# Patient Record
Sex: FEMALE | Race: WHITE | Employment: OTHER | URBAN - METROPOLITAN AREA
[De-identification: names, ages, dates, MRNs, and addresses within clinical notes are randomized per-mention and may not be internally consistent; named-entity substitution may affect disease eponyms.]

---

## 2017-01-17 ENCOUNTER — OFFICE VISIT - HEALTHEAST (OUTPATIENT)
Dept: FAMILY MEDICINE | Facility: CLINIC | Age: 67
End: 2017-01-17

## 2017-01-17 DIAGNOSIS — E11.9 TYPE 2 DIABETES MELLITUS (H): ICD-10-CM

## 2017-01-17 DIAGNOSIS — N30.01 ACUTE CYSTITIS WITH HEMATURIA: ICD-10-CM

## 2017-01-17 DIAGNOSIS — R73.9 HYPERGLYCEMIA: ICD-10-CM

## 2017-01-17 ASSESSMENT — MIFFLIN-ST. JEOR: SCORE: 1375.44

## 2017-01-18 LAB — HBA1C MFR BLD: 6.9 % (ref 4.2–6.1)

## 2017-01-27 ENCOUNTER — COMMUNICATION - HEALTHEAST (OUTPATIENT)
Dept: FAMILY MEDICINE | Facility: CLINIC | Age: 67
End: 2017-01-27

## 2017-01-30 ENCOUNTER — COMMUNICATION - HEALTHEAST (OUTPATIENT)
Dept: FAMILY MEDICINE | Facility: CLINIC | Age: 67
End: 2017-01-30

## 2017-02-14 ENCOUNTER — AMBULATORY - HEALTHEAST (OUTPATIENT)
Dept: EDUCATION SERVICES | Facility: CLINIC | Age: 67
End: 2017-02-14

## 2017-02-14 DIAGNOSIS — E11.9 TYPE 2 DIABETES MELLITUS WITHOUT COMPLICATION, WITHOUT LONG-TERM CURRENT USE OF INSULIN (H): ICD-10-CM

## 2017-03-01 ENCOUNTER — COMMUNICATION - HEALTHEAST (OUTPATIENT)
Dept: LAB | Facility: CLINIC | Age: 67
End: 2017-03-01

## 2017-04-19 ENCOUNTER — OFFICE VISIT - HEALTHEAST (OUTPATIENT)
Dept: FAMILY MEDICINE | Facility: CLINIC | Age: 67
End: 2017-04-19

## 2017-04-19 DIAGNOSIS — E78.5 HYPERLIPIDEMIA: ICD-10-CM

## 2017-04-19 DIAGNOSIS — E11.9 TYPE 2 DIABETES MELLITUS WITHOUT COMPLICATION, WITHOUT LONG-TERM CURRENT USE OF INSULIN (H): ICD-10-CM

## 2017-04-19 DIAGNOSIS — R09.81 NASAL CONGESTION: ICD-10-CM

## 2017-04-19 LAB — HBA1C MFR BLD: 6.2 % (ref 3.5–6)

## 2017-04-19 ASSESSMENT — MIFFLIN-ST. JEOR: SCORE: 1262.95

## 2017-05-02 ENCOUNTER — COMMUNICATION - HEALTHEAST (OUTPATIENT)
Dept: FAMILY MEDICINE | Facility: CLINIC | Age: 67
End: 2017-05-02

## 2017-05-02 DIAGNOSIS — I10 ESSENTIAL HYPERTENSION, MALIGNANT: ICD-10-CM

## 2017-05-09 ENCOUNTER — COMMUNICATION - HEALTHEAST (OUTPATIENT)
Dept: FAMILY MEDICINE | Facility: CLINIC | Age: 67
End: 2017-05-09

## 2017-05-15 ENCOUNTER — COMMUNICATION - HEALTHEAST (OUTPATIENT)
Dept: FAMILY MEDICINE | Facility: CLINIC | Age: 67
End: 2017-05-15

## 2017-05-23 ENCOUNTER — RECORDS - HEALTHEAST (OUTPATIENT)
Dept: ADMINISTRATIVE | Facility: OTHER | Age: 67
End: 2017-05-23

## 2017-06-26 ENCOUNTER — COMMUNICATION - HEALTHEAST (OUTPATIENT)
Dept: FAMILY MEDICINE | Facility: CLINIC | Age: 67
End: 2017-06-26

## 2017-06-26 DIAGNOSIS — G47.00 INSOMNIA: ICD-10-CM

## 2017-08-14 ENCOUNTER — COMMUNICATION - HEALTHEAST (OUTPATIENT)
Dept: FAMILY MEDICINE | Facility: CLINIC | Age: 67
End: 2017-08-14

## 2017-08-14 ENCOUNTER — OFFICE VISIT - HEALTHEAST (OUTPATIENT)
Dept: FAMILY MEDICINE | Facility: CLINIC | Age: 67
End: 2017-08-14

## 2017-08-14 DIAGNOSIS — G47.00 INSOMNIA: ICD-10-CM

## 2017-08-14 DIAGNOSIS — F33.0 MAJOR DEPRESSIVE DISORDER, RECURRENT, MILD (H): ICD-10-CM

## 2017-08-14 DIAGNOSIS — R23.2 FLUSHING: ICD-10-CM

## 2017-08-14 DIAGNOSIS — N39.3 FEMALE STRESS INCONTINENCE: ICD-10-CM

## 2017-08-14 DIAGNOSIS — M50.30 DEGENERATION OF CERVICAL INTERVERTEBRAL DISC: ICD-10-CM

## 2017-08-14 DIAGNOSIS — E55.9 VITAMIN D DEFICIENCY: ICD-10-CM

## 2017-08-14 DIAGNOSIS — F41.1 ANXIETY STATE: ICD-10-CM

## 2017-08-14 DIAGNOSIS — I10 ESSENTIAL HYPERTENSION, BENIGN: ICD-10-CM

## 2017-08-14 DIAGNOSIS — E78.5 HYPERLIPIDEMIA: ICD-10-CM

## 2017-08-14 DIAGNOSIS — E11.9 TYPE 2 DIABETES MELLITUS WITHOUT COMPLICATION, WITHOUT LONG-TERM CURRENT USE OF INSULIN (H): ICD-10-CM

## 2017-08-14 DIAGNOSIS — R06.83 SNORING: ICD-10-CM

## 2017-08-14 DIAGNOSIS — Z00.00 MEDICARE ANNUAL WELLNESS VISIT, INITIAL: ICD-10-CM

## 2017-08-14 DIAGNOSIS — R55 SYNCOPE: ICD-10-CM

## 2017-08-14 LAB
ATRIAL RATE - MUSE: 54 BPM
CHOLEST SERPL-MCNC: 126 MG/DL
DIASTOLIC BLOOD PRESSURE - MUSE: NORMAL MMHG
FASTING STATUS PATIENT QL REPORTED: ABNORMAL
HBA1C MFR BLD: 6.2 % (ref 3.5–6)
HDLC SERPL-MCNC: 43 MG/DL
INTERPRETATION ECG - MUSE: NORMAL
LDLC SERPL CALC-MCNC: 67 MG/DL
P AXIS - MUSE: 19 DEGREES
PR INTERVAL - MUSE: 134 MS
QRS DURATION - MUSE: 82 MS
QT - MUSE: 446 MS
QTC - MUSE: 422 MS
R AXIS - MUSE: 48 DEGREES
SYSTOLIC BLOOD PRESSURE - MUSE: NORMAL MMHG
T AXIS - MUSE: 47 DEGREES
TRIGL SERPL-MCNC: 82 MG/DL
VENTRICULAR RATE- MUSE: 54 BPM

## 2017-08-14 ASSESSMENT — MIFFLIN-ST. JEOR: SCORE: 1218.67

## 2017-08-15 ENCOUNTER — COMMUNICATION - HEALTHEAST (OUTPATIENT)
Dept: FAMILY MEDICINE | Facility: CLINIC | Age: 67
End: 2017-08-15

## 2017-08-15 DIAGNOSIS — F32.A DEPRESSION: ICD-10-CM

## 2017-08-16 ENCOUNTER — AMBULATORY - HEALTHEAST (OUTPATIENT)
Dept: FAMILY MEDICINE | Facility: CLINIC | Age: 67
End: 2017-08-16

## 2017-08-16 DIAGNOSIS — F32.A DEPRESSION: ICD-10-CM

## 2017-09-18 ENCOUNTER — COMMUNICATION - HEALTHEAST (OUTPATIENT)
Dept: FAMILY MEDICINE | Facility: CLINIC | Age: 67
End: 2017-09-18

## 2017-09-18 DIAGNOSIS — G47.00 INSOMNIA: ICD-10-CM

## 2017-10-11 ENCOUNTER — COMMUNICATION - HEALTHEAST (OUTPATIENT)
Dept: FAMILY MEDICINE | Facility: CLINIC | Age: 67
End: 2017-10-11

## 2017-10-11 DIAGNOSIS — E78.5 HYPERLIPIDEMIA: ICD-10-CM

## 2017-10-17 ENCOUNTER — COMMUNICATION - HEALTHEAST (OUTPATIENT)
Dept: FAMILY MEDICINE | Facility: CLINIC | Age: 67
End: 2017-10-17

## 2017-10-18 ENCOUNTER — COMMUNICATION - HEALTHEAST (OUTPATIENT)
Dept: FAMILY MEDICINE | Facility: CLINIC | Age: 67
End: 2017-10-18

## 2017-10-26 ENCOUNTER — OFFICE VISIT - HEALTHEAST (OUTPATIENT)
Dept: FAMILY MEDICINE | Facility: CLINIC | Age: 67
End: 2017-10-26

## 2017-10-26 DIAGNOSIS — E78.5 HYPERLIPIDEMIA: ICD-10-CM

## 2017-10-26 DIAGNOSIS — Z01.818 PREOP GENERAL PHYSICAL EXAM: ICD-10-CM

## 2017-10-26 DIAGNOSIS — F41.1 ANXIETY STATE: ICD-10-CM

## 2017-10-26 DIAGNOSIS — M77.9 BONE SPUR: ICD-10-CM

## 2017-10-26 DIAGNOSIS — G47.00 INSOMNIA: ICD-10-CM

## 2017-10-26 DIAGNOSIS — F33.42 MAJOR DEPRESSIVE DISORDER, RECURRENT EPISODE, IN FULL REMISSION (H): ICD-10-CM

## 2017-10-26 DIAGNOSIS — E11.9 TYPE 2 DIABETES MELLITUS WITHOUT COMPLICATION, WITHOUT LONG-TERM CURRENT USE OF INSULIN (H): ICD-10-CM

## 2017-10-26 DIAGNOSIS — Z23 NEED FOR VACCINATION: ICD-10-CM

## 2017-10-26 DIAGNOSIS — I10 ESSENTIAL HYPERTENSION, BENIGN: ICD-10-CM

## 2017-10-26 DIAGNOSIS — M50.30 DEGENERATION OF CERVICAL INTERVERTEBRAL DISC: ICD-10-CM

## 2017-10-26 DIAGNOSIS — L60.3 SPLITTING OF NAIL: ICD-10-CM

## 2017-10-26 RX ORDER — CHLORAL HYDRATE 500 MG
2 CAPSULE ORAL DAILY
Status: SHIPPED | COMMUNITY
Start: 2017-10-26

## 2017-10-26 ASSESSMENT — MIFFLIN-ST. JEOR: SCORE: 1221.67

## 2017-12-05 ENCOUNTER — COMMUNICATION - HEALTHEAST (OUTPATIENT)
Dept: LAB | Facility: CLINIC | Age: 67
End: 2017-12-05

## 2017-12-05 ENCOUNTER — AMBULATORY - HEALTHEAST (OUTPATIENT)
Dept: FAMILY MEDICINE | Facility: CLINIC | Age: 67
End: 2017-12-05

## 2017-12-05 DIAGNOSIS — E11.9 TYPE 2 DIABETES MELLITUS WITHOUT COMPLICATION, WITHOUT LONG-TERM CURRENT USE OF INSULIN (H): ICD-10-CM

## 2017-12-06 ENCOUNTER — AMBULATORY - HEALTHEAST (OUTPATIENT)
Dept: LAB | Facility: CLINIC | Age: 67
End: 2017-12-06

## 2017-12-06 DIAGNOSIS — E11.9 TYPE 2 DIABETES MELLITUS WITHOUT COMPLICATION, WITHOUT LONG-TERM CURRENT USE OF INSULIN (H): ICD-10-CM

## 2017-12-06 LAB — HBA1C MFR BLD: 6.1 % (ref 3.5–6)

## 2017-12-11 ENCOUNTER — COMMUNICATION - HEALTHEAST (OUTPATIENT)
Dept: FAMILY MEDICINE | Facility: CLINIC | Age: 67
End: 2017-12-11

## 2017-12-20 ENCOUNTER — COMMUNICATION - HEALTHEAST (OUTPATIENT)
Dept: FAMILY MEDICINE | Facility: CLINIC | Age: 67
End: 2017-12-20

## 2017-12-20 DIAGNOSIS — G47.00 INSOMNIA: ICD-10-CM

## 2018-02-16 ENCOUNTER — COMMUNICATION - HEALTHEAST (OUTPATIENT)
Dept: FAMILY MEDICINE | Facility: CLINIC | Age: 68
End: 2018-02-16

## 2018-02-20 ENCOUNTER — OFFICE VISIT - HEALTHEAST (OUTPATIENT)
Dept: FAMILY MEDICINE | Facility: CLINIC | Age: 68
End: 2018-02-20

## 2018-02-20 DIAGNOSIS — Z71.84 COUNSELING ABOUT TRAVEL: ICD-10-CM

## 2018-02-20 DIAGNOSIS — R63.5 WEIGHT GAIN: ICD-10-CM

## 2018-02-20 DIAGNOSIS — Z12.31 VISIT FOR SCREENING MAMMOGRAM: ICD-10-CM

## 2018-02-20 LAB — TSH SERPL DL<=0.005 MIU/L-ACNC: 1.78 UIU/ML (ref 0.3–5)

## 2018-02-20 ASSESSMENT — MIFFLIN-ST. JEOR: SCORE: 1273.83

## 2018-02-22 ENCOUNTER — COMMUNICATION - HEALTHEAST (OUTPATIENT)
Dept: FAMILY MEDICINE | Facility: CLINIC | Age: 68
End: 2018-02-22

## 2018-02-23 ENCOUNTER — COMMUNICATION - HEALTHEAST (OUTPATIENT)
Dept: FAMILY MEDICINE | Facility: CLINIC | Age: 68
End: 2018-02-23

## 2018-02-27 ENCOUNTER — RECORDS - HEALTHEAST (OUTPATIENT)
Dept: ADMINISTRATIVE | Facility: OTHER | Age: 68
End: 2018-02-27

## 2018-02-28 ENCOUNTER — COMMUNICATION - HEALTHEAST (OUTPATIENT)
Dept: FAMILY MEDICINE | Facility: CLINIC | Age: 68
End: 2018-02-28

## 2018-02-28 DIAGNOSIS — M25.511 RIGHT SHOULDER PAIN: ICD-10-CM

## 2018-03-05 ENCOUNTER — HOSPITAL ENCOUNTER (OUTPATIENT)
Dept: MAMMOGRAPHY | Facility: CLINIC | Age: 68
Discharge: HOME | End: 2018-03-05
Attending: FAMILY MEDICINE

## 2018-03-05 DIAGNOSIS — Z12.31 VISIT FOR SCREENING MAMMOGRAM: ICD-10-CM

## 2018-03-09 ENCOUNTER — OFFICE VISIT - HEALTHEAST (OUTPATIENT)
Dept: PHYSICAL THERAPY | Facility: REHABILITATION | Age: 68
End: 2018-03-09

## 2018-03-09 DIAGNOSIS — M25.511 PAIN IN JOINT OF RIGHT SHOULDER: ICD-10-CM

## 2018-03-09 DIAGNOSIS — M89.9 SCAPULAR DYSFUNCTION: ICD-10-CM

## 2018-03-12 ENCOUNTER — OFFICE VISIT - HEALTHEAST (OUTPATIENT)
Dept: PHYSICAL THERAPY | Facility: REHABILITATION | Age: 68
End: 2018-03-12

## 2018-03-12 DIAGNOSIS — M25.511 PAIN IN JOINT OF RIGHT SHOULDER: ICD-10-CM

## 2018-03-12 DIAGNOSIS — M89.9 SCAPULAR DYSFUNCTION: ICD-10-CM

## 2018-03-14 ENCOUNTER — OFFICE VISIT - HEALTHEAST (OUTPATIENT)
Dept: PHYSICAL THERAPY | Facility: REHABILITATION | Age: 68
End: 2018-03-14

## 2018-03-14 DIAGNOSIS — M89.9 SCAPULAR DYSFUNCTION: ICD-10-CM

## 2018-03-14 DIAGNOSIS — M25.511 PAIN IN JOINT OF RIGHT SHOULDER: ICD-10-CM

## 2018-03-21 ENCOUNTER — OFFICE VISIT - HEALTHEAST (OUTPATIENT)
Dept: PHYSICAL THERAPY | Facility: REHABILITATION | Age: 68
End: 2018-03-21

## 2018-03-21 DIAGNOSIS — M89.9 SCAPULAR DYSFUNCTION: ICD-10-CM

## 2018-03-21 DIAGNOSIS — M25.511 PAIN IN JOINT OF RIGHT SHOULDER: ICD-10-CM

## 2018-03-26 ENCOUNTER — OFFICE VISIT - HEALTHEAST (OUTPATIENT)
Dept: PHYSICAL THERAPY | Facility: REHABILITATION | Age: 68
End: 2018-03-26

## 2018-03-26 DIAGNOSIS — M25.511 PAIN IN JOINT OF RIGHT SHOULDER: ICD-10-CM

## 2018-03-26 DIAGNOSIS — M89.9 SCAPULAR DYSFUNCTION: ICD-10-CM

## 2018-03-30 ENCOUNTER — OFFICE VISIT - HEALTHEAST (OUTPATIENT)
Dept: FAMILY MEDICINE | Facility: CLINIC | Age: 68
End: 2018-03-30

## 2018-03-30 DIAGNOSIS — N30.01 ACUTE CYSTITIS WITH HEMATURIA: ICD-10-CM

## 2018-03-30 LAB
ALBUMIN UR-MCNC: ABNORMAL MG/DL
APPEARANCE UR: ABNORMAL
BACTERIA #/AREA URNS HPF: ABNORMAL HPF
BILIRUB UR QL STRIP: NEGATIVE
COLOR UR AUTO: ABNORMAL
GLUCOSE UR STRIP-MCNC: NEGATIVE MG/DL
HGB UR QL STRIP: ABNORMAL
KETONES UR STRIP-MCNC: NEGATIVE MG/DL
LEUKOCYTE ESTERASE UR QL STRIP: ABNORMAL
NITRATE UR QL: NEGATIVE
PH UR STRIP: 5.5 [PH] (ref 4.5–8)
RBC #/AREA URNS AUTO: >100 HPF
SP GR UR STRIP: 1.02 (ref 1–1.03)
SQUAMOUS #/AREA URNS AUTO: ABNORMAL LPF
UROBILINOGEN UR STRIP-ACNC: ABNORMAL
WBC #/AREA URNS AUTO: >100 HPF

## 2018-03-30 ASSESSMENT — MIFFLIN-ST. JEOR: SCORE: 1293.79

## 2018-04-01 LAB — BACTERIA SPEC CULT: ABNORMAL

## 2018-04-02 ENCOUNTER — OFFICE VISIT - HEALTHEAST (OUTPATIENT)
Dept: PHYSICAL THERAPY | Facility: REHABILITATION | Age: 68
End: 2018-04-02

## 2018-04-02 DIAGNOSIS — M25.511 PAIN IN JOINT OF RIGHT SHOULDER: ICD-10-CM

## 2018-04-02 DIAGNOSIS — M89.9 SCAPULAR DYSFUNCTION: ICD-10-CM

## 2018-04-04 ENCOUNTER — AMBULATORY - HEALTHEAST (OUTPATIENT)
Dept: FAMILY MEDICINE | Facility: CLINIC | Age: 68
End: 2018-04-04

## 2018-04-04 ENCOUNTER — COMMUNICATION - HEALTHEAST (OUTPATIENT)
Dept: FAMILY MEDICINE | Facility: CLINIC | Age: 68
End: 2018-04-04

## 2018-04-04 DIAGNOSIS — N30.01 ACUTE CYSTITIS WITH HEMATURIA: ICD-10-CM

## 2018-04-05 ENCOUNTER — AMBULATORY - HEALTHEAST (OUTPATIENT)
Dept: LAB | Facility: CLINIC | Age: 68
End: 2018-04-05

## 2018-04-05 DIAGNOSIS — N30.01 ACUTE CYSTITIS WITH HEMATURIA: ICD-10-CM

## 2018-04-05 LAB
ALBUMIN UR-MCNC: NEGATIVE MG/DL
APPEARANCE UR: CLEAR
BACTERIA #/AREA URNS HPF: ABNORMAL HPF
BILIRUB UR QL STRIP: NEGATIVE
COLOR UR AUTO: YELLOW
GLUCOSE UR STRIP-MCNC: NEGATIVE MG/DL
HGB UR QL STRIP: NEGATIVE
KETONES UR STRIP-MCNC: NEGATIVE MG/DL
LEUKOCYTE ESTERASE UR QL STRIP: ABNORMAL
MUCOUS THREADS #/AREA URNS LPF: ABNORMAL LPF
NITRATE UR QL: NEGATIVE
PH UR STRIP: 6 [PH] (ref 5–8)
RBC #/AREA URNS AUTO: ABNORMAL HPF
SP GR UR STRIP: 1.02 (ref 1–1.03)
SQUAMOUS #/AREA URNS AUTO: ABNORMAL LPF
UROBILINOGEN UR STRIP-ACNC: ABNORMAL
WBC #/AREA URNS AUTO: ABNORMAL HPF

## 2018-04-06 ENCOUNTER — COMMUNICATION - HEALTHEAST (OUTPATIENT)
Dept: FAMILY MEDICINE | Facility: CLINIC | Age: 68
End: 2018-04-06

## 2018-04-06 LAB — BACTERIA SPEC CULT: NO GROWTH

## 2018-05-31 ENCOUNTER — COMMUNICATION - HEALTHEAST (OUTPATIENT)
Dept: FAMILY MEDICINE | Facility: CLINIC | Age: 68
End: 2018-05-31

## 2018-06-04 ENCOUNTER — AMBULATORY - HEALTHEAST (OUTPATIENT)
Dept: NURSING | Facility: CLINIC | Age: 68
End: 2018-06-04

## 2018-06-07 ENCOUNTER — COMMUNICATION - HEALTHEAST (OUTPATIENT)
Dept: FAMILY MEDICINE | Facility: CLINIC | Age: 68
End: 2018-06-07

## 2018-06-07 DIAGNOSIS — I10 ESSENTIAL HYPERTENSION, MALIGNANT: ICD-10-CM

## 2018-07-06 ENCOUNTER — COMMUNICATION - HEALTHEAST (OUTPATIENT)
Dept: FAMILY MEDICINE | Facility: CLINIC | Age: 68
End: 2018-07-06

## 2018-07-06 DIAGNOSIS — E78.5 HYPERLIPIDEMIA: ICD-10-CM

## 2018-08-16 ENCOUNTER — OFFICE VISIT - HEALTHEAST (OUTPATIENT)
Dept: FAMILY MEDICINE | Facility: CLINIC | Age: 68
End: 2018-08-16

## 2018-08-16 DIAGNOSIS — I10 ESSENTIAL HYPERTENSION, BENIGN: ICD-10-CM

## 2018-08-16 DIAGNOSIS — E11.9 TYPE 2 DIABETES MELLITUS WITHOUT COMPLICATION, WITHOUT LONG-TERM CURRENT USE OF INSULIN (H): ICD-10-CM

## 2018-08-16 DIAGNOSIS — N39.3 FEMALE STRESS INCONTINENCE: ICD-10-CM

## 2018-08-16 DIAGNOSIS — M94.9 DISORDER OF BONE AND CARTILAGE: ICD-10-CM

## 2018-08-16 DIAGNOSIS — M79.10 MYALGIA: ICD-10-CM

## 2018-08-16 DIAGNOSIS — M50.30 DEGENERATION OF CERVICAL INTERVERTEBRAL DISC: ICD-10-CM

## 2018-08-16 DIAGNOSIS — M89.9 DISORDER OF BONE AND CARTILAGE: ICD-10-CM

## 2018-08-16 DIAGNOSIS — Z00.00 MEDICARE ANNUAL WELLNESS VISIT, SUBSEQUENT: ICD-10-CM

## 2018-08-16 DIAGNOSIS — M47.812 CERVICAL SPONDYLOSIS WITHOUT MYELOPATHY: ICD-10-CM

## 2018-08-16 DIAGNOSIS — E78.5 HYPERLIPIDEMIA: ICD-10-CM

## 2018-08-16 DIAGNOSIS — F41.1 ANXIETY STATE: ICD-10-CM

## 2018-08-16 DIAGNOSIS — F33.42 MAJOR DEPRESSIVE DISORDER, RECURRENT EPISODE, IN FULL REMISSION (H): ICD-10-CM

## 2018-08-16 DIAGNOSIS — G47.00 INSOMNIA: ICD-10-CM

## 2018-08-16 LAB
ALBUMIN SERPL-MCNC: 4.1 G/DL (ref 3.5–5)
ALP SERPL-CCNC: 84 U/L (ref 45–120)
ALT SERPL W P-5'-P-CCNC: 38 U/L (ref 0–45)
ANION GAP SERPL CALCULATED.3IONS-SCNC: 12 MMOL/L (ref 5–18)
AST SERPL W P-5'-P-CCNC: 31 U/L (ref 0–40)
BILIRUB SERPL-MCNC: 1 MG/DL (ref 0–1)
BUN SERPL-MCNC: 24 MG/DL (ref 8–22)
CALCIUM SERPL-MCNC: 9.8 MG/DL (ref 8.5–10.5)
CHLORIDE BLD-SCNC: 103 MMOL/L (ref 98–107)
CHOLEST SERPL-MCNC: 131 MG/DL
CO2 SERPL-SCNC: 27 MMOL/L (ref 22–31)
CREAT SERPL-MCNC: 1.03 MG/DL (ref 0.6–1.1)
CREAT UR-MCNC: 173.1 MG/DL
FASTING STATUS PATIENT QL REPORTED: YES
GFR SERPL CREATININE-BSD FRML MDRD: 53 ML/MIN/1.73M2
GLUCOSE BLD-MCNC: 128 MG/DL (ref 70–125)
HBA1C MFR BLD: 6.6 % (ref 3.5–6)
HDLC SERPL-MCNC: 44 MG/DL
LDLC SERPL CALC-MCNC: 67 MG/DL
MICROALBUMIN UR-MCNC: 0.63 MG/DL (ref 0–1.99)
MICROALBUMIN/CREAT UR: 3.6 MG/G
POTASSIUM BLD-SCNC: 4.3 MMOL/L (ref 3.5–5)
PROT SERPL-MCNC: 6.9 G/DL (ref 6–8)
SODIUM SERPL-SCNC: 142 MMOL/L (ref 136–145)
TRIGL SERPL-MCNC: 101 MG/DL

## 2018-08-16 ASSESSMENT — MIFFLIN-ST. JEOR: SCORE: 1286.54

## 2018-08-17 LAB
25(OH)D3 SERPL-MCNC: 51.4 NG/ML (ref 30–80)
25(OH)D3 SERPL-MCNC: 51.4 NG/ML (ref 30–80)

## 2018-08-21 ENCOUNTER — COMMUNICATION - HEALTHEAST (OUTPATIENT)
Dept: FAMILY MEDICINE | Facility: CLINIC | Age: 68
End: 2018-08-21

## 2018-09-04 ENCOUNTER — RECORDS - HEALTHEAST (OUTPATIENT)
Dept: ADMINISTRATIVE | Facility: OTHER | Age: 68
End: 2018-09-04

## 2018-09-17 ENCOUNTER — COMMUNICATION - HEALTHEAST (OUTPATIENT)
Dept: FAMILY MEDICINE | Facility: CLINIC | Age: 68
End: 2018-09-17

## 2018-09-17 DIAGNOSIS — G47.00 INSOMNIA: ICD-10-CM

## 2018-09-20 ENCOUNTER — COMMUNICATION - HEALTHEAST (OUTPATIENT)
Dept: FAMILY MEDICINE | Facility: CLINIC | Age: 68
End: 2018-09-20

## 2018-09-21 ENCOUNTER — AMBULATORY - HEALTHEAST (OUTPATIENT)
Dept: FAMILY MEDICINE | Facility: CLINIC | Age: 68
End: 2018-09-21

## 2018-09-28 ENCOUNTER — RECORDS - HEALTHEAST (OUTPATIENT)
Dept: BONE DENSITY | Facility: CLINIC | Age: 68
End: 2018-09-28

## 2018-09-28 ENCOUNTER — RECORDS - HEALTHEAST (OUTPATIENT)
Dept: ADMINISTRATIVE | Facility: OTHER | Age: 68
End: 2018-09-28

## 2018-09-28 DIAGNOSIS — M94.9 DISORDER OF CARTILAGE, UNSPECIFIED: ICD-10-CM

## 2018-09-28 DIAGNOSIS — M89.9 DISORDER OF BONE, UNSPECIFIED: ICD-10-CM

## 2018-10-08 ENCOUNTER — COMMUNICATION - HEALTHEAST (OUTPATIENT)
Dept: FAMILY MEDICINE | Facility: CLINIC | Age: 68
End: 2018-10-08

## 2018-10-22 ENCOUNTER — COMMUNICATION - HEALTHEAST (OUTPATIENT)
Dept: FAMILY MEDICINE | Facility: CLINIC | Age: 68
End: 2018-10-22

## 2018-10-25 ENCOUNTER — AMBULATORY - HEALTHEAST (OUTPATIENT)
Dept: NURSING | Facility: CLINIC | Age: 68
End: 2018-10-25

## 2018-10-25 DIAGNOSIS — Z23 FLU VACCINE NEED: ICD-10-CM

## 2018-11-05 ENCOUNTER — COMMUNICATION - HEALTHEAST (OUTPATIENT)
Dept: FAMILY MEDICINE | Facility: CLINIC | Age: 68
End: 2018-11-05

## 2018-11-05 DIAGNOSIS — F32.A DEPRESSION: ICD-10-CM

## 2018-11-08 ENCOUNTER — COMMUNICATION - HEALTHEAST (OUTPATIENT)
Dept: FAMILY MEDICINE | Facility: CLINIC | Age: 68
End: 2018-11-08

## 2018-11-08 DIAGNOSIS — G47.00 INSOMNIA: ICD-10-CM

## 2018-11-20 ENCOUNTER — RECORDS - HEALTHEAST (OUTPATIENT)
Dept: ADMINISTRATIVE | Facility: OTHER | Age: 68
End: 2018-11-20

## 2018-12-05 ENCOUNTER — COMMUNICATION - HEALTHEAST (OUTPATIENT)
Dept: FAMILY MEDICINE | Facility: CLINIC | Age: 68
End: 2018-12-05

## 2018-12-05 DIAGNOSIS — E11.9 TYPE 2 DIABETES MELLITUS WITHOUT COMPLICATION, WITHOUT LONG-TERM CURRENT USE OF INSULIN (H): ICD-10-CM

## 2018-12-06 ENCOUNTER — OFFICE VISIT - HEALTHEAST (OUTPATIENT)
Dept: FAMILY MEDICINE | Facility: CLINIC | Age: 68
End: 2018-12-06

## 2018-12-06 DIAGNOSIS — M79.10 MYALGIA: ICD-10-CM

## 2018-12-06 DIAGNOSIS — M89.9 DISORDER OF BONE AND CARTILAGE: ICD-10-CM

## 2018-12-06 DIAGNOSIS — E11.9 TYPE 2 DIABETES MELLITUS WITHOUT COMPLICATION, WITHOUT LONG-TERM CURRENT USE OF INSULIN (H): ICD-10-CM

## 2018-12-06 DIAGNOSIS — M94.9 DISORDER OF BONE AND CARTILAGE: ICD-10-CM

## 2018-12-06 LAB
ALBUMIN SERPL-MCNC: 4 G/DL (ref 3.5–5)
ANION GAP SERPL CALCULATED.3IONS-SCNC: 13 MMOL/L (ref 5–18)
BUN SERPL-MCNC: 20 MG/DL (ref 8–22)
CALCIUM SERPL-MCNC: 10 MG/DL (ref 8.5–10.5)
CHLORIDE BLD-SCNC: 103 MMOL/L (ref 98–107)
CK SERPL-CCNC: 86 U/L (ref 30–190)
CO2 SERPL-SCNC: 26 MMOL/L (ref 22–31)
CREAT SERPL-MCNC: 0.99 MG/DL (ref 0.6–1.1)
ERYTHROCYTE [SEDIMENTATION RATE] IN BLOOD BY WESTERGREN METHOD: 6 MM/HR (ref 0–20)
GFR SERPL CREATININE-BSD FRML MDRD: 56 ML/MIN/1.73M2
GLUCOSE BLD-MCNC: 120 MG/DL (ref 70–125)
HBA1C MFR BLD: 6.3 % (ref 3.5–6)
MAGNESIUM SERPL-MCNC: 2.4 MG/DL (ref 1.8–2.6)
PHOSPHATE SERPL-MCNC: 3.6 MG/DL (ref 2.5–4.5)
POTASSIUM BLD-SCNC: 3.7 MMOL/L (ref 3.5–5)
SODIUM SERPL-SCNC: 142 MMOL/L (ref 136–145)

## 2018-12-06 ASSESSMENT — MIFFLIN-ST. JEOR: SCORE: 1311.94

## 2018-12-21 ENCOUNTER — COMMUNICATION - HEALTHEAST (OUTPATIENT)
Dept: FAMILY MEDICINE | Facility: CLINIC | Age: 68
End: 2018-12-21

## 2019-03-21 ENCOUNTER — RECORDS - HEALTHEAST (OUTPATIENT)
Dept: ADMINISTRATIVE | Facility: OTHER | Age: 69
End: 2019-03-21

## 2019-04-03 ENCOUNTER — OFFICE VISIT - HEALTHEAST (OUTPATIENT)
Dept: FAMILY MEDICINE | Facility: CLINIC | Age: 69
End: 2019-04-03

## 2019-04-03 DIAGNOSIS — N39.41 URGE INCONTINENCE OF URINE: ICD-10-CM

## 2019-04-03 DIAGNOSIS — R30.0 DYSURIA: ICD-10-CM

## 2019-04-03 LAB
ALBUMIN UR-MCNC: NEGATIVE MG/DL
APPEARANCE UR: CLEAR
BACTERIA #/AREA URNS HPF: ABNORMAL HPF
BILIRUB UR QL STRIP: NEGATIVE
COLOR UR AUTO: YELLOW
GLUCOSE UR STRIP-MCNC: NEGATIVE MG/DL
HGB UR QL STRIP: ABNORMAL
KETONES UR STRIP-MCNC: NEGATIVE MG/DL
LEUKOCYTE ESTERASE UR QL STRIP: NEGATIVE
MUCOUS THREADS #/AREA URNS LPF: ABNORMAL LPF
NITRATE UR QL: NEGATIVE
PH UR STRIP: 6.5 [PH] (ref 5–8)
RBC #/AREA URNS AUTO: ABNORMAL HPF
SP GR UR STRIP: 1.02 (ref 1–1.03)
SQUAMOUS #/AREA URNS AUTO: ABNORMAL LPF
TRANS CELLS #/AREA URNS HPF: ABNORMAL LPF
UROBILINOGEN UR STRIP-ACNC: ABNORMAL
WBC #/AREA URNS AUTO: ABNORMAL HPF

## 2019-04-04 ENCOUNTER — COMMUNICATION - HEALTHEAST (OUTPATIENT)
Dept: FAMILY MEDICINE | Facility: CLINIC | Age: 69
End: 2019-04-04

## 2019-04-05 ENCOUNTER — COMMUNICATION - HEALTHEAST (OUTPATIENT)
Dept: FAMILY MEDICINE | Facility: CLINIC | Age: 69
End: 2019-04-05

## 2019-04-05 ENCOUNTER — AMBULATORY - HEALTHEAST (OUTPATIENT)
Dept: FAMILY MEDICINE | Facility: CLINIC | Age: 69
End: 2019-04-05

## 2019-04-05 DIAGNOSIS — N39.0 URINARY TRACT INFECTION WITHOUT HEMATURIA, SITE UNSPECIFIED: ICD-10-CM

## 2019-04-09 ENCOUNTER — RECORDS - HEALTHEAST (OUTPATIENT)
Dept: ADMINISTRATIVE | Facility: OTHER | Age: 69
End: 2019-04-09

## 2019-05-20 ENCOUNTER — COMMUNICATION - HEALTHEAST (OUTPATIENT)
Dept: FAMILY MEDICINE | Facility: CLINIC | Age: 69
End: 2019-05-20

## 2019-05-20 ENCOUNTER — AMBULATORY - HEALTHEAST (OUTPATIENT)
Dept: FAMILY MEDICINE | Facility: CLINIC | Age: 69
End: 2019-05-20

## 2019-07-03 ENCOUNTER — RECORDS - HEALTHEAST (OUTPATIENT)
Dept: ADMINISTRATIVE | Facility: OTHER | Age: 69
End: 2019-07-03

## 2019-07-06 ENCOUNTER — COMMUNICATION - HEALTHEAST (OUTPATIENT)
Dept: FAMILY MEDICINE | Facility: CLINIC | Age: 69
End: 2019-07-06

## 2019-07-06 DIAGNOSIS — I10 ESSENTIAL HYPERTENSION, MALIGNANT: ICD-10-CM

## 2019-07-08 ENCOUNTER — COMMUNICATION - HEALTHEAST (OUTPATIENT)
Dept: FAMILY MEDICINE | Facility: CLINIC | Age: 69
End: 2019-07-08

## 2019-07-08 DIAGNOSIS — I10 ESSENTIAL HYPERTENSION, MALIGNANT: ICD-10-CM

## 2019-07-08 DIAGNOSIS — G47.00 INSOMNIA: ICD-10-CM

## 2019-07-23 ENCOUNTER — RECORDS - HEALTHEAST (OUTPATIENT)
Dept: ADMINISTRATIVE | Facility: OTHER | Age: 69
End: 2019-07-23

## 2019-08-01 ENCOUNTER — RECORDS - HEALTHEAST (OUTPATIENT)
Dept: ADMINISTRATIVE | Facility: OTHER | Age: 69
End: 2019-08-01

## 2019-08-07 ENCOUNTER — RECORDS - HEALTHEAST (OUTPATIENT)
Dept: ADMINISTRATIVE | Facility: OTHER | Age: 69
End: 2019-08-07

## 2019-08-15 ENCOUNTER — RECORDS - HEALTHEAST (OUTPATIENT)
Dept: ADMINISTRATIVE | Facility: OTHER | Age: 69
End: 2019-08-15

## 2019-09-09 ENCOUNTER — RECORDS - HEALTHEAST (OUTPATIENT)
Dept: ADMINISTRATIVE | Facility: OTHER | Age: 69
End: 2019-09-09

## 2019-09-11 ENCOUNTER — OFFICE VISIT - HEALTHEAST (OUTPATIENT)
Dept: FAMILY MEDICINE | Facility: CLINIC | Age: 69
End: 2019-09-11

## 2019-09-11 DIAGNOSIS — F41.1 ANXIETY STATE: ICD-10-CM

## 2019-09-11 DIAGNOSIS — I10 ESSENTIAL HYPERTENSION, BENIGN: ICD-10-CM

## 2019-09-11 DIAGNOSIS — G47.00 INSOMNIA, UNSPECIFIED TYPE: ICD-10-CM

## 2019-09-11 DIAGNOSIS — Z00.00 MEDICARE ANNUAL WELLNESS VISIT, SUBSEQUENT: ICD-10-CM

## 2019-09-11 DIAGNOSIS — F33.42 MAJOR DEPRESSIVE DISORDER, RECURRENT EPISODE, IN FULL REMISSION (H): ICD-10-CM

## 2019-09-11 DIAGNOSIS — R26.89 POOR BALANCE: ICD-10-CM

## 2019-09-11 DIAGNOSIS — E78.5 HYPERLIPIDEMIA, UNSPECIFIED HYPERLIPIDEMIA TYPE: ICD-10-CM

## 2019-09-11 DIAGNOSIS — M85.80 OSTEOPENIA, UNSPECIFIED LOCATION: ICD-10-CM

## 2019-09-11 DIAGNOSIS — N39.3 FEMALE STRESS INCONTINENCE: ICD-10-CM

## 2019-09-11 DIAGNOSIS — Z23 NEED FOR VACCINATION: ICD-10-CM

## 2019-09-11 DIAGNOSIS — E11.9 TYPE 2 DIABETES MELLITUS WITHOUT COMPLICATION, WITHOUT LONG-TERM CURRENT USE OF INSULIN (H): ICD-10-CM

## 2019-09-11 LAB
ALBUMIN SERPL-MCNC: 4.2 G/DL (ref 3.5–5)
ALP SERPL-CCNC: 83 U/L (ref 45–120)
ALT SERPL W P-5'-P-CCNC: 24 U/L (ref 0–45)
ANION GAP SERPL CALCULATED.3IONS-SCNC: 11 MMOL/L (ref 5–18)
AST SERPL W P-5'-P-CCNC: 24 U/L (ref 0–40)
BILIRUB SERPL-MCNC: 0.6 MG/DL (ref 0–1)
BUN SERPL-MCNC: 19 MG/DL (ref 8–28)
CALCIUM SERPL-MCNC: 9.7 MG/DL (ref 8.5–10.5)
CHLORIDE BLD-SCNC: 103 MMOL/L (ref 98–107)
CHOLEST SERPL-MCNC: 240 MG/DL
CO2 SERPL-SCNC: 25 MMOL/L (ref 22–31)
CREAT SERPL-MCNC: 0.99 MG/DL (ref 0.6–1.1)
CREAT UR-MCNC: 155.9 MG/DL
FASTING STATUS PATIENT QL REPORTED: YES
GFR SERPL CREATININE-BSD FRML MDRD: 55 ML/MIN/1.73M2
GLUCOSE BLD-MCNC: 130 MG/DL (ref 70–125)
HBA1C MFR BLD: 6.7 % (ref 3.5–6)
HDLC SERPL-MCNC: 45 MG/DL
LDLC SERPL CALC-MCNC: 148 MG/DL
MICROALBUMIN UR-MCNC: <0.5 MG/DL (ref 0–1.99)
MICROALBUMIN/CREAT UR: NORMAL MG/G{CREAT}
POTASSIUM BLD-SCNC: 3.9 MMOL/L (ref 3.5–5)
PROT SERPL-MCNC: 7.1 G/DL (ref 6–8)
PTH-INTACT SERPL-MCNC: 43 PG/ML (ref 10–86)
SODIUM SERPL-SCNC: 139 MMOL/L (ref 136–145)
TRIGL SERPL-MCNC: 236 MG/DL

## 2019-09-11 ASSESSMENT — MIFFLIN-ST. JEOR: SCORE: 1348.22

## 2019-09-12 LAB
25(OH)D3 SERPL-MCNC: 40.5 NG/ML (ref 30–80)
25(OH)D3 SERPL-MCNC: 40.5 NG/ML (ref 30–80)
HCV AB SERPL QL IA: NEGATIVE

## 2019-09-12 ASSESSMENT — ANXIETY QUESTIONNAIRES
1. FEELING NERVOUS, ANXIOUS, OR ON EDGE: NEARLY EVERY DAY
3. WORRYING TOO MUCH ABOUT DIFFERENT THINGS: NEARLY EVERY DAY
IF YOU CHECKED OFF ANY PROBLEMS ON THIS QUESTIONNAIRE, HOW DIFFICULT HAVE THESE PROBLEMS MADE IT FOR YOU TO DO YOUR WORK, TAKE CARE OF THINGS AT HOME, OR GET ALONG WITH OTHER PEOPLE: VERY DIFFICULT
6. BECOMING EASILY ANNOYED OR IRRITABLE: MORE THAN HALF THE DAYS
7. FEELING AFRAID AS IF SOMETHING AWFUL MIGHT HAPPEN: MORE THAN HALF THE DAYS
4. TROUBLE RELAXING: NEARLY EVERY DAY
2. NOT BEING ABLE TO STOP OR CONTROL WORRYING: MORE THAN HALF THE DAYS
GAD7 TOTAL SCORE: 16
5. BEING SO RESTLESS THAT IT IS HARD TO SIT STILL: SEVERAL DAYS

## 2019-09-12 ASSESSMENT — PATIENT HEALTH QUESTIONNAIRE - PHQ9: SUM OF ALL RESPONSES TO PHQ QUESTIONS 1-9: 11

## 2019-09-16 ENCOUNTER — RECORDS - HEALTHEAST (OUTPATIENT)
Dept: ADMINISTRATIVE | Facility: OTHER | Age: 69
End: 2019-09-16

## 2019-09-17 ENCOUNTER — COMMUNICATION - HEALTHEAST (OUTPATIENT)
Dept: FAMILY MEDICINE | Facility: CLINIC | Age: 69
End: 2019-09-17

## 2019-09-23 ENCOUNTER — RECORDS - HEALTHEAST (OUTPATIENT)
Dept: ADMINISTRATIVE | Facility: OTHER | Age: 69
End: 2019-09-23

## 2019-09-25 ENCOUNTER — RECORDS - HEALTHEAST (OUTPATIENT)
Dept: ADMINISTRATIVE | Facility: OTHER | Age: 69
End: 2019-09-25

## 2019-09-30 ENCOUNTER — COMMUNICATION - HEALTHEAST (OUTPATIENT)
Dept: FAMILY MEDICINE | Facility: CLINIC | Age: 69
End: 2019-09-30

## 2019-10-01 ENCOUNTER — COMMUNICATION - HEALTHEAST (OUTPATIENT)
Dept: FAMILY MEDICINE | Facility: CLINIC | Age: 69
End: 2019-10-01

## 2019-10-02 ENCOUNTER — COMMUNICATION - HEALTHEAST (OUTPATIENT)
Dept: FAMILY MEDICINE | Facility: CLINIC | Age: 69
End: 2019-10-02

## 2019-10-02 DIAGNOSIS — R27.0 ATAXIA: ICD-10-CM

## 2019-10-03 ENCOUNTER — RECORDS - HEALTHEAST (OUTPATIENT)
Dept: ADMINISTRATIVE | Facility: OTHER | Age: 69
End: 2019-10-03

## 2019-10-09 ENCOUNTER — OFFICE VISIT - HEALTHEAST (OUTPATIENT)
Dept: FAMILY MEDICINE | Facility: CLINIC | Age: 69
End: 2019-10-09

## 2019-10-09 ENCOUNTER — COMMUNICATION - HEALTHEAST (OUTPATIENT)
Dept: FAMILY MEDICINE | Facility: CLINIC | Age: 69
End: 2019-10-09

## 2019-10-09 DIAGNOSIS — F33.42 MAJOR DEPRESSIVE DISORDER, RECURRENT EPISODE, IN FULL REMISSION (H): ICD-10-CM

## 2019-10-09 DIAGNOSIS — F41.1 ANXIETY STATE: ICD-10-CM

## 2019-10-14 ENCOUNTER — HOSPITAL ENCOUNTER (OUTPATIENT)
Dept: MRI IMAGING | Facility: CLINIC | Age: 69
Discharge: HOME | End: 2019-10-14
Attending: FAMILY MEDICINE

## 2019-10-14 DIAGNOSIS — R27.0 ATAXIA: ICD-10-CM

## 2019-10-18 ENCOUNTER — RECORDS - HEALTHEAST (OUTPATIENT)
Dept: ADMINISTRATIVE | Facility: OTHER | Age: 69
End: 2019-10-18

## 2019-10-21 ENCOUNTER — RECORDS - HEALTHEAST (OUTPATIENT)
Dept: ADMINISTRATIVE | Facility: OTHER | Age: 69
End: 2019-10-21

## 2019-10-28 ENCOUNTER — HOSPITAL ENCOUNTER (OUTPATIENT)
Dept: ULTRASOUND IMAGING | Facility: CLINIC | Age: 69
Discharge: HOME | End: 2019-10-28
Attending: UROLOGY

## 2019-10-28 DIAGNOSIS — N30.20 BLADDER INFECTION, CHRONIC: ICD-10-CM

## 2019-10-31 ENCOUNTER — RECORDS - HEALTHEAST (OUTPATIENT)
Dept: ADMINISTRATIVE | Facility: OTHER | Age: 69
End: 2019-10-31

## 2019-11-20 ENCOUNTER — OFFICE VISIT - HEALTHEAST (OUTPATIENT)
Dept: FAMILY MEDICINE | Facility: CLINIC | Age: 69
End: 2019-11-20

## 2019-11-20 DIAGNOSIS — F41.1 ANXIETY STATE: ICD-10-CM

## 2019-11-20 DIAGNOSIS — F33.42 MAJOR DEPRESSIVE DISORDER, RECURRENT EPISODE, IN FULL REMISSION (H): ICD-10-CM

## 2019-11-20 ASSESSMENT — PATIENT HEALTH QUESTIONNAIRE - PHQ9: SUM OF ALL RESPONSES TO PHQ QUESTIONS 1-9: 4

## 2019-11-26 ENCOUNTER — COMMUNICATION - HEALTHEAST (OUTPATIENT)
Dept: FAMILY MEDICINE | Facility: CLINIC | Age: 69
End: 2019-11-26

## 2019-11-26 DIAGNOSIS — F41.1 ANXIETY STATE: ICD-10-CM

## 2019-11-26 DIAGNOSIS — F33.42 MAJOR DEPRESSIVE DISORDER, RECURRENT EPISODE, IN FULL REMISSION (H): ICD-10-CM

## 2019-11-29 ENCOUNTER — OFFICE VISIT - HEALTHEAST (OUTPATIENT)
Dept: FAMILY MEDICINE | Facility: CLINIC | Age: 69
End: 2019-11-29

## 2019-11-29 ENCOUNTER — COMMUNICATION - HEALTHEAST (OUTPATIENT)
Dept: FAMILY MEDICINE | Facility: CLINIC | Age: 69
End: 2019-11-29

## 2019-11-29 DIAGNOSIS — F41.1 ANXIETY STATE: ICD-10-CM

## 2019-11-29 DIAGNOSIS — I67.89 OTHER CEREBROVASCULAR DISEASE: ICD-10-CM

## 2019-11-29 DIAGNOSIS — N39.3 FEMALE STRESS INCONTINENCE: ICD-10-CM

## 2019-11-29 DIAGNOSIS — F33.42 MAJOR DEPRESSIVE DISORDER, RECURRENT EPISODE, IN FULL REMISSION (H): ICD-10-CM

## 2019-11-29 DIAGNOSIS — R41.0 CONFUSION: ICD-10-CM

## 2019-11-29 DIAGNOSIS — R41.0 DISORIENTATION: ICD-10-CM

## 2019-11-29 LAB
ALBUMIN UR-MCNC: NEGATIVE MG/DL
APPEARANCE UR: CLEAR
BACTERIA #/AREA URNS HPF: ABNORMAL HPF
BILIRUB UR QL STRIP: NEGATIVE
COLOR UR AUTO: YELLOW
GLUCOSE UR STRIP-MCNC: NEGATIVE MG/DL
HGB UR QL STRIP: NEGATIVE
KETONES UR STRIP-MCNC: NEGATIVE MG/DL
LEUKOCYTE ESTERASE UR QL STRIP: ABNORMAL
NITRATE UR QL: NEGATIVE
PH UR STRIP: 5.5 [PH] (ref 5–8)
RBC #/AREA URNS AUTO: ABNORMAL HPF
SP GR UR STRIP: 1.02 (ref 1–1.03)
SQUAMOUS #/AREA URNS AUTO: ABNORMAL LPF
UROBILINOGEN UR STRIP-ACNC: ABNORMAL
WBC #/AREA URNS AUTO: ABNORMAL HPF

## 2019-11-30 LAB — BACTERIA SPEC CULT: NO GROWTH

## 2019-12-04 ENCOUNTER — AMBULATORY - HEALTHEAST (OUTPATIENT)
Dept: FAMILY MEDICINE | Facility: CLINIC | Age: 69
End: 2019-12-04

## 2019-12-04 DIAGNOSIS — R41.0 CONFUSION: ICD-10-CM

## 2019-12-09 ENCOUNTER — HOSPITAL ENCOUNTER (OUTPATIENT)
Dept: MRI IMAGING | Facility: CLINIC | Age: 69
Discharge: HOME | End: 2019-12-09

## 2019-12-09 DIAGNOSIS — R41.0 CONFUSION: ICD-10-CM

## 2019-12-09 LAB
CREAT BLD-MCNC: 1.1 MG/DL (ref 0.6–1.1)
GFR SERPL CREATININE-BSD FRML MDRD: 49 ML/MIN/1.73M2

## 2019-12-13 ENCOUNTER — OFFICE VISIT - HEALTHEAST (OUTPATIENT)
Dept: FAMILY MEDICINE | Facility: CLINIC | Age: 69
End: 2019-12-13

## 2019-12-13 DIAGNOSIS — N39.498 OTHER URINARY INCONTINENCE: ICD-10-CM

## 2019-12-13 DIAGNOSIS — Z87.440 PERSONAL HISTORY OF URINARY TRACT INFECTION: ICD-10-CM

## 2019-12-13 LAB
ALBUMIN UR-MCNC: NEGATIVE MG/DL
APPEARANCE UR: CLEAR
BACTERIA #/AREA URNS HPF: ABNORMAL HPF
BILIRUB UR QL STRIP: NEGATIVE
COLOR UR AUTO: YELLOW
GLUCOSE UR STRIP-MCNC: NEGATIVE MG/DL
HGB UR QL STRIP: ABNORMAL
KETONES UR STRIP-MCNC: NEGATIVE MG/DL
LEUKOCYTE ESTERASE UR QL STRIP: ABNORMAL
NITRATE UR QL: NEGATIVE
PH UR STRIP: 7.5 [PH] (ref 5–8)
RBC #/AREA URNS AUTO: ABNORMAL HPF
SP GR UR STRIP: 1.01 (ref 1–1.03)
SQUAMOUS #/AREA URNS AUTO: ABNORMAL LPF
UROBILINOGEN UR STRIP-ACNC: ABNORMAL
WBC #/AREA URNS AUTO: ABNORMAL HPF
WBC CLUMPS #/AREA URNS HPF: PRESENT /[HPF]

## 2019-12-15 ENCOUNTER — AMBULATORY - HEALTHEAST (OUTPATIENT)
Dept: FAMILY MEDICINE | Facility: CLINIC | Age: 69
End: 2019-12-15

## 2019-12-15 DIAGNOSIS — N30.00 ACUTE CYSTITIS WITHOUT HEMATURIA: ICD-10-CM

## 2019-12-15 LAB — BACTERIA SPEC CULT: ABNORMAL

## 2019-12-16 ENCOUNTER — COMMUNICATION - HEALTHEAST (OUTPATIENT)
Dept: FAMILY MEDICINE | Facility: CLINIC | Age: 69
End: 2019-12-16

## 2019-12-18 ENCOUNTER — RECORDS - HEALTHEAST (OUTPATIENT)
Dept: ADMINISTRATIVE | Facility: OTHER | Age: 69
End: 2019-12-18

## 2019-12-18 ENCOUNTER — OFFICE VISIT - HEALTHEAST (OUTPATIENT)
Dept: FAMILY MEDICINE | Facility: CLINIC | Age: 69
End: 2019-12-18

## 2019-12-18 DIAGNOSIS — E11.8 TYPE 2 DIABETES MELLITUS WITH COMPLICATION, WITHOUT LONG-TERM CURRENT USE OF INSULIN (H): ICD-10-CM

## 2019-12-18 DIAGNOSIS — Z12.31 VISIT FOR SCREENING MAMMOGRAM: ICD-10-CM

## 2019-12-18 DIAGNOSIS — F33.42 MAJOR DEPRESSIVE DISORDER, RECURRENT EPISODE, IN FULL REMISSION (H): ICD-10-CM

## 2019-12-18 DIAGNOSIS — F41.1 ANXIETY STATE: ICD-10-CM

## 2019-12-18 DIAGNOSIS — R41.0 CONFUSION: ICD-10-CM

## 2019-12-18 LAB — HBA1C MFR BLD: 6.9 % (ref 3.5–6)

## 2019-12-18 ASSESSMENT — MIFFLIN-ST. JEOR: SCORE: 1357.3

## 2020-01-10 ENCOUNTER — RECORDS - HEALTHEAST (OUTPATIENT)
Dept: ADMINISTRATIVE | Facility: OTHER | Age: 70
End: 2020-01-10

## 2020-01-16 ENCOUNTER — RECORDS - HEALTHEAST (OUTPATIENT)
Dept: ADMINISTRATIVE | Facility: OTHER | Age: 70
End: 2020-01-16

## 2020-02-13 ENCOUNTER — HOSPITAL ENCOUNTER (OUTPATIENT)
Dept: MAMMOGRAPHY | Facility: CLINIC | Age: 70
Discharge: HOME | End: 2020-02-13
Attending: FAMILY MEDICINE

## 2020-02-13 DIAGNOSIS — Z12.31 VISIT FOR SCREENING MAMMOGRAM: ICD-10-CM

## 2020-04-14 ENCOUNTER — COMMUNICATION - HEALTHEAST (OUTPATIENT)
Dept: FAMILY MEDICINE | Facility: CLINIC | Age: 70
End: 2020-04-14

## 2020-04-14 DIAGNOSIS — F33.42 MAJOR DEPRESSIVE DISORDER, RECURRENT EPISODE, IN FULL REMISSION (H): ICD-10-CM

## 2020-04-14 DIAGNOSIS — F41.1 ANXIETY STATE: ICD-10-CM

## 2020-04-14 DIAGNOSIS — G47.00 INSOMNIA: ICD-10-CM

## 2020-06-05 ENCOUNTER — OFFICE VISIT - HEALTHEAST (OUTPATIENT)
Dept: FAMILY MEDICINE | Facility: CLINIC | Age: 70
End: 2020-06-05

## 2020-06-05 DIAGNOSIS — E11.9 TYPE 2 DIABETES MELLITUS WITHOUT COMPLICATION, WITHOUT LONG-TERM CURRENT USE OF INSULIN (H): ICD-10-CM

## 2020-06-05 DIAGNOSIS — G47.00 INSOMNIA: ICD-10-CM

## 2020-06-05 DIAGNOSIS — I10 ESSENTIAL HYPERTENSION, MALIGNANT: ICD-10-CM

## 2020-06-05 DIAGNOSIS — Z13.220 SCREENING FOR HYPERLIPIDEMIA: ICD-10-CM

## 2020-06-05 DIAGNOSIS — F33.42 MAJOR DEPRESSIVE DISORDER, RECURRENT EPISODE, IN FULL REMISSION (H): ICD-10-CM

## 2020-06-05 DIAGNOSIS — F41.1 ANXIETY STATE: ICD-10-CM

## 2020-06-05 ASSESSMENT — ANXIETY QUESTIONNAIRES
5. BEING SO RESTLESS THAT IT IS HARD TO SIT STILL: NOT AT ALL
1. FEELING NERVOUS, ANXIOUS, OR ON EDGE: NEARLY EVERY DAY
6. BECOMING EASILY ANNOYED OR IRRITABLE: NEARLY EVERY DAY
4. TROUBLE RELAXING: MORE THAN HALF THE DAYS
2. NOT BEING ABLE TO STOP OR CONTROL WORRYING: NOT AT ALL
7. FEELING AFRAID AS IF SOMETHING AWFUL MIGHT HAPPEN: SEVERAL DAYS
3. WORRYING TOO MUCH ABOUT DIFFERENT THINGS: MORE THAN HALF THE DAYS
GAD7 TOTAL SCORE: 11

## 2020-06-05 ASSESSMENT — PATIENT HEALTH QUESTIONNAIRE - PHQ9: SUM OF ALL RESPONSES TO PHQ QUESTIONS 1-9: 3

## 2020-07-17 ENCOUNTER — AMBULATORY - HEALTHEAST (OUTPATIENT)
Dept: LAB | Facility: CLINIC | Age: 70
End: 2020-07-17

## 2020-07-17 DIAGNOSIS — E11.9 TYPE 2 DIABETES MELLITUS WITHOUT COMPLICATION, WITHOUT LONG-TERM CURRENT USE OF INSULIN (H): ICD-10-CM

## 2020-07-17 DIAGNOSIS — I10 ESSENTIAL HYPERTENSION, MALIGNANT: ICD-10-CM

## 2020-07-17 DIAGNOSIS — Z13.220 SCREENING FOR HYPERLIPIDEMIA: ICD-10-CM

## 2020-07-17 LAB
ANION GAP SERPL CALCULATED.3IONS-SCNC: 11 MMOL/L (ref 5–18)
BUN SERPL-MCNC: 15 MG/DL (ref 8–28)
CALCIUM SERPL-MCNC: 10.2 MG/DL (ref 8.5–10.5)
CHLORIDE BLD-SCNC: 99 MMOL/L (ref 98–107)
CHOLEST SERPL-MCNC: 243 MG/DL
CO2 SERPL-SCNC: 28 MMOL/L (ref 22–31)
CREAT SERPL-MCNC: 1.06 MG/DL (ref 0.6–1.1)
FASTING STATUS PATIENT QL REPORTED: YES
GFR SERPL CREATININE-BSD FRML MDRD: 51 ML/MIN/1.73M2
GLUCOSE BLD-MCNC: 208 MG/DL (ref 70–125)
HBA1C MFR BLD: 7.9 % (ref 3.5–6)
HDLC SERPL-MCNC: 46 MG/DL
LDLC SERPL CALC-MCNC: 148 MG/DL
POTASSIUM BLD-SCNC: 4.3 MMOL/L (ref 3.5–5)
SODIUM SERPL-SCNC: 138 MMOL/L (ref 136–145)
TRIGL SERPL-MCNC: 246 MG/DL

## 2020-07-20 ENCOUNTER — AMBULATORY - HEALTHEAST (OUTPATIENT)
Dept: FAMILY MEDICINE | Facility: CLINIC | Age: 70
End: 2020-07-20

## 2020-07-20 DIAGNOSIS — E11.8 TYPE 2 DIABETES MELLITUS WITH COMPLICATION, WITHOUT LONG-TERM CURRENT USE OF INSULIN (H): ICD-10-CM

## 2020-07-21 ENCOUNTER — COMMUNICATION - HEALTHEAST (OUTPATIENT)
Dept: FAMILY MEDICINE | Facility: CLINIC | Age: 70
End: 2020-07-21

## 2020-08-05 ENCOUNTER — COMMUNICATION - HEALTHEAST (OUTPATIENT)
Dept: FAMILY MEDICINE | Facility: CLINIC | Age: 70
End: 2020-08-05

## 2020-08-05 DIAGNOSIS — I10 ESSENTIAL HYPERTENSION, MALIGNANT: ICD-10-CM

## 2020-10-15 ENCOUNTER — OFFICE VISIT - HEALTHEAST (OUTPATIENT)
Dept: FAMILY MEDICINE | Facility: CLINIC | Age: 70
End: 2020-10-15

## 2020-10-15 DIAGNOSIS — H91.93 DECREASED HEARING OF BOTH EARS: ICD-10-CM

## 2020-10-15 DIAGNOSIS — E11.8 TYPE 2 DIABETES MELLITUS WITH COMPLICATION, WITHOUT LONG-TERM CURRENT USE OF INSULIN (H): ICD-10-CM

## 2020-10-15 DIAGNOSIS — R14.0 ABDOMINAL BLOATING: ICD-10-CM

## 2020-10-15 DIAGNOSIS — Z00.00 ROUTINE GENERAL MEDICAL EXAMINATION AT A HEALTH CARE FACILITY: ICD-10-CM

## 2020-10-15 DIAGNOSIS — I10 ESSENTIAL HYPERTENSION, BENIGN: ICD-10-CM

## 2020-10-15 DIAGNOSIS — E78.5 HYPERLIPIDEMIA, UNSPECIFIED HYPERLIPIDEMIA TYPE: ICD-10-CM

## 2020-10-15 DIAGNOSIS — Z63.79 STRESSFUL LIFE EVENT AFFECTING FAMILY: ICD-10-CM

## 2020-10-15 ASSESSMENT — ANXIETY QUESTIONNAIRES
5. BEING SO RESTLESS THAT IT IS HARD TO SIT STILL: NOT AT ALL
2. NOT BEING ABLE TO STOP OR CONTROL WORRYING: MORE THAN HALF THE DAYS
GAD7 TOTAL SCORE: 9
1. FEELING NERVOUS, ANXIOUS, OR ON EDGE: MORE THAN HALF THE DAYS
7. FEELING AFRAID AS IF SOMETHING AWFUL MIGHT HAPPEN: NOT AT ALL
4. TROUBLE RELAXING: SEVERAL DAYS
3. WORRYING TOO MUCH ABOUT DIFFERENT THINGS: MORE THAN HALF THE DAYS
6. BECOMING EASILY ANNOYED OR IRRITABLE: MORE THAN HALF THE DAYS

## 2020-10-15 ASSESSMENT — PATIENT HEALTH QUESTIONNAIRE - PHQ9: SUM OF ALL RESPONSES TO PHQ QUESTIONS 1-9: 3

## 2020-10-15 ASSESSMENT — MIFFLIN-ST. JEOR: SCORE: 1353.33

## 2020-10-22 ENCOUNTER — AMBULATORY - HEALTHEAST (OUTPATIENT)
Dept: LAB | Facility: CLINIC | Age: 70
End: 2020-10-22

## 2020-10-22 DIAGNOSIS — E11.8 TYPE 2 DIABETES MELLITUS WITH COMPLICATION, WITHOUT LONG-TERM CURRENT USE OF INSULIN (H): ICD-10-CM

## 2020-10-22 LAB
CREAT UR-MCNC: 147.5 MG/DL
HBA1C MFR BLD: 9.3 %
MICROALBUMIN UR-MCNC: 0.94 MG/DL (ref 0–1.99)
MICROALBUMIN/CREAT UR: 6.4 MG/G

## 2020-10-23 ENCOUNTER — COMMUNICATION - HEALTHEAST (OUTPATIENT)
Dept: FAMILY MEDICINE | Facility: CLINIC | Age: 70
End: 2020-10-23

## 2020-10-28 ENCOUNTER — COMMUNICATION - HEALTHEAST (OUTPATIENT)
Dept: FAMILY MEDICINE | Facility: CLINIC | Age: 70
End: 2020-10-28

## 2020-10-28 DIAGNOSIS — F41.1 ANXIETY STATE: ICD-10-CM

## 2020-10-28 DIAGNOSIS — F33.42 MAJOR DEPRESSIVE DISORDER, RECURRENT EPISODE, IN FULL REMISSION (H): ICD-10-CM

## 2020-11-05 ENCOUNTER — OFFICE VISIT - HEALTHEAST (OUTPATIENT)
Dept: FAMILY MEDICINE | Facility: CLINIC | Age: 70
End: 2020-11-05

## 2020-11-05 DIAGNOSIS — N18.31 STAGE 3A CHRONIC KIDNEY DISEASE (H): ICD-10-CM

## 2020-11-05 DIAGNOSIS — E11.8 TYPE 2 DIABETES MELLITUS WITH COMPLICATION, WITHOUT LONG-TERM CURRENT USE OF INSULIN (H): ICD-10-CM

## 2020-11-05 DIAGNOSIS — R06.02 SOB (SHORTNESS OF BREATH) ON EXERTION: ICD-10-CM

## 2020-11-05 DIAGNOSIS — R23.2 HOT FLASHES: ICD-10-CM

## 2020-11-05 LAB
ALBUMIN SERPL-MCNC: 4.2 G/DL (ref 3.5–5)
ALP SERPL-CCNC: 107 U/L (ref 45–120)
ALT SERPL W P-5'-P-CCNC: 51 U/L (ref 0–45)
ANION GAP SERPL CALCULATED.3IONS-SCNC: 13 MMOL/L (ref 5–18)
AST SERPL W P-5'-P-CCNC: 34 U/L (ref 0–40)
BASOPHILS # BLD AUTO: 0 THOU/UL (ref 0–0.2)
BASOPHILS NFR BLD AUTO: 1 % (ref 0–2)
BILIRUB SERPL-MCNC: 0.4 MG/DL (ref 0–1)
BNP SERPL-MCNC: <10 PG/ML (ref 0–123)
BUN SERPL-MCNC: 24 MG/DL (ref 8–28)
CALCIUM SERPL-MCNC: 10.1 MG/DL (ref 8.5–10.5)
CHLORIDE BLD-SCNC: 100 MMOL/L (ref 98–107)
CO2 SERPL-SCNC: 26 MMOL/L (ref 22–31)
CREAT SERPL-MCNC: 1.1 MG/DL (ref 0.6–1.1)
EOSINOPHIL # BLD AUTO: 0.1 THOU/UL (ref 0–0.4)
EOSINOPHIL NFR BLD AUTO: 2 % (ref 0–6)
ERYTHROCYTE [DISTWIDTH] IN BLOOD BY AUTOMATED COUNT: 11.5 % (ref 11–14.5)
ESTRADIOL SERPL-MCNC: <10 PG/ML
FSH SERPL-ACNC: 44.5 MIU/ML
GFR SERPL CREATININE-BSD FRML MDRD: 49 ML/MIN/1.73M2
GLUCOSE BLD-MCNC: 178 MG/DL (ref 70–125)
HCT VFR BLD AUTO: 44.2 % (ref 35–47)
HGB BLD-MCNC: 15.3 G/DL (ref 12–16)
LYMPHOCYTES # BLD AUTO: 1.2 THOU/UL (ref 0.8–4.4)
LYMPHOCYTES NFR BLD AUTO: 21 % (ref 20–40)
MCH RBC QN AUTO: 32.9 PG (ref 27–34)
MCHC RBC AUTO-ENTMCNC: 34.5 G/DL (ref 32–36)
MCV RBC AUTO: 96 FL (ref 80–100)
MONOCYTES # BLD AUTO: 0.5 THOU/UL (ref 0–0.9)
MONOCYTES NFR BLD AUTO: 8 % (ref 2–10)
NEUTROPHILS # BLD AUTO: 4 THOU/UL (ref 2–7.7)
NEUTROPHILS NFR BLD AUTO: 68 % (ref 50–70)
PLATELET # BLD AUTO: 204 THOU/UL (ref 140–440)
PMV BLD AUTO: 9.9 FL (ref 7–10)
POTASSIUM BLD-SCNC: 3.8 MMOL/L (ref 3.5–5)
PROT SERPL-MCNC: 7.2 G/DL (ref 6–8)
RBC # BLD AUTO: 4.63 MILL/UL (ref 3.8–5.4)
SODIUM SERPL-SCNC: 139 MMOL/L (ref 136–145)
TROPONIN I SERPL-MCNC: <0.01 NG/ML (ref 0–0.29)
TSH SERPL DL<=0.005 MIU/L-ACNC: 1.79 UIU/ML (ref 0.3–5)
WBC: 5.8 THOU/UL (ref 4–11)

## 2020-11-05 RX ORDER — CRANBERRY FRUIT EXTRACT 200 MG
1 CAPSULE ORAL DAILY
Status: SHIPPED | COMMUNITY
Start: 2020-11-05 | End: 2023-06-01

## 2020-11-09 ENCOUNTER — COMMUNICATION - HEALTHEAST (OUTPATIENT)
Dept: FAMILY MEDICINE | Facility: CLINIC | Age: 70
End: 2020-11-09

## 2020-11-10 ENCOUNTER — COMMUNICATION - HEALTHEAST (OUTPATIENT)
Dept: FAMILY MEDICINE | Facility: CLINIC | Age: 70
End: 2020-11-10

## 2020-11-10 ENCOUNTER — AMBULATORY - HEALTHEAST (OUTPATIENT)
Dept: FAMILY MEDICINE | Facility: CLINIC | Age: 70
End: 2020-11-10

## 2020-11-10 DIAGNOSIS — R06.02 SOB (SHORTNESS OF BREATH) ON EXERTION: ICD-10-CM

## 2020-11-17 ENCOUNTER — RECORDS - HEALTHEAST (OUTPATIENT)
Dept: ADMINISTRATIVE | Facility: OTHER | Age: 70
End: 2020-11-17

## 2020-11-25 ENCOUNTER — COMMUNICATION - HEALTHEAST (OUTPATIENT)
Dept: FAMILY MEDICINE | Facility: CLINIC | Age: 70
End: 2020-11-25

## 2020-11-25 DIAGNOSIS — E11.8 TYPE 2 DIABETES MELLITUS WITH COMPLICATION, WITHOUT LONG-TERM CURRENT USE OF INSULIN (H): ICD-10-CM

## 2020-12-03 ENCOUNTER — HOSPITAL ENCOUNTER (OUTPATIENT)
Dept: CARDIOLOGY | Facility: CLINIC | Age: 70
Discharge: HOME | End: 2020-12-03
Attending: FAMILY MEDICINE

## 2020-12-03 DIAGNOSIS — R06.02 SOB (SHORTNESS OF BREATH) ON EXERTION: ICD-10-CM

## 2020-12-03 LAB
CV STRESS CURRENT BP HE: NORMAL
CV STRESS CURRENT HR HE: 100
CV STRESS CURRENT HR HE: 101
CV STRESS CURRENT HR HE: 107
CV STRESS CURRENT HR HE: 113
CV STRESS CURRENT HR HE: 114
CV STRESS CURRENT HR HE: 116
CV STRESS CURRENT HR HE: 121
CV STRESS CURRENT HR HE: 129
CV STRESS CURRENT HR HE: 132
CV STRESS CURRENT HR HE: 132
CV STRESS CURRENT HR HE: 134
CV STRESS CURRENT HR HE: 135
CV STRESS CURRENT HR HE: 139
CV STRESS CURRENT HR HE: 139
CV STRESS CURRENT HR HE: 140
CV STRESS CURRENT HR HE: 147
CV STRESS CURRENT HR HE: 147
CV STRESS CURRENT HR HE: 152
CV STRESS CURRENT HR HE: 94
CV STRESS CURRENT HR HE: 97
CV STRESS CURRENT HR HE: 98
CV STRESS CURRENT HR HE: 98
CV STRESS DEVIATION TIME HE: NORMAL
CV STRESS ECHO PERCENT HR HE: NORMAL
CV STRESS EXERCISE STAGE HE: NORMAL
CV STRESS FINAL RESTING BP HE: NORMAL
CV STRESS FINAL RESTING HR HE: 97
CV STRESS MAX HR HE: 153
CV STRESS MAX TREADMILL GRADE HE: 14
CV STRESS MAX TREADMILL SPEED HE: 3.4
CV STRESS PEAK DIA BP HE: NORMAL
CV STRESS PEAK SYS BP HE: NORMAL
CV STRESS PHASE HE: NORMAL
CV STRESS PROTOCOL HE: NORMAL
CV STRESS RESTING PT POSITION HE: NORMAL
CV STRESS RESTING PT POSITION HE: NORMAL
CV STRESS ST DEVIATION AMOUNT HE: NORMAL
CV STRESS ST DEVIATION ELEVATION HE: NORMAL
CV STRESS ST EVELATION AMOUNT HE: NORMAL
CV STRESS TEST TYPE HE: NORMAL
CV STRESS TOTAL STAGE TIME MIN 1 HE: NORMAL
ECHO EJECTION FRACTION ESTIMATED: 60 %
RATE PRESSURE PRODUCT: NORMAL
STRESS ECHO BASELINE DIASTOLIC HE: 94
STRESS ECHO BASELINE HR: 99
STRESS ECHO BASELINE SYSTOLIC BP: 162
STRESS ECHO CALCULATED PERCENT HR: 103 %
STRESS ECHO LAST STRESS DIASTOLIC BP: 70
STRESS ECHO LAST STRESS HR: 152
STRESS ECHO LAST STRESS SYSTOLIC BP: 186
STRESS ECHO POST ESTIMATED WORKLOAD: 7.3
STRESS ECHO POST EXERCISE DUR MIN: 6
STRESS ECHO POST EXERCISE DUR SEC: 0
STRESS ECHO TARGET HR: 149

## 2021-02-01 ENCOUNTER — COMMUNICATION - HEALTHEAST (OUTPATIENT)
Dept: SCHEDULING | Facility: CLINIC | Age: 71
End: 2021-02-01

## 2021-02-03 ENCOUNTER — OFFICE VISIT - HEALTHEAST (OUTPATIENT)
Dept: FAMILY MEDICINE | Facility: CLINIC | Age: 71
End: 2021-02-03

## 2021-02-03 DIAGNOSIS — E11.8 TYPE 2 DIABETES MELLITUS WITH COMPLICATION, WITHOUT LONG-TERM CURRENT USE OF INSULIN (H): ICD-10-CM

## 2021-02-03 DIAGNOSIS — E66.3 OVERWEIGHT (BMI 25.0-29.9): ICD-10-CM

## 2021-02-03 DIAGNOSIS — N18.31 STAGE 3A CHRONIC KIDNEY DISEASE (H): ICD-10-CM

## 2021-02-03 DIAGNOSIS — I10 ESSENTIAL HYPERTENSION: ICD-10-CM

## 2021-02-03 LAB — HBA1C MFR BLD: 6.5 %

## 2021-02-22 ENCOUNTER — COMMUNICATION - HEALTHEAST (OUTPATIENT)
Dept: SURGERY | Facility: CLINIC | Age: 71
End: 2021-02-22

## 2021-02-26 ENCOUNTER — COMMUNICATION - HEALTHEAST (OUTPATIENT)
Dept: FAMILY MEDICINE | Facility: CLINIC | Age: 71
End: 2021-02-26

## 2021-02-26 DIAGNOSIS — I10 ESSENTIAL HYPERTENSION, MALIGNANT: ICD-10-CM

## 2021-02-26 DIAGNOSIS — G47.00 INSOMNIA: ICD-10-CM

## 2021-02-26 RX ORDER — HYDROCHLOROTHIAZIDE 25 MG/1
TABLET ORAL
Qty: 90 TABLET | Refills: 3 | Status: SHIPPED | OUTPATIENT
Start: 2021-02-26 | End: 2022-03-24

## 2021-02-26 RX ORDER — TRAZODONE HYDROCHLORIDE 100 MG/1
TABLET ORAL
Qty: 90 TABLET | Refills: 2 | Status: SHIPPED | OUTPATIENT
Start: 2021-02-26 | End: 2022-04-29

## 2021-03-24 ENCOUNTER — RECORDS - HEALTHEAST (OUTPATIENT)
Dept: ADMINISTRATIVE | Facility: OTHER | Age: 71
End: 2021-03-24

## 2021-04-07 ENCOUNTER — RECORDS - HEALTHEAST (OUTPATIENT)
Dept: ADMINISTRATIVE | Facility: OTHER | Age: 71
End: 2021-04-07

## 2021-04-28 ENCOUNTER — HOSPITAL ENCOUNTER (OUTPATIENT)
Dept: MAMMOGRAPHY | Facility: CLINIC | Age: 71
Discharge: HOME | End: 2021-04-28
Attending: FAMILY MEDICINE

## 2021-04-28 DIAGNOSIS — Z12.31 VISIT FOR SCREENING MAMMOGRAM: ICD-10-CM

## 2021-05-17 ENCOUNTER — COMMUNICATION - HEALTHEAST (OUTPATIENT)
Dept: FAMILY MEDICINE | Facility: CLINIC | Age: 71
End: 2021-05-17

## 2021-05-17 DIAGNOSIS — F33.42 MAJOR DEPRESSIVE DISORDER, RECURRENT EPISODE, IN FULL REMISSION (H): ICD-10-CM

## 2021-05-17 DIAGNOSIS — F41.1 ANXIETY STATE: ICD-10-CM

## 2021-05-19 RX ORDER — VENLAFAXINE HYDROCHLORIDE 75 MG/1
CAPSULE, EXTENDED RELEASE ORAL
Qty: 90 CAPSULE | Refills: 2 | Status: SHIPPED | OUTPATIENT
Start: 2021-05-19 | End: 2022-02-02

## 2021-05-25 ENCOUNTER — RECORDS - HEALTHEAST (OUTPATIENT)
Dept: ADMINISTRATIVE | Facility: CLINIC | Age: 71
End: 2021-05-25

## 2021-05-26 ENCOUNTER — RECORDS - HEALTHEAST (OUTPATIENT)
Dept: ADMINISTRATIVE | Facility: CLINIC | Age: 71
End: 2021-05-26

## 2021-05-26 ASSESSMENT — PATIENT HEALTH QUESTIONNAIRE - PHQ9
SUM OF ALL RESPONSES TO PHQ QUESTIONS 1-9: 4
SUM OF ALL RESPONSES TO PHQ QUESTIONS 1-9: 3
SUM OF ALL RESPONSES TO PHQ QUESTIONS 1-9: 11

## 2021-05-27 ENCOUNTER — RECORDS - HEALTHEAST (OUTPATIENT)
Dept: ADMINISTRATIVE | Facility: CLINIC | Age: 71
End: 2021-05-27

## 2021-05-27 ASSESSMENT — PATIENT HEALTH QUESTIONNAIRE - PHQ9: SUM OF ALL RESPONSES TO PHQ QUESTIONS 1-9: 3

## 2021-05-27 NOTE — PROGRESS NOTES
Assessment/Plan:    1. Dysuria  2. Urge incontinence of urine  Urinalysis is unremarkable today.  No indication of continued infection or blood.  Will follow-up with patient regarding culture results when available.  I discussed differential diagnoses of dysuria with the patient including atrophic vaginitis.  Given patient's history of bladder sling and new onset of urge incontinence without any indication of new infection, I recommend referral to urology for further evaluation and management.  Patient is comfortable with this plan.  - Urinalysis-UC if Indicated  - Ambulatory referral to Urology    Subjective:    Ana Maria De La Garza is a 68 year old female seen today for follow-up evaluation from an urgent care visit.  Patient was seen in urgent care on 3/24/2019 with a urinary tract infection.  She was having symptoms of burning with urination, urinary frequency and urgency.  Patient was treated with 7-day course of cephalexin and was told to follow-up in clinic to make sure urinalysis improved. She reports that symptoms seem to improve initially.  She continues to have symptoms of urgency and frequency.  These have been going on for several months.  Patient describes feeling of having to use the bathroom urgently whenever she hears water running.  Oftentimes she is not able to make it to the bathroom before having incontinence of urine.  The burning with urination has improved slightly since finishing the cephalexin.  Patient reports getting UTIs yearly since her 20s.  She had urinary tract infection last year and then again in February which she was not seen in clinic for.  She had leftover antibiotics which she took at that time.  She then developed the most recent urinary tract infection in March 2019.  Although symptoms of dysuria have improved slightly she continues to have urgency, frequency of urine.  She has occasional dysuria still.  She denies any abdominal pain or flank pain.  No change in vaginal  discharge.  No chance of an STD.  She has not had any systemic symptoms such as fevers, chills, body aches etc.  She does not have any additional concerns today.  Review of systems is as stated in HPI, and the remainder of the 10 system review is otherwise unremarkable.    Past Medical History, Family History, and Social History reviewed.    Past Surgical History:   Procedure Laterality Date     JOINT REPLACEMENT Left 2008    knee     ORIF ANKLE FRACTURE Right     2008 and pin removal 2009     NM APPENDECTOMY      Description: Appendectomy;  Recorded: 02/26/2008;     NM SLING OPER STRES INCONTINENCE      Description: Mid-Urethral Sling Operation;  Recorded: 05/18/2010;     NM TOTAL KNEE ARTHROPLASTY Left 2/18/2015    Procedure:   LEFT TOTAL KNEE ARTHROPLASTY;  Surgeon: Sina SHARP MD;  Location: [facility] OR;  Service: Orthopedics        Family History   Problem Relation Age of Onset     Diabetes Mother      Hypertension Mother      Hypertension Father      Cancer Brother      Clotting disorder Neg Hx         Past Medical History:   Diagnosis Date     Depression      Hypertension      Insomnia         Social History     Tobacco Use     Smoking status: Never Smoker     Smokeless tobacco: Never Used   Substance Use Topics     Alcohol use: Yes     Alcohol/week: 1.2 oz     Types: 2 Shots of liquor per week     Drug use: No        Current Outpatient Medications   Medication Sig Dispense Refill     aspirin 81 MG EC tablet Take 1 tablet (81 mg total) by mouth daily.  0     blood glucose test (CONTOUR NEXT TEST STRIPS) strips Use 1 each As Directed daily. 100 strip 11     calcium-vitamin D (CALCIUM-VITAMIN D) 500 mg(1,250mg) -200 unit per tablet Take 2 tablets by mouth daily.       co-enzyme Q-10 30 mg capsule Take 30 mg by mouth 3 (three) times a day.       hydroCHLOROthiazide (HYDRODIURIL) 25 MG tablet Take 1 tablet (25 mg total) by mouth daily. 90 tablet 3     multivitamin therapeutic (THERAGRAN) tablet Take 1  tablet by mouth daily.       OMEGA-3/DHA/EPA/FISH OIL (FISH OIL-OMEGA-3 FATTY ACIDS) 300-1,000 mg capsule Take 2 g by mouth daily.       pravastatin (PRAVACHOL) 10 MG tablet Take 1 tablet (10 mg total) by mouth at bedtime. 90 tablet 4     sertraline (ZOLOFT) 100 MG tablet Take 1.5 tablets (150 mg total) by mouth daily. 135 tablet 3     traZODone (DESYREL) 100 MG tablet TAKE ONE-HALF TO ONE TABLET BY MOUTH ONCE DAILY AT BEDTIME AS NEEDED FOR SLEEP 90 tablet 0     No current facility-administered medications for this visit.           Objective:    Vitals:    04/03/19 1050   BP: 138/82   Patient Site: Right Arm   Patient Position: Sitting   Cuff Size: Adult Regular   Pulse: 80      There is no height or weight on file to calculate BMI.      General Appearance:  Alert, cooperative, no distress, appears stated age   Lungs:   Clear to auscultation bilaterally, respirations unlabored.     Heart:  Regular rate and rhythm, S1, S2 normal.   Abdomen:   Soft, non-tender, positive bowel sounds, no masses, no organomegaly   Back:  Vaginal: No CVA tenderness  Deferred   Skin: Warm, dry.  No rashes or lesions       This note has been dictated using voice recognition software. Any grammatical or context distortions are unintentional and inherent to the use of this software.

## 2021-05-28 ENCOUNTER — RECORDS - HEALTHEAST (OUTPATIENT)
Dept: ADMINISTRATIVE | Facility: CLINIC | Age: 71
End: 2021-05-28

## 2021-05-28 ASSESSMENT — ANXIETY QUESTIONNAIRES
GAD7 TOTAL SCORE: 11
GAD7 TOTAL SCORE: 9
GAD7 TOTAL SCORE: 16

## 2021-05-29 ENCOUNTER — RECORDS - HEALTHEAST (OUTPATIENT)
Dept: ADMINISTRATIVE | Facility: CLINIC | Age: 71
End: 2021-05-29

## 2021-05-30 ENCOUNTER — RECORDS - HEALTHEAST (OUTPATIENT)
Dept: ADMINISTRATIVE | Facility: CLINIC | Age: 71
End: 2021-05-30

## 2021-05-30 VITALS — WEIGHT: 186 LBS | HEIGHT: 66 IN | BODY MASS INDEX: 29.89 KG/M2

## 2021-05-30 VITALS — HEIGHT: 66 IN | BODY MASS INDEX: 25.91 KG/M2 | WEIGHT: 161.2 LBS

## 2021-05-30 VITALS — WEIGHT: 174.7 LBS | BODY MASS INDEX: 28.2 KG/M2

## 2021-05-30 NOTE — TELEPHONE ENCOUNTER
Refill Request  Did you contact pharmacy: Yes  Medication name:   Requested Prescriptions     Pending Prescriptions Disp Refills     hydroCHLOROthiazide (HYDRODIURIL) 25 MG tablet 90 tablet 1     Sig: Take 1 tablet (25 mg total) by mouth daily.     traZODone (DESYREL) 100 MG tablet 90 tablet 1     Sig: TAKE ONE-HALF TO ONE TABLET BY MOUTH ONCE DAILY AT BEDTIME AS NEEDED FOR SLEEP     Who prescribed the medication: Dr Navarrete      Pharmacy Name and Location: Changing pharmacy to [facility] as was requested on patients My Chart message today.  Please send out today to the Health system as she is going out of town.    Okay to leave a detailed message: yes

## 2021-05-30 NOTE — TELEPHONE ENCOUNTER
Refill Approved    Rx renewed per Medication Renewal Policy. Medication was last renewed on 6/8/2018 with 3 refills.  Last office visit: 4/3/2019 with SOHA Cooper CNP @ [facility].    Change in pharmacy noted.    Norma Jeffries, [facility] Triage/Med Refill 7/7/2019     Requested Prescriptions   Pending Prescriptions Disp Refills     hydroCHLOROthiazide (HYDRODIURIL) 25 MG tablet [Pharmacy Med Name: hydroCHLOROthiazide Oral Tablet 25 MG] 90 tablet 0     Sig: TAKE ONE TABLET BY MOUTH ONE TIME DAILY       Diuretics/Combination Diuretics Refill Protocol  Passed - 7/6/2019  9:38 AM        Passed - Visit with PCP or prescribing provider visit in past 12 months     Last office visit with prescriber/PCP: 12/6/2018 Kristen Navarrete MD OR same dept: 4/3/2019 Halley Cooper CNP OR same specialty: 4/3/2019 Halley Cooper CNP  Last physical: 8/16/2018 Last MTM visit: Visit date not found   Next visit within 3 mo: Visit date not found  Next physical within 3 mo: Visit date not found  Prescriber OR PCP: Kristen Navarrete MD  Last diagnosis associated with med order: 1. Essential hypertension, malignant  - hydroCHLOROthiazide (HYDRODIURIL) 25 MG tablet [Pharmacy Med Name: hydroCHLOROthiazide Oral Tablet 25 MG]; TAKE ONE TABLET BY MOUTH ONE TIME DAILY   Dispense: 90 tablet; Refill: 0    If protocol passes may refill for 12 months if within 3 months of last provider visit (or a total of 15 months).             Passed - Serum Potassium in past 12 months      Lab Results   Component Value Date    Potassium 3.7 12/06/2018             Passed - Serum Sodium in past 12 months      Lab Results   Component Value Date    Sodium 142 12/06/2018             Passed - Blood pressure on file in past 12 months     BP Readings from Last 1 Encounters:   04/03/19 138/82             Passed - Serum Creatinine in past 12 months      Creatinine   Date Value Ref Range Status   12/06/2018 0.99 0.60 - 1.10 mg/dL Final

## 2021-05-30 NOTE — TELEPHONE ENCOUNTER
Refill Approved    Rx renewed per Medication Renewal Policy. Medication was last renewed on 7/8/19.    Tara Saul, [facility] Triage/Med Refill 7/8/2019     Requested Prescriptions   Pending Prescriptions Disp Refills     hydroCHLOROthiazide (HYDRODIURIL) 25 MG tablet 90 tablet 1     Sig: Take 1 tablet (25 mg total) by mouth daily.       Diuretics/Combination Diuretics Refill Protocol  Passed - 7/8/2019  3:38 PM        Passed - Visit with PCP or prescribing provider visit in past 12 months     Last office visit with prescriber/PCP: 12/6/2018 Kristen Navarrete MD OR same dept: 4/3/2019 Halley Cooper CNP OR same specialty: 4/3/2019 Halley Cooper CNP  Last physical: 8/16/2018 Last MTM visit: Visit date not found   Next visit within 3 mo: Visit date not found  Next physical within 3 mo: Visit date not found  Prescriber OR PCP: Kristen Navarrete MD  Last diagnosis associated with med order: 1. Essential hypertension, malignant  - hydroCHLOROthiazide (HYDRODIURIL) 25 MG tablet; Take 1 tablet (25 mg total) by mouth daily.  Dispense: 90 tablet; Refill: 1    2. Insomnia  - traZODone (DESYREL) 100 MG tablet; TAKE ONE-HALF TO ONE TABLET BY MOUTH ONCE DAILY AT BEDTIME AS NEEDED FOR SLEEP  Dispense: 90 tablet; Refill: 1    If protocol passes may refill for 12 months if within 3 months of last provider visit (or a total of 15 months).             Passed - Serum Potassium in past 12 months      Lab Results   Component Value Date    Potassium 3.7 12/06/2018             Passed - Serum Sodium in past 12 months      Lab Results   Component Value Date    Sodium 142 12/06/2018             Passed - Blood pressure on file in past 12 months     BP Readings from Last 1 Encounters:   04/03/19 138/82             Passed - Serum Creatinine in past 12 months      Creatinine   Date Value Ref Range Status   12/06/2018 0.99 0.60 - 1.10 mg/dL Final             traZODone (DESYREL) 100 MG tablet 90 tablet 1     Sig: TAKE ONE-HALF TO ONE  TABLET BY MOUTH ONCE DAILY AT BEDTIME AS NEEDED FOR SLEEP       Tricyclics/Misc Antidepressant/Antianxiety Meds Refill Protocol Passed - 7/8/2019  3:38 PM        Passed - PCP or prescribing provider visit in last year     Last office visit with prescriber/PCP: 12/6/2018 Kristen Navarrete MD OR same dept: 4/3/2019 Halley Cooper CNP OR same specialty: 4/3/2019 Halley Cooper CNP  Last physical: 8/16/2018 Last MTM visit: Visit date not found   Next visit within 3 mo: Visit date not found  Next physical within 3 mo: Visit date not found  Prescriber OR PCP: Kristen Navarrete MD  Last diagnosis associated with med order: 1. Essential hypertension, malignant  - hydroCHLOROthiazide (HYDRODIURIL) 25 MG tablet; Take 1 tablet (25 mg total) by mouth daily.  Dispense: 90 tablet; Refill: 1    2. Insomnia  - traZODone (DESYREL) 100 MG tablet; TAKE ONE-HALF TO ONE TABLET BY MOUTH ONCE DAILY AT BEDTIME AS NEEDED FOR SLEEP  Dispense: 90 tablet; Refill: 1    If protocol passes may refill for 12 months if within 3 months of last provider visit (or a total of 15 months).

## 2021-05-30 NOTE — TELEPHONE ENCOUNTER
chronic health conditions last addressed in 12/2018  She otherwise saw Halley on 4/3/2019 for UTI    Per 12/6/2018 OV note she was to follow-up in 6 months.  Do you want an appointment scheduled?

## 2021-05-31 VITALS — BODY MASS INDEX: 24.44 KG/M2 | HEIGHT: 66 IN | WEIGHT: 152.1 LBS

## 2021-05-31 VITALS — HEIGHT: 66 IN | WEIGHT: 151.44 LBS | BODY MASS INDEX: 24.34 KG/M2

## 2021-06-01 ENCOUNTER — RECORDS - HEALTHEAST (OUTPATIENT)
Dept: ADMINISTRATIVE | Facility: CLINIC | Age: 71
End: 2021-06-01

## 2021-06-01 VITALS — BODY MASS INDEX: 27 KG/M2 | WEIGHT: 168 LBS | HEIGHT: 66 IN

## 2021-06-01 VITALS — BODY MASS INDEX: 26.29 KG/M2 | HEIGHT: 66 IN | WEIGHT: 163.6 LBS

## 2021-06-01 VITALS — HEIGHT: 66 IN | WEIGHT: 166.4 LBS | BODY MASS INDEX: 26.74 KG/M2

## 2021-06-01 NOTE — TELEPHONE ENCOUNTER
Who is calling:  Linette with [facility]   Reason for Call:  Linette is wondering if the patient order for physical therapy can be faxed over to them. Fax # [phone]  Date of last appointment with primary care: 9/11/19  Okay to leave a detailed message: Yes

## 2021-06-01 NOTE — PATIENT INSTRUCTIONS - HE
For 5 days, take one half tab of sertraline and 1 capsule of fluoxetine, then stop sertraline and increase fluoxetine to 2 caps daily.  You may take them at the same time.  Consider seeing a counselor again.    Continue to work with Dr. Coats regarding your bladder.  I think pelvic floor physical therapy is a great idea.    When you are ready, I recommend you begin physical therapy to help with your balance.    Schedule your diabetes eye exam.      Patient Education   Urinary Incontinence, Female (Adult)  Urinary incontinence means loss of control of the bladder. This problem affects many women, especially as they get older. If you have incontinence, you may be embarrassed to ask for help. But know that this problem can be treated.  Types of Incontinence  There are different types of incontinence. Two of the main types are described here. You can have more than one type.    Stress incontinence. With this type, urine leaks when pressure (stress) is put on the bladder. This may happen when you cough, sneeze, or laugh. Stress incontinence most often occurs because the pelvic floor muscles that support the bladder and urethra are weak. This can happen after pregnancy and vaginal childbirth or a hysterectomy. It can also be due to excess body weight or hormone changes.    Urge incontinence (also called overactive bladder). With this type, a sudden urge to urinate is felt often. This may happen even though there may not be much urine in the bladder. The need to urinate often during the night is common. Urge incontinence most often occurs because of bladder spasms. This may be due to bladder irritation or infection. Damage to bladder nerves or pelvic muscles, constipation, and certain medicines can also lead to urge incontinence.  Treatment of urinary incontinence depends on the cause. Further evaluation is needed to find the type you have. This will likely include an exam and certain tests. Based on the results, you and  your healthcare provider can then plan treatment. Until a diagnosis is made, the home care tips below can help relieve symptoms.  Home care    Do pelvic floor muscle exercises, if they are prescribed. The pelvic floor muscles help support the bladder and urethra. Many women find that their symptoms improve when doing special exercises that strengthen these muscles. To do the exercises contract the muscles you would use to stop your stream of urine, but do this when you re not urinating. Hold for 10 seconds, then relax. Repeat 10 to 20 times in a row, at least 3 times a day. Your provider may give you other instructions for how to do the exercises and how often.    Keep a bladder diary. This helps track how often and how much you urinate over a set period of time. Bring this diary with you to your next visit with the provider. The information can help your provider learn more about your bladder problem.    Lose weight, if advised to by your provider. Excess weight puts pressure on the bladder. Your provider can help you create a weight-loss plan that s right for you. This may include exercising more and making certain diet changes.    Don't consume foods and drinks that may irritate the bladder. These can include alcohol and caffeinated drinks.    Quit smoking. Smoking and other tobacco use can lead to chronic cough that strains the pelvic floor muscles. Smoking may also damage the bladder and urethra. Talk with your provider about treatments or methods you can use to quit smoking.    If drinking large amounts of fluid causes you to have symptoms, you may be advised to limit your fluid intake. You may also be advised to drink most of your fluids during the day and to limit fluids at night.    If you re worried about urine leakage or accidents, you may wear absorbent pads to catch urine. Change the pads often. This helps reduce discomfort. It may also reduce the risk of skin or bladder infections.  Follow-up  care  Follow up with your healthcare provider, or as directed. It may take some to find the right treatment for your problem. Your treatment plan may include special therapies or medicines. Certain procedures or surgery may also be options. Be sure to discuss any questions you have with your provider.  When to seek medical advice  Call the healthcare provider right away if any of these occur:    Fever of 100.4 F (38 C) or higher, or as directed by your provider    Bladder pain or fullness    Abdominal swelling    Nausea or vomiting    Back pain    Weakness, dizziness or fainting  Date Last Reviewed: 10/1/2017    [id number] [employer]. [address] [place] [id number]. All rights reserved. This information is not intended as a substitute for professional medical care. Always follow your healthcare professional's instructions.     Patient Education   Your Health Risk Assessment indicates you feel you are not in good emotional health.    Recreation   Recreation is not limited to sports and team events. It includes any activity that provides relaxation, interest, enjoyment, and exercise. Recreation provides an outlet for physical, mental, and social energy. It can give a sense of worth and achievement. It can help you stay healthy.    Mental Exercise and Social Involvement  Mental and emotional health is as important as physical health. Keep in touch with friends and family. Stay as active as possible. Continue to learn and challenge yourself.   Things you can do to stay mentally active are:    Learn something new, like a foreign language or musical instrument.     Play SCRABBLE or do crossword puzzles. If you cannot find people to play these games with you at home, you can play them with others on your computer through the Internet.     Join a games club--anything from card games to chess or checkers or lawn bowling.     Start a new hobby.     Go back to school.     Volunteer.     Read.     Keep  up with world events.         Advance Directive  Patient s advance directive was discussed and I am comfortable with the patient s wishes.  Patient Education   Personalized Prevention Plan  You are due for the preventive services outlined below.  Your care team is available to assist you in scheduling these services.  If you have already completed any of these items, please share that information with your care team to update in your medical record.  Health Maintenance   Topic Date Due     HEPATITIS C SCREENING  1950     ZOSTER VACCINES (2 of 3) 12/08/2011     TD 18+ HE  04/30/2018     DIABETES FOOT EXAM  12/06/2018     DEPRESSION FOLLOW UP  02/16/2019     DIABETES FOLLOW-UP  02/16/2019     DIABETES HEMOGLOBIN A1C  06/06/2019     INFLUENZA VACCINE RULE BASED (1) 08/01/2019     DIABETES URINE MICROALBUMIN  08/16/2019     FALL RISK ASSESSMENT  08/16/2019     DIABETES OPHTHALMOLOGY EXAM  09/04/2019     MEDICARE ANNUAL WELLNESS VISIT  08/16/2019     MAMMOGRAM  03/05/2020     DXA SCAN  09/28/2020     COLONOSCOPY  05/23/2022     ADVANCE CARE PLANNING  08/16/2023     PNEUMOCOCCAL POLYSACCHARIDE VACCINE AGE 65 AND OVER  Completed     PNEUMOCOCCAL CONJUGATE VACCINE FOR ADULTS (PCV13 OR PREVNAR)  Completed

## 2021-06-01 NOTE — PROGRESS NOTES
Assessment and Plan:     1. Medicare annual wellness visit, subsequent  At today's visit, we discussed lifestyle interventions to promote self-management and wellness, including maintenance of a healthy weight, healthy diet, regular physical activity and exercise, and falls prevention.  Influenza vaccination administered today.  Elects to check her insurance coverage prior to administration of tetanus booster or Shingrix.  Agreeable to hepatitis C screening today.  Advanced healthcare directive documented in chart.  - Hepatitis C Antibody (Anti-HCV)    2. Type 2 diabetes mellitus without complication, without long-term current use of insulin (H)  Worsened control.  Likely skewed by worsened diet recently with only improvement of the last month.  Encouraged her in regards to her dietary efforts.  Offered Diabetes education, She declines but will consider.  Will obtain urine microalbumin.  Encouraged follow-up diabetes eye exam.  - Glycosylated Hemoglobin A1c; Standing  - Microalbumin, Random Urine  - Comprehensive Metabolic Panel  - Glycosylated Hemoglobin A1c  - Ambulatory referral to Ophthalmology    3. Benign Essential Hypertension  Adequately controlled on hydrochlorothiazide.  Encouraged continued efforts at healthy lifestyle habits.  - Comprehensive Metabolic Panel    4. Hyperlipidemia, unspecified hyperlipidemia type  Encouraged efforts at healthy lifestyle habits.  She does not tolerate statins.  She thus far is tolerating her red rice yeast and will continue this supplement.  - Lipid Cascade FASTING    5. Insomnia, unspecified type  Primarily stress related.  Stable.  Will address anxiety as noted.    6. Anxiety  Inadequate control.  We discussed options.  As her daughter has done quite well on fluoxetine, transition patient to fluoxetine.  It began 20 mg daily for 5 days, then 40 mg daily for 5 days.  In the interim, she will cut her sertraline in half for 5 days, then stop entirely.  She will follow-up  with me in 1 to 2 months at her office visit or virtual visit.  - FLUoxetine (PROZAC) 20 MG capsule; Take 1 capsule (20 mg total) by mouth daily for 5 days, THEN 2 capsules (40 mg total) daily.  Dispense: 60 capsule; Refill: 1    7. Female stress incontinence  Chronic, managed by Dr. Coats.  Continue physical therapy.    8. Osteopenia, unspecified location  She has not had her parathyroid hormone level checked, and we will check this today.  She would be a candidate for alendronate pending that result.  We will also obtain vitamin D level.  Encouraged healthy lifestyle habits including adequate calcium and vitamin D intake, weightbearing exercises, and measures to reduce risk of falls.  - Vitamin D, Total (25-Hydroxy)  - Parathyroid Hormone Intact    9. Major depressive disorder, recurrent episode, in full remission (H)  As above.    10. Need for vaccination  Influenza vaccine administered today.  - Influenza High Dose,Seasonal,PF 65+ Yrs; Future  - Influenza High Dose,Seasonal,PF 65+ Yrs    11. Poor balance  We discussed options.  She is considering a physical therapist, I will let me know when she is ready for that referral.  - Ambulatory referral to PT/OT     The patient's current medical problems were reviewed.    The following health maintenance schedule was reviewed with the patient and provided in printed form in the after visit summary:   Health Maintenance   Topic Date Due     HEPATITIS C SCREENING  1950     ZOSTER VACCINES (2 of 3) 12/08/2011     TD 18+ HE  04/30/2018     DIABETES FOOT EXAM  12/06/2018     DEPRESSION FOLLOW UP  02/16/2019     DIABETES FOLLOW-UP  02/16/2019     DIABETES HEMOGLOBIN A1C  06/06/2019     INFLUENZA VACCINE RULE BASED (1) 08/01/2019     DIABETES URINE MICROALBUMIN  08/16/2019     FALL RISK ASSESSMENT  08/16/2019     DIABETES OPHTHALMOLOGY EXAM  09/04/2019     MEDICARE ANNUAL WELLNESS VISIT  08/16/2019     MAMMOGRAM  03/05/2020     DXA SCAN  09/28/2020     COLONOSCOPY   05/23/2022     ADVANCE CARE PLANNING  08/16/2023     PNEUMOCOCCAL POLYSACCHARIDE VACCINE AGE 65 AND OVER  Completed     PNEUMOCOCCAL CONJUGATE VACCINE FOR ADULTS (PCV13 OR PREVNAR)  Completed        Subjective:   Chief Complaint: Ana Maria De La Garza is an 69 y.o. female here for an Annual Wellness visit.   HPI: States that she is really been working on her diet for the past month, however prior to that admits that her dietary choices have been poor.  She is not checking her sugars regularly.  She requires no medications for her diabetes.  She takes aspirin.  She does not tolerate statins.  She has added red rice yeast to her diet and thus far is tolerating.  Taking hydrochlorothiazide in management of hypertension, tolerating well.  Denies lightheadedness, dizziness, headaches, chest pain, dyspnea, or swelling.  History of osteopenia with high fracture risk noted August 2018, encourage consideration of alendronate pending results of PTH but it appears that was not prolonged at that time, needing follow-up of this and vitamin D level.  She is a history of female stress incontinence with sudden urgency, working with Dr. Coats of neurology, has been participating physical therapy for the past month.  Noting that her anxiety has worsened significantly due to worsening distress.  Her  having increasing difficulty hearing, he is very worried about money, and general she is having more difficulty concentrating and finding enjoyment in things.  Notes that her daughter has a history of doing quite well on fluoxetine, she is interested in trying the same.  Finds that she is awakening early in the morning with worry, feeling like her mind is always racing, cannot seem to relax, struggling if she does not feel like she is in charge    Also that her balance seems to worsen discussion by room corners, feeling as though she is 70 glucose more often, bumping into the wall or bumping her head, and increasingly fearful of  falling such as winter hiking.  Denies any dizziness.  No numbness, tingling, weakness, or other focal neurologic symptoms.    Review of Systems:   Please see above.  The rest of the review of systems are negative for all systems.    Patient Care Team:  Kristen Navarrete MD as PCP - General  Sina Medel MD as Physician (Orthopedic Surgery)  Jason Root DPM as Physician (Podiatry)  Kristen Navarrete MD as Assigned PCP     Patient Active Problem List   Diagnosis     Female Stress Incontinence     Hyperlipidemia     Recurrent Major Depression In Full Remission     Benign Essential Hypertension     Cervical Spondylosis     Cervical Disc Degeneration     Anxiety     Status post total knee replacement     Insomnia     Type 2 diabetes mellitus with complication, without long-term current use of insulin (H)     Osteopenia     Past Medical History:   Diagnosis Date     Depression      Hypertension      Insomnia       Past Surgical History:   Procedure Laterality Date     JOINT REPLACEMENT Left 2008    knee     ORIF ANKLE FRACTURE Right     2008 and pin removal 2009     NJ APPENDECTOMY      Description: Appendectomy;  Recorded: 02/26/2008;     [place] SLING OPER STRES INCONTINENCE      Description: Mid-Urethral Sling Operation;  Recorded: 05/18/2010;     [place] TOTAL KNEE ARTHROPLASTY Left 2/18/2015    Procedure:   LEFT TOTAL KNEE ARTHROPLASTY;  Surgeon: Sina SHARP MD;  Location: [facility];  Service: Orthopedics      Family History   Problem Relation Age of Onset     Diabetes Mother      Hypertension Mother      Hypertension Father      Cancer Brother      Clotting disorder Neg Hx       Social History     Socioeconomic History     Marital status:      Spouse name: Abimael     Number of children: 2     Years of education: Not on file     Highest education level: Not on file   Occupational History     Occupation: Retired   Social Needs     Financial resource strain: Not on file     Food  insecurity:     Worry: Not on file     Inability: Not on file     Transportation needs:     Medical: Not on file     Non-medical: Not on file   Tobacco Use     Smoking status: Never Smoker     Smokeless tobacco: Never Used   Substance and Sexual Activity     Alcohol use: Yes     Alcohol/week: 1.2 oz     Types: 2 Shots of liquor per week     Drug use: No     Sexual activity: Not on file   Lifestyle     Physical activity:     Days per week: Not on file     Minutes per session: Not on file     Stress: Not on file   Relationships     Social connections:     Talks on phone: Not on file     Gets together: Not on file     Attends Worship service: Not on file     Active member of club or organization: Not on file     Attends meetings of clubs or organizations: Not on file     Relationship status: Not on file     Intimate partner violence:     Fear of current or ex partner: Not on file     Emotionally abused: Not on file     Physically abused: Not on file     Forced sexual activity: Not on file   Other Topics Concern     Not on file   Social History Narrative     Not on file      Current Outpatient Medications   Medication Sig Dispense Refill     aspirin 81 MG EC tablet Take 1 tablet (81 mg total) by mouth daily.  0     blood glucose test (CONTOUR NEXT TEST STRIPS) strips Use 1 each As Directed daily. 100 strip 11     calcium-vitamin D (CALCIUM-VITAMIN D) 500 mg(1,250mg) -200 unit per tablet Take 2 tablets by mouth daily.       co-enzyme Q-10 30 mg capsule Take 30 mg by mouth 3 (three) times a day.       hydroCHLOROthiazide (HYDRODIURIL) 25 MG tablet Take 1 tablet (25 mg total) by mouth daily. 90 tablet 1     multivitamin therapeutic (THERAGRAN) tablet Take 1 tablet by mouth daily.       OMEGA-3/DHA/EPA/FISH OIL (FISH OIL-OMEGA-3 FATTY ACIDS) 300-1,000 mg capsule Take 2 g by mouth daily.       sertraline (ZOLOFT) 100 MG tablet Take 1.5 tablets (150 mg total) by mouth daily. 135 tablet 3     traZODone (DESYREL) 100 MG  "tablet TAKE ONE-HALF TO ONE TABLET BY MOUTH ONCE DAILY AT BEDTIME AS NEEDED FOR SLEEP 90 tablet 1     No current facility-administered medications for this visit.       Objective:   Vital Signs:   Visit Vitals  /82   Pulse 73   Temp 98.1  F (36.7  C) (Oral)   Resp 20   Ht 5' 6\" (1.676 m)   Wt 180 lb (81.6 kg)   LMP 03/05/1998   SpO2 96%   BMI 29.05 kg/m         VisionScreening:  No exam data present     PHYSICAL EXAM  Physical Examination: General appearance - alert, well appearing, and in no distress, oriented to person, place, and time and normal appearing weight  Mental status - alert, oriented to person, place, and time, normal mood, behavior, speech, dress, motor activity, and thought processes  Eyes - pupils equal and reactive, extraocular eye movements intact  Ears - bilateral TM's and external ear canals normal  Nose - normal and patent, no erythema, discharge or polyps  Mouth - mucous membranes moist, pharynx normal without lesions  Neck - supple, no significant adenopathy  Lymphatics - no palpable lymphadenopathy, no hepatosplenomegaly  Chest - clear to auscultation, no wheezes, rales or rhonchi, symmetric air entry  Heart - normal rate, regular rhythm, normal S1, S2, no murmurs, rubs, clicks or gallops  Abdomen - soft, nontender, nondistended, no masses or organomegaly  Breasts - breasts appear normal, no suspicious masses, no skin or nipple changes or axillary nodes  Neurological - alert, oriented, normal speech, no focal findings or movement disorder noted  Musculoskeletal - no joint tenderness, deformity or swelling  Extremities - peripheral pulses normal, no pedal edema, no clubbing or cyanosis  Skin - normal coloration and turgor, no rashes, no suspicious skin lesions noted    Normal foot exam    Assessment Results 9/11/2019   Activities of Daily Living No help needed   Instrumental Activities of Daily Living No help needed   Mini Cog Total Score 5   Some recent data might be hidden     A " Mini-Cog score of 0-2 suggests the possibility of dementia, score of 3-5 suggests no dementia    Identified Health Risks:     Information on urinary incontinence and treatment options given to patient.  The patient was provided with suggestions to help her develop a healthy emotional lifestyle.   Patient's advanced directive was discussed and I am comfortable with the patient's wishes.

## 2021-06-01 NOTE — TELEPHONE ENCOUNTER
Who is calling:  Linette BENAVIDEZ  Reason for Call:  Please resend the order faxed on 9/30/19.  All tht came over om 9/30/19 was the patient's demographic sheet.   Please resend the orders.  Thank you..  Date of last appointment with primary care: 9/11/19  Okay to leave a detailed message: Yes

## 2021-06-02 VITALS — HEIGHT: 66 IN | BODY MASS INDEX: 27.64 KG/M2 | WEIGHT: 172 LBS

## 2021-06-02 NOTE — PROGRESS NOTES
"Ana Maria De La Garza is a 69 y.o. female who is being evaluated via a billable telephone visit.      The patient has been notified of following:     \"This telephone visit will be conducted via a call between you and your physician/provider. We have found that certain health care needs can be provided without the need for a physical exam.  This service lets us provide the care you need with a short phone conversation.  If a prescription is necessary we can send it directly to your pharmacy.  If lab work is needed we can place an order for that and you can then stop by our lab to have the test done at a later time.    If during the course of the call the physician/provider feels a telephone visit is not appropriate, you will not be charged for this service.\"     Consent has been obtained for this service by 1 care team member: Yes. See the scanned image in the medical record.    Ana Maria De La Garza complains of Anxiety  .    I have reviewed and updated the patient's Past Medical History, Social History, Family History and Medication List.    ALLERGIES  Atorvastatin; Morphine (pf); and Pravastatin    Caroline reeves (MA signature)    Additional provider notes:  Depression and Anxiety Follow-Up    How are you doing with your depression since your last visit: Worse    How are you doing with your anxiety since your last visit: Better    Are you having other symptoms that might be associated with depression or anxiety:No    Have you had a significant life event: No     Feeling as though she is able to relax a little more easily though she does need to remind herself to let her muscles become less tense.  She is feeling more calm overall.  Noting that her depression is gotten a little bit worse that she is feeling more down.  Denies suicidal ideation.  Some insomnia but at baseline and unchanged from previous.  She is exercising daily and feeling well with this.  Diet remains stable as does appetite.  Working with physical " therapist in regards to possible cervical myelopathy, physical therapist is encouraging her to consider cognitive behavioral therapy, she is interested in pursuing this.    Patient spoke to her son who is a pharmacist regarding cholesterol treatment, we discussed that Zetia, fenofibrate, cholestyramine, and niacin products can improve cholesterol but have not been associated with reduction in risk of heart disease, that while these medications are a consideration, I do not feel that this is something we need to pursue at this time.      Social History     Tobacco Use     Smoking status: Never Smoker     Smokeless tobacco: Never Used   Substance Use Topics     Alcohol use: Yes     Alcohol/week: 2.0 standard drinks     Types: 2 Shots of liquor per week     Drug use: No         PHQ-9  English  PHQ-9   Any Language  KATT-7  Suicide Assessment Five-step Evaluation and Treatment (SAFE-T)    Assessment/Plan:    ICD-10-CM    1. Anxiety F41.1 FLUoxetine (PROZAC) 20 MG capsule   2. Recurrent Major Depression In Full Remission F33.42         Follow Up Plan: Follow up in 6 week(s) for follow-up of depression and anxiety.  I have reviewed the note as documented above.  This accurately captures the substance of my conversation with the patient.    Total time of call between patient and provider was 8 minutes     Kristen Navarrete MD

## 2021-06-03 VITALS
HEIGHT: 66 IN | OXYGEN SATURATION: 96 % | HEART RATE: 73 BPM | WEIGHT: 180 LBS | RESPIRATION RATE: 20 BRPM | DIASTOLIC BLOOD PRESSURE: 82 MMHG | BODY MASS INDEX: 28.93 KG/M2 | SYSTOLIC BLOOD PRESSURE: 136 MMHG | TEMPERATURE: 98.1 F

## 2021-06-03 NOTE — TELEPHONE ENCOUNTER
Medication Question or Clarification  Who is calling: Patient  What medication are you calling about? (include dose and sig)   venlafaxine (EFFEXOR XR) 37.5 MG 24 hr capsule 30 capsule 0 11/29/2019     Sig: Take 1 cap by mouth daily for 3 days, then 2 caps daily for 3 days, then 4 caps daily.    Sent to pharmacy as: venlafaxine ER 37.5 mg capsule,extended release 24 hr (EFFEXOR XR)        Who prescribed the medication?: Halley Cooper, ELADIO    What is your question/concern?: The Rx should read # 150 capsules not # 30 capsules. Please send in ASAP   [facility]acy: [facility]  Okay to leave a detailed message?: Yes  Site CMT - Please call the pharmacy to obtain any additional needed information.

## 2021-06-03 NOTE — PROGRESS NOTES
"Ana Maria De La Garza is a 69 y.o. female who is being evaluated via a billable telephone visit.      The patient has been notified of following:     \"This telephone visit will be conducted via a call between you and your physician/provider. We have found that certain health care needs can be provided without the need for a physical exam.  This service lets us provide the care you need with a short phone conversation.  If a prescription is necessary we can send it directly to your pharmacy.  If lab work is needed we can place an order for that and you can then stop by our lab to have the test done at a later time.    If during the course of the call the physician/provider feels a telephone visit is not appropriate, you will not be charged for this service.\"     Consent has been obtained for this service by 1 care team member: Yes. See the scanned image in the medical record.    Ana Maria De La Garza complains of anxiety'depression (possible side effect)  .    I have reviewed and updated the patient's Past Medical History, Social History, Family History and Medication List.    ALLERGIES  Atorvastatin; Morphine (pf); and Pravastatin    Caroline Condon CMA (MA signature)    Additional provider notes:    Patient tolerating fluoxetine well but noting her anxiety is seeming to increase again.  Depression overall has remained stable, in fact that perhaps a little bit better, however she is also concerned about possible side effects.  She was seen by urologist, started 5 days ago on tolterodine.  The next day but she noted that she was a bit foggy in her thinking which is atypical for her, such as finding the door back to her hotel waiting, finding a locker in the gym locker room, and stating someone's name incorrectly.  This was a change for her.  In general feeling more foggy.  When reviewing, she feels like it was more so with onset of tolterodine than with fluoxetine.  In general however, she is interested in trying a " different medication to treat her anxiety and depression symptoms.  Noting no additional side effects.  Appetite stable.  Sleep is stable, perhaps slightly better.      Assessment/Plan:    ICD-10-CM    1. Anxiety F41.1 venlafaxine (EFFEXOR XR) 37.5 MG 24 hr capsule   2. Recurrent Major Depression In Full Remission F33.42 venlafaxine (EFFEXOR XR) 37.5 MG 24 hr capsule        Follow Up Plan: Follow up in 4 week(s) for follow-up of anxiety and depression.  I have reviewed the note as documented above.  This accurately captures the substance of my conversation with the patient.    Total time of call between patient and provider was 9.5 minutes     Kristen Navarrete MD

## 2021-06-03 NOTE — PATIENT INSTRUCTIONS - HE
I recommend you stop tolterodine.  I am concerned that it is causing your fogginess in thinking.  You could consider trying the medication again in 2 weeks to confirm this.  If your fogginess does not clear within the next few days or if it worsens, then I think you should be seen in clinic.    Let us transition you from fluoxetine to venlafaxine as follows:  For 3 days, take 2 caps of fluoxetine and 1 cap of venlafaxine, then  For 3 days, take 1 cap of fluoxetine and 2 caps of venlafaxine, then  Take 4 caps of venlafaxine daily.    When you refill venlafaxine, we can plan to refill it as a single 150 mg capsule.    Be sure to let me know if you are having side effects with this transition.

## 2021-06-03 NOTE — PROGRESS NOTES
Assessment/Plan:    1. Disorientation  2. Confusion  Urinalysis is negative for infection and neurologic exam is overall unremarkable.  I am concerned about patient's report of worsening confusion, episodes of blacking out while driving and unsteadiness over the last couple of months.  Patient's family has verbalized to her their concern regarding the frequency of these episodes.  Patient and family are both concerned about dementia.  We discussed the possibility that the patient's recent symptoms of confusion, disorientation and balance issues could be related to medication reaction or anxiety attacks.  However, these are very concerning symptoms and I recommend brain MRI to further evaluate and rule out a more serious underlying etiology.  Patient remains off of fluoxetine and Detrol due to possible adverse effects.  Will consider referral to neurology pending results of brain MRI.  - MR Brain COW Carotid With and Without Contrast; Future  - Urinalysis-UC if Indicated    2. Anxiety  3. Recurrent Major Depression In Full Remission  Worsening anxiety in recent months.  Possible medication reaction to fluoxetine.  Will provide refill for venlafaxine, patient appears to be tolerating well at this point.  She will follow-up in 4 weeks to reassess symptoms and make any medication adjustments as indicated.    4. Female stress incontinence  Patient remains off of Detrol.  Continue to follow with urology for management of stress incontinence.      Subjective:    Ana Maria De La Garza is a 69-year-old female seen today with concerns of feeling more confused and disoriented over the past couple of weeks.  She has had a few events that have happened in the last 2 weeks that have concerned her.  The first happened about 2 weeks ago.  She [place] a casino with her  for a concert.  She was going back to her room and became completely disoriented.  She was not drinking at the time.  She could not find her way back  "to the hotel room.  This was unusual for her.  A few days later, she was at her gym and became lost in the lock room, could not find her locker and was in a completely different area of the locker room.  This past Monday she was driving to the store.  While she was driving she felt brain \"fogginess\".  She felt that she was in the wrong ana and became very disoriented.  She had to pull off and regroup herself.  This was very frightening for her.  These episodes have caused increased stress and anxiety.  She does have worsening symptoms of anxiety over the last few months.  She was started on Prozac which she took for about a month.  After having the symptoms, her Prozac was discontinued.  Additionally, she was started on Detrol by her urologist for symptoms of stress incontinence.  After having disorientation and confusion, this was also discontinued.  She has been off of these medications for almost 2 weeks now and has not noticed improvement in the brain fog, confusion and disoriented feeling.  She denies any head injury.  No significant history of dementia in her family.  Her family members and friends are also concerned as is the patient about the symptoms and possible dementia.  She denies any hallucinations.  She has been feeling shaky more.  Has a hard time keeping her hands still.  Has somewhat of a tremor.  She was recently started on venlafaxine given discontinuation of Prozac.  She is taking 150 mg of this.  So far, denies any obvious side effects and is requesting a refill of it today.  She does note that she has had issues with balance over the last several months and has been seeing a physical therapist to help with this. Review of systems is as stated in HPI, and the remainder of the 10 system review is otherwise unremarkable.    Past Medical History, Family History, and Social History reviewed.    Past Surgical History:   Procedure Laterality Date     JOINT REPLACEMENT Left 2008    knee     ORIF " ANKLE FRACTURE Right     2008 and pin removal 2009     [place] APPENDECTOMY      Description: Appendectomy;  Recorded: 02/26/2008;     [place] SLING OPER STRES INCONTINENCE      Description: Mid-Urethral Sling Operation;  Recorded: 05/18/2010;     [place] TOTAL KNEE ARTHROPLASTY Left 2/18/2015    Procedure:   LEFT TOTAL KNEE ARTHROPLASTY;  Surgeon: Sina SHARP MD;  Location: [facility] OR;  Service: Orthopedics        Family History   Problem Relation Age of Onset     Diabetes Mother      Hypertension Mother      Hypertension Father      Cancer Brother      Clotting disorder Neg Hx         Past Medical History:   Diagnosis Date     Depression      Hypertension      Insomnia         Social History     Tobacco Use     Smoking status: Never Smoker     Smokeless tobacco: Never Used   Substance Use Topics     Alcohol use: Yes     Alcohol/week: 2.0 standard drinks     Types: 2 Shots of liquor per week     Drug use: No        Current Outpatient Medications   Medication Sig Dispense Refill     aspirin 81 MG EC tablet Take 1 tablet (81 mg total) by mouth daily.  0     blood glucose test (CONTOUR NEXT TEST STRIPS) strips Use 1 each As Directed daily. 100 strip 11     calcium-vitamin D (CALCIUM-VITAMIN D) 500 mg(1,250mg) -200 unit per tablet Take 2 tablets by mouth daily.       co-enzyme Q-10 30 mg capsule Take 30 mg by mouth 3 (three) times a day.       hydroCHLOROthiazide (HYDRODIURIL) 25 MG tablet Take 1 tablet (25 mg total) by mouth daily. 90 tablet 1     multivitamin therapeutic (THERAGRAN) tablet Take 1 tablet by mouth daily.       OMEGA-3/DHA/EPA/FISH OIL (FISH OIL-OMEGA-3 FATTY ACIDS) 300-1,000 mg capsule Take 2 g by mouth daily.       traZODone (DESYREL) 100 MG tablet TAKE ONE-HALF TO ONE TABLET BY MOUTH ONCE DAILY AT BEDTIME AS NEEDED FOR SLEEP 90 tablet 1     venlafaxine (EFFEXOR XR) 37.5 MG 24 hr capsule Take 1 cap by mouth daily for 3 days, then 2 caps daily for 3 days, then 4 caps daily. 30 capsule 0     No current  facility-administered medications for this visit.           Objective:    Vitals:    11/29/19 1317   BP: 130/82   Patient Site: Right Arm   Patient Position: Sitting   Cuff Size: Adult Regular   Pulse: 83   SpO2: 96%      There is no height or weight on file to calculate BMI.      General Appearance:  Alert, cooperative, no distress, appears stated age   HEENT:  Normal.  No acute findings.   Neck: Supple, symmetrical, no adenopathy.   Lungs:   Clear to auscultation bilaterally, respirations unlabored.  No expiratory wheeze or inspiratory crackles noted.   Heart:  Regular rate and rhythm, S1, S2 normal, no murmur, rub or gallop   Abdomen:   Soft, non-tender, positive bowel sounds, no masses, no organomegaly   Extremities: Extremities normal.  No cyanosis or edema   Skin: Warm, dry.  Skin color, texture, turgor normal, no rashes or lesions   Neurologic:  Patient is alert and oriented x3.  Neuro exam is grossly intact, equal strength, sensation and reflexes throughout.  No focal deficits noted on exam today.           This note has been dictated using voice recognition software. Any grammatical or context distortions are unintentional and inherent to the use of this software.

## 2021-06-04 VITALS — HEART RATE: 83 BPM | DIASTOLIC BLOOD PRESSURE: 82 MMHG | OXYGEN SATURATION: 96 % | SYSTOLIC BLOOD PRESSURE: 130 MMHG

## 2021-06-04 VITALS
HEART RATE: 83 BPM | TEMPERATURE: 98.5 F | OXYGEN SATURATION: 98 % | SYSTOLIC BLOOD PRESSURE: 134 MMHG | BODY MASS INDEX: 29.25 KG/M2 | RESPIRATION RATE: 20 BRPM | WEIGHT: 182 LBS | HEIGHT: 66 IN | DIASTOLIC BLOOD PRESSURE: 80 MMHG

## 2021-06-04 VITALS
HEART RATE: 91 BPM | WEIGHT: 182.2 LBS | DIASTOLIC BLOOD PRESSURE: 80 MMHG | OXYGEN SATURATION: 98 % | BODY MASS INDEX: 29.41 KG/M2 | SYSTOLIC BLOOD PRESSURE: 134 MMHG

## 2021-06-04 NOTE — PATIENT INSTRUCTIONS - HE
Start antibiotic  We will let you know the rest of your urine test results - will mychart with results  Drink lots of water

## 2021-06-04 NOTE — TELEPHONE ENCOUNTER
Reason contacted:  Results   Information relayed:  Below message. Patient will  the new antibiotic today.   Additional questions:  No  Further follow-up needed:  No  Okay to leave a detailed message:  No

## 2021-06-04 NOTE — PROGRESS NOTES
Assessment/Plan:    1. Other urinary incontinence  2. Personal history of urinary tract infection  UA returned today concerning for potential infection with moderate blood and small leukocytes; urine culture pending.  Given UA findings and patient's history of recurrent UTIs, recommend initiating treatment with Bactrim.  The next day urine culture results returned positive for infection with E. coli that was resistant to Bactrim; Macrobid sent to pharmacy for treatment.  Encourage patient to remain hydrated with water.  - Urinalysis-UC if Indicated  - Culture, Urine      Follow up: As needed    Adelina Donaldson MD  [facility]    Subjective:    Patient ID: Ana Maria De La Garza is a 69 y.o. female is here today for possible UTI    Possible UTI  -has had issues with bladder infections every year, 1-2/year  -last one this past spring - associated with urge incontinence  -now following [facility] - start estrogen vaginal cream, Kegel exercises - tried medication for overactive bladder but had side effect so stopped , nml kidney US  -Most recent note from urology on 10/18/2019 states: Pelvic floor physical therapy has only been mildly helpful but patient will continue this, trial Myrbetriq (samples provided), follow-up 6 to 8 weeks, continue topical vaginal estrogen  -was supposed to see urology last week but missed appointment - now scheduled for Jan 2020  -this morning woke up and had smelly urine, no cloudiness yet, increased frequency, mild burning - no hematuria       Patient Active Problem List   Diagnosis     Female Stress Incontinence     Hyperlipidemia     Recurrent Major Depression In Full Remission     Benign Essential Hypertension     Cervical Spondylosis     Cervical Disc Degeneration     Anxiety     Status post total knee replacement     Insomnia     Type 2 diabetes mellitus with complication, without long-term current use of insulin (H)     Osteopenia     Past Medical History:    Diagnosis Date     Depression      Hypertension      Insomnia      Past Surgical History:   Procedure Laterality Date     JOINT REPLACEMENT Left 2008    knee     ORIF ANKLE FRACTURE Right     2008 and pin removal 2009     [place] APPENDECTOMY      Description: Appendectomy;  Recorded: 02/26/2008;     [place] SLING OPER STRES INCONTINENCE      Description: Mid-Urethral Sling Operation;  Recorded: 05/18/2010;     [place] TOTAL KNEE ARTHROPLASTY Left 2/18/2015    Procedure:   LEFT TOTAL KNEE ARTHROPLASTY;  Surgeon: Sina SHARP MD;  Location: [facility] OR;  Service: Orthopedics     Current Outpatient Medications on File Prior to Visit   Medication Sig Dispense Refill     aspirin 81 MG EC tablet Take 1 tablet (81 mg total) by mouth daily.  0     blood glucose test (CONTOUR NEXT TEST STRIPS) strips Use 1 each As Directed daily. 100 strip 11     calcium-vitamin D (CALCIUM-VITAMIN D) 500 mg(1,250mg) -200 unit per tablet Take 2 tablets by mouth daily.       hydroCHLOROthiazide (HYDRODIURIL) 25 MG tablet Take 1 tablet (25 mg total) by mouth daily. 90 tablet 1     multivitamin therapeutic (THERAGRAN) tablet Take 1 tablet by mouth daily.       OMEGA-3/DHA/EPA/FISH OIL (FISH OIL-OMEGA-3 FATTY ACIDS) 300-1,000 mg capsule Take 2 g by mouth daily.       traZODone (DESYREL) 100 MG tablet TAKE ONE-HALF TO ONE TABLET BY MOUTH ONCE DAILY AT BEDTIME AS NEEDED FOR SLEEP 90 tablet 1     venlafaxine (EFFEXOR-XR) 150 MG 24 hr capsule Take 1 capsule (150 mg total) by mouth daily. 90 capsule 1     venlafaxine (EFFEXOR XR) 37.5 MG 24 hr capsule Take 1 cap by mouth daily for 3 days, then 2 caps daily for 3 days, then 4 caps daily. 150 capsule 0     No current facility-administered medications on file prior to visit.      Allergies   Allergen Reactions     Atorvastatin Myalgia     Morphine (Pf) Nausea And Vomiting     Pravastatin Myalgia     Social History     Socioeconomic History     Marital status:      Spouse name: Abimael Rosado of  children: 2     Years of education: Not on file     Highest education level: Not on file   Occupational History     Occupation: Retired   Social Needs     Financial resource strain: Not on file     Food insecurity:     Worry: Not on file     Inability: Not on file     Transportation needs:     Medical: Not on file     Non-medical: Not on file   Tobacco Use     Smoking status: Never Smoker     Smokeless tobacco: Never Used   Substance and Sexual Activity     Alcohol use: Yes     Alcohol/week: 2.0 standard drinks     Types: 2 Shots of liquor per week     Drug use: No     Sexual activity: Not on file   Lifestyle     Physical activity:     Days per week: Not on file     Minutes per session: Not on file     Stress: Not on file   Relationships     Social connections:     Talks on phone: Not on file     Gets together: Not on file     Attends [employer] service: Not on file     Active member of club or organization: Not on file     Attends meetings of clubs or organizations: Not on file     Relationship status: Not on file     Intimate partner violence:     Fear of current or ex partner: Not on file     Emotionally abused: Not on file     Physically abused: Not on file     Forced sexual activity: Not on file   Other Topics Concern     Not on file   Social History Narrative     Not on file     Family History   Problem Relation Age of Onset     Diabetes Mother      Hypertension Mother      Hypertension Father      Cancer Brother      Clotting disorder Neg Hx      Review of systems is as stated in HPI, and the remainder of system review is otherwise negative.    Objective:      /80 (Patient Site: Left Arm, Patient Position: Sitting, Cuff Size: Adult Large)   Pulse 91   Wt 182 lb 3.2 oz (82.6 kg)   LMP 03/05/1998   SpO2 98%   BMI 29.41 kg/m      General appearance: awake, NAD  HEENT: atraumatic, normocephalic, no scleral icterus or injection, ears and nose grossly normal, moist mucous membranes  Lungs: breathing  comfortably on room air  Back: no CVA tenderness  Extremities: moving all extremities  Skin: no rashes or lesions  Neuro: alert, oriented x3, CNs grossly intact, no focal deficits appreciated  Psych: normal mood/affect/behavior, answering questions appropriately, linear thought process

## 2021-06-04 NOTE — TELEPHONE ENCOUNTER
----- Message from Adelina Donaldson MD sent at 12/15/2019  9:49 PM CST -----  Make sure pt picked up new prescription as bactrim that was prescribed in the office is not adequate to treat the bacteria she has.    Thanks  Dr Donaldson

## 2021-06-04 NOTE — PROGRESS NOTES
Assessment/Plan:     1. Confusion  MRI reassuring, however does show some diffuse cerebral volume loss so unclear if there could be a component of early dementia.  I feel that neuropsychiatric assessment makes sense.  Unclear if medication reaction could also be contributing.  Reassuring that symptoms have resolved since being off tolterodine, remain off this medication and medications like it.  Because her anxiety is continuing to be a bit worse and since it has been worse on higher doses of SSRIs in the past, I am going to have her reduce venlafaxine to 75 mg daily to see if this helps her symptoms.  She will update me if she notes worsening, otherwise she will follow-up with me in about 1 month to reassess.  She is comfortable with this plan.  Could consider neurology assessment.    2. Recurrent Major Depression In Full Remission  3. Anxiety  Depression overall is doing well though anxiety is inadequate.  In the past anxiety is worsened on higher doses of SSRI medication.  Is possible that we have to high a dose of venlafaxine.  I am going to have her reduce venlafaxine down to 75 mg daily.  We will see if this makes a difference in her appetite, intermittent shaking, and her intermittent jerking which are likely myoclonic in nature.  Notify me with intolerance to reduction or onset additional  - venlafaxine (EFFEXOR-XR) 75 MG 24 hr capsule; Take 1 capsule (75 mg total) by mouth daily.  Dispense: 90 capsule; Refill: 1    4. Type 2 diabetes mellitus with complication, without long-term current use of insulin (H)  Will obtain A1c today before she goes.  - Glycosylated Hemoglobin A1c    5. Visit for screening mammogram  Furl placed for screening mammogram.  - Mammo Screening Bilateral; Future      There are no Patient Instructions on file for this visit.     Return in about 1 month (around 1/18/2020).      Subjective:      Ana Maria De La Garza is a 69 y.o. female presented to clinic today for follow-up of recent  memory changes that seem to begin around the time that her fluoxetine dose was increased and she was started on tolterodine by urology when she had some episodes of confusion and word finding difficulties.  She discontinued tolterodine on November 20, notes that her last episode of confusion was on November 25 and she is been doing well since then but has been fearful of driving since then.  Is tolerating venlafaxine well.  Wondering if she could be having some side effects as sometimes she will feel as though she is got more tremors, on occasion will feel like her whole body jumps or jerks, has had some intermittent nausea and constipation, and overall her appetite is reduced.  She feels her depression is okay but her anxiety remains present.  In the past anxiety was worse on higher doses of sertraline and fluoxetine.  We reviewed recent MRI of brain ordered following recent episodes, it shows a small cavernous hemangioma in the left frontal region, some mild to moderate volume loss, and some chronic small vessel ischemic changes.  We reviewed the meanings of this today.  Recent UTI treated with initially Bactrim and then transition to Macrobid based on culture results, though symptoms have resolved.  She feels like her balance is improving overall following physical therapy, using a Bosu ball there and plans to get one for her home as well.  Notes that in general she is having difficulty in managing overwhelming things like [place] shopping or hosting [place].  This is a change for her.  She has had a bit of increased difficulty in sleeping, waking early more frequently than she had in the past.  She denies any palpitations or heart racing.  No inappropriate sweating.  She has not been checking her blood sugars.    Current Outpatient Medications   Medication Sig Dispense Refill     aspirin 81 MG EC tablet Take 1 tablet (81 mg total) by mouth daily.  0     blood glucose test (CONTOUR NEXT TEST STRIPS) strips  Use 1 each As Directed daily. 100 strip 11     calcium-vitamin D (CALCIUM-VITAMIN D) 500 mg(1,250mg) -200 unit per tablet Take 2 tablets by mouth daily.       hydroCHLOROthiazide (HYDRODIURIL) 25 MG tablet Take 1 tablet (25 mg total) by mouth daily. 90 tablet 1     multivitamin therapeutic (THERAGRAN) tablet Take 1 tablet by mouth daily.       nitrofurantoin, macrocrystal-monohydrate, (MACROBID) 100 MG capsule Take 1 capsule (100 mg total) by mouth 2 (two) times a day for 5 days. 10 capsule 0     OMEGA-3/DHA/EPA/FISH OIL (FISH OIL-OMEGA-3 FATTY ACIDS) 300-1,000 mg capsule Take 2 g by mouth daily.       traZODone (DESYREL) 100 MG tablet TAKE ONE-HALF TO ONE TABLET BY MOUTH ONCE DAILY AT BEDTIME AS NEEDED FOR SLEEP 90 tablet 1     venlafaxine (EFFEXOR-XR) 75 MG 24 hr capsule Take 1 capsule (75 mg total) by mouth daily. 90 capsule 1     No current facility-administered medications for this visit.        Past Medical History, Family History, and Social History reviewed.  Past Medical History:   Diagnosis Date     Depression      Hypertension      Insomnia      Past Surgical History:   Procedure Laterality Date     JOINT REPLACEMENT Left 2008    knee     ORIF ANKLE FRACTURE Right     2008 and pin removal 2009     [place] APPENDECTOMY      Description: Appendectomy;  Recorded: 02/26/2008;     [place] SLING OPER STRES INCONTINENCE      Description: Mid-Urethral Sling Operation;  Recorded: 05/18/2010;     [place] TOTAL KNEE ARTHROPLASTY Left 2/18/2015    Procedure:   LEFT TOTAL KNEE ARTHROPLASTY;  Surgeon: Sina SHARP MD;  Location: [facility] OR;  Service: Orthopedics     Atorvastatin; Morphine (pf); and Pravastatin  Family History   Problem Relation Age of Onset     Diabetes Mother      Hypertension Mother      Hypertension Father      Cancer Brother      Clotting disorder Neg Hx      Social History     Socioeconomic History     Marital status:      Spouse name: Abimael     Number of children: 2     Years of education: Not  "on file     Highest education level: Not on file   Occupational History     Occupation: Retired   Social Needs     Financial resource strain: Not on file     Food insecurity:     Worry: Not on file     Inability: Not on file     Transportation needs:     Medical: Not on file     Non-medical: Not on file   Tobacco Use     Smoking status: Never Smoker     Smokeless tobacco: Never Used   Substance and Sexual Activity     Alcohol use: Yes     Alcohol/week: 2.0 standard drinks     Types: 2 Shots of liquor per week     Drug use: No     Sexual activity: Not on file   Lifestyle     Physical activity:     Days per week: Not on file     Minutes per session: Not on file     Stress: Not on file   Relationships     Social connections:     Talks on phone: Not on file     Gets together: Not on file     Attends Baptism service: Not on file     Active member of club or organization: Not on file     Attends meetings of clubs or organizations: Not on file     Relationship status: Not on file     Intimate partner violence:     Fear of current or ex partner: Not on file     Emotionally abused: Not on file     Physically abused: Not on file     Forced sexual activity: Not on file   Other Topics Concern     Not on file   Social History Narrative     Not on file         Review of systems is as stated in HPI, and the remainder of the 10 system review is otherwise negative.    Objective:     Vitals:    12/18/19 1100   BP: 134/80   Pulse: 83   Resp: 20   Temp: 98.5  F (36.9  C)   TempSrc: Oral   SpO2: 98%   Weight: 182 lb (82.6 kg)   Height: 5' 6\" (1.676 m)    Body mass index is 29.38 kg/m .      Alert female.  Mildly anxious.  Mucous membranes moist.  Affect is within normal limits and appropriate.  Normal psychomotor activities.  No tremors noted.  Able to attend to the conversation without difficulty.    This note has been dictated using voice recognition software. Any grammatical or context distortions are unintentional and inherent to " the the software.

## 2021-06-05 VITALS
DIASTOLIC BLOOD PRESSURE: 64 MMHG | HEART RATE: 82 BPM | SYSTOLIC BLOOD PRESSURE: 122 MMHG | WEIGHT: 171.31 LBS | BODY MASS INDEX: 27.65 KG/M2 | TEMPERATURE: 98 F | OXYGEN SATURATION: 97 %

## 2021-06-05 VITALS
WEIGHT: 181.13 LBS | HEART RATE: 87 BPM | BODY MASS INDEX: 29.11 KG/M2 | SYSTOLIC BLOOD PRESSURE: 140 MMHG | HEIGHT: 66 IN | DIASTOLIC BLOOD PRESSURE: 80 MMHG

## 2021-06-05 VITALS
HEART RATE: 82 BPM | BODY MASS INDEX: 28.97 KG/M2 | DIASTOLIC BLOOD PRESSURE: 80 MMHG | SYSTOLIC BLOOD PRESSURE: 170 MMHG | WEIGHT: 179.5 LBS

## 2021-06-08 NOTE — PROGRESS NOTES
"Ana Maria De La Garza is a 69 y.o. female who is being evaluated via a billable telephone visit.      The patient has been notified of following:     \"This telephone visit will be conducted via a call between you and your physician/provider. We have found that certain health care needs can be provided without the need for a physical exam.  This service lets us provide the care you need with a short phone conversation.  If a prescription is necessary we can send it directly to your pharmacy.  If lab work is needed we can place an order for that and you can then stop by our lab to have the test done at a later time.    Telephone visits are billed at different rates depending on your insurance coverage. During this emergency period, for some insurers they may be billed the same as an in-person visit.  Please reach out to your insurance provider with any questions.    If during the course of the call the physician/provider feels a telephone visit is not appropriate, you will not be charged for this service.\"    Patient has given verbal consent to a Telephone visit? Yes    What phone number would you like to be contacted at? [phone]    Patient would like to receive their AVS by AVS Preference: Manda.    Assessment/Plan:    1. Recurrent Major Depression In Full Remission  Patient admits to struggling with increased anxiety over the last several months but feels that this was situational and has since improved.  She wishes to continue at current dose of venlafaxine 75 mg once daily.  She will notify us if anxiety or depression worsens and will consider increasing medication.  - venlafaxine (EFFEXOR-XR) 75 MG 24 hr capsule; Take 1 capsule (75 mg total) by mouth daily.  Dispense: 90 capsule; Refill: 1    3. Essential hypertension, malignant  She does not routinely check her blood pressure at home.  She is consistently taking hydrochlorothiazide 25 mg daily.  Will place future order for labs to check basic metabolic " panel to ensure stable renal function and electrolytes.  - hydroCHLOROthiazide (HYDRODIURIL) 25 MG tablet; Take 1 tablet (25 mg total) by mouth daily.  Dispense: 90 tablet; Refill: 1  - Basic Metabolic Panel; Future    4. Insomnia  Intermittent insomnia noted.  Benefit with trazodone 50 to 100 mg as needed.  Refill provided today.  - traZODone (DESYREL) 100 MG tablet; TAKE ONE-HALF TO ONE TABLET BY MOUTH ONCE DAILY AT BEDTIME AS NEEDED FOR SLEEP  Dispense: 90 tablet; Refill: 1    5. Type 2 diabetes mellitus without complication, without long-term current use of insulin (H)  Most recent hemoglobin A1c in December 2019 noted to be 6.9.  Currently diet controlled.  She is not checking blood sugars encouraged lifestyle modifications in regards to diet and exercise.  Will check hemoglobin A1c during lab only visit in the upcoming weeks and follow-up with results when available.  - blood glucose test (CONTOUR NEXT TEST STRIPS) strips; Use 1 each As Directed daily.  Dispense: 100 strip; Refill: 11  - Glycosylated Hemoglobin A1c; Future    6. Screening for hyperlipidemia  We will check fasting lipids as well at lab appointment.  She is due for annual exam and plans to schedule this in 4-6 months.  - Lipid [place] FASTING; Future    The following are part of a depression follow up plan for the patient:  mental health screening assessment, mental elvis screening education, mental health treatment assessment and mental health treatment education      Subjective:    Ana Maria De La Garza is a 69 year old female here today for a telephone visit medication check.  Past medical history significant for type 2 diabetes, most recent hemoglobin A1c is 6.9 in December 2019.  This remains diet controlled.  She does not routinely check her blood sugar but has the ability to do so.  Reports feeling very well though in this regard.  She is trying to make healthier changes to her diet and increase amount of exercise.  Denies any recent weight  gain etc.  She has been on venlafaxine 75 mg for depression anxiety.  Tolerating well without side effects.  She states that anxiety was very much heightened over the last couple months due to coronavirus and trying to sell her house as well as dealing with a broken pool.  She recently sold her house and had a pool sex and states that anxiety has improved immensely.  She feels that 75 mg is adequate at this time and does not wish to adjust medication however well consider if anxiety worsens again.  She continues to struggle with insomnia off and on.  States that this is usually worse when anxiety is out of control.  She uses trazodone between 50 mg and 100 mg intermittently as needed.  This works well for her.  She reports feeling well and denies any recent illness.  No chest pain, shortness of breath, fevers, chills.  No heart palpitations.   Review of systems is as stated in HPI, and the remainder of the 10 system review is otherwise unremarkable.    Past Medical History, Family History, and Social History reviewed.    Past Surgical History:   Procedure Laterality Date     JOINT REPLACEMENT Left 2008    knee     ORIF ANKLE FRACTURE Right     2008 and pin removal 2009     MS APPENDECTOMY      Description: Appendectomy;  Recorded: 02/26/2008;     MS SLING OPER STRES INCONTINENCE      Description: Mid-Urethral Sling Operation;  Recorded: 05/18/2010;     MS TOTAL KNEE ARTHROPLASTY Left 2/18/2015    Procedure:   LEFT TOTAL KNEE ARTHROPLASTY;  Surgeon: Sina SHARP MD;  Location: [facility];  Service: Orthopedics        Family History   Problem Relation Age of Onset     Diabetes Mother      Hypertension Mother      Hypertension Father      Cancer Brother      Clotting disorder Neg Hx         Past Medical History:   Diagnosis Date     Depression      Hypertension      Insomnia         Social History     Tobacco Use     Smoking status: Never Smoker     Smokeless tobacco: Never Used   Substance Use Topics      Alcohol use: Yes     Alcohol/week: 2.0 standard drinks     Types: 2 Shots of liquor per week     Drug use: No        Current Outpatient Medications   Medication Sig Dispense Refill     aspirin 81 MG EC tablet Take 1 tablet (81 mg total) by mouth daily.  0     blood glucose test (CONTOUR NEXT TEST STRIPS) strips Use 1 each As Directed daily. 100 strip 11     calcium-vitamin D (CALCIUM-VITAMIN D) 500 mg(1,250mg) -200 unit per tablet Take 2 tablets by mouth daily.       hydroCHLOROthiazide (HYDRODIURIL) 25 MG tablet Take 1 tablet (25 mg total) by mouth daily. 90 tablet 1     multivitamin therapeutic (THERAGRAN) tablet Take 1 tablet by mouth daily.       OMEGA-3/DHA/EPA/FISH OIL (FISH OIL-OMEGA-3 FATTY ACIDS) 300-1,000 mg capsule Take 2 g by mouth daily.       traZODone (DESYREL) 100 MG tablet TAKE ONE-HALF TO ONE TABLET BY MOUTH ONCE DAILY AT BEDTIME AS NEEDED FOR SLEEP 90 tablet 1     venlafaxine (EFFEXOR-XR) 75 MG 24 hr capsule Take 1 capsule (75 mg total) by mouth daily. 90 capsule 1     No current facility-administered medications for this visit.           Objective:      This note has been dictated using voice recognition software. Any grammatical or context distortions are unintentional and inherent to the use of this software.     Phone call duration:  13 minutes    Halley Cooper, CNP

## 2021-06-08 NOTE — PROGRESS NOTES
Assessment: Ana Maria is here to review using a meter.    Stated she is in denial of having Diabetes.  She has been restricting her diet and being more active to help get her A1C down.  Stated before having it checked in January she was not making great choices for her meals.    We reviewed how to use a Contour Next meter and discussed when she should test.  Asked her to test 2-3 times per week at different times of the day.  She was not excited but stated she will give this a try.    We also talked briefly about Metformin and how it works to help control glucose.    Plan:   1. Check glucose as discussed  2. Follow-up with Cici Navarrete in April    Subjective and Objective:      Ana Maria De La Garza is referred by Dr. Navarrete for Diabetes Education.     Lab Results   Component Value Date    HGBA1C 6.9 (H) 01/17/2017         Current diabetes medications:  None    Goals     None          Follow up:   Primary care visit      Education:     Monitoring   Meter (per above goals): Assessed and Discussed  Monitoring: Assessed, Discussed, Literature provided and Patient returned demonstration  BG goals: Assessed, Discussed and Literature provided    Nutrition Management  Nutrition Management: Assessed and Discussed  Weight: Assessed  Portions/Balance: Assessed and Discussed  Carb ID/Count: Assessed and Discussed  Label Reading: Not addressed  Heart Healthy Fats: Not addressed  Menu Planning: Assessed and Discussed  Dining Out: Assessed and Discussed  Physical Activity: Assessed and Discussed  Medications: Assessed and Discussed  Orals: Assessed and Discussed  Injected Medications: Not addressed   Storage/Exp:Not addressed   Site Rotation: Not addressed   Sites Assessed: no    Diabetes Disease Process: Assessed and Discussed    Acute Complications: Prevent, Detect, Treat:  Hypoglycemia: Not addressed  Hyperglycemia: Not addressed  Sick Days: Not addressed  Driving: Not addressed    Chronic Complications  Foot Care:Not  addressed  Skin Care: Not addressed  Eye: Not addressed  ABC: Assessed and Discussed  Teeth:Not addressed  Goal Setting and Problem Solving: Assessed and Discussed  Barriers: Assessed and Discussed  Psychosocial Adjustments: Assessed and Discussed      Time spent with the patient: 30 minutes for diabetes education and counseling.   Previous Education: no  Visit Type:DSMT  Hours Remaining: DSMT 1.5 and MNT 2      Desirae Moralez  2/14/2017

## 2021-06-08 NOTE — PROGRESS NOTES
Assessment/Plan:     1. Acute cystitis with hematuria  In light of gross hematuria and hyperglycemia with likely type 2 diabetes, will treat with a seven-day course of Bactrim.  Continue symptomatic cares with ibuprofen as needed.  Will await basic metabolic panel and urine culture.  - Urinalysis-UC if Indicated  - Culture, Urine  - Urine,Microscopic  - Basic Metabolic Panel    2. Hyperglycemia  Will await A1c and basic metabolic panel.  - Glycosylated Hemoglobin A1c  - Basic Metabolic Panel    Subjective:      Ana Maria De La Garza is a 66 y.o. female presented to clinic today for evaluation of acute hematuria and dysuria.  She awoke from sleep at 3 AM with low pelvic and urethral pain with gross hematuria.  The past few days perhaps very mild UTI symptoms though she was not certain.  Denies any fevers or chills.  Has had some intermittent low back pain but no mid back pain.  Yesterday was feeling some generalized aching in her low back, legs, and shoulders but this is a not uncommon finding for her.  She took ibuprofen this morning and that is been somewhat helpful but continues to have bright red blood in urine.  She has a history of hemorrhagic UTI in the past, the most recent in June treated with 3 day course of Bactrim with good results.    We review lab findings from physical in June indicating A1c at 6.8 which is a new finding for her.  She has not yet followed up on this.  She is not fasting today.  Her renal function was also mildly decreased from baseline.  Follow-up of this.    Current Outpatient Prescriptions   Medication Sig Dispense Refill     calcium-vitamin D (CALCIUM-VITAMIN D) 500 mg(1,250mg) -200 unit per tablet Take 2 tablets by mouth daily.       fluticasone (FLONASE) 50 mcg/actuation nasal spray 1 spray each nostril twice daily (Patient taking differently: 1 spray each nostril twice daily as needed) 1 g 3     hydrochlorothiazide (HYDRODIURIL) 25 MG tablet Take one tablet by mouth once daily 90  tablet 0     hydrochlorothiazide (HYDRODIURIL) 25 MG tablet TAKE ONE TABLET BY MOUTH ONCE DAILY 90 tablet 2     multivitamin therapeutic (THERAGRAN) tablet Take 1 tablet by mouth daily.       sertraline (ZOLOFT) 100 MG tablet TAKE ONE TABLET BY MOUTH ONCE DAILY 90 tablet 1     traZODone (DESYREL) 100 MG tablet TAKE ONE-HALF TO ONE TABLET BY MOUTH ONCE DAILY AT BEDTIME AS NEEDED FOR SLEEP 90 tablet 1     sulfamethoxazole-trimethoprim (BACTRIM DS) 800-160 mg per tablet Take 1 tablet by mouth 2 (two) times a day for 7 days. 14 tablet 0     No current facility-administered medications for this visit.        Past Medical History, Family History, and Social History reviewed.  Past Medical History   Diagnosis Date     Depression      Hypertension      Insomnia      Past Surgical History   Procedure Laterality Date     Pr appendectomy       Description: Appendectomy;  Recorded: 02/26/2008;     Pr sling oper stres incontinence       Description: Mid-Urethral Sling Operation;  Recorded: 05/18/2010;     Orif ankle fracture Right      2008 and pin removal 2009     Joint replacement Left 2008     knee     Pr total knee arthroplasty Left 2/18/2015     Procedure:   LEFT TOTAL KNEE ARTHROPLASTY;  Surgeon: Sina SHARP MD;  Location: [facility];  Service: Orthopedics     Morphine (pf)  Family History   Problem Relation Age of Onset     Diabetes Mother      Hypertension Mother      Hypertension Father      Cancer Brother      Social History     Social History     Marital status:      Spouse name: Abimael     Number of children: 2     Years of education: N/A     Occupational History     Retired      Social History Main Topics     Smoking status: Never Smoker     Smokeless tobacco: Not on file     Alcohol use 1.2 oz/week     2 Shots of liquor per week     Drug use: No     Sexual activity: Not on file     Other Topics Concern     Not on file     Social History Narrative         Review of systems is as stated in HPI, and  "the remainder of the 10 system review is otherwise negative.    Objective:     Vitals:    01/17/17 0816   BP: 136/80   Patient Site: Right Arm   Patient Position: Sitting   Cuff Size: Adult Large   Pulse: 72   Resp: 20   Temp: 98  F (36.7  C)   TempSrc: Oral   Weight: 186 lb (84.4 kg)   Height: 5' 6\" (1.676 m)    Body mass index is 30.02 kg/(m^2).    Alert female.  Abdomen soft and nontender.  Very mild tenderness palpation suprapubic region with deep palpation only.  No CVA tenderness.    Office Visit on 01/17/2017   Component Date Value Ref Range Status     Color, UA 01/17/2017 Jennifer* Colorless, Yellow, Straw, Light Yellow Final     Clarity, UA 01/17/2017 Cloudy* Clear Final     Glucose, UA 01/17/2017 Negative  Negative Final     Bilirubin, UA 01/17/2017 Negative  Negative Final     Ketones, UA 01/17/2017 Negative  Negative Final     Specific Gravity, UA 01/17/2017 1.020  1.002 - 1.030 Final     Blood, UA 01/17/2017 Large* Negative Final     pH, UA 01/17/2017 5.5  4.5 - 8.0 Final     Protein, UA 01/17/2017 100 mg/dL* Negative mg/dL Final     Urobilinogen, UA 01/17/2017 0.2 E.U./dL  0.2 E.U./dL, 1.0 E.U./dL Final     Nitrite, UA 01/17/2017 Positive* Negative Final     Leukocytes, UA 01/17/2017 Moderate* Negative Final          This note has been dictated using voice recognition software. Any grammatical or context distortions are unintentional and inherent to the the software.     "

## 2021-06-10 NOTE — TELEPHONE ENCOUNTER
Who is calling:  Pharmacy  Reason for Call:  Caller trying to figure out why Pt is unable to get medication.  Writer noted it was sent to [facility] 06/05/2020, and that is possibly the reason why.  Caller agreed.  Date of last appointment with primary care: N/A  Okay to leave a detailed message: No

## 2021-06-10 NOTE — PROGRESS NOTES
Assessment/Plan:     1. Type 2 diabetes mellitus without complication, without long-term current use of insulin  We reviewed nature of condition including pathophysiology.  Will obtain urine microalbumin today.  Advised eye exam.  Advised aspirin 81 mg daily.  Prescription provided for a atorvastatin 80 mg daily.  Encouraged continued efforts at healthy lifestyle habits.  No indication for metformin initiation at this time.  Follow-up in 3 months, sooner further problems or concerns, notify me if blood sugars increase while checking 2-3 times weekly.  - Glycosylated Hemoglobin A1c  - Microalbumin, Random Urine    2. Nasal congestion  Refill provided of fluticasone.  - fluticasone (FLONASE) 50 mcg/actuation nasal spray; 2 sprays each nostril daily  Dispense: 1 g; Refill: 3    3. Hyperlipidemia  8 atorvastatin 80 mg at bedtime initiated today.  Advised healthy lifestyle habits.      Subjective:      Ana Maria De La Garza is a 66 y.o. female Presenting to clinic today for follow-up of new diagnosis of type 2 diabetes.  States that she has been very depressed by new diagnosis, but denying specific depression with further questioning.  She has increased her intake of fruits, vegetables, and protein.  Struggling with this that she does not care for them.  Feels like she has been more ill than usual over the winter months.  Doing water exercises 3 times a week, dancing twice weekly.  Finds that diet is her biggest struggle.  Checking sugars 2-3 times weekly, typically in the  range.    Current Outpatient Prescriptions   Medication Sig Dispense Refill     blood glucose test (CONTOUR NEXT STRIPS) strips Use 1 each As Directed daily. (Patient taking differently: Use 1 each As Directed. A couple times a week) 50 each 2     calcium-vitamin D (CALCIUM-VITAMIN D) 500 mg(1,250mg) -200 unit per tablet Take 2 tablets by mouth daily.       generic lancets (MICROLET LANCET) Use 1 each As Directed daily. 50 each 2      hydrochlorothiazide (HYDRODIURIL) 25 MG tablet Take one tablet by mouth once daily 90 tablet 0     multivitamin therapeutic (THERAGRAN) tablet Take 1 tablet by mouth daily.       sertraline (ZOLOFT) 100 MG tablet TAKE ONE TABLET BY MOUTH ONCE DAILY 90 tablet 1     traZODone (DESYREL) 100 MG tablet TAKE ONE-HALF TO ONE TABLET BY MOUTH ONCE DAILY AT BEDTIME AS NEEDED FOR SLEEP 90 tablet 1     atorvastatin (LIPITOR) 80 MG tablet Take 1 tablet (80 mg total) by mouth daily. 90 tablet 1     fluticasone (FLONASE) 50 mcg/actuation nasal spray 2 sprays each nostril daily 1 g 3     No current facility-administered medications for this visit.        Past Medical History, Family History, and Social History reviewed.  Past Medical History:   Diagnosis Date     Depression      Hypertension      Insomnia      Past Surgical History:   Procedure Laterality Date     JOINT REPLACEMENT Left 2008    knee     ORIF ANKLE FRACTURE Right     2008 and pin removal 2009     NC APPENDECTOMY      Description: Appendectomy;  Recorded: 02/26/2008;     NC SLING OPER STRES INCONTINENCE      Description: Mid-Urethral Sling Operation;  Recorded: 05/18/2010;     NC TOTAL KNEE ARTHROPLASTY Left 2/18/2015    Procedure:   LEFT TOTAL KNEE ARTHROPLASTY;  Surgeon: Sina SHARP MD;  Location: [facility];  Service: Orthopedics     Morphine (pf)  Family History   Problem Relation Age of Onset     Diabetes Mother      Hypertension Mother      Hypertension Father      Cancer Brother      Social History     Social History     Marital status:      Spouse name: Abimael     Number of children: 2     Years of education: N/A     Occupational History     Retired      Social History Main Topics     Smoking status: Never Smoker     Smokeless tobacco: Never Used     Alcohol use 1.2 oz/week     2 Shots of liquor per week     Drug use: No     Sexual activity: Not on file     Other Topics Concern     Not on file     Social History Narrative         Review of  "systems is as stated in HPI, and the remainder of the 10 system review is otherwise negative.    Objective:     Vitals:    04/19/17 0927   BP: 130/82   Patient Site: Right Arm   Patient Position: Sitting   Cuff Size: Adult Regular   Pulse: 64   Resp: 20   Temp: 97.7  F (36.5  C)   TempSrc: Oral   Weight: 161 lb 3.2 oz (73.1 kg)   Height: 5' 6\" (1.676 m)    Body mass index is 26.02 kg/(m^2).    Alert female.  Mucous membranes moist.  Heart with regular rate and rhythm.  Lungs clear.  Extremities without edema.  Normal foot exam including monofilament testing.      This note has been dictated using voice recognition software. Any grammatical or context distortions are unintentional and inherent to the the software.     "

## 2021-06-11 ENCOUNTER — OFFICE VISIT - HEALTHEAST (OUTPATIENT)
Dept: FAMILY MEDICINE | Facility: CLINIC | Age: 71
End: 2021-06-11

## 2021-06-11 ENCOUNTER — COMMUNICATION - HEALTHEAST (OUTPATIENT)
Dept: FAMILY MEDICINE | Facility: CLINIC | Age: 71
End: 2021-06-11

## 2021-06-11 ENCOUNTER — RECORDS - HEALTHEAST (OUTPATIENT)
Dept: GENERAL RADIOLOGY | Facility: CLINIC | Age: 71
End: 2021-06-11

## 2021-06-11 DIAGNOSIS — R14.0 ABDOMINAL BLOATING: ICD-10-CM

## 2021-06-11 DIAGNOSIS — I10 ESSENTIAL HYPERTENSION, BENIGN: ICD-10-CM

## 2021-06-11 DIAGNOSIS — Z12.11 COLON CANCER SCREENING: ICD-10-CM

## 2021-06-11 DIAGNOSIS — E11.8 TYPE 2 DIABETES MELLITUS WITH COMPLICATION, WITHOUT LONG-TERM CURRENT USE OF INSULIN (H): ICD-10-CM

## 2021-06-11 DIAGNOSIS — F33.42 MAJOR DEPRESSIVE DISORDER, RECURRENT EPISODE, IN FULL REMISSION (H): ICD-10-CM

## 2021-06-11 DIAGNOSIS — L29.9 ITCHING: ICD-10-CM

## 2021-06-11 DIAGNOSIS — R42 DIZZINESS: ICD-10-CM

## 2021-06-11 DIAGNOSIS — Z83.719 FAMILY HISTORY OF COLONIC POLYPS: ICD-10-CM

## 2021-06-11 DIAGNOSIS — R68.89 TEMPERATURE INTOLERANCE: ICD-10-CM

## 2021-06-11 DIAGNOSIS — R14.0 ABDOMINAL DISTENSION (GASEOUS): ICD-10-CM

## 2021-06-11 DIAGNOSIS — R53.83 FATIGUE, UNSPECIFIED TYPE: ICD-10-CM

## 2021-06-11 LAB
ALBUMIN SERPL-MCNC: 4.1 G/DL (ref 3.5–5)
ALBUMIN UR-MCNC: NEGATIVE G/DL
ALP SERPL-CCNC: 95 U/L (ref 45–120)
ALT SERPL W P-5'-P-CCNC: 34 U/L (ref 0–45)
ANION GAP SERPL CALCULATED.3IONS-SCNC: 12 MMOL/L (ref 5–18)
APPEARANCE UR: CLEAR
AST SERPL W P-5'-P-CCNC: 25 U/L (ref 0–40)
BASOPHILS # BLD AUTO: 0 THOU/UL (ref 0–0.2)
BASOPHILS NFR BLD AUTO: 0 % (ref 0–2)
BILIRUB SERPL-MCNC: 0.6 MG/DL (ref 0–1)
BILIRUB UR QL STRIP: NEGATIVE
BUN SERPL-MCNC: 15 MG/DL (ref 8–28)
C REACTIVE PROTEIN LHE: 0.3 MG/DL (ref 0–0.8)
CALCIUM SERPL-MCNC: 9.6 MG/DL (ref 8.5–10.5)
CHLORIDE BLD-SCNC: 103 MMOL/L (ref 98–107)
CO2 SERPL-SCNC: 25 MMOL/L (ref 22–31)
COLOR UR AUTO: YELLOW
CREAT SERPL-MCNC: 0.97 MG/DL (ref 0.6–1.1)
EOSINOPHIL # BLD AUTO: 0 THOU/UL (ref 0–0.4)
EOSINOPHIL NFR BLD AUTO: 0 % (ref 0–6)
ERYTHROCYTE [DISTWIDTH] IN BLOOD BY AUTOMATED COUNT: 12.4 % (ref 11–14.5)
ERYTHROCYTE [SEDIMENTATION RATE] IN BLOOD BY WESTERGREN METHOD: 6 MM/HR (ref 0–20)
GFR SERPL CREATININE-BSD FRML MDRD: 57 ML/MIN/1.73M2
GLUCOSE BLD-MCNC: 212 MG/DL (ref 70–125)
GLUCOSE UR STRIP-MCNC: ABNORMAL MG/DL
HBA1C MFR BLD: 8.2 %
HCT VFR BLD AUTO: 41.3 % (ref 35–47)
HGB BLD-MCNC: 14.7 G/DL (ref 12–16)
HGB UR QL STRIP: NEGATIVE
IMM GRANULOCYTES # BLD: 0 THOU/UL
IMM GRANULOCYTES NFR BLD: 0 %
KETONES UR STRIP-MCNC: NEGATIVE MG/DL
LEUKOCYTE ESTERASE UR QL STRIP: NEGATIVE
LYMPHOCYTES # BLD AUTO: 0.7 THOU/UL (ref 0.8–4.4)
LYMPHOCYTES NFR BLD AUTO: 9 % (ref 20–40)
MCH RBC QN AUTO: 31.3 PG (ref 27–34)
MCHC RBC AUTO-ENTMCNC: 35.6 G/DL (ref 32–36)
MCV RBC AUTO: 88 FL (ref 80–100)
MONOCYTES # BLD AUTO: 0.5 THOU/UL (ref 0–0.9)
MONOCYTES NFR BLD AUTO: 7 % (ref 2–10)
NEUTROPHILS # BLD AUTO: 6.3 THOU/UL (ref 2–7.7)
NEUTROPHILS NFR BLD AUTO: 83 % (ref 50–70)
NITRATE UR QL: NEGATIVE
PH UR STRIP: 5.5 [PH] (ref 5–8)
PLATELET # BLD AUTO: 202 THOU/UL (ref 140–440)
PMV BLD AUTO: 10.4 FL (ref 7–10)
POTASSIUM BLD-SCNC: 4.7 MMOL/L (ref 3.5–5)
PROT SERPL-MCNC: 6.9 G/DL (ref 6–8)
RBC # BLD AUTO: 4.69 MILL/UL (ref 3.8–5.4)
SODIUM SERPL-SCNC: 140 MMOL/L (ref 136–145)
SP GR UR STRIP: 1.02 (ref 1–1.03)
TSH SERPL DL<=0.005 MIU/L-ACNC: 1.39 UIU/ML (ref 0.3–5)
UROBILINOGEN UR STRIP-ACNC: ABNORMAL
WBC: 7.6 THOU/UL (ref 4–11)

## 2021-06-11 RX ORDER — GLIPIZIDE 5 MG/1
2.5 TABLET ORAL DAILY
Qty: 90 TABLET | Refills: 3 | Status: SHIPPED | OUTPATIENT
Start: 2021-06-11 | End: 2022-08-02 | Stop reason: DRUGHIGH

## 2021-06-12 NOTE — PROGRESS NOTES
Assessment/Plan:    1. Type 2 diabetes mellitus with complication, without long-term current use of insulin (H)  Hx DM2 with worsening A1c over past year, now 9.3%. We discussed need to start medication and pt agrees - she expresses worry about metformin, especially related to potential GI side effects - provided reassurance that not all patients develop these side effects, often it is dose related and we will start at a low dose and increase slowly as she is tolerating the medication - after discussion pt agrees with plan, will start metformin as below. Encouraged healthy diet (especially decreasing carbohydrates), regular exercise and some weight loss. If needed could consider glipizide as next option.   - metFORMIN (GLUCOPHAGE) 500 MG tablet; Take one tablet with dinner for 1-2 weeks; then increase to 1 tablet with breakfast and 1 tablet with dinner if tolerating.  Dispense: 90 tablet; Refill: 1    2. Stage 3a chronic kidney disease  Prior labs show CKD3a, will recheck BMP today as below.    3. Hot flashes  Pt very worried and frustrated by hot flashes; suspect related to postmenopausal status but will check labs as below. If no other medical cause, then could consider increasing effexor dose, trial other options such as soy or black cohosh or starting very low dose HRT (though this would be last option given risks).  - Thyroid Cascade  - Comprehensive Metabolic Panel  - HM1(CBC and Differential)  - Estradiol  - Follicle Stimulating Hormone (FSH)    4. SOB (shortness of breath) on exertion  Pt reporting increased shortness of breath with exertion; no other associated symptoms. Recommend cardiac evaluation - will check labs and EKG as below. EKG normal - sinus bradycardia, nml axis, nml intervals, no ST or T wave changes. Discussed if EKG and labs nml then would recommend exercise stress test and pt agrees.  - BNP(B-type Natriuretic Peptide)  - Electrocardiogram Perform and Read  - Troponin I      Follow up: 3  months, sooner as needed    Adelina Donaldson MD  [facility]    Subjective:    Patient ID: Ana Maria De La Garza is a 70 y.o. female is here today for f/u DM2    F/u DM2  -History type 2 diabetes, per review of chart patient was diagnosed in 2016, for the most part levels have been less than 7% but have been increasing over the past year with last A1c 9.3% on 10/22/20  -worried about metformin   -has made diet changes since last visit but not much wt change, frustrated by this    Sweating   -pt reports always sweating and feeling hot, especially when summer or doing activities  -generalized sweating/heat  -going on for years  -year round  -doesn't remember having this much around menopause but seems worsening  -not sweating so much to change sheets/clothes  -shortness of breath with exertion, no chest pain, palpitations, leg swelling    Patient Active Problem List   Diagnosis     Female Stress Incontinence     Hyperlipidemia     Recurrent Major Depression In Full Remission     Benign Essential Hypertension     Cervical Spondylosis     Cervical Disc Degeneration     Anxiety     Status post total knee replacement     Insomnia     Type 2 diabetes mellitus with complication, without long-term current use of insulin (H)     Osteopenia     Chronic kidney disease, stage 3     Past Medical History:   Diagnosis Date     Depression      Hypertension      Insomnia      Past Surgical History:   Procedure Laterality Date     JOINT REPLACEMENT Left 2008    knee     ORIF ANKLE FRACTURE Right     2008 and pin removal 2009     [place] APPENDECTOMY      Description: Appendectomy;  Recorded: 02/26/2008;     LA SLING OPER STRES INCONTINENCE      Description: Mid-Urethral Sling Operation;  Recorded: 05/18/2010;     LA TOTAL KNEE ARTHROPLASTY Left 2/18/2015    Procedure:   LEFT TOTAL KNEE ARTHROPLASTY;  Surgeon: Sina SHARP MD;  Location: [facility];  Service: Orthopedics     Current Outpatient Medications on File  Prior to Visit   Medication Sig Dispense Refill     aspirin 81 MG EC tablet Take 1 tablet (81 mg total) by mouth daily.  0     blood glucose test (CONTOUR NEXT TEST STRIPS) strips Use 1 each As Directed daily. 100 strip 11     calcium-vitamin D (CALCIUM-VITAMIN D) 500 mg(1,250mg) -200 unit per tablet Take 2 tablets by mouth daily.       hydroCHLOROthiazide (HYDRODIURIL) 25 MG tablet Take 1 tablet (25 mg total) by mouth daily. 90 tablet 1     multivitamin therapeutic (THERAGRAN) tablet Take 1 tablet by mouth daily.       OMEGA-3/DHA/EPA/FISH OIL (FISH OIL-OMEGA-3 FATTY ACIDS) 300-1,000 mg capsule Take 2 g by mouth daily.       red yeast rice 600 mg cap        traZODone (DESYREL) 100 MG tablet TAKE ONE-HALF TO ONE TABLET BY MOUTH ONCE DAILY AT BEDTIME AS NEEDED FOR SLEEP 90 tablet 1     venlafaxine (EFFEXOR-XR) 75 MG 24 hr capsule TAKE 1 CAPSULE BY MOUTH DAILY 90 capsule 1     No current facility-administered medications on file prior to visit.      Allergies   Allergen Reactions     Atorvastatin Myalgia     Morphine (Pf) Nausea And Vomiting     Pravastatin Myalgia     Social History     Socioeconomic History     Marital status:      Spouse name: Abimael     Number of children: 2     Years of education: Not on file     Highest education level: Not on file   Occupational History     Occupation: Retired   Social Needs     Financial resource strain: Not on file     Food insecurity     Worry: Not on file     Inability: Not on file     Transportation needs     Medical: Not on file     Non-medical: Not on file   Tobacco Use     Smoking status: Never Smoker     Smokeless tobacco: Never Used   Substance and Sexual Activity     Alcohol use: Yes     Alcohol/week: 2.0 standard drinks     Types: 2 Shots of liquor per week     Drug use: No     Sexual activity: Not on file   Lifestyle     Physical activity     Days per week: Not on file     Minutes per session: Not on file     Stress: Not on file   Relationships     Social  connections     Talks on phone: Not on file     Gets together: Not on file     Attends Worship service: Not on file     Active member of club or organization: Not on file     Attends meetings of clubs or organizations: Not on file     Relationship status: Not on file     Intimate partner violence     Fear of current or ex partner: Not on file     Emotionally abused: Not on file     Physically abused: Not on file     Forced sexual activity: Not on file   Other Topics Concern     Not on file   Social History Narrative     Not on file     Family History   Problem Relation Age of Onset     Diabetes Mother      Hypertension Mother      Hypertension Father      Cancer Brother      Clotting disorder Neg Hx      Review of systems is as stated in HPI, and the remainder of system review is otherwise negative.    Objective:      /80   Pulse 82   Wt 179 lb 8 oz (81.4 kg)   LMP 03/05/1998   BMI 28.97 kg/m      General appearance: awake, NAD, overweight  HEENT: atraumatic, normocephalic, no scleral icterus or injection  CV: RRR, no murmurs/rubs/gallops, normal S1 and S2  Lungs: CTAB, no wheezes or crackles, breathing comfortably on room air  Extremities: no LE edema bilaterally, moving all extremities  Skin: no rashes or lesions  Neuro: alert, oriented x3, CNs grossly intact, no focal deficits appreciated  Psych: normal mood/affect/behavior, answering questions appropriately, linear thought process

## 2021-06-12 NOTE — PROGRESS NOTES
Assessment and Plan:   Ana Maria is a 70-year-old female here for annual wellness visit.  Patient has been advised of split billing requirements and indicates understanding: Yes     1. Routine general medical examination at a health care facility  Annual wellness visit completed today.  Labs as below.  Patient due for tetanus booster but wants to discuss with her insurance prior to getting this done.  Patient up-to-date on colon cancer screening.    2. Type 2 diabetes mellitus with complication, without long-term current use of insulin (H)  History type 2 diabetes, per review of chart patient was diagnosed in 2016, for the most part levels have been less than 7% but have been increasing over the past year with last A1c 7.9% in July.  We will plan for patient to return next week for lab only visit, will check A1c and urine microalbumin as below.  We did discuss that if her A1c level is still elevated that we should consider starting medication, patient expresses concern about Metformin but we discussed this and it would still be reasonable to use this as first-line if appropriate.  - Glycosylated Hemoglobin A1c; Future  - Microalbumin, Random Urine; Future    3. Benign Essential Hypertension  Patient with a history of hypertension, currently taking hydrochlorothiazide 25 mg daily; blood pressure elevated at 160/90 but repeat 140/80.  Patient is asymptomatic at this time.  Normal BMP within the past year.  We will continue current therapy and monitor for persistent borderline/elevated blood pressures and add additional therapy if needed.    4. Hyperlipidemia, unspecified hyperlipidemia type  History elevated lipid levels at last check on 7/17/2020, PCP recommended healthy diet and regular exercise.  We will plan repeat in 6 to 12 months and consider statin therapy if needed.    5. Abdominal bloating  Patient reports occasional abdominal bloating, most often in the evenings.  Discussed most likely food related,  encourage patient to monitor for food triggers; provided low FODMAP diet information for patient to review.  Discussed can use simethicone as needed.    6. Decreased hearing of both ears  Patient reports decreased hearing but not interested in audiology referral at this time.  She will continue to monitor and reach out if needed.    7. Stressful life event affecting family  Patient reports a lot of stress in the past 18 months.  PHQ 9 score normal today, KATT-7 score mildly elevated.  Patient is taking Effexor 75 mg daily.  Offered referral to psychology but patient declines at this time.        The patient's current medical problems were reviewed.    I have had an Advance Directives discussion with the patient.  The following high BMI interventions were performed this visit: encouragement to exercise and lifestyle education regarding diet  The following health maintenance schedule was reviewed with the patient and provided in printed form in the after visit summary:   Health Maintenance   Topic Date Due     DEPRESSION ACTION PLAN  1950     HEPATITIS B VACCINES (1 of 3 - Risk 3-dose series) 09/07/1968     TD 18+ HE  04/30/2018     DIABETIC EYE EXAM  09/04/2019     DIABETIC FOOT EXAM  09/11/2020     MICROALBUMIN  09/11/2020     DXA SCAN  09/28/2020     A1C  01/17/2021     BMP  07/17/2021     LIPID  07/17/2021     MEDICARE ANNUAL WELLNESS VISIT  10/15/2021     FALL RISK ASSESSMENT  10/15/2021     MAMMOGRAM  02/13/2022     COLORECTAL CANCER SCREENING  05/23/2022     ADVANCE CARE PLANNING  10/15/2025     HEPATITIS C SCREENING  Completed     Pneumococcal Vaccine: 65+ Years  Completed     INFLUENZA VACCINE RULE BASED  Completed     ZOSTER VACCINES  Completed     Pneumococcal Vaccine: Pediatrics (0 to 5 Years) and At-Risk Patients (6 to 64 Years)  Aged Out      Adelina Donaldson MD  [facility]    Subjective:   Chief Complaint: Ana Maria De La Garza is an 70 y.o. female here for an Annual Wellness visit.   HPI:     Not getting enough fruits/vegetables  Concerns about hearing: sometimes has trouble but not all the time  Issues with eyes: last eye exam was a couple years ago, hx bifocals but still couldn't read - pt currently using readers for reading and her glasses for long distances, doesn't want to see eye doctor again right now  Leaking urine: related to bladder infections, planning visit with Dr Coats at [facility]  Hx DM: states hasn't been good lately, high stress in past 18 months (sold house, moved, new house sewage issue); had been recommended to check BG a few days per week  Frustrated by phone issues with clinic  Hx HTN: no chest pain; does get shortness of breath when going up hill and more physical exertion, no leg swelling  GAD7 score 9 today, not difficult to function  PHQ9 score 3 today  Stress with , hx counseling together - not a ton of people in [place], does stay contacted with people via telephone    Review of Systems: Please see above.  The rest of the review of systems are negative for all systems.    Patient Care Team:  Kristen Navarrete MD as PCP - Sina Cadet MD as Physician (Orthopedic Surgery)  Jason Root DPM as Physician (Podiatry)  Kristen Navarrete MD as Assigned PCP     Patient Active Problem List   Diagnosis     Female Stress Incontinence     Hyperlipidemia     Recurrent Major Depression In Full Remission     Benign Essential Hypertension     Cervical Spondylosis     Cervical Disc Degeneration     Anxiety     Status post total knee replacement     Insomnia     Type 2 diabetes mellitus with complication, without long-term current use of insulin (H)     Osteopenia     Past Medical History:   Diagnosis Date     Depression      Hypertension      Insomnia       Past Surgical History:   Procedure Laterality Date     JOINT REPLACEMENT Left 2008    knee     ORIF ANKLE FRACTURE Right     2008 and pin removal 2009     [place] APPENDECTOMY      Description:  Appendectomy;  Recorded: 02/26/2008;     OR SLING OPER STRES INCONTINENCE      Description: Mid-Urethral Sling Operation;  Recorded: 05/18/2010;     OR TOTAL KNEE ARTHROPLASTY Left 2/18/2015    Procedure:   LEFT TOTAL KNEE ARTHROPLASTY;  Surgeon: Sina SHARP MD;  Location: [facility] Main OR;  Service: Orthopedics      Family History   Problem Relation Age of Onset     Diabetes Mother      Hypertension Mother      Hypertension Father      Cancer Brother      Clotting disorder Neg Hx       Social History     Socioeconomic History     Marital status:      Spouse name: Abimael     Number of children: 2     Years of education: Not on file     Highest education level: Not on file   Occupational History     Occupation: Retired   Social Needs     Financial resource strain: Not on file     Food insecurity     Worry: Not on file     Inability: Not on file     Transportation needs     Medical: Not on file     Non-medical: Not on file   Tobacco Use     Smoking status: Never Smoker     Smokeless tobacco: Never Used   Substance and Sexual Activity     Alcohol use: Yes     Alcohol/week: 2.0 standard drinks     Types: 2 Shots of liquor per week     Drug use: No     Sexual activity: Not on file   Lifestyle     Physical activity     Days per week: Not on file     Minutes per session: Not on file     Stress: Not on file   Relationships     Social connections     Talks on phone: Not on file     Gets together: Not on file     Attends Religion service: Not on file     Active member of club or organization: Not on file     Attends meetings of clubs or organizations: Not on file     Relationship status: Not on file     Intimate partner violence     Fear of current or ex partner: Not on file     Emotionally abused: Not on file     Physically abused: Not on file     Forced sexual activity: Not on file   Other Topics Concern     Not on file   Social History Narrative     Not on file      Current Outpatient Medications   Medication  "Sig Dispense Refill     aspirin 81 MG EC tablet Take 1 tablet (81 mg total) by mouth daily.  0     blood glucose test (CONTOUR NEXT TEST STRIPS) strips Use 1 each As Directed daily. 100 strip 11     calcium-vitamin D (CALCIUM-VITAMIN D) 500 mg(1,250mg) -200 unit per tablet Take 2 tablets by mouth daily.       hydroCHLOROthiazide (HYDRODIURIL) 25 MG tablet Take 1 tablet (25 mg total) by mouth daily. 90 tablet 1     multivitamin therapeutic (THERAGRAN) tablet Take 1 tablet by mouth daily.       OMEGA-3/DHA/EPA/FISH OIL (FISH OIL-OMEGA-3 FATTY ACIDS) 300-1,000 mg capsule Take 2 g by mouth daily.       traZODone (DESYREL) 100 MG tablet TAKE ONE-HALF TO ONE TABLET BY MOUTH ONCE DAILY AT BEDTIME AS NEEDED FOR SLEEP 90 tablet 1     venlafaxine (EFFEXOR-XR) 75 MG 24 hr capsule Take 1 capsule (75 mg total) by mouth daily. 90 capsule 1     No current facility-administered medications for this visit.       Objective:   Vital Signs:   Visit Vitals  /80   Pulse 87   Ht 5' 6\" (1.676 m)   Wt 181 lb 2 oz (82.2 kg)   LMP 03/05/1998   BMI 29.23 kg/m       PHYSICAL EXAM  General appearance: awake, NAD, overweight  HEENT: atraumatic, normocephalic, no scleral icterus or injection  CV: RRR, no murmurs/rubs/gallops, normal S1 and S2  Lungs: CTAB, no wheezes or crackles, breathing comfortably on room air  Extremities: no LE edema bilaterally, moving all extremities  Diabetic foot exam: normal DP and PT pulses, no trophic changes or ulcerative lesions, normal sensory exam and normal monofilament exam  Neuro: alert, oriented x3, CNs grossly intact, no focal deficits appreciated  Psych: normal mood/affect/behavior, answering questions appropriately, linear thought process      Assessment Results 10/15/2020   Activities of Daily Living No help needed   Instrumental Activities of Daily Living No help needed   Mini Cog Total Score 5   Some recent data might be hidden     Identified Health Risks:     The patient was counseled and " encouraged to consider modifying their diet and eating habits. She was provided with information on recommended healthy diet options.  The patient was provided with written information regarding signs of hearing loss.  Information on urinary incontinence and treatment options given to patient.  Patient's advanced directive was discussed and I am comfortable with the patient's wishes.

## 2021-06-12 NOTE — TELEPHONE ENCOUNTER
RN cannot approve Refill Request    RN can NOT refill this medication PCP messaged that patient is overdue for Labs. Last office visit: 12/18/2019 Kristen Navarrete MD Last Physical: 9/11/2019 Last MTM visit: Visit date not found Last visit same specialty: 12/18/2019 Kristen Navarrete MD.  Next visit within 3 mo: Visit date not found  Next physical within 3 mo: Visit date not found      Austin Shi, [facility] Triage/Med Refill 10/28/2020    Requested Prescriptions   Pending Prescriptions Disp Refills     venlafaxine (EFFEXOR-XR) 75 MG 24 hr capsule [Pharmacy Med Name: VENLAFAXINE 75MG ER CAP] 90 capsule 1     Sig: TAKE 1 CAPSULE BY MOUTH DAILY       Venlafaxine/Desvenlafaxine Refill Protocol Failed - 10/28/2020 10:11 AM        Failed - LFT or AST or ALT in last year     Albumin   Date Value Ref Range Status   09/11/2019 4.2 3.5 - 5.0 g/dL Final     Bilirubin, Total   Date Value Ref Range Status   09/11/2019 0.6 0.0 - 1.0 mg/dL Final     Bilirubin, Direct   Date Value Ref Range Status   05/18/2011 0.2 <0.6 mg/dL Final     Alkaline Phosphatase   Date Value Ref Range Status   09/11/2019 83 45 - 120 U/L Final     AST   Date Value Ref Range Status   09/11/2019 24 0 - 40 U/L Final     ALT   Date Value Ref Range Status   09/11/2019 24 0 - 45 U/L Final     Protein, Total   Date Value Ref Range Status   09/11/2019 7.1 6.0 - 8.0 g/dL Final                Passed - Fasting lipid cascade in last year     Cholesterol   Date Value Ref Range Status   07/17/2020 243 (H) <=199 mg/dL Final     Triglycerides   Date Value Ref Range Status   07/17/2020 246 (H) <=149 mg/dL Final     HDL Cholesterol   Date Value Ref Range Status   07/17/2020 46 (L) >=50 mg/dL Final     LDL Calculated   Date Value Ref Range Status   07/17/2020 148 (H) <=129 mg/dL Final     Patient Fasting > 8hrs?   Date Value Ref Range Status   07/17/2020 Yes  Final             Passed - PCP or prescribing provider visit in last year     Last office visit with  prescriber/PCP: 12/18/2019 Kristen Navarrete MD OR same dept: 12/18/2019 Kristen Navarrete MD OR same specialty: 12/18/2019 Kristen Navarrete MD  Last physical: 9/11/2019 Last MTM visit: Visit date not found   Next visit within 3 mo: Visit date not found  Next physical within 3 mo: Visit date not found  Prescriber OR PCP: Kristen Navarrete MD  Last diagnosis associated with med order: 1. Recurrent Major Depression In Full Remission  - venlafaxine (EFFEXOR-XR) 75 MG 24 hr capsule [Pharmacy Med Name: VENLAFAXINE 75MG ER CAP]; TAKE 1 CAPSULE BY MOUTH DAILY  Dispense: 90 capsule; Refill: 1    2. Anxiety  - venlafaxine (EFFEXOR-XR) 75 MG 24 hr capsule [Pharmacy Med Name: VENLAFAXINE 75MG ER CAP]; TAKE 1 CAPSULE BY MOUTH DAILY  Dispense: 90 capsule; Refill: 1    If protocol passes may refill for 12 months if within 3 months of last provider visit (or a total of 15 months).             Passed - Blood Pressure in last year     BP Readings from Last 1 Encounters:   10/15/20 140/80

## 2021-06-12 NOTE — TELEPHONE ENCOUNTER
Reason contacted:  Results   Information relayed:  Below message. Helped arrange a follow-up telephone visit with  on 11/5/2020.   Additional questions:  No  Further follow-up needed:  No  Okay to leave a detailed message:  No

## 2021-06-12 NOTE — PROGRESS NOTES
Assessment and Plan:       1. Medicare annual wellness visit, initial  At today's visit, we discussed lifestyle interventions to promote self-management and wellness, including maintenance of a healthy weight, healthy diet, regular physical activity and exercise, and falls prevention.  We review all active medical problems.  Advised advanced healthcare directive review, she has been filed with our office.  She will be due for mammogram in December.  She will be due for follow-up colonoscopy in 5 years.  No longer in need of Pap smears.  Encouraged flu shot this season when available.  Remainder immunizations up-to-date.      2. Type 2 diabetes mellitus without complication, without long-term current use of insulin  Excellent improvement in control.  I encouraged her in her efforts.  - Glycosylated Hemoglobin A1c  - Comprehensive Metabolic Panel    3. Syncope  EKG unremarkable.  An etiology not apparent.  Possible she could be having very rare palpitations.  I think for now with rarity it is unlikely that we will find anything on a Holter monitor, but consider doing so in the future.  Will obtain labs as noted to assess for underlying contributing factors.  - Electrocardiogram Perform and Read    4. Anxiety  5. Major depressive disorder, recurrent, mild  Inadequate control.  We discussed options including adjustments of sertraline to 150 mg daily versus the addition of cognitive behavioral therapy versus both.  At time of visit, she is unsure, but only because she reached out prefers to increase sertraline 150 mg.  Advised her to do so continue prescription provided.  Follow-up in clinic in 1-2 months, sooner if side effects or intolerance.    6. Insomnia  Continue trazodone as needed.  Treat anxiety as noted.  Advised good sleep hygiene and lifestyle habits to assist in management of this.    7. Benign Essential Hypertension  Controlled on current regimen.  Continue hydrochlorothiazide at current dose.  Would have  low threshold to transition to ACE inhibitor in the future, though no specific indications or microalbuminuria to indicate this at this time.    8. Cervical Disc Degeneration  Doing well.  No further interventions.    9. Female stress incontinence  Doing well overall, no need for further interventions.    10. Hyperlipidemia  Continue atorvastatin 80 mg daily.  Will check conference metabolic panel and fasting lipid cascade today.  - Comprehensive Metabolic Panel  - Lipid Cascade FASTING    11. Snoring  We can review potential sleep study, she will consider.  For now, she is having improvement in her symptoms with weight loss she is experiencing, and would like to see how things go.  Feels is reasonable at this time.    12. Vitamin D deficiency  We will check follow-up vitamin D level today.  - Vitamin D, Total (25-Hydroxy)    13. Flushing  Discussed that this could be an arrhythmia, unclear whether it is related to the episode of presyncope.  We discussed options.  Will obtain labs as noted.  For now we will plan to monitor.      The patient's current medical problems were reviewed.    I have had an Advance Directives discussion with the patient.  The following high BMI interventions were performed this visit: encouragement to exercise and lifestyle education regarding diet  The following health maintenance schedule was reviewed with the patient and provided in printed form in the after visit summary:   Health Maintenance   Topic Date Due     DEPRESSION FOLLOW UP  1950     DIABETES FOLLOW-UP  1950     DIABETES FOOT EXAM  09/07/1959     DIABETES OPHTHALMOLOGY EXAM  09/07/1959     FALL RISK ASSESSMENT  09/07/2014     INFLUENZA VACCINE RULE BASED (1) 08/01/2017     TD 18+ HE  04/30/2018     MAMMOGRAM  12/21/2017     DIABETES HEMOGLOBIN A1C  02/14/2018     DIABETES URINE MICROALBUMIN  04/19/2018     DXA SCAN  08/16/2018     ADVANCE DIRECTIVES DISCUSSED WITH PATIENT  06/22/2021     COLONOSCOPY  05/23/2022      PNEUMOCOCCAL POLYSACCHARIDE VACCINE AGE 65 AND OVER  Completed     PNEUMOCOCCAL CONJUGATE VACCINE FOR ADULTS (PCV13 OR PREVNAR)  Completed     ZOSTER VACCINE  Completed        Subjective:   Chief Complaint: Ana Maria De La Garza is an 66 y.o. female here for an Annual Wellness visit.   HPI: Noted history of depression and anxiety, noting an increase in symptoms of late.  Specifically is noting more anxiety.  Oftentimes will lay awake at night, especially if awoken.  Frequently worrying about her , second guessing his decisions and not trusting him as he seems to be having some memory difficulties.  She is also very fearful of car accidents, as he oftentimes is not doing appropriate maintenance on the vehicle and she is not sure if he is adequately checking them prior to trips in the vehicle.  This is become increasingly distressing to her.  She is finding less enjoyment in things.  At this time she does not see a therapist.  Has otherwise tolerated sertraline well and it is previously been effective.  Chronic history of insomnia, taking trazodone 100 mg at bedtime but feels that it less effective.  She is able to fall into a deep sleep but has difficulty staying asleep.  Oftentimes will worry when she awakens early.    History of type 2 diabetes, has been doing well with this.  She is not taking any medications.  Notes weight loss of 10 pounds over the last months.  She has been more active with lots of swimming, biking, climbing, and windsurfing.  Her energy level has been good.  She and her  had a cabin in a camper, and this is also encouraged her activity level.  Overall feels that she is eating healthier.    She is a history of snoring, consideration is been getting to sleep study in the past but she has not pursued this.  Energy level is overall been quite good and improving.  History of hyperlipidemia for which he takes atorvastatin 80 mg daily.  History of female stress incontinence that has been  stable.  Her cervical disc disease with radiculopathy is also been doing quite well with minimal symptoms.  She has a history of hypertension for which she takes hydrochlorthiazide 25 mg daily tolerates well.  Denies lightheadedness, dizziness, headaches, chest pain, dyspnea, or swelling.  She is a history of vitamin D deficiency, due for follow-up.  Bone density scan was last done in June 2016 showing osteopenia and moderate risk, due for follow-up vitamin D level today.    Relating a story about when she was driving with her  to [place], was feeling very stressed, and developed a swelling sensation into her chest and throat, causing her to have some difficulty swallowing.  Superiorly passed out very briefly and then awoke on her own, lasted just a few seconds.  Notes that she will get the swelling of episodes that start in her chest and well up towards her face lasting just a few seconds perhaps every 3-4 months.  She has had these before but the syncopal episode was new and different.  She denies any chest pain.  No difficulties with exertion, no shortness of breath or palpitations otherwise.    Review of Systems:   Please see above.  The rest of the review of systems are negative for all systems.    Patient Care Team:  Kristen Navarrete MD as PCP - General  Sina SHARP MD as Physician (Orthopedic Surgery)     Patient Active Problem List   Diagnosis     Female Stress Incontinence     Hyperlipidemia     Recurrent Major Depression In Full Remission     Osteopenia     Benign Essential Hypertension     Cervical Spondylosis     Cervical Disc Degeneration     Anxiety     Snoring     Status post total knee replacement     Insomnia     Blood in urine     Type 2 diabetes mellitus     Past Medical History:   Diagnosis Date     Depression      Hypertension      Insomnia       Past Surgical History:   Procedure Laterality Date     JOINT REPLACEMENT Left 2008    knee     ORIF ANKLE FRACTURE Right     2008 and pin  removal 2009     RI APPENDECTOMY      Description: Appendectomy;  Recorded: 02/26/2008;     RI SLING OPER STRES INCONTINENCE      Description: Mid-Urethral Sling Operation;  Recorded: 05/18/2010;     RI TOTAL KNEE ARTHROPLASTY Left 2/18/2015    Procedure:   LEFT TOTAL KNEE ARTHROPLASTY;  Surgeon: Sina SHARP MD;  Location: [facility] Main OR;  Service: Orthopedics      Family History   Problem Relation Age of Onset     Diabetes Mother      Hypertension Mother      Hypertension Father      Cancer Brother       Social History     Social History     Marital status:      Spouse name: Abimael     Number of children: 2     Years of education: N/A     Occupational History     Retired      Social History Main Topics     Smoking status: Never Smoker     Smokeless tobacco: Never Used     Alcohol use 1.2 oz/week     2 Shots of liquor per week     Drug use: No     Sexual activity: Not on file     Other Topics Concern     Not on file     Social History Narrative      Current Outpatient Prescriptions   Medication Sig Dispense Refill     atorvastatin (LIPITOR) 80 MG tablet Take 1 tablet (80 mg total) by mouth daily. 90 tablet 1     blood glucose test (CONTOUR NEXT STRIPS) strips Use 1 each As Directed daily. (Patient taking differently: Use 1 each As Directed. A couple times a week) 50 each 2     calcium-vitamin D (CALCIUM-VITAMIN D) 500 mg(1,250mg) -200 unit per tablet Take 2 tablets by mouth daily.       generic lancets (MICROLET LANCET) Use 1 each As Directed daily. (Patient taking differently: Use 1 each As Directed. Does not check routinely) 50 each 2     hydrochlorothiazide (HYDRODIURIL) 25 MG tablet Take one tablet by mouth once daily 90 tablet 0     hydroCHLOROthiazide (HYDRODIURIL) 25 MG tablet TAKE ONE TABLET BY MOUTH ONCE DAILY 90 tablet 4     multivitamin therapeutic (THERAGRAN) tablet Take 1 tablet by mouth daily.       traZODone (DESYREL) 100 MG tablet TAKE ONE-HALF TO ONE TABLET BY MOUTH ONCE DAILY AT  "BEDTIME AS NEEDED FOR SLEEP 90 tablet 0     sertraline (ZOLOFT) 100 MG tablet Take 1.5 tablets (150 mg total) by mouth daily. 135 tablet 3     No current facility-administered medications for this visit.       Objective:   Vital Signs:   Visit Vitals     /70 (Patient Site: Right Arm, Patient Position: Sitting, Cuff Size: Adult Regular)     Pulse 62     Temp 97.9  F (36.6  C)     Resp 20     Ht 5' 6\" (1.676 m)     Wt 151 lb 7 oz (68.7 kg)     SpO2 96%     BMI 24.44 kg/m2        VisionScreening:  No exam data present     PHYSICAL EXAM  Physical Examination: General appearance - alert, well appearing, and in no distress, oriented to person, place, and time and normal appearing weight  Mental status - alert, oriented to person, place, and time, normal mood, behavior, speech, dress, motor activity, and thought processes, depressed mood, anxious  Eyes - pupils equal and reactive, extraocular eye movements intact, left eye normal, right eye normal  Ears - bilateral TM's and external ear canals normal  Nose - normal and patent, no erythema, discharge or polyps  Mouth - mucous membranes moist, pharynx normal without lesions  Neck - supple, no significant adenopathy  Lymphatics - no palpable lymphadenopathy, no hepatosplenomegaly  Chest - clear to auscultation, no wheezes, rales or rhonchi, symmetric air entry  Heart - normal rate, regular rhythm, normal S1, S2, no murmurs, rubs, clicks or gallops  Abdomen - soft, nontender, nondistended, no masses or organomegaly  Breasts - breasts appear normal, no suspicious masses, no skin or nipple changes or axillary nodes  Musculoskeletal - no joint tenderness, deformity or swelling  Extremities - peripheral pulses normal, no pedal edema, no clubbing or cyanosis, no pedal edema noted  Skin - normal coloration and turgor, no rashes, no suspicious skin lesions noted     I ordered and personally reviewed a 12-lead EKG.  This reveals sinus bradycardia with 54 bpm but no ischemic or " other concerning findings.    Assessment Results 8/14/2017   Activities of Daily Living No help needed   Instrumental Activities of Daily Living No help needed   Some recent data might be hidden     A Mini-Cog score of 0-2 suggests the possibility of dementia, score of 3-5 suggests no dementia    Identified Health Risks:     The patient's KATT-7 score is consistent with probably anxiety disorder.  She was provided with patient information regarding anxiety and was advised to set up a follow up appointment in 8 weeks to further address this issue.  Patient's advanced directive was discussed and I am comfortable with the patient's wishes.

## 2021-06-12 NOTE — TELEPHONE ENCOUNTER
----- Message from Adelina Donaldson MD sent at 10/22/2020  9:39 AM CDT -----  A1c increased to 9.3% - pt needs visit to discuss medication options and probably DM ed referral - please schedule with me or [place].    Dr Donaldson

## 2021-06-12 NOTE — PATIENT INSTRUCTIONS - HE
For the heat/hot flashes and sweating:  -will check bloodwork today  -if normal then will consider starting medication to help with these symptoms - often will try increasing effexor/venlafaxine first vs low dose hormone medication    For the shortness of breath:  -will check bloodwork today  -will check EKG - tells us how the heart is beating and looks for sign of heart damage  -if all normal and you are continuing to have symptoms then we may need to do a heart stress test    For the diabetes:  -start metformin 500mg daily with dinner - take this for 1-2 weeks and then increase to one tablet with breakfast and one tablet with dinner  -plan recheck diabetes A1c test in 3-4 months

## 2021-06-13 NOTE — PROGRESS NOTES
"Assessment/Plan:     1. Preop general physical exam  Surgical and anesthesia risks include type 2 diabetes, hypertension, and hyperlipidemia, all of which are adequately medically managed at this time.  She may proceed with surgery as scheduled without further clinical clarification or evaluation.  She may proceed with choice of anesthesia.  She is instructed to hold her aspirin from now until surgery.  May resume after surgery.  - Basic Metabolic Panel  - Hemoglobin    2. Bone spur  To proceed with surgical treatment.    3. Splitting of nail  Seen with surgical treatment.    4. Need for vaccination  Plan to vaccine administered today.  - Influenza High Dose, Seasonal 65+ yrs    5. Type 2 diabetes mellitus without complication, without long-term current use of insulin  Currently managed with dietary changes and without need for medications.    6. Benign Essential Hypertension  Controlled on current regimen.    7. Hyperlipidemia  She remains on atorvastatin.    8. Recurrent Major Depression In Full Remission  9. Anxiety  Stable on sertraline.    10. Insomnia  Chronic, continue trazodone, discussed \"3 good things\" needed.    11. Cervical Disc Degeneration  Caution with any prolonged positioning.    Subjective:     Scheduled Procedure: bone spur right foot  Surgery Date:  10-30-17  Surgery Location:  [facility]  Fax [phone] and [phone]  Surgeon:  Dr Dk Sultanaerson is a 59-year-old female presenting to clinic today for preoperative assessment prior to foot surgical treatment.  She has had significant pain and deformity that is been progressive as result of a bone spur on her first MTP joint of her right foot.  She is also had discomfort from splitting of her third and fourth toenails on her left foot.  She is interested in surgical treatment of both.    She has a history of type 2 diabetes that is improved significantly and is currently diet controlled through lifestyle management.  She " remains on aspirin for this, which she is instructed to hold prior to surgery.  She remains on atorvastatin for hyperlipidemia.  Her hyper tension is controlled with hydrochlorothiazide.  Depression and anxiety well controlled on sertraline.  Taking trazodone for insomnia, somewhat helpful though she continues to struggle with insomnia some.    Current Outpatient Prescriptions   Medication Sig Dispense Refill     aspirin 81 MG EC tablet Take 81 mg by mouth daily.       atorvastatin (LIPITOR) 80 MG tablet TAKE ONE TABLET (80MG TOTAL) BY MOUTH DAILY 90 tablet 2     BIOTIN ORAL Take by mouth daily.       calcium-vitamin D (CALCIUM-VITAMIN D) 500 mg(1,250mg) -200 unit per tablet Take 2 tablets by mouth daily.       hydrochlorothiazide (HYDRODIURIL) 25 MG tablet Take one tablet by mouth once daily 90 tablet 0     hydroCHLOROthiazide (HYDRODIURIL) 25 MG tablet TAKE ONE TABLET BY MOUTH ONCE DAILY 90 tablet 4     multivitamin therapeutic (THERAGRAN) tablet Take 1 tablet by mouth daily.       OMEGA-3/DHA/EPA/FISH OIL (FISH OIL-OMEGA-3 FATTY ACIDS) 300-1,000 mg capsule Take 2 g by mouth daily.       sertraline (ZOLOFT) 100 MG tablet Take 1.5 tablets (150 mg total) by mouth daily. 135 tablet 3     traZODone (DESYREL) 100 MG tablet TAKE ONE-HALF TO ONE TABLET BY MOUTH ONCE DAILY AT BEDTIME AS NEEDED FOR SLEEP 90 tablet 0     blood glucose test (CONTOUR NEXT STRIPS) strips Use 1 each As Directed daily. (Patient taking differently: Use 1 each As Directed. A couple times a week) 50 each 2     generic lancets (MICROLET LANCET) Use 1 each As Directed daily. (Patient taking differently: Use 1 each As Directed. Does not check routinely) 50 each 2     No current facility-administered medications for this visit.        Allergies   Allergen Reactions     Morphine (Pf) Nausea And Vomiting       Immunization History   Administered Date(s) Administered     DT (pediatric) 01/01/1996, 04/21/2004     Hep A, historic 04/27/2007, 04/30/2008      Influenza high dose, seasonal 09/09/2014, 09/30/2016, 10/26/2017     Influenza, seasonal,quad inj 6-35 mos 09/18/2009, 10/13/2011, 09/19/2012     Pneumo Conj 13-V (2010&after) 02/05/2015     Pneumo Polysac 23-V 06/22/2016     Td, historic 04/30/2008     Tdap 04/30/2008     ZOSTER 10/13/2011       Patient Active Problem List   Diagnosis     Female Stress Incontinence     Hyperlipidemia     Recurrent Major Depression In Full Remission     Osteopenia     Benign Essential Hypertension     Cervical Spondylosis     Cervical Disc Degeneration     Anxiety     Status post total knee replacement     Insomnia     Type 2 diabetes mellitus       Past Medical History:   Diagnosis Date     Depression      Hypertension      Insomnia        Social History     Social History     Marital status:      Spouse name: Abimael     Number of children: 2     Years of education: N/A     Occupational History     Retired      Social History Main Topics     Smoking status: Never Smoker     Smokeless tobacco: Never Used     Alcohol use 1.2 oz/week     2 Shots of liquor per week     Drug use: No     Sexual activity: Not on file     Other Topics Concern     Not on file     Social History Narrative       Past Surgical History:   Procedure Laterality Date     JOINT REPLACEMENT Left 2008    knee     ORIF ANKLE FRACTURE Right     2008 and pin removal 2009     MT APPENDECTOMY      Description: Appendectomy;  Recorded: 02/26/2008;     MT SLING OPER STRES INCONTINENCE      Description: Mid-Urethral Sling Operation;  Recorded: 05/18/2010;     MT TOTAL KNEE ARTHROPLASTY Left 2/18/2015    Procedure:   LEFT TOTAL KNEE ARTHROPLASTY;  Surgeon: Sina SHARP MD;  Location: [facility];  Service: Orthopedics       History of Present Illness  Recent Health  Fever: no  Chills: no  Fatigue: no  Chest Pain: no  Cough: no  Dyspnea: no  Urinary Frequency: no  Nausea: no  Vomiting: no  Diarrhea: no  Abdominal Pain: no  Easy Bruising: no  Lower Extremity  "Swelling: no  Poor Exercise Tolerance: no    Pertinent History  Prior Anesthesia: yes  Previous Anesthesia Reaction:  no  Diabetes: yes, controlled  Cardiovascular Disease: no  Pulmonary Disease: no  Renal Disease: no  GI Disease: no  Sleep Apnea: no  Thromboembolic Problems: no  Clotting Disorder: no  Bleeding Disorder: no  Transfusion Reaction: no  Impaired Immunity: no  Steroid use in the last 6 months: no  Frequent Aspirin use: yes, 81 mg daily    After surgery, the patient plans to recover at home with family.    Review of Systems  Pertinent items are noted in HPI.  A 12 point comprehensive review of systems was negative except as noted.          Objective:         Vitals:    10/26/17 1050   BP: 120/70   Pulse: 60   Resp: 20   Temp: 97.7  F (36.5  C)   TempSrc: Oral   SpO2: 95%   Weight: 152 lb 1.6 oz (69 kg)   Height: 5' 6\" (1.676 m)       Physical Exam:  Physical Exam:  General Appearance: Alert, cooperative, no distress, appears stated age  Head: Normocephalic, without obvious abnormality, atraumatic  Eyes: PERRL, conjunctiva/corneas clear, EOM's intact  Ears: Normal TM's and external ear canals, both ears  Nose: Nares normal, septum midline,mucosa normal, no drainage  Throat: Lips, mucosa, and tongue normal; teeth and gums normal  Neck: Supple, symmetrical, trachea midline, no adenopathy;  thyroid: not enlarged  Lungs: Clear to auscultation bilaterally, respirations unlabored  Heart: Regular rate and rhythm, S1 and S2 normal, no murmur, rub, or gallop,   Abdomen: Soft, non-tender, bowel sounds active all four quadrants,  no masses, no organomegaly  Pelvic:Not examined  Extremities: Extremities normal, atraumatic, no cyanosis or edema  Skin: Skin color, texture, turgor normal, no rashes or lesions  Lymph nodes: Cervical, supraclavicular, and axillary nodes normal  Neurologic: Normal      Physical on 10/26/2017   Component Date Value Ref Range Status     Hemoglobin 10/26/2017 14.9  12.0 - 16.0 g/dL Final "       BMP pending    EKG attached from 8/14/2017 indicating sinus bradycardia at 54 bpm, no ischemic changes.

## 2021-06-14 NOTE — TELEPHONE ENCOUNTER
Patient wondering if JT wanted to see her in person for an office visit?  Or ok for lab only dm ck?  Please f/u

## 2021-06-14 NOTE — PROGRESS NOTES
Assessment & Plan     Ana Maria was seen today for diabetes.    Diagnoses and all orders for this visit:    Type 2 diabetes mellitus with complication, without long-term current use of insulin (H)  -     Glycosylated Hemoglobin A1c  -     Ambulatory referral to Bariatric Care: Surgical and Non-Surgical  -A1c has significantly improved to 6.5%.  Congratulated her on her healthy lifestyle changes and improvement in her A1c.  She is on a very small dose of glipizide 2.5 mg daily.  She would like to discontinue this medication and I think that would be reasonable if she is able to continue with her lifestyle changes and weight loss.  Glipizide 2.5 mg daily will be discontinued at this time.  She will continue healthy lifestyle changes.  Encouraged her to follow-up in 6 months.  Encouraged her to schedule an eye exam.  She is on an aspirin.  She does not take a statin due to history of statin intolerance.  Blood pressure is controlled with current dose of hydrochlorothiazide    Overweight (BMI 25.0-29.9)  -     Ambulatory referral to Bariatric Care: Surgical and Non-Surgical    Essential hypertension  -     Ambulatory referral to Bariatric Care: Surgical and Non-Surgical    Stage 3a chronic kidney disease  Discussed that she does have mild decrease in her kidney function.  Discussed importance of keeping blood pressure and diabetes under control.  Discussed that this will be monitored on a regular basis.  Also discussed importance of avoiding NSAIDs and other nephrotoxic medications.      [id number]}       Return in about 6 months (around 8/3/2021) for Recheck diabetes.    Elise Montgomery MD  [facility]     Ana Maria De La Garza is 70 y.o. and presents to clinic today for the following health issues   HPI   She is here today for follow-up on her diabetes.  She was last seen in November by her PCP.  Her A1c had increased to 9.3%.  She was started on Metformin.  Unfortunately she did not tolerate  the Metformin so this was discontinued.  She was started on glipizide in its place.  She is taking 2.5 mg of glipizide once daily.  She has also been working on healthy lifestyle changes.  She walks and bikes.  The walking does bother her knees.  She has given up most sweets.  She has lost about 7 pounds.  Overall she is feeling better.  She was having some shortness of breath but that improved with weight loss.  She did undergo cardiac evaluation including a stress test in early December and this was negative.  She has questions about how she can promote ongoing weight loss as she has remained stuck at her current weight for a while.  She is hoping that she can discontinue the glipizide.  She is not having any new symptoms of concern.  She has no other questions today.          Review of Systems   All other systems reviewed and are negative.          Objective    /64 (Patient Site: Left Arm, Patient Position: Sitting, Cuff Size: Adult Regular)   Pulse 82   Temp 98  F (36.7  C) (Temporal)   Wt 171 lb 5 oz (77.7 kg)   LMP 03/05/1998   SpO2 97%   BMI 27.65 kg/m    Body mass index is 27.65 kg/m .  Physical Exam   Constitutional: She is oriented to person, place, and time. She appears well-developed and well-nourished.   Pulmonary/Chest: Effort normal.   Neurological: She is alert and oriented to person, place, and time.   Psychiatric: She has a normal mood and affect. Her behavior is normal.

## 2021-06-14 NOTE — PATIENT INSTRUCTIONS - HE
Stop glipizide    Continue weight loss efforts    Referral placed to weight management clinic    Caution with ibuprofen/aleve due to mild decrease in kidney function  Tylenol is okay    Schedule eye exam    Check on tetanus shot at pharmacy

## 2021-06-15 PROBLEM — E11.8 TYPE 2 DIABETES MELLITUS WITH COMPLICATION, WITHOUT LONG-TERM CURRENT USE OF INSULIN (H): Status: ACTIVE | Noted: 2017-01-26

## 2021-06-15 LAB — ANA SER QL: 2 U

## 2021-06-15 NOTE — TELEPHONE ENCOUNTER
Refill Approved    Rx renewed per Medication Renewal Policy. Medication was last renewed on 6/5/20.    Valeriy Gustafson, [facility] Triage/Med Refill 2/26/2021     Requested Prescriptions   Pending Prescriptions Disp Refills     hydroCHLOROthiazide (HYDRODIURIL) 25 MG tablet [Pharmacy Med Name: hydroCHLOROthiazide 25MG TABS] 90 tablet 1     Sig: TAKE 1 TABLET BY MOUTH DAILY       Diuretics/Combination Diuretics Refill Protocol  Passed - 2/26/2021  9:38 AM        Passed - Visit with PCP or prescribing provider visit in past 12 months     Last office visit with prescriber/PCP: 11/5/2020 Adelina Donaldson MD OR same dept: 2/3/2021 Elise Montgomery MD OR same specialty: 2/3/2021 Elise Montgomery MD  Last physical: 10/15/2020 Last MTM visit: Visit date not found   Next visit within 3 mo: Visit date not found  Next physical within 3 mo: Visit date not found  Prescriber OR PCP: Adelina Donaldson MD  Last diagnosis associated with med order: 1. Essential hypertension, malignant  - hydroCHLOROthiazide (HYDRODIURIL) 25 MG tablet [Pharmacy Med Name: hydroCHLOROthiazide 25MG TABS]; TAKE 1 TABLET BY MOUTH DAILY  Dispense: 90 tablet; Refill: 1    2. Insomnia  - traZODone (DESYREL) 100 MG tablet [Pharmacy Med Name: traZODone HCl 100MG TABS]; TAKE 1/2 TABLET TO 1 TABLET BY MOUTH AT BEDTIME AS NEEDED FOR  SLEEP  Dispense: 90 tablet; Refill: 1    If protocol passes may refill for 12 months if within 3 months of last provider visit (or a total of 15 months).             Passed - Serum Potassium in past 12 months      Lab Results   Component Value Date    Potassium 3.8 11/05/2020             Passed - Serum Sodium in past 12 months      Lab Results   Component Value Date    Sodium 139 11/05/2020             Passed - Blood pressure on file in past 12 months     BP Readings from Last 1 Encounters:   02/03/21 122/64             Passed - Serum Creatinine in past 12 months      Creatinine   Date Value Ref Range Status   11/05/2020 1.10 0.60 -  1.10 mg/dL Final                traZODone (DESYREL) 100 MG tablet [Pharmacy Med Name: traZODone HCl 100MG TABS] 90 tablet 1     Sig: TAKE 1/2 TABLET TO 1 TABLET BY MOUTH AT BEDTIME AS NEEDED FOR  SLEEP       Tricyclics/Misc Antidepressant/Antianxiety Meds Refill Protocol Passed - 2/26/2021  9:38 AM        Passed - PCP or prescribing provider visit in last year     Last office visit with prescriber/PCP: 11/5/2020 Adelina Donaldson MD OR same dept: 2/3/2021 Elise Montgomery MD OR same specialty: 2/3/2021 Elise Montgomery MD  Last physical: 10/15/2020 Last MTM visit: Visit date not found   Next visit within 3 mo: Visit date not found  Next physical within 3 mo: Visit date not found  Prescriber OR PCP: Adelina Donaldson MD  Last diagnosis associated with med order: 1. Essential hypertension, malignant  - hydroCHLOROthiazide (HYDRODIURIL) 25 MG tablet [Pharmacy Med Name: hydroCHLOROthiazide 25MG TABS]; TAKE 1 TABLET BY MOUTH DAILY  Dispense: 90 tablet; Refill: 1    2. Insomnia  - traZODone (DESYREL) 100 MG tablet [Pharmacy Med Name: traZODone HCl 100MG TABS]; TAKE 1/2 TABLET TO 1 TABLET BY MOUTH AT BEDTIME AS NEEDED FOR  SLEEP  Dispense: 90 tablet; Refill: 1    If protocol passes may refill for 12 months if within 3 months of last provider visit (or a total of 15 months).

## 2021-06-16 PROBLEM — M85.80 OSTEOPENIA: Status: ACTIVE | Noted: 2018-10-09

## 2021-06-16 PROBLEM — N18.30 CHRONIC KIDNEY DISEASE, STAGE 3 (H): Status: ACTIVE | Noted: 2020-11-04

## 2021-06-16 NOTE — PROGRESS NOTES
[facility] Daily Progress     Patient Name: Ana Maria De La Garza  Date: 3/21/2018  Visit #: 3  PTA visit #:  0  Referral Diagnosis: R shoulder pain  Referring provider: Kristen Navarrete MD  Visit Diagnosis:     ICD-10-CM    1. Pain in joint of right shoulder M25.511    2. Scapular dysfunction M89.9          Assessment:   Patient continues to require constant assurance on long-term ramifications from injuries. PT educated the patient this session on the difference between Chronic and episodic.  Patient is appropriate to continue with skilled physical therapy intervention, as indicated by initial plan of care.    Goal Status:  Pt. will be independent with home exercise program in : 4 weeks  Pt will: improve SPADI by 30% in 8 weeks  Pt will: no longer have pain pushing or pulling in 8 weeks    Plan / Patient Education:     Continue with initial plan of care.  Progress with home program as tolerated.    PLAN for next visit:  Progress HEP as tolerated  Subjective:     Pain Rating: 3  She was noting some increased stiffness and soreness following the stretch so we reviewed the position.  She wonders was the long-term prognosis is for her shoulder.  Patient is also noting three days of vertigo like issues but resolved on its own. PT will consult with vestibular therapist but it is only treatable when symptoms are present.  Objective:     Exercises:  Exercise #2: Orange theraband shoulder extension x 15  Comment #2: 1/2kneel quad stretch x 30 seconds  Comment #4: Supine Pectoral stretch x 1 minute  Exercise #5: scapular retraction x 10 hold 5 seconds  Comment #5: seated sidebending stretch x 20 seconds    Shoulder/Elbow ROM    Date: 3/9/18 3/14/18    Shoulder and Elbow ROM ( )   AROM in degrees AROM in degrees AROM in degrees    Right Left Right Left Right Left   Shoulder Flexion (0-180 ) 144 173 165      Shoulder Abduction (0-180 ) 151 171 160        Treatment Today     TREATMENT MINUTES COMMENTS   Evaluation      Self-care/ Home management     Manual therapy 15 Patient positioned in supine- R GH mobilization 1) Posterior glide at grade III 2) Inferior [place] at 80 ABD Grade II 3) Distraction Grade II  TPR to R UT and Rhomboids and bicep   Neuromuscular Re-education     Therapeutic Activity     Therapeutic Exercises 15 See flow sheet   Gait training     Modality__________________                Total 30    Blank areas are intentional and mean the treatment did not include these items.       Asya Sunshine  3/21/2018

## 2021-06-16 NOTE — PROGRESS NOTES
"[facility] Daily Progress     Patient Name: Ana Maria De La Garza  Date: 3/26/2018  Visit #: 5  PTA visit #:  0  Referral Diagnosis: R shoulder pain  Referring provider: Kristen Navarrete MD  Visit Diagnosis:     ICD-10-CM    1. Pain in joint of right shoulder M25.511    2. Scapular dysfunction M89.9          Assessment:   Patient requires extensive education about when Medical imaging is warranted. She continues to question her \"diagnosis\" but seemed to have more of a grasp pf musculoskeletal concepts after discussion. PT and some shoulder strengthening exercises and the patient seemed to tolerate well.  Patient is appropriate to continue with skilled physical therapy intervention, as indicated by initial plan of care.    Goal Status:  Pt. will be independent with home exercise program in : 4 weeks  Pt will: improve SPADI by 30% in 8 weeks  Pt will: no longer have pain pushing or pulling in 8 weeks    Plan / Patient Education:     Continue with initial plan of care.  Progress with home program as tolerated.    PLAN for next visit:  Progress HEP as tolerated  Subjective:     Pain Rating: 3  She woke up on Sunday with increased pain for no known reason. She also had a catch when lifting up her arm.  She is a little better today.   She did not do ice or heat and she knows she should have.  Objective:   PT reviewed the principles of commonality of certain musculoskeletal condition found on MRI.  Sometimes an MRI can find diagnoses that are not always symptomatic.   Exercises:  Exercise #2: Orange theraband shoulder extension x 15  Comment #2: 1/2kneel quad stretch x 30 seconds  Comment #4: Supine Pectoral stretch x 1 minute  Exercise #5: scapular retraction x 10 hold 5 seconds  Comment #5: seated sidebending stretch x 20 seconds  Exercise #6: Overhead press x 10B  Comment #6: Shoulder ABD to 90 x 10 B    Shoulder/Elbow ROM    Date: 3/9/18 3/14/18    Shoulder and Elbow ROM ( )   AROM in degrees AROM in " degrees AROM in degrees    Right Left Right Left Right Left   Shoulder Flexion (0-180 ) 144 173 165      Shoulder Abduction (0-180 ) 151 171 160        Treatment Today     TREATMENT MINUTES COMMENTS   Evaluation     Self-care/ Home management     Manual therapy 15 Patient positioned in supine- R GH mobilization 1) Posterior glide at grade III 2) Inferior [place] at 80 ABD Grade II 3) Distraction Grade II  TPR to R UT and Rhomboids and bicep   Neuromuscular Re-education     Therapeutic Activity     Therapeutic Exercises 15 See flow sheet   Gait training     Modality__________________                Total 30    Blank areas are intentional and mean the treatment did not include these items.       Asya Sunshine  3/26/2018

## 2021-06-16 NOTE — PROGRESS NOTES
[facility] Daily Progress     Patient Name: Ana Maria De La Garza  Date: 3/14/2018  Visit #: 3  PTA visit #:  0  Referral Diagnosis: R shoulder pain  Referring provider: Kristen Navarrete MD  Visit Diagnosis:     ICD-10-CM    1. Pain in joint of right shoulder M25.511    2. Scapular dysfunction M89.9          Assessment:   Patient continues with questions regarding muscle and reasoning for increased pain. She is demonstrating improvement in shoulder ROM demonstrated this visit. She continues to report positive improvement form the manual therapy.     Patient is appropriate to continue with skilled physical therapy intervention, as indicated by initial plan of care.    Goal Status:  Pt. will be independent with home exercise program in : 4 weeks  Pt will: improve SPADI by 30% in 8 weeks  Pt will: no longer have pain pushing or pulling in 8 weeks    Plan / Patient Education:     Continue with initial plan of care.  Progress with home program as tolerated.    PLAN for next visit:  Progress HEP as tolerated  Subjective:     Pain Rating: 3  She was noting increased pain last night for no known reason. She reports her pain to be 7/10 but the heating pas helped this morning.    Objective:     Exercises:  Exercise #2: Orange theraband shoulder extension x 15  Comment #2: 1/2kneel quad stretch x 30 seconds  Comment #4: Supine Pectoral stretch x 1 minute  Exercise #5: scapular retraction x 10 hold 5 seconds  Comment #5: seated sidebending stretch x 20 seconds    Shoulder/Elbow ROM    Date: 3/9/18 3/14/18    Shoulder and Elbow ROM ( )   AROM in degrees AROM in degrees AROM in degrees    Right Left Right Left Right Left   Shoulder Flexion (0-180 ) 144 173 165      Shoulder Abduction (0-180 ) 151 171 160        Treatment Today     TREATMENT MINUTES COMMENTS   Evaluation     Self-care/ Home management     Manual therapy 15 Patient positioned in supine- R GH mobilization 1) Posterior glide at grade III 2) Inferior  [place] at 80 ABD Grade II 3) Distraction Grade II  TPR to R UT and Rhomboids and bicep   Neuromuscular Re-education     Therapeutic Activity     Therapeutic Exercises 10 See flow sheet   Gait training     Modality__________________                Total 25    Blank areas are intentional and mean the treatment did not include these items.       Asya Sunshine  3/14/2018

## 2021-06-16 NOTE — PROGRESS NOTES
Assessment/Plan:     1. Counseling about travel  Together we reviewed recommendations per CDC website.  She is given a prescription for oral typhoid vaccination.  She is otherwise up-to-date with travel vaccinations.  She does not require malaria prophylaxis.  We discussed ability to discontinue her supplements prior to travel.  Encourage her to bring snacks with in the event that she does not care for fluid presented.  Prescription provided for ciprofloxacin in the event of traveler's diarrhea.    2. Visit for screening mammogram  Referral placed for mammogram.  - Mammo Screening Bilateral; Future    3. Weight gain  Will obtain thyroid cascade.  Discussed role of elevated insulin levels and insulin resistance in regard to weight gain.  I doubt that sertraline is contributing, but she is interested in reducing doses I will have her cut the tablet in half, currently taking 100 mg and will cut down to 50 mg, monitor closely for recurrence of symptoms.  Follow-up with me as needed in this regard.  - Thyroid Cascade      Subjective:      Ana Maria De La Garza is a 67 y.o. female presenting to clinic today along with her  for travel consultation as she and her  will be rock hunting in [place].  He will be there for 16 days.  [place] [employer] [place].  She is looking forward to this trip.  She has had previous hepatitis A vaccination series.  She is up-to-date with routine vaccinations.  She has not had previous typhoid vaccination.  Malaria prophylaxis is not required.  A1c 6.1% 2 months ago.  Note frustration regarding weight gain of 10 pounds since a foot surgery in November despite reduction in caloric intake.  Noting her hair is thinner as well, interested in thyroid evaluation.  States that from his mental illness standpoint she has been doing quite well in regards to depression, interested in reducing sertraline further.  Currently taking 100 mg daily.  Feels  that she is getting adequate sleep.  Feels that her lifestyle habits are overall good.  Interested in reducing dose and weaning off.    Current Outpatient Prescriptions   Medication Sig Dispense Refill     aspirin 81 MG EC tablet Take 81 mg by mouth daily.       atorvastatin (LIPITOR) 80 MG tablet TAKE ONE TABLET (80MG TOTAL) BY MOUTH DAILY 90 tablet 2     blood glucose test (CONTOUR NEXT STRIPS) strips Use 1 each As Directed daily. (Patient taking differently: Use 1 each As Directed. A couple times a week) 50 each 2     calcium-vitamin D (CALCIUM-VITAMIN D) 500 mg(1,250mg) -200 unit per tablet Take 2 tablets by mouth daily.       generic lancets (MICROLET LANCET) Use 1 each As Directed daily. (Patient taking differently: Use 1 each As Directed. Does not check routinely) 50 each 2     hydrochlorothiazide (HYDRODIURIL) 25 MG tablet Take one tablet by mouth once daily 90 tablet 0     multivitamin therapeutic (THERAGRAN) tablet Take 1 tablet by mouth daily.       OMEGA-3/DHA/EPA/FISH OIL (FISH OIL-OMEGA-3 FATTY ACIDS) 300-1,000 mg capsule Take 2 g by mouth daily.       sertraline (ZOLOFT) 100 MG tablet Take 1.5 tablets (150 mg total) by mouth daily. 135 tablet 3     traZODone (DESYREL) 100 MG tablet TAKE ONE-HALF TO ONE TABLET BY MOUTH ONCE DAILY AT BEDTIME AS NEEDED FOR SLEEP 90 tablet 2     BIOTIN ORAL Take by mouth daily.       ciprofloxacin HCl (CIPRO) 500 MG tablet Take 1 tablet (500 mg total) by mouth 2 (two) times a day for 5 days. As needed for traveller's diarrhea. 10 tablet 0     hydroCHLOROthiazide (HYDRODIURIL) 25 MG tablet TAKE ONE TABLET BY MOUTH ONCE DAILY 90 tablet 4     typhoid (VIVOTIF) SR capsule Take 1 capsule by mouth every other day. Follow package instructions. 4 capsule 0     No current facility-administered medications for this visit.        Past Medical History, Family History, and Social History reviewed.  Past Medical History:   Diagnosis Date     Depression      Hypertension      Insomnia   "    Past Surgical History:   Procedure Laterality Date     JOINT REPLACEMENT Left 2008    knee     ORIF ANKLE FRACTURE Right     2008 and pin removal 2009     WI APPENDECTOMY      Description: Appendectomy;  Recorded: 02/26/2008;     WI SLING OPER STRES INCONTINENCE      Description: Mid-Urethral Sling Operation;  Recorded: 05/18/2010;     WI TOTAL KNEE ARTHROPLASTY Left 2/18/2015    Procedure:   LEFT TOTAL KNEE ARTHROPLASTY;  Surgeon: Sina SHARP MD;  Location: [facility] OR;  Service: Orthopedics     Morphine (pf)  Family History   Problem Relation Age of Onset     Diabetes Mother      Hypertension Mother      Hypertension Father      Cancer Brother      Clotting disorder Neg Hx      Social History     Social History     Marital status:      Spouse name: Abimael     Number of children: 2     Years of education: N/A     Occupational History     Retired      Social History Main Topics     Smoking status: Never Smoker     Smokeless tobacco: Never Used     Alcohol use 1.2 oz/week     2 Shots of liquor per week     Drug use: No     Sexual activity: Not on file     Other Topics Concern     Not on file     Social History Narrative         Review of systems is as stated in HPI, and the remainder of the 10 system review is otherwise negative.    Objective:     Vitals:    02/20/18 1410   BP: 128/80   Patient Site: Left Arm   Patient Position: Sitting   Cuff Size: Adult Regular   Pulse: 72   Resp: 16   Temp: 97.9  F (36.6  C)   TempSrc: Oral   Weight: 163 lb 9.6 oz (74.2 kg)   Height: 5' 6\" (1.676 m)    Body mass index is 26.41 kg/(m^2).    Alert female.  Mucous membranes moist.  Thought processes and judgment intact.  Normal affect.       This note has been dictated using voice recognition software. Any grammatical or context distortions are unintentional and inherent to the the software.   "

## 2021-06-16 NOTE — PROGRESS NOTES
[facility] Daily Progress     Patient Name: Ana Maria De La Garza  Date: 3/12/2018  Visit #: 2  PTA visit #:  0  Referral Diagnosis: R shoulder pain  Referring provider: Kristen Navarrete MD  Visit Diagnosis:     ICD-10-CM    1. Pain in joint of right shoulder M25.511    2. Scapular dysfunction M89.9          Assessment:   Patient continues with questions regarding muscle and goals of treatment but she always tends to ask while in therapy. She noted positive improvement from the previous manual therapy and compliance with home program.    Patient is appropriate to continue with skilled physical therapy intervention, as indicated by initial plan of care.    Goal Status:  Pt. will be independent with home exercise program in : 4 weeks  Pt will: improve SPADI by 30% in 8 weeks  Pt will: no longer have pain pushing or pulling in 8 weeks    Plan / Patient Education:     Continue with initial plan of care.  Progress with home program as tolerated.    PLAN for next visit:  Review band scapular strengthening  Subjective:     Pain Rating: Not rated this visit  She is noting a crazy weekend and day today. She noted the manual therapy to be very beneficial.  She asks if she will be in shape for her trip in April      Objective:     Exercises:  Comment #4: Supine Pectoral stretch x 1 minute  Exercise #5: scapular retraction x 10 hold 5 seconds  Comment #5: seated sidebending stretch x 20 seconds      Treatment Today     TREATMENT MINUTES COMMENTS   Evaluation     Self-care/ Home management     Manual therapy 23 Patient positioned in supine- R GH mobilization 1) Posterior glide at grade III 2) Inferior [place] at 80 ABD Grade II  TPR to R UT and Rhomboids   Neuromuscular Re-education     Therapeutic Activity     Therapeutic Exercises 5 See flow sheet   Gait training     Modality__________________                Total 28    Blank areas are intentional and mean the treatment did not include these items.       Asya RICHARDSON  Jong  3/12/2018

## 2021-06-16 NOTE — PROGRESS NOTES
[facility] Certification Request    March 9, 2018      Patient: Ana Maria De La Garza  MR Number: [id number]  YOB: 1950  Date of Visit: 3/9/2018      Dear Dr. Navarrete:    Thank you for this referral.   We are seeing Ana Maria De La Garza for Physical Therapy of R shoulder pain.    Medicare and/or Medicaid requires physician review and approval of the treatment plan. Please review the plan of care and verify that you agree with the therapy plan of care by co-signing this note.      Plan of Care  Authorization / Certification Start Date: 03/09/18  Authorization / Certification End Date: 06/09/18  Authorization / Certification Number of Visits: 12  Communication with: Referral Source  Patient Related Instruction: Nature of Condition;Treatment plan and rationale;Posture;Self Care instruction;Body mechanics  Times per Week: 1  Number of Weeks: 12  Number of Visits: 12  Therapeutic Exercise: Strengthening;Stretching;ROM  Neuromuscular Reeducation: posture;balance/proprioception  Manual Therapy: soft tissue mobilization;myofascial release;joint mobilization  Modalities: TENS    Goals:  Pt. will be independent with home exercise program in : 4 weeks  Pt will: improve SPADI by 30% in 8 weeks  Pt will: no longer have pain pushing or pulling in 8 weeks      If you have any questions or concerns, please don't hesitate to call.    Sincerely,      Asya Sunshine, PT        Physician recommendation:     ___ Follow therapist's recommendation        ___ Modify therapy      *Physician co-signature indicates they certify the need for these services furnished within this plan and while under their care.      [facility]   Shoulder Initial Evaluation    Patient Name: Ana Maria De La Garza  Date of evaluation: 3/9/2018  Referral Diagnosis: Right shoulder pain  Referring provider: Kristen Navarrete MD  Visit Diagnosis:     ICD-10-CM    1. Pain in joint of right shoulder M25.511    2. Scapular dysfunction  M89.9        Assessment:    Patient presenting with insidious onset R shoulder pain, although she does have a history of R shoulder impingement. Patient is noting some improvement in her symptoms but would like to address her shoulder pain prior to leaving on vacation. PT examination reveals decreased capsular mobility of the R shoulder and increased tone in the parascapular musculature secondary to poor scapular positioning. PT initiated manual therapy this visit and began reinitiate of HEP. Patient does require some education on pathology and decreasing worry.  PT established goals in agreement with the patient.    Pt. is appropriate for skilled PT intervention as outlined in the Plan of Care (POC).  Pt. is a good candidate for skilled PT services to improve pain levels and function.    Goals:  Pt. will be independent with home exercise program in : 4 weeks  Pt will: improve SPADI by 30% in 8 weeks  Pt will: no longer have pain pushing or pulling in 8 weeks    Patient's expectations/goals are realistic.    Barriers to Learning or Achieving Goals:  No Barriers.       Plan / Patient Instructions:        Plan of Care:   Authorization / Certification Start Date: 03/09/18  Authorization / Certification End Date: 06/09/18  Authorization / Certification Number of Visits: 12  Communication with: Referral Source  Patient Related Instruction: Nature of Condition;Treatment plan and rationale;Posture;Self Care instruction;Body mechanics  Times per Week: 1  Number of Weeks: 12  Number of Visits: 12  Therapeutic Exercise: Strengthening;Stretching;ROM  Neuromuscular Reeducation: posture;balance/proprioception  Manual Therapy: soft tissue mobilization;myofascial release;joint mobilization  Modalities: TENS    POC and pathology of condition were reviewed with patient.  Pt. is in agreement with the Plan of Care  A Home Exercise Program (HEP) was initiated today.  Plan for next visit: assess response to HEP, cervical sidebending  stretch  .   Subjective:       History of Present Illness:    Ana Maria is a 67 y.o. female who presents to therapy today with complaints of R shoulder pain that started about 2 weeks ago with no known reason. She has initiated a pool program recently for a year. She immediately started the previous shoulder exercises and it may have made it a little worse. She is now noting some improvement but would like to work on her shoulder prior to leave. She had some numbness in the median distribution of the hand and some neck stiffness.     Pertinent medical history: DM, osteopenia    Worse: pulling pants up, pushing and pulling,   Better with: reviewed ice and heat    Pain Ratin  Pain rating at best: 0  Pain rating at worst: 4  Pain description: pain    Functional limitations are described as occurring with:   reaching behind back and pushing and pulling    Patient reports benefit from:  rest         Objective:      Note: Items left blank indicates the item was not performed or not indicated at the time of the evaluation.    Patient Outcome Measures :    No Data Recorded   Scores range from 0-100%, where a score of 0% represents minimal pain and maximal function. The minimal clinically important difference is a score reduction of 10%.    Shoulder Examination  1. Pain in joint of right shoulder     2. Scapular dysfunction       Involved side: Right  Posture Observation:      General sitting posture is  fair. To poor  Cervical Clearing: Normal    Shoulder/Elbow ROM    Date: 3/9/18     Shoulder and Elbow ROM ( )   AROM in degrees AROM in degrees AROM in degrees    Right Left Right Left Right Left   Shoulder Flexion (0-180 ) 144 173       Shoulder Abduction (0-180 ) 151 171       Shoulder Extension (0-60 )         Shoulder ER (0-90 ) 62 80       Shoulder IR (0-70 )         Shoulder IR/Ext Thumb to T12 Thumb to T 7       Cervical Flexion 45        Cervical Extenison 44          Shoulder/Elbow Strength   Date: 3/9/18      Shoulder/Elbow Strength (/5)  Manual Muscle Test (MMT) MMT MMT MMT    Right Left Right Left Right Left   Shoulder Flexion 5 5       Supraspinatus         Shoulder Abduction Not tested 5       Shoulder Extension         Shoulder External Rotation 4+, mild pain 5       Shoulder Internal Rotation 5 5       Elbow Flexion         Elbow Extension         Other:         Other:           Flexibility: tight pectorals on R>L    Palpation: R UT, infraspinatus    Joint Assessment:    Glenohumeral Joint Assessment:Posterior Capsule tightness. and inferior    Shoulder Special Tests     Impingement Cluster Right (+/-) Left (+/-) Rotator Cuff Tests Right (+/-) Left (+/-)   Sky-Johnny - - Drop Arm Sign     Painful Arc + - Hornblowers     Infraspinatus Test Mild + - ERLS     AC Tests Right (+/-) Left (+/-) IRLS     Active Compression   Labral Tests Right (+/-) Left (+/-)   Crossbody Adduction   Biceps Load Test II     AC Resisted Extension   Jerk Test     GH Instability Tests Right (+/-) Left (+/-) Corrie Test     Sulcus Sign   Biceps Tests Right (+/-) Left (+/-)   Apprehension   Speed     Relocation   Jia tinoco     Other:   Other:     Other:   Other:         Treatment Today    TREATMENT MINUTES COMMENTS   Evaluation 20 Patient eval time limited due to paperwork   Self-care/ Home management     Manual therapy 12 Patient positioned in supine- R GH mobilization 1) Posterior glide at grade III 2) Inferior [place] at 80 ABD Grade II  TPR to R UT   Neuromuscular Re-education     Therapeutic Activity     Therapeutic Exercises 15 See flow sheet  Extensive education on eval findings and reassurement     Gait training     Modality__________________                Total 47    Blank areas are intentional and mean the treatment did not include these items.       PT Evaluation Code: (Please list factors)  Patient History/Comorbidities: DM  Examination: shoulder  Clinical Presentation: stable  Clinical Decision Making: low    Patient  History/  Comorbidities Examination  (body structures and functions, activity limitations, and/or participation restrictions) Clinical Presentation Clinical Decision Making (Complexity)   No documented Comorbidities or personal factors 1-2 Elements Stable and/or uncomplicated Low   1-2 documented comorbidities or personal factor 3 Elements Evolving clinical presentation with changing characteristics Moderate   3-4 documented comorbidities or personal factors 4 or more Unstable and unpredictable High              Asya Sunshine  3/9/2018  9:13 AM

## 2021-06-17 NOTE — PROGRESS NOTES
Assessment/Plan:    1. Acute cystitis with hematuria  Due to dysuria with gross hematuria, will treat with 7 day course of Bactrim.  She should continue with symptomatic cares as needed.  Okay to take over-the-counter Azo for symptom relief.  Discussed signs and symptoms of worsening infection including fever, abdominal or back pain.  Return to clinic if symptoms fail to improve or worsen despite antibiotic treatment.  Patient understands and is content with this plan of care.  - Urinalysis-UC if Indicated  - sulfamethoxazole-trimethoprim (BACTRIM DS) 800-160 mg per tablet; Take 1 tablet by mouth 2 (two) times a day for 7 days.  Dispense: 14 tablet; Refill: 0    The following are part of a depression follow up plan for the patient:  mental health screening assessment    Subjective:    Ana Maria De La Garza is a 67 year old female seen today for evaluation of acute hematuria and dysuria.  Patient has history of UTIs.  She can tell when they are coming on and can tell that she has one currently.  She woke up this morning with a strong odor in her urine which is classic for her with her urinary tract infections.  Has noticeable red blood in her urine and burning with urination.  Prior to today she has been asymptomatic.  Denies any fevers, chills, abdominal or back pain.  Hasn't taken anything over-the-counter for her symptoms.  Reports that she is going to [place] in a week and a half.  She would like to have urinary symptoms resolved by that time she leaves.  She has no other concerns today. Review of systems is as stated in HPI, and the remainder of the 10 system review is otherwise unremarkable.    Past Medical History, Family History, and Social History reviewed.    Past Surgical History:   Procedure Laterality Date     JOINT REPLACEMENT Left 2008    knee     ORIF ANKLE FRACTURE Right     2008 and pin removal 2009     MT APPENDECTOMY      Description: Appendectomy;  Recorded: 02/26/2008;     MT SLING OPER STRES  INCONTINENCE      Description: Mid-Urethral Sling Operation;  Recorded: 05/18/2010;     [place] TOTAL KNEE ARTHROPLASTY Left 2/18/2015    Procedure:   LEFT TOTAL KNEE ARTHROPLASTY;  Surgeon: Sina SHARP MD;  Location: [facility] Main OR;  Service: Orthopedics        Family History   Problem Relation Age of Onset     Diabetes Mother      Hypertension Mother      Hypertension Father      Cancer Brother      Clotting disorder Neg Hx         Past Medical History:   Diagnosis Date     Depression      Hypertension      Insomnia         Social History   Substance Use Topics     Smoking status: Never Smoker     Smokeless tobacco: Never Used     Alcohol use 1.2 oz/week     2 Shots of liquor per week        Current Outpatient Prescriptions   Medication Sig Dispense Refill     aspirin 81 MG EC tablet Take 81 mg by mouth daily.       atorvastatin (LIPITOR) 80 MG tablet TAKE ONE TABLET (80MG TOTAL) BY MOUTH DAILY 90 tablet 2     blood glucose test (CONTOUR NEXT STRIPS) strips Use 1 each As Directed daily. (Patient taking differently: Use 1 each As Directed. A couple times a week) 50 each 2     calcium-vitamin D (CALCIUM-VITAMIN D) 500 mg(1,250mg) -200 unit per tablet Take 2 tablets by mouth daily.       generic lancets (MICROLET LANCET) Use 1 each As Directed daily. (Patient taking differently: Use 1 each As Directed. Does not check routinely) 50 each 2     hydrochlorothiazide (HYDRODIURIL) 25 MG tablet Take one tablet by mouth once daily 90 tablet 0     hydroCHLOROthiazide (HYDRODIURIL) 25 MG tablet TAKE ONE TABLET BY MOUTH ONCE DAILY 90 tablet 4     multivitamin therapeutic (THERAGRAN) tablet Take 1 tablet by mouth daily.       OMEGA-3/DHA/EPA/FISH OIL (FISH OIL-OMEGA-3 FATTY ACIDS) 300-1,000 mg capsule Take 2 g by mouth daily.       sertraline (ZOLOFT) 100 MG tablet Take 1.5 tablets (150 mg total) by mouth daily. 135 tablet 3     traZODone (DESYREL) 100 MG tablet TAKE ONE-HALF TO ONE TABLET BY MOUTH ONCE DAILY AT BEDTIME AS  "NEEDED FOR SLEEP 90 tablet 2     BIOTIN ORAL Take by mouth daily.       typhoid (VIVOTIF) SR capsule Take 1 capsule by mouth every other day. Follow package instructions. 4 capsule 0     No current facility-administered medications for this visit.           Objective:    Vitals:    03/30/18 1308   BP: 144/82   Patient Site: Left Arm   Patient Position: Sitting   Cuff Size: Adult Regular   Pulse: 80   Temp: 98.2  F (36.8  C)   Weight: 168 lb (76.2 kg)   Height: 5' 6\" (1.676 m)      Body mass index is 27.12 kg/(m^2).      General Appearance:    Alert, nontoxic,cooperative, no distress.     Back:     No CVA tenderness   Lungs:     Clear to auscultation bilaterally, respirations unlabored   Heart:    Regular rate and rhythm, S1 and S2 normal, no murmur, rub   or gallop   Abdomen:     Soft, non-tender with palpation         This note has been dictated using voice recognition software. Any grammatical or context distortions are unintentional and inherent to the use of this software.     "

## 2021-06-17 NOTE — PROGRESS NOTES
[facility] Daily Progress     Patient Name: Ana Maria De La Garza  Date: 2018  Visit #: 6  PTA visit #:  0  Referral Diagnosis: R shoulder pain  Referring provider: Kristen Navarrete MD  Visit Diagnosis:     ICD-10-CM    1. Pain in joint of right shoulder M25.511    2. Scapular dysfunction M89.9          Assessment:   Patient is demonstrating improvement in ROM pf the R shoulder since start of therapy and reduction in overall pain level. Patient continues to require education on posture but is making progress on addressing independently. PT will leave chart open until the patient returns from her vacation. She will only schedule further follow-ups if she feels she needs it.    Patient is appropriate to continue with skilled physical therapy intervention, as indicated by initial plan of care.    Goal Status:  Pt. will be independent with home exercise program in : 4 weeks  Pt will: improve SPADI by 30% in 8 weeks  Pt will: no longer have pain pushing or pulling in 8 weeks    Plan / Patient Education:   Patient to start independent home trial. PT will hold chart open for one month.  Subjective:     Pain Ratin  She had a bladder infection on Friday.  She is noting the shoulder to be improved and catching herself to be watching posture especially in sitting. She is trying not to lean on the R UE. She will see how she feels when she returns from vacation  Objective:   PT reviewed the principles of commonality of certain musculoskeletal condition found on MRI.  Sometimes an MRI can find diagnoses that are not always symptomatic.     She asks which exercises are the most important.     Patient demonstrating some posterior capsule tightness  Exercises:  Exercise #2: Orange theraband shoulder extension x 15  Comment #2: 1/2kneel quad stretch x 30 seconds  Comment #4: Supine Pectoral stretch x 1 minute  Exercise #5: scapular retraction x 10 hold 5 seconds  Comment #5: seated sidebending stretch x 20  seconds  Exercise #6: Overhead press x 10B  Comment #6: Shoulder ABD to 90 x 10 B    Shoulder/Elbow ROM    Date: 3/9/18 3/14/18 4/2/18   Shoulder and Elbow ROM ( )   AROM in degrees AROM in degrees AROM in degrees    Right Left Right Left Right Left   Shoulder Flexion (0-180 ) 144 173 165  150    Shoulder Abduction (0-180 ) 151 171 160  160      Treatment Today     TREATMENT MINUTES COMMENTS   Evaluation     Self-care/ Home management     Manual therapy 20 Patient positioned in supine- R GH mobilization 1) Posterior glide at grade III 2) Inferior [place] at 80 ABD Grade II 3) Distraction Grade II  TPR to R UT and Rhomboids and bicep   Neuromuscular Re-education     Therapeutic Activity     Therapeutic Exercises 8 See flow sheet  ROM measurements taken this visit   Gait training     Modality__________________                Total 28    Blank areas are intentional and mean the treatment did not include these items.       Asya Sunshine  4/2/2018

## 2021-06-17 NOTE — TELEPHONE ENCOUNTER
Refill Approved    Rx renewed per Medication Renewal Policy. Medication was last renewed on 10/29/2020.    Dayami Whitt, [facility] Triage/Med Refill 5/19/2021     Requested Prescriptions   Pending Prescriptions Disp Refills     venlafaxine (EFFEXOR-XR) 75 MG 24 hr capsule [Pharmacy Med Name: Venlafaxine HCl ER 75MG CP24] 90 capsule 1     Sig: TAKE 1 CAPSULE BY MOUTH DAILY       Venlafaxine/Desvenlafaxine Refill Protocol Passed - 5/17/2021  1:46 PM        Passed - LFT or AST or ALT in last year     Albumin   Date Value Ref Range Status   11/05/2020 4.2 3.5 - 5.0 g/dL Final     Bilirubin, Total   Date Value Ref Range Status   11/05/2020 0.4 0.0 - 1.0 mg/dL Final     Bilirubin, Direct   Date Value Ref Range Status   05/18/2011 0.2 <0.6 mg/dL Final     Alkaline Phosphatase   Date Value Ref Range Status   11/05/2020 107 45 - 120 U/L Final     AST   Date Value Ref Range Status   11/05/2020 34 0 - 40 U/L Final     ALT   Date Value Ref Range Status   11/05/2020 51 (H) 0 - 45 U/L Final     Protein, Total   Date Value Ref Range Status   11/05/2020 7.2 6.0 - 8.0 g/dL Final                Passed - Fasting lipid cascade in last year     Cholesterol   Date Value Ref Range Status   07/17/2020 243 (H) <=199 mg/dL Final     Triglycerides   Date Value Ref Range Status   07/17/2020 246 (H) <=149 mg/dL Final     HDL Cholesterol   Date Value Ref Range Status   07/17/2020 46 (L) >=50 mg/dL Final     LDL Calculated   Date Value Ref Range Status   07/17/2020 148 (H) <=129 mg/dL Final     Patient Fasting > 8hrs?   Date Value Ref Range Status   07/17/2020 Yes  Final             Passed - PCP or prescribing provider visit in last year     Last office visit with prescriber/PCP: 11/5/2020 Adelina Donaldson MD OR same dept: 2/3/2021 Elise Montgomery MD OR same specialty: 2/3/2021 Elise Montgomery MD  Last physical: 10/15/2020 Last MTM visit: Visit date not found   Next visit within 3 mo: Visit date not found  Next physical within 3 mo:  Visit date not found  Prescriber OR PCP: Adelina Donaldson MD  Last diagnosis associated with med order: 1. Recurrent Major Depression In Full Remission  - venlafaxine (EFFEXOR-XR) 75 MG 24 hr capsule [Pharmacy Med Name: Venlafaxine HCl ER 75MG CP24]; TAKE 1 CAPSULE BY MOUTH DAILY  Dispense: 90 capsule; Refill: 1    2. Anxiety  - venlafaxine (EFFEXOR-XR) 75 MG 24 hr capsule [Pharmacy Med Name: Venlafaxine HCl ER 75MG CP24]; TAKE 1 CAPSULE BY MOUTH DAILY  Dispense: 90 capsule; Refill: 1    If protocol passes may refill for 12 months if within 3 months of last provider visit (or a total of 15 months).             Passed - Blood Pressure in last year     BP Readings from Last 1 Encounters:   02/03/21 122/64

## 2021-06-18 NOTE — PROGRESS NOTES
I met with Ana Maria De La Garza at the request of Dr Navarrete to recheck her blood pressure.  Blood pressure medications on the MAR were reviewed with patient.    Patient has taken all medications as per usual regimen: Yes  Patient reports tolerating them without any issues or concerns: Yes    There were no vitals filed for this visit.    Blood pressure was taken, previous encounter was reviewed, recorded blood pressure below 140/90.  Patient was discharged and the note will be sent to the provider for final review.           76 and 132 76

## 2021-06-18 NOTE — PROGRESS NOTES
[facility] Discharge Summary  Patient Name: Ana Maria De La Garza  Date: 6/22/2018  Referral Diagnosis: R shoulder pain  Referring provider: Kristen Navarrete MD  Visit Diagnosis:   1. Pain in joint of right shoulder     2. Scapular dysfunction         Goals:  See below    Patient was seen for 6 visits from 3/9/18 to 4/2/18 with 0 missed appointments.  The patient met goals and has demonstrated understanding of and independence in the home program for self-care, and progression to next steps.  She will initiate contact if questions or concerns arise.    Therapy will be discontinued at this time.  The patient will need a new referral to resume.    Thank you for your referral.  Asya Sunshine  6/22/2018  12:42 PM

## 2021-06-18 NOTE — PATIENT INSTRUCTIONS - HE
Patient Instructions by Adelina Donaldson MD at 10/15/2020 10:00 AM     Author: Adelina Donaldson MD Service: -- Author Type: Physician    Filed: 10/15/2020 10:55 AM Encounter Date: 10/15/2020 Status: Addendum    : Adelina Donaldson MD (Physician)    Related Notes: Original Note by Adelina Donaldson MD (Physician) filed at 10/15/2020 10:53 AM       If hearing issues worsen or you decide you want to have the testing done, just let Dr Donaldson/Rosalba know and we can easily order it    Call insurance to ask about coverage for tetanus booster    Lab visit next week - blood and urine testing    Bloating:   -often food related - monitor for this  -can use gasx if needed      Patient Education   Understanding USDA MyPlate  The USDA (US Department of Agriculture) has guidelines to help you make healthy food choices. These are called MyPlate. MyPlate shows the food groups that make up healthy meals using the image of a place setting. Before you eat, think about the healthiest choices for what to put onto your plate or into your cup or bowl. To learn more about building a healthy plate, visit www.choosemyplate.gov.       The Food Groups    Fruits: Any fruit or 100% fruit juice counts as part of the Fruit Group. Fruits may be fresh, canned, frozen, or dried, and may be whole, cut-up, or pureed. Make half your plate fruits and vegetables.    Vegetables: Any vegetable or 100% vegetable juice counts as a member of the Vegetable Group. Vegetables may be fresh, frozen, canned, or dried. They can be served raw or cooked and may be whole, cut-up, or mashed. Make half your plate fruits and vegetables.     Grains: All foods made from grains are part of the Grains Group. These include wheat, rice, oats, cornmeal, and barley such as bread, pasta, oatmeal, cereal, tortillas, and grits. Grains should be no more than a quarter of your plate. At least half of your grains should be whole grains.    Protein: This group includes meat,  poultry, seafood, beans and peas, eggs, processed soy products (like tofu), nuts (including nut butters), and seeds. Make protein choices no more than a quarter of your plate. Meat and poultry choices should be lean or low fat.    Dairy: All fluid milk products and foods made from milk that contain calcium, like yogurt and cheese are part of the Dairy Group. (Foods that have little calcium, such as cream, butter, and cream cheese, are not part of the group.) Most dairy choices should be low-fat or fat-free.    Oils: These are fats that are liquid at room temperature. They include canola, corn, olive, soybean, and sunflower oil. Foods that are mainly oil include mayonnaise, certain salad dressings, and soft margarines. You should have only 5 to 7 teaspoons of oils a day. You probably already get this much from the food you eat.  Use [employer] to Help Build Your Meals  The SuperTracker can help you plan and track your meals and activity. You can look up individual foods to see or compare their nutritional value. You can get guidelines for what and how much you should eat. You can compare your food choices. And you can assess personal physical activities and see ways you can improve. Go to www.Bellaboxplate.gov/supertracker/.    1329-7265 [employer]. [address] [place] [address] 12812. All rights reserved. This information is not intended as a substitute for professional medical care. Always follow your healthcare professional's instructions.           Patient Education   Signs of Hearing Loss  Hearing loss is a problem shared by many people. In fact, it is one of the most common health conditions, particularly as people age. Most people over age 65 have some hearing loss, and by age 80, almost everyone does. Because hearing loss usually occurs slowly over the years, you may not realize your hearing ability has gotten worse.       Have your hearing checked  Contact your [facility] care provider if  you:    Have to strain to hear normal conversation.    Have to watch other peoples faces very carefully to follow what theyre saying.    Need to ask people to repeat what theyve said.    Often misunderstand what people are saying.    Turn the volume of the television or radio up so high that others complain.    Feel that people are mumbling when theyre talking to you.    Find that the effort to hear leaves you feeling tired and irritated.    Notice, when using the phone, that you hear better with 1 ear than the other.    0744-3752 [employer]. [address] [place] [address] 85463. All rights reserved. This information is not intended as a substitute for professional medical care. Always follow your healthcare professional's instructions.         Patient Education   Urinary Incontinence, Female (Adult)  Urinary incontinence means loss of control of the bladder. This problem affects many women, especially as they get older. If you have incontinence, you may be embarrassed to ask for help. But know that this problem can be treated.  Types of Incontinence  There are different types of incontinence. Two of the main types are described here. You can have more than one type.    Stress incontinence. With this type, urine leaks when pressure (stress) is put on the bladder. This may happen when you cough, sneeze, or laugh. Stress incontinence most often occurs because the pelvic floor muscles that support the bladder and urethra are weak. This can happen after pregnancy and vaginal childbirth or a hysterectomy. It can also be due to excess body weight or hormone changes.    Urge incontinence (also called overactive bladder). With this type, a sudden urge to urinate is felt often. This may happen even though there may not be much urine in the bladder. The need to urinate often during the night is common. Urge incontinence most often occurs because of bladder spasms. This may be due to bladder irritation or  infection. Damage to bladder nerves or pelvic muscles, constipation, and certain medicines can also lead to urge incontinence.  Treatment of urinary incontinence depends on the cause. Further evaluation is needed to find the type you have. This will likely include an exam and certain tests. Based on the results, you and your healthcare provider can then plan treatment. Until a diagnosis is made, the home care tips below can help relieve symptoms.  Home care    Do pelvic floor muscle exercises, if they are prescribed. The pelvic floor muscles help support the bladder and urethra. Many women find that their symptoms improve when doing special exercises that strengthen these muscles. To do the exercises contract the muscles you would use to stop your stream of urine, but do this when youre not urinating. Hold for 10 seconds, then relax. Repeat 10 to 20 times in a row, at least 3 times a day. Your provider may give you other instructions for how to do the exercises and how often.    Keep a bladder diary. This helps track how often and how much you urinate over a set period of time. Bring this diary with you to your next visit with the provider. The information can help your provider learn more about your bladder problem.    Lose weight, if advised to by your provider. Excess weight puts pressure on the bladder. Your provider can help you create a weight-loss plan thats right for you. This may include exercising more and making certain diet changes.    Don't consume foods and drinks that may irritate the bladder. These can include alcohol and caffeinated drinks.    Quit smoking. Smoking and other tobacco use can lead to chronic cough that strains the pelvic floor muscles. Smoking may also damage the bladder and urethra. Talk with your provider about treatments or methods you can use to quit smoking.    If drinking large amounts of fluid causes you to have symptoms, you may be advised to limit your fluid intake. You may  also be advised to drink most of your fluids during the day and to limit fluids at night.    If youre worried about urine leakage or accidents, you may wear absorbent pads to catch urine. Change the pads often. This helps reduce discomfort. It may also reduce the risk of skin or bladder infections.  Follow-up care  Follow up with your healthcare provider, or as directed. It may take some to find the right treatment for your problem. Your treatment plan may include special therapies or medicines. Certain procedures or surgery may also be options. Be sure to discuss any questions you have with your provider.  When to seek medical advice  Call the healthcare provider right away if any of these occur:    Fever of 100.4 F (38 C) or higher, or as directed by your provider    Bladder pain or fullness    Abdominal swelling    Nausea or vomiting    Back pain    Weakness, dizziness or fainting  Date Last Reviewed: 10/1/2017    7885-0730 [employer]. [address] [place] [address]. All rights reserved. This information is not intended as a substitute for professional medical care. Always follow your healthcare professional's instructions.       Advance Directive  Patients advance directive was discussed and I am comfortable with the patients wishes.  Patient Education   Personalized Prevention Plan  You are due for the preventive services outlined below.  Your care team is available to assist you in scheduling these services.  If you have already completed any of these items, please share that information with your care team to update in your medical record.  Health Maintenance   Topic Date Due   ? DEPRESSION ACTION PLAN  1950   ? HEPATITIS B VACCINES (1 of 3 - Risk 3-dose series) 09/07/1968   ? TD 18+ HE  04/30/2018   ? DIABETIC EYE EXAM  09/04/2019   ? DIABETIC FOOT EXAM  09/11/2020   ? MICROALBUMIN  09/11/2020   ? DXA SCAN  09/28/2020   ? A1C  01/17/2021   ? BMP  07/17/2021   ? LIPID   07/17/2021   ? MEDICARE ANNUAL WELLNESS VISIT  10/15/2021   ? FALL RISK ASSESSMENT  10/15/2021   ? MAMMOGRAM  02/13/2022   ? COLORECTAL CANCER SCREENING  05/23/2022   ? ADVANCE CARE PLANNING  09/11/2024   ? HEPATITIS C SCREENING  Completed   ? Pneumococcal Vaccine: 65+ Years  Completed   ? INFLUENZA VACCINE RULE BASED  Completed   ? ZOSTER VACCINES  Completed   ? Pneumococcal Vaccine: Pediatrics (0 to 5 Years) and At-Risk Patients (6 to 64 Years)  Aged Out         The FODMAP Diet      What is FODMAP?    FODMAP stands for Fermenatable Oligo-, Di-, and Mono-saccharides, And Polyols. The acronym is used  to describe a specific group of carbohydrates that are poorly absorbed in the small intestine and often  cause symptoms such as excess gas, bloating, and diarrhea in certain individuals with Irritable Bowel  Syndrome (IBS) and other functional bowel disorders.    What carbohydrate foods are FODMAPs?    FODMAP carbohyrdates include:      Certain sugars (lactose and fructose) found in foods such as milk and dairy products, fruit,    honey, and high-fructose corn syrup.    Artificial sweeteners (polyols), especially sweeteners containing sorbitol and mannitol.    Certain types of fiber (fructans and galactans) found in wheat, beans, and some vegetables.      Try these low-FODMAP foods      Avoid these foods containing FODMAPs        Adapted from IBS - Free at Last! Second Edition by Amena Wallace, MS, RD, LD, available at www.amazon.com

## 2021-06-19 NOTE — PROGRESS NOTES
Assessment and Plan:     1. Medicare annual wellness visit, subsequent  At today's visit, we discussed lifestyle interventions to promote self-management and wellness, including maintenance of a healthy weight, healthy diet, regular physical activity and exercise, and falls prevention.  Encouraged tetanus booster, influenza vaccine, and consideration of Shingrix, she would like to check her insurance coverage first.  Referrals placed for bone density scan.  Will obtain fasting lipids.    2. Type 2 diabetes mellitus without complication, without long-term current use of insulin  Worsened control, good control currently remains adequate.  I encouraged her in her efforts, especially in regards to diet and exercise.  We reviewed carbohydrate control and portion sizes.  Follow-up in 3 months given significant change.  - Glycosylated Hemoglobin A1c; Standing  - Microalbumin, Random Urine  - Glycosylated Hemoglobin A1c  - Comprehensive Metabolic Panel    3. Hyperlipidemia  She will hold her pravastatin for 1 month as this could be contributing to her noted myalgia.  Pending response, would be to plan to restart it or would plan to have her start coenzyme Q 10 and then do a trial of pravastatin at a low-dose and titrating as tolerated.  She is in agreement.  Encouraged healthy lifestyle.  Encouraged healthy lifestyle habits.  Will check comprehensive metabolic panel and fasting lipid cascade.  - Comprehensive Metabolic Panel  - Lipid Cascade FASTING    4. Benign Essential Hypertension  Controlled on hydrochlorothiazide.  Continue.  Will check comprehensive metabolic panel today.    5. Cervical Disc Degeneration  6. Cervical Spondylosis  Stable and adequately managed, intermittent flares with occasional morning headache overall doing well.    7. Female stress incontinence  No significant issues.    8. Recurrent Major Depression In Full Remission  9. Anxiety  Overall feeling well in good balance, she will continue  sertraline at current dose.    10. Insomnia  Encouraged her in regards to healthy lifestyle habits and good sleep hygiene.  Continue trazodone at bedtime.    11. Osteopenia  Encourage adequate calcium and vitamin D intake, regular weightbearing exercises, and measures to reduce risk of falling.  Will check vitamin D level today.  Referral placed for bone density scan.  Encouraged her to add a core exercises, consider yoga as well.  - Vitamin D, Total (25-Hydroxy)  - DXA Bone Density Scan; Future    12. Myalgia  We discussed broad differential diagnosis.  Encourage adequate hydration.  Encourage the addition of core exercises and yoga.  We will do a trial off a atorvastatin to see how her myalgias respond.    The patient's current medical problems were reviewed.    I have had an Advance Directives discussion with the patient.  The following are part of a depression follow up plan for the patient:  mental health care management  The following high BMI interventions were performed this visit: encouragement to exercise and lifestyle education regarding diet  The following health maintenance schedule was reviewed with the patient and provided in printed form in the after visit summary:   Health Maintenance   Topic Date Due     DIABETES OPHTHALMOLOGY EXAM  09[birth year]07/1960     DIABETES URINE MICROALBUMIN  04/19/2018     TD 18+ HE  04/30/2018     INFLUENZA VACCINE RULE BASED (1) 08/01/2018     DXA SCAN  08/16/2018     DIABETES FOOT EXAM  12/06/2018     DEPRESSION FOLLOW UP  02/16/2019     DIABETES HEMOGLOBIN A1C  02/16/2019     DIABETES FOLLOW-UP  02/16/2019     FALL RISK ASSESSMENT  08/16/2019     MAMMOGRAM  03/05/2020     COLONOSCOPY  05/23/2022     ADVANCE DIRECTIVES DISCUSSED WITH PATIENT  08/14/2022     PNEUMOCOCCAL POLYSACCHARIDE VACCINE AGE 65 AND OVER  Completed     PNEUMOCOCCAL CONJUGATE VACCINE FOR ADULTS (PCV13 OR PREVNAR)  Completed     ZOSTER VACCINE  Completed        Subjective:   Chief Complaint: Ana Maria DELGADO  Holli is an 67 y.o. female here for an Annual Wellness visit.   HPI: Admits that she has been eating more carbohydrates over the past few months, and is therefore not surprised about the increase in her A1c level.  She takes no medications.  She remains on aspirin and statin.  Exercise has been regular but notes that she becomes very sore after exercise, especially in her right shoulder if she swims oftentimes is very sore in her knees, hips, and low back.  Enjoys hiking and dancing but somewhat limited as result of this pain.  Denies any chest pain or breath.  Remains on atorvastatin for hyperlipidemia.  Remains on hydrochlorothiazide management of hypertension.  She has a history of osteopenia, and is taking a vitamin D supplement, notes when post density scan was August 2016.  History of cervical disc disease and spondylosis, intermittently has flares of the cervical morning headaches but overall feels that this is well controlled.  She has history of female stress incontinence but notes this is been stable and manageable.  She has a history of anxiety and major depression, remains on sertraline daily, overall feeling as though she is in good balance.  Trazodone is working effectively to manage her insomnia.  Tolerated attempts at weaning.    Review of Systems:    Please see above.  The rest of the review of systems are negative for all systems.    Patient Care Team:  Kristen Navarrete MD as PCP - General  Sina SHARP MD as Physician (Orthopedic Surgery)  Jason Root DPM as Physician (Podiatry)     Patient Active Problem List   Diagnosis     Female Stress Incontinence     Hyperlipidemia     Recurrent Major Depression In Full Remission     Osteopenia     Benign Essential Hypertension     Cervical Spondylosis     Cervical Disc Degeneration     Anxiety     Status post total knee replacement     Insomnia     Type 2 diabetes mellitus (H)     Past Medical History:   Diagnosis Date     Depression       Hypertension      Insomnia       Past Surgical History:   Procedure Laterality Date     JOINT REPLACEMENT Left 2008    knee     ORIF ANKLE FRACTURE Right     2008 and pin removal 2009     [place] APPENDECTOMY      Description: Appendectomy;  Recorded: 02/26/2008;     [place] SLING OPER STRES INCONTINENCE      Description: Mid-Urethral Sling Operation;  Recorded: 05/18/2010;     [place] TOTAL KNEE ARTHROPLASTY Left 2/18/2015    Procedure:   LEFT TOTAL KNEE ARTHROPLASTY;  Surgeon: Sina SHARP MD;  Location: [facility] Main OR;  Service: Orthopedics      Family History   Problem Relation Age of Onset     Diabetes Mother      Hypertension Mother      Hypertension Father      Cancer Brother      Clotting disorder Neg Hx       Social History     Social History     Marital status:      Spouse name: Abimael     Number of children: 2     Years of education: N/A     Occupational History     Retired      Social History Main Topics     Smoking status: Never Smoker     Smokeless tobacco: Never Used     Alcohol use 1.2 oz/week     2 Shots of liquor per week     Drug use: No     Sexual activity: Not on file     Other Topics Concern     Not on file     Social History Narrative      Current Outpatient Prescriptions   Medication Sig Dispense Refill     aspirin 81 MG EC tablet Take 81 mg by mouth daily.       atorvastatin (LIPITOR) 80 MG tablet TAKE ONE TABLET BY MOUTH ONCE DAILY 90 tablet 2     blood glucose test (CONTOUR NEXT STRIPS) strips Use 1 each As Directed daily. (Patient taking differently: Use 1 each As Directed. A couple times a week) 50 each 2     calcium-vitamin D (CALCIUM-VITAMIN D) 500 mg(1,250mg) -200 unit per tablet Take 2 tablets by mouth daily.       hydroCHLOROthiazide (HYDRODIURIL) 25 MG tablet Take 1 tablet (25 mg total) by mouth daily. 90 tablet 3     multivitamin therapeutic (THERAGRAN) tablet Take 1 tablet by mouth daily.       OMEGA-3/DHA/EPA/FISH OIL (FISH OIL-OMEGA-3 FATTY ACIDS) 300-1,000 mg capsule Take 2  "g by mouth daily.       sertraline (ZOLOFT) 100 MG tablet Take 1.5 tablets (150 mg total) by mouth daily. 135 tablet 3     traZODone (DESYREL) 100 MG tablet TAKE ONE-HALF TO ONE TABLET BY MOUTH ONCE DAILY AT BEDTIME AS NEEDED FOR SLEEP 90 tablet 2     No current facility-administered medications for this visit.       Objective:   Vital Signs:   Visit Vitals     /76 (Patient Site: Left Arm, Patient Position: Sitting, Cuff Size: Adult Regular)     Pulse 68     Temp 98.6  F (37  C) (Oral)     Resp 20     Ht 5' 6\" (1.676 m)     Wt 166 lb 6.4 oz (75.5 kg)     LMP 03/05/1998     SpO2 98%     BMI 26.86 kg/m2        VisionScreening:  No exam data present     PHYSICAL EXAM  Physical Examination: General appearance - alert, well appearing, and in no distress, oriented to person, place, and time and normal appearing weight  Mental status - alert, oriented to person, place, and time, normal mood, behavior, speech, dress, motor activity, and thought processes  Eyes - pupils equal and reactive, extraocular eye movements intact  Ears - bilateral TM's and external ear canals normal  Nose - normal and patent, no erythema, discharge or polyps  Mouth - mucous membranes moist, pharynx normal without lesions  Neck - supple, no significant adenopathy, thyroid exam: thyroid is normal in size without nodules or tenderness  Lymphatics - no palpable lymphadenopathy, no hepatosplenomegaly  Chest - clear to auscultation, no wheezes, rales or rhonchi, symmetric air entry  Heart - normal rate, regular rhythm, normal S1, S2, no murmurs, rubs, clicks or gallops  Abdomen - soft, nontender, nondistended, no masses or organomegaly  Breasts - breasts appear normal, no suspicious masses, no skin or nipple changes or axillary nodes  Neurological - alert, oriented, normal speech, no focal findings or movement disorder noted  Musculoskeletal - no joint tenderness, deformity or swelling  Extremities - peripheral pulses normal, no pedal edema, no " clubbing or cyanosis  Skin - normal coloration and turgor, no rashes, no suspicious skin lesions noted     Assessment Results 8/16/2018   Activities of Daily Living No help needed   Instrumental Activities of Daily Living No help needed   Some recent data might be hidden     A Mini-Cog score of 0-2 suggests the possibility of dementia, score of 3-5 suggests no dementia    Identified Health Risks:     Patient's advanced directive was discussed and I am comfortable with the patient's wishes.

## 2021-06-22 NOTE — PROGRESS NOTES
Assessment/Plan:     1. Type 2 diabetes mellitus without complication, without long-term current use of insulin  Improved control despite poor lifestyle habits last month.  Encouraged her in regards to her regular activities.  We will see if we can assist in managing myalgia so that she can tolerate activity better.  Follow-up with me in 6 months, sooner if sudden changes in blood sugars.  - Glycosylated Hemoglobin A1c    2. Osteopenia  Reviewed nature of condition.  Reviewed osteopenia with high fracture risk versus osteoporosis.  Reviewed importance of adequate calcium and vitamin D intake, weightbearing exercise, and measures to reduce risk of falls.  Will obtain labs to include PTH and renal function profile to assess for contributing factors.  Recent normal TSH and vitamin D levels.  Discussed alendronate treatment, she will consider but is not terribly interested today.  - Parathyroid Hormone Intact; Future  - Renal Function Profile    3. Myalgia  This seems significant and is certainly disruptive to life and ability to complete regular exercise.  We will assess further with labs as noted.  I am concerned that there could be some myopathy or polymyalgia rheumatica potentially contributing.  We will also make sure she does not have any electrolyte imbalance.  - Renal Function Profile  - Magnesium  - Sedimentation Rate  - CK Total      Patient Instructions   Goal sugars: <130 in diabetes; <100 fasting is normal  <180 after meals (less is better)       Return in about 6 months (around 6/6/2019).      Subjective:      Ana Maria De La Garza is a 68 y.o. female resenting to clinic today with 2 concerns.  She is first here for follow-up of her diabetes.  Notes that for the past month she is been doing quite well in regards to exercise and eating habits, however the month prior to that had been eating out more and snacking much more.  Notes that she continues to exercise regularly but it becomes very painful as  shortly afterwards and into the next day or sometimes more she has significant muscle pain.  This is particularly true in her legs and quadriceps muscles, perhaps a bit into her shoulders.  Denies any numbness, tingling, or weakness.  It is bilateral and symmetric.  No redness or erythema of her joints.  She has been off pravastatin for about 6 weeks now, taking coenzyme Q 10.  She remains on aspirin.    She is also here for follow-up of recent bone density scan indicating osteopenia with high fracture risk.  We review normal vitamin D level August 2018 and normal TSH February 2018.    Current Outpatient Medications   Medication Sig Dispense Refill     aspirin 81 MG EC tablet Take 1 tablet (81 mg total) by mouth daily.  0     blood glucose test (CONTOUR NEXT TEST STRIPS) strips Use 1 each As Directed daily. 100 strip 11     calcium-vitamin D (CALCIUM-VITAMIN D) 500 mg(1,250mg) -200 unit per tablet Take 2 tablets by mouth daily.       co-enzyme Q-10 30 mg capsule Take 30 mg by mouth 3 (three) times a day.       hydroCHLOROthiazide (HYDRODIURIL) 25 MG tablet Take 1 tablet (25 mg total) by mouth daily. 90 tablet 3     multivitamin therapeutic (THERAGRAN) tablet Take 1 tablet by mouth daily.       OMEGA-3/DHA/EPA/FISH OIL (FISH OIL-OMEGA-3 FATTY ACIDS) 300-1,000 mg capsule Take 2 g by mouth daily.       pravastatin (PRAVACHOL) 10 MG tablet Take 1 tablet (10 mg total) by mouth at bedtime. 90 tablet 4     sertraline (ZOLOFT) 100 MG tablet Take 1.5 tablets (150 mg total) by mouth daily. 135 tablet 3     traZODone (DESYREL) 100 MG tablet TAKE ONE-HALF TO ONE TABLET BY MOUTH ONCE DAILY AT BEDTIME AS NEEDED FOR SLEEP 90 tablet 0     No current facility-administered medications for this visit.        Past Medical History, Family History, and Social History reviewed.  Past Medical History:   Diagnosis Date     Depression      Hypertension      Insomnia      Past Surgical History:   Procedure Laterality Date     JOINT  "REPLACEMENT Left 2008    knee     ORIF ANKLE FRACTURE Right     2008 and pin removal 2009     CO APPENDECTOMY      Description: Appendectomy;  Recorded: 02/26/2008;     CO SLING OPER STRES INCONTINENCE      Description: Mid-Urethral Sling Operation;  Recorded: 05/18/2010;     CO TOTAL KNEE ARTHROPLASTY Left 2/18/2015    Procedure:   LEFT TOTAL KNEE ARTHROPLASTY;  Surgeon: Sina SHARP MD;  Location: [facility] Main OR;  Service: Orthopedics     Atorvastatin and Morphine (pf)  Family History   Problem Relation Age of Onset     Diabetes Mother      Hypertension Mother      Hypertension Father      Cancer Brother      Clotting disorder Neg Hx      Social History     Socioeconomic History     Marital status:      Spouse name: Abimael     Number of children: 2     Years of education: Not on file     Highest education level: Not on file   Social Needs     Financial resource strain: Not on file     Food insecurity - worry: Not on file     Food insecurity - inability: Not on file     Transportation needs - medical: Not on file     Transportation needs - non-medical: Not on file   Occupational History     Occupation: Retired   Tobacco Use     Smoking status: Never Smoker     Smokeless tobacco: Never Used   Substance and Sexual Activity     Alcohol use: Yes     Alcohol/week: 1.2 oz     Types: 2 Shots of liquor per week     Drug use: No     Sexual activity: Not on file   Other Topics Concern     Not on file   Social History Narrative     Not on file         Review of systems is as stated in HPI, and the remainder of the 10 system review is otherwise negative.    Objective:     Vitals:    12/06/18 0944   BP: 122/68   Patient Site: Left Arm   Patient Position: Sitting   Cuff Size: Adult Regular   Pulse: 72   Resp: 20   Temp: 98  F (36.7  C)   TempSrc: Oral   Weight: 172 lb (78 kg)   Height: 5' 6\" (1.676 m)    Body mass index is 27.76 kg/m .    Alert female.  Mucous membranes moist.  Heart with regular rate and rhythm.  " Lungs clear.  No tenderness to palpation of her musculature.  No joint erythema or edema.      This note has been dictated using voice recognition software. Any grammatical or context distortions are unintentional and inherent to the the software.

## 2021-06-25 NOTE — TELEPHONE ENCOUNTER
Pt notified of results, states she moved to [facility] and is using [facility]  There . Chart updated with new pharm. She will call [facility] and have them transfer the glipizide. No other questions.

## 2021-06-25 NOTE — TELEPHONE ENCOUNTER
----- Message from Elise Montgomery MD sent at 6/11/2021  2:40 PM CDT -----  Please let patient know that her urine sample shows no signs of a urinary tract infection.  There is sugar in her urine due to her diabetes.  Her A1c has increased to 8.2%.  I recommend that she restart glipizide and I will send this to her pharmacy.  Her hemoglobin is okay

## 2021-06-26 NOTE — PROGRESS NOTES
Assessment & Plan     Ana Maria was seen today for dizziness, contraception, anorexia and itching.    Diagnoses and all orders for this visit:    Dizziness  Unclear etiology.  We will start by checking some basic labs including hemogram, comprehensive metabolic panel, urinalysis, TSH and A1c.  Discussed importance of adequate fluids.  Further recommendations will be made once lab results are available.    Abdominal bloating  -     Urinalysis-UC if Indicated  -     XR Abdomen 2 Views; Future  -We will check UA to ensure no UTI given associated symptoms of fatigue and poor appetite.  X-ray of the abdomen is performed to evaluate amount of stool in her colon due to history of constipation.  This was personally reviewed and showed normal amount of stool and no signs of obstruction.  Recommended increase fiber in diet and increase fluid intake.  Also recommended trial of Metamucil daily.  Labs will be checked today including a hemogram and comprehensive metabolic panel.    Major depressive disorder, recurrent episode, in full remission (H)  Stable on venlafaxine.  This is managed by her PCP    Benign Essential Hypertension  -     Comprehensive Metabolic Panel  -Continue hydrochlorothiazide    Type 2 diabetes mellitus with complication, without long-term current use of insulin (H)  -     Glycosylated Hemoglobin A1c  -     glipiZIDE (GLUCOTROL) 5 MG tablet; Take 0.5 tablets (2.5 mg total) by mouth daily.  -A1c has increased to 8.2%.  Patient advised to restart glipizide and prescription sent to pharmacy.  Encouraged regular exercise, healthy diet and weight loss.  Follow-up in 3 months with PCP    Fatigue, unspecified type  -     Thyroid Stimulating Hormone (TSH)  -     Antinuclear Antibody (FRANK) [place]  -Check labs including TSH, FRANK, hemogram and comprehensive metabolic panel    Temperature intolerance  -     HM1(CBC and Differential)  -     Antinuclear Antibody (FRANK) [place]    Itching  -     Sedimentation Rate  -    "  C-Reactive Protein(CRP)  -     Antinuclear Antibody (FRANK) [place]  -Check labs.  Recommend Sarna antiitch lotion    Colon cancer screening  -     Ambulatory referral for Colonoscopy - [facility]    Family history of colonic polyps  -     Ambulatory referral for Colonoscopy - [place] [facility]      Start metamucil daily    Make sure to drink at least 6-8 glasses of water daily    Increase fiber in diet    Labs today    Try sarna anti-itch lotion    Ok to use hyrdocortisone cream for itching             BMI:   Estimated body mass index is 27.64 kg/m  as calculated from the following:    Height as of 10/15/20: 5' 6\" (1.676 m).    Weight as of this encounter: 171 lb 4 oz (77.7 kg).   The following high BMI interventions were performed this visit: encouragement to exercise    Return in about 4 weeks (around 7/9/2021) for BP and labs.    Elise Montgomery MD  [facility]   Ana Maria De La Garza is 70 y.o. and presents today for the following health issues   HPI   She is seen today for a number of concerns.  Primary concern is dizziness.  She notices that moving her head causes a sensation.  She has noticed intermittent bloating.  She does have trouble with constipation.  Takes over-the-counter medications.  Feels that she has a foggy brain and no energy.  Appetite is decreased.  She has noticed temperature intolerance.  She also reports that her skin is very itchy all over the place but she does not have a rash.  She has a couple of areas of bruising due to scratching herself so hard.  She reports her weight has been stable.  Blood pressure is a little high.  She did not take her hydrochlorothiazide this morning.  Meds and allergies are reviewed and updated.  She was last seen in clinic in February.  At that time she was taken off of glipizide because her A1c very was very good and she wanted to see if she could control her diabetes on her own.  No other concerns or " questions.          Review of Systems   All other systems reviewed and are negative.          Objective    /88   Pulse 86   Temp 98.5  F (36.9  C)   Wt 171 lb 4 oz (77.7 kg)   LMP 03/05/1998   BMI 27.64 kg/m    Body mass index is 27.64 kg/m .  Physical Exam   Constitutional: She is oriented to person, place, and time. She appears well-developed and well-nourished.   HENT:   Head: Normocephalic and atraumatic.   Nose: Nose normal. No mucosal edema or rhinorrhea.   Mouth/Throat: Oropharynx is clear and moist. No oropharyngeal exudate.   Cerumen present in right ear   Cardiovascular: Normal rate, regular rhythm and normal heart sounds.   Pulmonary/Chest: Effort normal and breath sounds normal. No respiratory distress. She has no wheezes.   Abdominal: Soft. Normal appearance and bowel sounds are normal. She exhibits no distension. There is no abdominal tenderness. There is no rebound and no guarding.   Neurological: She is alert and oriented to person, place, and time.   Some dizziness is noted with [place]-Hall[place] maneuvers bilaterally  Skin: There is a large bruise on her left medial thigh left posterior thigh from scratching.  No rash        X-ray of abdomen was performed in clinic.  This was personally reviewed.  Per my read it was normal

## 2021-06-26 NOTE — PATIENT INSTRUCTIONS - HE
Start metamucil daily    Make sure to drink at least 6-8 glasses of water daily    Increase fiber in diet    Labs today    Try sarna anti-itch lotion    Ok to use hyrdocortisone cream for itching

## 2021-06-27 ENCOUNTER — HEALTH MAINTENANCE LETTER (OUTPATIENT)
Age: 71
End: 2021-06-27

## 2021-06-29 ENCOUNTER — RECORDS - HEALTHEAST (OUTPATIENT)
Dept: ADMINISTRATIVE | Facility: OTHER | Age: 71
End: 2021-06-29

## 2021-07-03 NOTE — ADDENDUM NOTE
Addendum Note by Kush Escobar MD at 1/26/2017  9:36 PM     Author: Kush Escobar MD Service: -- Author Type: Physician    Filed: 1/26/2017  9:36 PM Encounter Date: 1/17/2017 Status: Signed    : Kush Escobar MD (Physician)    Addended by: KUSH ESCOBAR on: 1/26/2017 09:36 PM        Modules accepted: Orders

## 2021-07-06 VITALS
HEART RATE: 86 BPM | BODY MASS INDEX: 27.64 KG/M2 | DIASTOLIC BLOOD PRESSURE: 88 MMHG | SYSTOLIC BLOOD PRESSURE: 140 MMHG | TEMPERATURE: 98.5 F | WEIGHT: 171.25 LBS

## 2021-07-21 ENCOUNTER — RECORDS - HEALTHEAST (OUTPATIENT)
Dept: ADMINISTRATIVE | Facility: CLINIC | Age: 71
End: 2021-07-21

## 2021-10-09 ENCOUNTER — TRANSFERRED RECORDS (OUTPATIENT)
Dept: HEALTH INFORMATION MANAGEMENT | Facility: CLINIC | Age: 71
End: 2021-10-09

## 2021-10-17 ENCOUNTER — HEALTH MAINTENANCE LETTER (OUTPATIENT)
Age: 71
End: 2021-10-17

## 2021-12-12 ENCOUNTER — HEALTH MAINTENANCE LETTER (OUTPATIENT)
Age: 71
End: 2021-12-12

## 2022-01-28 ASSESSMENT — ACTIVITIES OF DAILY LIVING (ADL): CURRENT_FUNCTION: NO ASSISTANCE NEEDED

## 2022-02-02 ENCOUNTER — OFFICE VISIT (OUTPATIENT)
Dept: FAMILY MEDICINE | Facility: CLINIC | Age: 72
End: 2022-02-02
Payer: COMMERCIAL

## 2022-02-02 VITALS
WEIGHT: 179.5 LBS | OXYGEN SATURATION: 99 % | HEIGHT: 66 IN | DIASTOLIC BLOOD PRESSURE: 88 MMHG | HEART RATE: 89 BPM | BODY MASS INDEX: 28.85 KG/M2 | SYSTOLIC BLOOD PRESSURE: 158 MMHG

## 2022-02-02 DIAGNOSIS — I10 ESSENTIAL HYPERTENSION, BENIGN: ICD-10-CM

## 2022-02-02 DIAGNOSIS — F33.42 MAJOR DEPRESSIVE DISORDER, RECURRENT EPISODE, IN FULL REMISSION (H): ICD-10-CM

## 2022-02-02 DIAGNOSIS — E78.2 MIXED HYPERLIPIDEMIA: ICD-10-CM

## 2022-02-02 DIAGNOSIS — E11.8 TYPE 2 DIABETES MELLITUS WITH COMPLICATION, WITHOUT LONG-TERM CURRENT USE OF INSULIN (H): ICD-10-CM

## 2022-02-02 DIAGNOSIS — Z00.00 ENCOUNTER FOR MEDICARE ANNUAL WELLNESS EXAM: Primary | ICD-10-CM

## 2022-02-02 DIAGNOSIS — F41.1 ANXIETY STATE: ICD-10-CM

## 2022-02-02 DIAGNOSIS — N18.31 STAGE 3A CHRONIC KIDNEY DISEASE (H): ICD-10-CM

## 2022-02-02 LAB
ANION GAP SERPL CALCULATED.3IONS-SCNC: 10 MMOL/L (ref 5–18)
BUN SERPL-MCNC: 23 MG/DL (ref 8–28)
CALCIUM SERPL-MCNC: 10.2 MG/DL (ref 8.5–10.5)
CHLORIDE BLD-SCNC: 99 MMOL/L (ref 98–107)
CHOLEST SERPL-MCNC: 231 MG/DL
CO2 SERPL-SCNC: 29 MMOL/L (ref 22–31)
CREAT SERPL-MCNC: 1.17 MG/DL (ref 0.6–1.1)
CREAT UR-MCNC: 67 MG/DL
ERYTHROCYTE [DISTWIDTH] IN BLOOD BY AUTOMATED COUNT: 12.1 % (ref 10–15)
FASTING STATUS PATIENT QL REPORTED: NO
GFR SERPL CREATININE-BSD FRML MDRD: 49 ML/MIN/1.73M2
GLUCOSE BLD-MCNC: 246 MG/DL (ref 70–125)
HBA1C MFR BLD: 8.4 % (ref 0–5.6)
HCT VFR BLD AUTO: 43.2 % (ref 35–47)
HDLC SERPL-MCNC: 42 MG/DL
HGB BLD-MCNC: 15.3 G/DL (ref 11.7–15.7)
LDLC SERPL CALC-MCNC: 143 MG/DL
MCH RBC QN AUTO: 31.9 PG (ref 26.5–33)
MCHC RBC AUTO-ENTMCNC: 35.4 G/DL (ref 31.5–36.5)
MCV RBC AUTO: 90 FL (ref 78–100)
MICROALBUMIN UR-MCNC: 0.69 MG/DL (ref 0–1.99)
MICROALBUMIN/CREAT UR: 10.3 MG/G CR
PLATELET # BLD AUTO: 230 10E3/UL (ref 150–450)
POTASSIUM BLD-SCNC: 4.4 MMOL/L (ref 3.5–5)
RBC # BLD AUTO: 4.79 10E6/UL (ref 3.8–5.2)
SODIUM SERPL-SCNC: 138 MMOL/L (ref 136–145)
TRIGL SERPL-MCNC: 228 MG/DL
WBC # BLD AUTO: 6.8 10E3/UL (ref 4–11)

## 2022-02-02 PROCEDURE — 99397 PER PM REEVAL EST PAT 65+ YR: CPT | Performed by: FAMILY MEDICINE

## 2022-02-02 PROCEDURE — 80061 LIPID PANEL: CPT | Performed by: FAMILY MEDICINE

## 2022-02-02 PROCEDURE — 85027 COMPLETE CBC AUTOMATED: CPT | Performed by: FAMILY MEDICINE

## 2022-02-02 PROCEDURE — 80048 BASIC METABOLIC PNL TOTAL CA: CPT | Performed by: FAMILY MEDICINE

## 2022-02-02 PROCEDURE — 36415 COLL VENOUS BLD VENIPUNCTURE: CPT | Performed by: FAMILY MEDICINE

## 2022-02-02 PROCEDURE — 99214 OFFICE O/P EST MOD 30 MIN: CPT | Mod: 25 | Performed by: FAMILY MEDICINE

## 2022-02-02 PROCEDURE — 83036 HEMOGLOBIN GLYCOSYLATED A1C: CPT | Performed by: FAMILY MEDICINE

## 2022-02-02 PROCEDURE — 82043 UR ALBUMIN QUANTITATIVE: CPT | Performed by: FAMILY MEDICINE

## 2022-02-02 RX ORDER — SODIUM FLUORIDE 1.1 G/100G
GEL ORAL
COMMUNITY
Start: 2021-11-18 | End: 2023-01-30

## 2022-02-02 RX ORDER — VENLAFAXINE HYDROCHLORIDE 150 MG/1
150 CAPSULE, EXTENDED RELEASE ORAL DAILY
Qty: 30 CAPSULE | Refills: 1 | Status: SHIPPED | OUTPATIENT
Start: 2022-02-02 | End: 2022-03-02

## 2022-02-02 ASSESSMENT — ANXIETY QUESTIONNAIRES
2. NOT BEING ABLE TO STOP OR CONTROL WORRYING: NEARLY EVERY DAY
IF YOU CHECKED OFF ANY PROBLEMS ON THIS QUESTIONNAIRE, HOW DIFFICULT HAVE THESE PROBLEMS MADE IT FOR YOU TO DO YOUR WORK, TAKE CARE OF THINGS AT HOME, OR GET ALONG WITH OTHER PEOPLE: SOMEWHAT DIFFICULT
1. FEELING NERVOUS, ANXIOUS, OR ON EDGE: NEARLY EVERY DAY
1. FEELING NERVOUS, ANXIOUS, OR ON EDGE: NEARLY EVERY DAY
5. BEING SO RESTLESS THAT IT IS HARD TO SIT STILL: NOT AT ALL
7. FEELING AFRAID AS IF SOMETHING AWFUL MIGHT HAPPEN: NEARLY EVERY DAY
GAD7 TOTAL SCORE: 16
3. WORRYING TOO MUCH ABOUT DIFFERENT THINGS: NEARLY EVERY DAY
5. BEING SO RESTLESS THAT IT IS HARD TO SIT STILL: NOT AT ALL
3. WORRYING TOO MUCH ABOUT DIFFERENT THINGS: NEARLY EVERY DAY
GAD7 TOTAL SCORE: 16
6. BECOMING EASILY ANNOYED OR IRRITABLE: SEVERAL DAYS
4. TROUBLE RELAXING: NEARLY EVERY DAY
6. BECOMING EASILY ANNOYED OR IRRITABLE: SEVERAL DAYS
2. NOT BEING ABLE TO STOP OR CONTROL WORRYING: NEARLY EVERY DAY
7. FEELING AFRAID AS IF SOMETHING AWFUL MIGHT HAPPEN: NEARLY EVERY DAY

## 2022-02-02 ASSESSMENT — MIFFLIN-ST. JEOR: SCORE: 1340.96

## 2022-02-02 ASSESSMENT — PATIENT HEALTH QUESTIONNAIRE - PHQ9: 5. POOR APPETITE OR OVEREATING: NEARLY EVERY DAY

## 2022-02-02 NOTE — PATIENT INSTRUCTIONS
Start checking blood pressure intermittently at home with blood pressure cuff (printed)  -goal is less than 140/90  -check your blood pressure: any time of day is fine, make sure you are sitting for at least 5 minutes before you check it, don't cross your legs/ankles when you are checking it    Let's plan a virtual (telephone) visit in 1 month for mood/med check and blood pressure check      Patient Education   Personalized Prevention Plan  You are due for the preventive services outlined below.  Your care team is available to assist you in scheduling these services.  If you have already completed any of these items, please share that information with your care team to update in your medical record.  Health Maintenance Due   Topic Date Due     ANNUAL REVIEW OF HM ORDERS  Never done     Depression Action Plan  Never done     Eye Exam  09/04/2019     Depression Assessment  04/15/2021     Cholesterol Lab  07/17/2021     Diabetic Foot Exam  10/15/2021     FALL RISK ASSESSMENT  10/15/2021     Kidney Microalbumin Urine Test  10/22/2021     A1C Lab  12/11/2021     Preventive Health Recommendations    See your health care provider every year to    Review health changes.     Discuss preventive care.      Review your medicines if your doctor has prescribed any.    You no longer need a yearly Pap test unless you've had an abnormal Pap test in the past 10 years. If you have vaginal symptoms, such as bleeding or discharge, be sure to talk with your provider about a Pap test.    Every 1 to 2 years, have a mammogram.  If you are over 69, talk with your health care provider about whether or not you want to continue having screening mammograms.    Every 10 years, have a colonoscopy. Or, have a yearly FIT test (stool test). These exams will check for colon cancer.     Have a cholesterol test every 5 years, or more often if your doctor advises it.     Have a diabetes test (fasting glucose) every three years. If you are at risk for  diabetes, you should have this test more often.     At age 65, have a bone density scan (DEXA) to check for osteoporosis (brittle bone disease).    Shots:    Get a flu shot each year.    Get a tetanus shot every 10 years.    Talk to your doctor about your pneumonia vaccines. There are now two you should receive - Pneumovax (PPSV 23) and Prevnar (PCV 13).    Talk to your pharmacist about the shingles vaccine.    Talk to your doctor about the hepatitis B vaccine.    Nutrition:     Eat at least 5 servings of fruits and vegetables each day.    Eat whole-grain bread, whole-wheat pasta and brown rice instead of white grains and rice.    Get adequate Calcium and Vitamin D.     Lifestyle    Exercise at least 150 minutes a week (30 minutes a day, 5 days a week). This will help you control your weight and prevent disease.    Limit alcohol to one drink per day.    No smoking.     Wear sunscreen to prevent skin cancer.     See your dentist twice a year for an exam and cleaning.    See your eye doctor every 1 to 2 years to screen for conditions such as glaucoma, macular degeneration and cataracts.    Personalized Prevention Plan  You are due for the preventive services outlined below.  Your care team is available to assist you in scheduling these services.  If you have already completed any of these items, please share that information with your care team to update in your medical record.  Health Maintenance   Topic Date Due     ANNUAL REVIEW OF HM ORDERS  Never done     DEPRESSION ACTION PLAN  Never done     EYE EXAM  09/04/2019     PHQ-9  04/15/2021     LIPID  07/17/2021     DIABETIC FOOT EXAM  10/15/2021     FALL RISK ASSESSMENT  10/15/2021     MICROALBUMIN  10/22/2021     A1C  12/11/2021     BMP  06/11/2022     HEMOGLOBIN  06/11/2022     MEDICARE ANNUAL WELLNESS VISIT  02/02/2023     MAMMO SCREENING  04/28/2023     COLORECTAL CANCER SCREENING  10/09/2026     ADVANCE CARE PLANNING  02/02/2027     DTAP/TDAP/TD IMMUNIZATION (3  - Td or Tdap) 10/10/2031     DEXA  09/28/2033     HEPATITIS C SCREENING  Completed     INFLUENZA VACCINE  Completed     Pneumococcal Vaccine: 65+ Years  Completed     URINALYSIS  Completed     ZOSTER IMMUNIZATION  Completed     COVID-19 Vaccine  Completed     IPV IMMUNIZATION  Aged Out     MENINGITIS IMMUNIZATION  Aged Out

## 2022-02-02 NOTE — PROGRESS NOTES
"  SUBJECTIVE:   Ana Maria De La Garza is a 71 year old female who presents for Preventive Visit.  Patient has been advised of split billing requirements and indicates understanding: Yes  Are you in the first 12 months of your Medicare Part B coverage?  No    Physical Health:  Answers for HPI/ROS submitted by the patient on 1/28/2022  In general, how would you rate your overall physical health?: good  Frequency of exercise:: 4-5 days/week  Do you usually eat at least 4 servings of fruit and vegetables a day, include whole grains & fiber, and avoid regularly eating high fat or \"junk\" foods? : Yes  Taking medications regularly:: Yes  Medication side effects:: None  Activities of Daily Living: no assistance needed  Home safety: no safety concerns identified  Hearing Impairment:: need to ask people to speak up or repeat themselves  In the past 6 months, have you been bothered by leaking of urine?: Yes  In general, how would you rate your overall mental or emotional health?: fair  Additional concerns today:: Yes  Duration of exercise:: 30-45 minutes    Stress/anxiety elevated - feels tense/stress, worried about everything, not sleeping well d/t anxiety, GAD7 score 16 - stopped venlafaxine on her own (quickly so felt some withdrawal sx) but then restarted - has been on zoloft in the past (had been working ok in the past, looks like went up to 150mg/day dosing and then switched to prozac but stopped d/t side effects and then started venlafaxine), has done counseling in the past - does some breathing exercises    Mental Health:    In general, how would you rate your overall mental or emotional health? poor  PHQ-2 Score: (P) 0    Do you feel safe in your environment? Yes    Have you ever done Advance Care Planning? (For example, a Health Directive, POLST, or a discussion with a medical provider or your loved ones about your wishes): Yes, advance care planning is on file.    Additional concerns to address?  YES      Cognitive " Screenin) Repeat 3 items (Leader, Season, Table)    2) Clock draw: NORMAL  3) 3 item recall: Recalls 3 objects  Results: 3 items recalled: COGNITIVE IMPAIRMENT LESS LIKELY    Do you have sleep apnea, excessive snoring or daytime drowsiness?: no    Reviewed and updated as needed this visit by clinical staff  Tobacco  Allergies  Meds  Problems  Med Hx  Surg Hx  Fam Hx         Reviewed and updated as needed this visit by Provider  Tobacco  Allergies  Meds  Problems  Med Hx  Surg Hx  Fam Hx        Social History     Tobacco Use     Smoking status: Never Smoker     Smokeless tobacco: Never Used   Substance Use Topics     Alcohol use: Not Currently     Alcohol/week: 2.0 standard drinks                           Current providers sharing in care for this patient include:   Patient Care Team:  Adelina Donaldson MD as PCP - General (Family Practice)  Adelina Donaldson MD as Assigned PCP    The following health maintenance items are reviewed in Epic and correct as of today:  Health Maintenance   Topic Date Due     ANNUAL REVIEW OF HM ORDERS  Never done     DEPRESSION ACTION PLAN  Never done     EYE EXAM  2019     PHQ-9  04/15/2021     LIPID  2021     DIABETIC FOOT EXAM  10/15/2021     FALL RISK ASSESSMENT  10/15/2021     MICROALBUMIN  10/22/2021     A1C  2021     BMP  2022     HEMOGLOBIN  2022     MEDICARE ANNUAL WELLNESS VISIT  2023     MAMMO SCREENING  2023     COLORECTAL CANCER SCREENING  10/09/2026     ADVANCE CARE PLANNING  2027     DTAP/TDAP/TD IMMUNIZATION (3 - Td or Tdap) 10/10/2031     DEXA  2033     HEPATITIS C SCREENING  Completed     INFLUENZA VACCINE  Completed     Pneumococcal Vaccine: 65+ Years  Completed     URINALYSIS  Completed     ZOSTER IMMUNIZATION  Completed     COVID-19 Vaccine  Completed     IPV IMMUNIZATION  Aged Out     MENINGITIS IMMUNIZATION  Aged Out     ROS:  Constitutional, HEENT, cardiovascular, pulmonary, GI,  ", musculoskeletal, neuro, skin, endocrine and psych systems are negative, except as otherwise noted.    OBJECTIVE:   BP (!) 168/84 (BP Location: Left arm, Patient Position: Sitting, Cuff Size: Adult Regular)   Pulse 89   Wt 81.4 kg (179 lb 8 oz)   SpO2 99%   BMI 28.97 kg/m   Estimated body mass index is 28.97 kg/m  as calculated from the following:    Height as of 10/15/20: 1.676 m (5' 6\").    Weight as of this encounter: 81.4 kg (179 lb 8 oz).  EXAM:   GENERAL APPEARANCE: healthy, alert and no distress  EYES: Eyes grossly normal to inspection, PERRL and conjunctivae and sclerae normal  HENT: ear canals and TM's normal, nose and mouth without ulcers or lesions, oropharynx clear and oral mucous membranes moist  NECK: no adenopathy, no asymmetry, masses, or scars and thyroid normal to palpation  RESP: lungs clear to auscultation - no rales, rhonchi or wheezes  BREAST: normal without masses, tenderness or nipple discharge and no palpable axillary masses or adenopathy  CV: regular rate and rhythm, normal S1 S2, no S3 or S4, no murmur, click or rub, no peripheral edema and peripheral pulses strong  ABDOMEN: soft, nontender, no hepatosplenomegaly, no masses and bowel sounds normal  MS: no musculoskeletal defects are noted and gait is age appropriate without ataxia  SKIN: no suspicious lesions or rashes  NEURO: Normal strength and tone, sensory exam grossly normal, mentation intact and speech normal  PSYCH: mentation appears normal and affect normal/bright  Diabetic foot exam: normal DP and PT pulses, no trophic changes or ulcerative lesions, normal sensory exam and normal monofilament exam      ASSESSMENT / PLAN:   Ana Maria was seen today for wellness visit.    Diagnoses and all orders for this visit:    Encounter for Medicare annual wellness exam  Annual wellness visit completed today. Labs as below. Vaccines up to date. Colon cancer screen and mammogram up to date.  -     CBC with platelets; Future    Mixed " "hyperlipidemia  Hx HLD, will check lipid levels today, not on statin but taking red yeast rice.   -     Lipid panel reflex to direct LDL Fasting; Future    Type 2 diabetes mellitus with complication, without long-term current use of insulin (H)  Hx DM2, on glipizide 2.5 mg daily, will check A1c and urine microalbumin today, discussed healthy diabetic diet, will adjust dose if needed.   -     Hemoglobin A1c; Future  -     Albumin Random Urine Quantitative with Creat Ratio; Future    Benign Essential Hypertension  Hx HTN, on hydrochlorothiazide 25mg daily, BP elevated today but pt thinks related to stress - will check BMP and start home BP monitoring, if persistently >140/90 then will increase antihypertensive therapy - follow-up in 1 month.  -     Basic metabolic panel  (Ca, Cl, CO2, Creat, Gluc, K, Na, BUN); Future    Stage 3a chronic kidney disease (H)  Will check BMP today.    Major depressive disorder, recurrent episode, in full remission (H)  Anxiety state  Uncontrolled symptoms at this time - recommend increasing effexor dose for better control of anxiety - will increase to 150mg as below. Follow-up in 1 month.   -     venlafaxine (EFFEXOR-XR) 150 MG 24 hr capsule; Take 1 capsule (150 mg) by mouth daily        COUNSELING:  Reviewed preventive health counseling, as reflected in patient instructions       Regular exercise       Healthy diet/nutrition       Vision screening       Dental care    Estimated body mass index is 28.97 kg/m  as calculated from the following:    Height as of 10/15/20: 1.676 m (5' 6\").    Weight as of this encounter: 81.4 kg (179 lb 8 oz).      She reports that she has never smoked. She has never used smokeless tobacco.    Appropriate preventive services were discussed with this patient, including applicable screening as appropriate for cardiovascular disease, diabetes, osteopenia/osteoporosis, and glaucoma.  As appropriate for age/gender, discussed screening for colorectal cancer, " prostate cancer, breast cancer, and cervical cancer. Checklist reviewing preventive services available has been given to the patient.    Reviewed patients plan of care and provided an AVS. The Basic Care Plan (routine screening as documented in Health Maintenance) for Ana Maria meets the Care Plan requirement. This Care Plan has been established and reviewed with the Patient.    Adelina Donaldson MD  [facility]

## 2022-02-03 ASSESSMENT — ANXIETY QUESTIONNAIRES: GAD7 TOTAL SCORE: 16

## 2022-03-02 ENCOUNTER — VIRTUAL VISIT (OUTPATIENT)
Dept: FAMILY MEDICINE | Facility: CLINIC | Age: 72
End: 2022-03-02
Payer: COMMERCIAL

## 2022-03-02 DIAGNOSIS — I10 ESSENTIAL HYPERTENSION, BENIGN: Primary | ICD-10-CM

## 2022-03-02 DIAGNOSIS — F41.1 ANXIETY STATE: ICD-10-CM

## 2022-03-02 DIAGNOSIS — F33.1 MODERATE EPISODE OF RECURRENT MAJOR DEPRESSIVE DISORDER (H): ICD-10-CM

## 2022-03-02 PROCEDURE — 99214 OFFICE O/P EST MOD 30 MIN: CPT | Mod: 95 | Performed by: FAMILY MEDICINE

## 2022-03-02 RX ORDER — LISINOPRIL 10 MG/1
10 TABLET ORAL DAILY
Qty: 30 TABLET | Refills: 1 | Status: SHIPPED | OUTPATIENT
Start: 2022-03-02 | End: 2022-04-11

## 2022-03-02 RX ORDER — VENLAFAXINE HYDROCHLORIDE 225 MG/1
225 TABLET, EXTENDED RELEASE ORAL DAILY
Qty: 90 TABLET | Refills: 0 | Status: SHIPPED | OUTPATIENT
Start: 2022-03-02 | End: 2022-04-29

## 2022-03-02 NOTE — PROGRESS NOTES
Sayda is a 71 year old who is being evaluated via a billable video visit.      How would you like to obtain your AVS? MyChart  ** Patient requesting to just be called via telephone: [phone]     Video Start Time: 4:35pm    Assessment/Plan:    Benign Essential Hypertension  Hx HTN, blood pressure inadequately controlled - will add lisinopril 10mg daily, continue hydrochlorothiazide 25 mg daily - follow-up in 4 weeks for BP check and BMP.   - lisinopril (ZESTRIL) 10 MG tablet  Dispense: 30 tablet; Refill: 1    Major depressive disorder, recurrent episode, moderate (H)  Anxiety state  Uncontrolled symptoms - will increase effexor from 150 mg to 225 mg daily. Follow-up in 4 weeks.   - venlafaxine (EFFEXOR-ER) 225 MG 24 hr tablet  Dispense: 90 tablet; Refill: 0       Follow up: 4 weeks    Adelina Donaldson MD  [facility]      Subjective:   Ana Maria De La Garza is a 71 year old female being seen today via video visit for multiple concerns    -home readings - diastolic 70-80s, systolic 131-160s (mostly >140)  -started diet to eat healthy - lost 3 lbs  -lots of walking/riding bike  -otherwise feeling good and sleeping well  -not happy with venlafaxine - doesn't think is helping much  -dry mouth - especially with exercise    Patient Active Problem List   Diagnosis     Female Stress Incontinence     Hyperlipidemia     Recurrent Major Depression In Full Remission     Benign Essential Hypertension     Cervical Spondylosis     Cervical Disc Degeneration     Anxiety     Status post total knee replacement     Insomnia     Type 2 diabetes mellitus with complication, without long-term current use of insulin (H)     Osteopenia     Chronic kidney disease, stage 3 (H)     Past Medical History:   Diagnosis Date     Depression      Hypertension      Insomnia      Past Surgical History:   Procedure Laterality Date     HC SLING OPERATION FOR STRESS INCONTINENCE      Description: Mid-Urethral Sling Operation;  Recorded:  05/18/2010;     JOINT REPLACEMENT Left 2008    knee     OPEN REDUCTION INTERNAL FIXATION ANKLE Right     2008 and pin removal 2009     [facility] APPENDECTOMY      Description: Appendectomy;  Recorded: 02/26/2008;     C TOTAL KNEE ARTHROPLASTY Left 2/18/2015    Procedure:   LEFT TOTAL KNEE ARTHROPLASTY;  Surgeon: Sina SHARP MD;  Location: [facility];  Service: Orthopedics     Current Outpatient Medications   Medication     calcium-vitamin D (CALCIUM-VITAMIN D) 500 mg(1,250mg) -200 unit per tablet     DENTAGEL 1.1 % GEL topical gel     glipiZIDE (GLUCOTROL) 5 MG tablet     hydroCHLOROthiazide (HYDRODIURIL) 25 MG tablet     lisinopril (ZESTRIL) 10 MG tablet     multivitamin therapeutic (THERAGRAN) tablet     OMEGA-3/DHA/EPA/FISH OIL (FISH OIL-OMEGA-3 FATTY ACIDS) 300-1,000 mg capsule     red yeast rice 600 mg cap     traZODone (DESYREL) 100 MG tablet     venlafaxine (EFFEXOR-ER) 225 MG 24 hr tablet     No current facility-administered medications for this visit.     Allergies   Allergen Reactions     Atorvastatin Muscle Pain (Myalgia)     Metformin Unknown     Body aches, headache     Morphine (Pf) [Morphine] Nausea and Vomiting     Pravastatin Muscle Pain (Myalgia)     Social History     Socioeconomic History     Marital status:      Spouse name: Not on file     Number of children: 2     Years of education: Not on file     Highest education level: Not on file   Occupational History     Not on file   Tobacco Use     Smoking status: Never Smoker     Smokeless tobacco: Never Used   Substance and Sexual Activity     Alcohol use: Not Currently     Alcohol/week: 2.0 standard drinks     Drug use: No     Sexual activity: Not on file   Other Topics Concern     Not on file   Social History Narrative     Not on file     Social Determinants of Health     Financial Resource Strain: Not on file   Food Insecurity: Not on file   Transportation Needs: Not on file   Physical Activity: Not on file   Stress: Not on file    Social Connections: Not on file   Intimate Partner Violence: Not on file   Housing Stability: Not on file     Family History   Problem Relation Age of Onset     Diabetes Mother      Hypertension Mother      Hypertension Father      Cancer Brother      Clotting Disorder No family hx of      Review of systems is as stated in HPI, and the remainder of system review is otherwise negative.    Objective:     There were no vitals taken for this visit.    General appearance: awake, NAD  HEENT: atraumatic, normocephalic  Lungs: breathing comfortably on room air, no cough  Neuro: alert, oriented x3, CNs grossly intact, no focal deficits appreciated  Psych: normal mood/affect/behavior, answering questions appropriately, linear thought process    Video-Visit Details    Type of service:  Video Visit    Video End Time:4:51 PM    Originating Location (pt. Location): Home    Distant Location (provider location):  [facility]     Platform used for Video Visit: DailyCred

## 2022-04-11 ENCOUNTER — ALLIED HEALTH/NURSE VISIT (OUTPATIENT)
Dept: FAMILY MEDICINE | Facility: CLINIC | Age: 72
End: 2022-04-11
Payer: COMMERCIAL

## 2022-04-11 VITALS — SYSTOLIC BLOOD PRESSURE: 152 MMHG | DIASTOLIC BLOOD PRESSURE: 86 MMHG

## 2022-04-11 DIAGNOSIS — I10 ESSENTIAL HYPERTENSION, BENIGN: Primary | ICD-10-CM

## 2022-04-11 PROCEDURE — 99207 PR DROP WITH A PROCEDURE: CPT | Mod: 25

## 2022-04-11 RX ORDER — LISINOPRIL 20 MG/1
20 TABLET ORAL DAILY
Qty: 30 TABLET | Refills: 1 | Status: SHIPPED | OUTPATIENT
Start: 2022-04-11 | End: 2022-07-01

## 2022-04-11 NOTE — PROGRESS NOTES
I met with Ana Maria De La Garza at the request of Dr. Donaldson to recheck her blood pressure.  Blood pressure medications on the med list were reviewed with patient.    Patient has taken all medications as per usual regimen: Yes  Patient reports tolerating them without any issues or concerns: Yes    Vitals:    04/11/22 1023 04/11/22 1039   BP: (!) 156/90 (!) 152/86   PATIENTS BP CUFF: 170/96    After 5 minutes, the patient's blood pressure remained greater than or equal to 140/90.    Is the patient currently having any chest pain? No  Does the patient currently have a headache? No  Does the patient currently have any vision changes? No  Does the patient currently have any nausea? No  Does the patient currently have any abdominal pain? No    The previous encounter was reviewed.  The patient was discharged and the note will be sent to the provider for final review.

## 2022-04-27 ENCOUNTER — NURSE TRIAGE (OUTPATIENT)
Dept: NURSING | Facility: CLINIC | Age: 72
End: 2022-04-27
Payer: COMMERCIAL

## 2022-04-27 NOTE — TELEPHONE ENCOUNTER
Patient calling back.  Just realized she is not taking her effexor for days.    She took it now.    She is now requesting lower dose so she can taper off of it as she states it does not work.    [facility], [place], [place].    Zee De Los Santos RN  [facility] Nurse Advisor

## 2022-04-27 NOTE — TELEPHONE ENCOUNTER
Nurse Triage SBAR    Is this a 2nd Level Triage? YES, LICENSED PRACTITIONER REVIEW IS REQUIRED    Situation: dizziness, can only sit in chair.  Able to walk unassisted currently. Dizzy and confined to chair or will vomit she states.    Background:  Dizzy started last night.  If moves her eyes she gets increased nausea.    /90, Recent BP med increase a month ago.    Patient looking to be seen tomorrow.  She is not a t home.    Assessment: ask provider if she can be added to tomorrows schedule, when patient back in town    Protocol Recommended Disposition:   Go To ED/UCC Now (Or To Office With PCP Approval)    Recommendation: Please advise     Routed to provider    Does the patient meet one of the following criteria for ADS visit consideration? 16+ years old, with an [facility] PCP     TIP  Providers, please consider if this condition is appropriate for management at one of our Acute and Diagnostic Services sites.     If patient is a good candidate, please use dotphrase <dot>triageresponse and select Refer to ADS to document.                Reason for Disposition    Spinning or tilting sensation (vertigo) present now and one or more stroke risk factors (i.e., hypertension, diabetes, prior stroke/TIA, heart attack, age over 60) (Exception: prior physician evaluation for this AND no different/worse than usual)    Additional Information    Negative: Shock suspected (e.g., cold/pale/clammy skin, too weak to stand, low BP, rapid pulse)    Negative: Difficult to awaken or acting confused (e.g., disoriented, slurred speech)    Negative: Fainted, and still feels dizzy afterwards    Negative: Severe difficulty breathing (e.g., struggling for each breath, speaks in single words)    Negative: Overdose (accidental or intentional) of medications    Negative: New neurologic deficit that is present now: * Weakness of the face, arm, or leg on one side of the body * Numbness of the face, arm, or leg on one side of the body * Loss of  speech or garbled speech    Negative: Heart beating < 50 beats per minute OR > 140 beats per minute    Negative: Sounds like a life-threatening emergency to the triager    Negative: Chest pain    Negative: Rectal bleeding, bloody stool, or tarry-black stool    Negative: Vomiting is the main symptom    Negative: Diarrhea is the main symptom    Negative: Headache is the main symptom    Negative: Heat exhaustion suspected (i.e., dehydration from heat exposure)    Negative: Patient states that he/she is having an anxiety/panic attack    Negative: SEVERE dizziness (e.g., unable to stand, requires support to walk, feels like passing out now)    Negative: SEVERE headache or neck pain    Protocols used: DIZZINESS-A-OH

## 2022-04-27 NOTE — TELEPHONE ENCOUNTER
Provider Response to 2nd Level Triage Request    I have reviewed the RN documentation. My recommendation is:  Refer to ED     Symptoms could be associated with stroke especially given report of recent elevated blood pressures    Dr Donaldson

## 2022-04-29 ENCOUNTER — VIRTUAL VISIT (OUTPATIENT)
Dept: FAMILY MEDICINE | Facility: CLINIC | Age: 72
End: 2022-04-29
Payer: COMMERCIAL

## 2022-04-29 DIAGNOSIS — F41.1 ANXIETY STATE: Primary | ICD-10-CM

## 2022-04-29 DIAGNOSIS — I10 ESSENTIAL HYPERTENSION: ICD-10-CM

## 2022-04-29 PROCEDURE — 99214 OFFICE O/P EST MOD 30 MIN: CPT | Mod: 95 | Performed by: FAMILY MEDICINE

## 2022-04-29 RX ORDER — VENLAFAXINE HYDROCHLORIDE 150 MG/1
150 CAPSULE, EXTENDED RELEASE ORAL DAILY
COMMUNITY
Start: 2022-03-21 | End: 2022-08-02

## 2022-04-29 RX ORDER — CITALOPRAM HYDROBROMIDE 10 MG/1
10 TABLET ORAL DAILY
Qty: 30 TABLET | Refills: 1 | Status: SHIPPED | OUTPATIENT
Start: 2022-04-29 | End: 2022-08-02 | Stop reason: DRUGHIGH

## 2022-04-29 RX ORDER — VENLAFAXINE HYDROCHLORIDE 37.5 MG/1
37.5 CAPSULE, EXTENDED RELEASE ORAL DAILY
Qty: 14 CAPSULE | Refills: 0 | Status: SHIPPED | OUTPATIENT
Start: 2022-04-29 | End: 2022-08-02

## 2022-04-29 ASSESSMENT — ANXIETY QUESTIONNAIRES
GAD7 TOTAL SCORE: 11
1. FEELING NERVOUS, ANXIOUS, OR ON EDGE: MORE THAN HALF THE DAYS
2. NOT BEING ABLE TO STOP OR CONTROL WORRYING: MORE THAN HALF THE DAYS
7. FEELING AFRAID AS IF SOMETHING AWFUL MIGHT HAPPEN: SEVERAL DAYS
5. BEING SO RESTLESS THAT IT IS HARD TO SIT STILL: NOT AT ALL
7. FEELING AFRAID AS IF SOMETHING AWFUL MIGHT HAPPEN: SEVERAL DAYS
GAD7 TOTAL SCORE: 11
3. WORRYING TOO MUCH ABOUT DIFFERENT THINGS: MORE THAN HALF THE DAYS
4. TROUBLE RELAXING: MORE THAN HALF THE DAYS
6. BECOMING EASILY ANNOYED OR IRRITABLE: MORE THAN HALF THE DAYS
GAD7 TOTAL SCORE: 11

## 2022-04-29 ASSESSMENT — PATIENT HEALTH QUESTIONNAIRE - PHQ9
SUM OF ALL RESPONSES TO PHQ QUESTIONS 1-9: 5
SUM OF ALL RESPONSES TO PHQ QUESTIONS 1-9: 5
10. IF YOU CHECKED OFF ANY PROBLEMS, HOW DIFFICULT HAVE THESE PROBLEMS MADE IT FOR YOU TO DO YOUR WORK, TAKE CARE OF THINGS AT HOME, OR GET ALONG WITH OTHER PEOPLE: SOMEWHAT DIFFICULT

## 2022-04-29 NOTE — PROGRESS NOTES
Sayda is a 71 year old who is being evaluated via a billable telephone visit.      How would you like to obtain your AVS? Virajharzoe  If the visit is dropped, the invitation should be resent by: Text to cell phone: [phone]  Will anyone else be joining your visit? No      Telephone Start Time: 11:54 AM    Assessment/Plan:    Anxiety state  Uncontrolled anxiety, patient does not feel that venlafaxine is helping, she will start tapering off venlafaxine: 150 mg daily for 1 week, 75 mg daily for 1 week, 37.5 mg daily for 1 week.  Once she is off venlafaxine she will start Celexa, will increase dose 2 to 4 weeks later if needed.  Patient will reach out with any concerns.  - venlafaxine (EFFEXOR-XR) 37.5 MG 24 hr capsule  Dispense: 14 capsule; Refill: 0  - citalopram (CELEXA) 10 MG tablet  Dispense: 30 tablet; Refill: 1    Essential hypertension  Recent blood pressures at or near goal of less than 140/90, will continue current regimen and home blood pressure monitoring, patient will send me an update with blood pressure readings and if most are 140/90 or higher then would increase lisinopril to 30 mg daily.       Follow up: as needed    Adelina Donaldson MD  [facility]      Subjective:   Ana Maria De La Garza is a 71 year old female being seen today via telephone visit for med ck    -Patient reports feeling really unwell Monday through Wednesday, dizziness, nausea, chills, she thought perhaps she had COVID but then realized she did not take her venlafaxine for 3 days -likely withdrawal symptoms  -She has now restarted venlafaxine at 150 mg daily and is no longer having symptoms  -She does want to wean off of Effexor as she does not feel that it was helpful for her  -She reports feeling very stressed/anxious, states this is chronic for her though worsening recently  -She previously was on Zoloft and Prozac before switching to venlafaxine    -Patient ports getting a new blood pressure cuff recently  -She reports  on the days that she was not feeling well her blood pressure was 154-179/90  -The past couple days her blood pressure has been 138-142 over 80s  -She also reports losing 10 pounds       Answers for HPI/ROS submitted by the patient on 4/29/2022  If you checked off any problems, how difficult have these problems made it for you to do your work, take care of things at home, or get along with other people?: Somewhat difficult  PHQ9 TOTAL SCORE: 5  KATT 7 TOTAL SCORE: 11  Do you check your blood pressure regularly outside of the clinic?: Yes  Are your blood pressures ever more than 140 on the top number (systolic) OR more than 90 on the bottom number (diastolic)? (For example, greater than 140/90): Yes  Are you following a low salt diet?: Yes  Depression/Anxiety: Depression & Anxiety  Status since last visit:: no change  Anxiety since last: : medium  Other associated symptoms of depression:: No  Other associated symotome: : No  Significant life event: : No  Anxious:: No  Current substance use:: No  On average, how many sweetened beverages do you drink each day (Examples: soda, juice, sweet tea, etc.  Do NOT count diet or artificially sweetened beverages)?: 1      Patient Active Problem List   Diagnosis     Female Stress Incontinence     Hyperlipidemia     Recurrent Major Depression In Full Remission     Benign Essential Hypertension     Cervical Spondylosis     Cervical Disc Degeneration     Anxiety     Status post total knee replacement     Insomnia     Type 2 diabetes mellitus with complication, without long-term current use of insulin (H)     Osteopenia     Chronic kidney disease, stage 3 (H)     Past Medical History:   Diagnosis Date     Depression      Hypertension      Insomnia      Past Surgical History:   Procedure Laterality Date     HC SLING OPERATION FOR STRESS INCONTINENCE      Description: Mid-Urethral Sling Operation;  Recorded: 05/18/2010;     JOINT REPLACEMENT Left 2008    knee     OPEN REDUCTION INTERNAL  FIXATION ANKLE Right     2008 and pin removal 2009     [facility] APPENDECTOMY      Description: Appendectomy;  Recorded: 02/26/2008;     [facility] TOTAL KNEE ARTHROPLASTY Left 2/18/2015    Procedure:   LEFT TOTAL KNEE ARTHROPLASTY;  Surgeon: Sina SHARP MD;  Location: [facility] OR;  Service: Orthopedics     Current Outpatient Medications   Medication     calcium-vitamin D (CALCIUM-VITAMIN D) 500 mg(1,250mg) -200 unit per tablet     citalopram (CELEXA) 10 MG tablet     glipiZIDE (GLUCOTROL) 5 MG tablet     hydrochlorothiazide (HYDRODIURIL) 25 MG tablet     lisinopril (ZESTRIL) 20 MG tablet     multivitamin therapeutic (THERAGRAN) tablet     OMEGA-3/DHA/EPA/FISH OIL (FISH OIL-OMEGA-3 FATTY ACIDS) 300-1,000 mg capsule     red yeast rice 600 mg cap     venlafaxine (EFFEXOR-XR) 150 MG 24 hr capsule     venlafaxine (EFFEXOR-XR) 37.5 MG 24 hr capsule     DENTAGEL 1.1 % GEL topical gel     No current facility-administered medications for this visit.     Allergies   Allergen Reactions     Atorvastatin Muscle Pain (Myalgia)     Metformin Unknown     Body aches, headache     Morphine (Pf) [Morphine] Nausea and Vomiting     Pravastatin Muscle Pain (Myalgia)     Social History     Socioeconomic History     Marital status:      Spouse name: Not on file     Number of children: 2     Years of education: Not on file     Highest education level: Not on file   Occupational History     Not on file   Tobacco Use     Smoking status: Never Smoker     Smokeless tobacco: Never Used   Substance and Sexual Activity     Alcohol use: Not Currently     Alcohol/week: 2.0 standard drinks     Drug use: No     Sexual activity: Not on file   Other Topics Concern     Not on file   Social History Narrative     Not on file     Social Determinants of Health     Financial Resource Strain: Not on file   Food Insecurity: Not on file   Transportation Needs: Not on file   Physical Activity: Not on file   Stress: Not on file   Social Connections: Not on  file   Intimate Partner Violence: Not on file   Housing Stability: Not on file     Family History   Problem Relation Age of Onset     Diabetes Mother      Hypertension Mother      Hypertension Father      Cancer Brother      Clotting Disorder No family hx of      Review of systems is as stated in HPI, and the remainder of system review is otherwise negative.    Objective:     There were no vitals taken for this visit.    General appearance: awake, NAD  HEENT: atraumatic, normocephalic  Lungs: breathing comfortably on room air, no cough  Neuro: alert, oriented x3, CNs grossly intact, no focal deficits appreciated  Psych: normal mood/affect/behavior, answering questions appropriately, linear thought process    Visit Details    Type of service:  Video Visit    Video End Time:12:07pm    Originating Location (pt. Location): Home    Distant Location (provider location):  [facility]     Platform used for Video Visit: Other: phone

## 2022-04-29 NOTE — PATIENT INSTRUCTIONS
Taper off venlafaxine  -150 mg daily for 1 week  -75 mg daily for 1 week  -37.5 mg daily for 1 week  If you feel that you are experiencing withdrawal symptoms you could adjust to this taper schedule slightly.    Plan to start citalopram (Celexa) 10 mg daily once off venlafaxine    Continue to monitor your blood pressures at home.  Goal is less than 140/90.  If your levels are mostly above that then I would recommend increasing lisinopril dose.  Send me a message with an update when appropriate.

## 2022-04-30 ASSESSMENT — ANXIETY QUESTIONNAIRES: GAD7 TOTAL SCORE: 11

## 2022-04-30 ASSESSMENT — PATIENT HEALTH QUESTIONNAIRE - PHQ9: SUM OF ALL RESPONSES TO PHQ QUESTIONS 1-9: 5

## 2022-08-02 ENCOUNTER — OFFICE VISIT (OUTPATIENT)
Dept: FAMILY MEDICINE | Facility: CLINIC | Age: 72
End: 2022-08-02
Payer: COMMERCIAL

## 2022-08-02 ENCOUNTER — ANCILLARY PROCEDURE (OUTPATIENT)
Dept: GENERAL RADIOLOGY | Facility: CLINIC | Age: 72
End: 2022-08-02
Attending: NURSE PRACTITIONER
Payer: COMMERCIAL

## 2022-08-02 VITALS
SYSTOLIC BLOOD PRESSURE: 132 MMHG | BODY MASS INDEX: 28.41 KG/M2 | WEIGHT: 176 LBS | HEART RATE: 84 BPM | DIASTOLIC BLOOD PRESSURE: 78 MMHG | OXYGEN SATURATION: 97 %

## 2022-08-02 DIAGNOSIS — E11.8 TYPE 2 DIABETES MELLITUS WITH COMPLICATION, WITHOUT LONG-TERM CURRENT USE OF INSULIN (H): ICD-10-CM

## 2022-08-02 DIAGNOSIS — N18.31 STAGE 3A CHRONIC KIDNEY DISEASE (H): ICD-10-CM

## 2022-08-02 DIAGNOSIS — R53.83 OTHER FATIGUE: ICD-10-CM

## 2022-08-02 DIAGNOSIS — R53.83 OTHER FATIGUE: Primary | ICD-10-CM

## 2022-08-02 DIAGNOSIS — I10 ESSENTIAL HYPERTENSION, BENIGN: ICD-10-CM

## 2022-08-02 DIAGNOSIS — M19.90 ARTHRITIS: ICD-10-CM

## 2022-08-02 DIAGNOSIS — E55.9 VITAMIN D DEFICIENCY: ICD-10-CM

## 2022-08-02 DIAGNOSIS — F41.1 ANXIETY STATE: ICD-10-CM

## 2022-08-02 LAB
ALBUMIN SERPL BCG-MCNC: 4.8 G/DL (ref 3.5–5.2)
ALBUMIN UR-MCNC: NEGATIVE MG/DL
ALP SERPL-CCNC: 92 U/L (ref 35–104)
ALT SERPL W P-5'-P-CCNC: 31 U/L (ref 10–35)
ANION GAP SERPL CALCULATED.3IONS-SCNC: 11 MMOL/L (ref 7–15)
APPEARANCE UR: CLEAR
AST SERPL W P-5'-P-CCNC: 31 U/L (ref 10–35)
BILIRUB SERPL-MCNC: 0.5 MG/DL
BILIRUB UR QL STRIP: NEGATIVE
BUN SERPL-MCNC: 25.7 MG/DL (ref 8–23)
CALCIUM SERPL-MCNC: 10.3 MG/DL (ref 8.8–10.2)
CHLORIDE SERPL-SCNC: 100 MMOL/L (ref 98–107)
COLOR UR AUTO: YELLOW
CREAT SERPL-MCNC: 1.07 MG/DL (ref 0.51–0.95)
DEPRECATED HCO3 PLAS-SCNC: 26 MMOL/L (ref 22–29)
ERYTHROCYTE [DISTWIDTH] IN BLOOD BY AUTOMATED COUNT: 11.8 % (ref 10–15)
GFR SERPL CREATININE-BSD FRML MDRD: 55 ML/MIN/1.73M2
GLUCOSE SERPL-MCNC: 238 MG/DL (ref 70–99)
GLUCOSE UR STRIP-MCNC: 100 MG/DL
HBA1C MFR BLD: 8.7 % (ref 0–5.6)
HCT VFR BLD AUTO: 43.6 % (ref 35–47)
HGB BLD-MCNC: 15.4 G/DL (ref 11.7–15.7)
HGB UR QL STRIP: NEGATIVE
KETONES UR STRIP-MCNC: NEGATIVE MG/DL
LEUKOCYTE ESTERASE UR QL STRIP: NEGATIVE
MCH RBC QN AUTO: 32.6 PG (ref 26.5–33)
MCHC RBC AUTO-ENTMCNC: 35.3 G/DL (ref 31.5–36.5)
MCV RBC AUTO: 92 FL (ref 78–100)
NITRATE UR QL: NEGATIVE
PH UR STRIP: 5.5 [PH] (ref 5–8)
PLATELET # BLD AUTO: 260 10E3/UL (ref 150–450)
POTASSIUM SERPL-SCNC: 4.6 MMOL/L (ref 3.4–5.3)
PROT SERPL-MCNC: 7.8 G/DL (ref 6.4–8.3)
RBC # BLD AUTO: 4.73 10E6/UL (ref 3.8–5.2)
SODIUM SERPL-SCNC: 137 MMOL/L (ref 136–145)
SP GR UR STRIP: 1.02 (ref 1–1.03)
TSH SERPL DL<=0.005 MIU/L-ACNC: 1.74 UIU/ML (ref 0.3–4.2)
UROBILINOGEN UR STRIP-ACNC: 0.2 E.U./DL
WBC # BLD AUTO: 7.3 10E3/UL (ref 4–11)

## 2022-08-02 PROCEDURE — 99214 OFFICE O/P EST MOD 30 MIN: CPT | Performed by: NURSE PRACTITIONER

## 2022-08-02 PROCEDURE — 81003 URINALYSIS AUTO W/O SCOPE: CPT | Performed by: NURSE PRACTITIONER

## 2022-08-02 PROCEDURE — 36415 COLL VENOUS BLD VENIPUNCTURE: CPT | Performed by: NURSE PRACTITIONER

## 2022-08-02 PROCEDURE — 71046 X-RAY EXAM CHEST 2 VIEWS: CPT | Mod: TC | Performed by: RADIOLOGY

## 2022-08-02 PROCEDURE — 84443 ASSAY THYROID STIM HORMONE: CPT | Performed by: NURSE PRACTITIONER

## 2022-08-02 PROCEDURE — 82306 VITAMIN D 25 HYDROXY: CPT | Performed by: NURSE PRACTITIONER

## 2022-08-02 PROCEDURE — 85027 COMPLETE CBC AUTOMATED: CPT | Performed by: NURSE PRACTITIONER

## 2022-08-02 PROCEDURE — 83036 HEMOGLOBIN GLYCOSYLATED A1C: CPT | Performed by: NURSE PRACTITIONER

## 2022-08-02 PROCEDURE — 80053 COMPREHEN METABOLIC PANEL: CPT | Performed by: NURSE PRACTITIONER

## 2022-08-02 RX ORDER — CITALOPRAM HYDROBROMIDE 20 MG/1
20 TABLET ORAL DAILY
Qty: 30 TABLET | Refills: 3 | Status: SHIPPED | OUTPATIENT
Start: 2022-08-02 | End: 2023-01-04

## 2022-08-02 RX ORDER — GLIPIZIDE 10 MG/1
10 TABLET, FILM COATED, EXTENDED RELEASE ORAL DAILY
Qty: 90 UNITS | Refills: 4 | Status: SHIPPED | OUTPATIENT
Start: 2022-08-02 | End: 2022-09-01

## 2022-08-02 RX ORDER — CELECOXIB 100 MG/1
100 CAPSULE ORAL 2 TIMES DAILY
Qty: 60 CAPSULE | Refills: 1 | Status: SHIPPED | OUTPATIENT
Start: 2022-08-02 | End: 2023-01-04

## 2022-08-02 NOTE — PROGRESS NOTES
Assessment & Plan     Other fatigue  Exam today is without any significant findings.  We discussed possible causes of increased fatigue including poor quality sleep, sleep apnea, anemia, thyroid abnormality, infection etc.  Chest x-ray is without any acute changes noted.  Will obtain labs as noted below and follow-up with patient regarding results when available.  Referral placed for sleep study to evaluate for known sleep apnea as well.  She will closely monitor for any new or worsening symptoms.  - CBC with platelets  - Comprehensive metabolic panel (BMP + Alb, Alk Phos, ALT, AST, Total. Bili, TP)  - XR Chest 2 Views  - TSH with free T4 reflex  - UA Macro with Reflex to Micro and Culture - lab collect  - Adult Sleep Eval & Management  Referral  - CBC with platelets  - Comprehensive metabolic panel (BMP + Alb, Alk Phos, ALT, AST, Total. Bili, TP)  - TSH with free T4 reflex  - UA Macro with Reflex to Micro and Culture - lab collect    Type 2 diabetes mellitus with complication, without long-term current use of insulin (H)  Patient's hemoglobin A1c is 8.7 which is up slightly from prior reading.  We will increase glipizide to 10 mg daily.  Recommend following up in 3 months for A1c recheck.  She will continue to work on healthy lifestyle modifications in regards to diet and exercise.  Referral placed for routine diabetic eye exam.  - OPTOMETRY REFERRAL  - HEMOGLOBIN A1C  - HEMOGLOBIN A1C    Benign Essential Hypertension  Blood pressure is overall adequately controlled.  We will continue to keep a close eye on it.  At this time, she will continue lisinopril 20 mg and hydrochlorothiazide 25 mg daily.    Anxiety  She was switched from venlafaxine to citalopram a few months ago.  Benefit from we discussed options today and will increase dose of escitalopram to 20 mg.  If still no improvement may consider switch back to venlafaxine or trying alternative such bupropion.  I recommend follow-up in 3 to 4 weeks to  "reassess.  - citalopram (CELEXA) 20 MG tablet  Dispense: 30 tablet; Refill: 3    Stage 3a chronic kidney disease (H)  We will check metabolic panel today to ensure stable renal function and electrolytes.    Arthritis  Arthritis of multiple joints including shoulders, knees, neck and back.  She was advised by orthopedics to start Celebrex which she has used on occasion and does seem to help.  Her prescription at home is .  Refill provided today.  She will follow-up with Ortho with any ongoing joint pain.  - celecoxib (CELEBREX) 100 MG capsule  Dispense: 60 capsule; Refill: 1    Vitamin D deficiency  - Vitamin D deficiency screening  - Vitamin D deficiency screening           BMI:   Estimated body mass index is 28.41 kg/m  as calculated from the following:    Height as of 22: 1.676 m (5' 6\").    Weight as of this encounter: 79.8 kg (176 lb).   Weight management plan: Discussed healthy diet and exercise guidelines    No follow-ups on file.    TONNY Cardoso CNP  [facility] Alfredo is a 71 year old, presenting for the following health issues:  Foot Problems and Fatigue (/)      HPI   Patient has multiple concerns today.  She reports having worsening of her depression and anxiety over the last couple months.  At her visit this past spring she was switched from venlafaxine which she was taking for many years to Celexa 10 mg.  She took the Celexa for 1 month and did not notice much benefit so discontinued it.  She is currently not on anything and has noticed worsening of symptoms.  Her family has also commented on her overall mood.      She feels increased pain in her joints particularly in her left shoulder and bilateral knees.  She has seen orthopedics for this who recommended starting Celebrex.  She has taken this sporadically and finds that it does help.  The prescription she has is  and she is requesting a refill.  She has discussed the possibility of joint " replacements in the future.    She has a discomfort on the bottom of her feet, the ball of each foot feels like the skin is thicker than usual.  Her feet oftentimes feel hot at night.  She denies any redness, swelling or pain otherwise.  No recent injuries.  She is worried about the diabetes worsening.  She tries to consume a healthy diet and exercise but states that it has been difficult due to her pain level.  She feels fatigued most days.  She is sleepy throughout the day and occasionally naps.  She does not feel that she sleeps well at night.    Review of Systems   Review of Systems - pertinent positives noted in HPI, otherwise ROS is negative.        Objective    /78   Pulse 84   Wt 79.8 kg (176 lb)   SpO2 97%   BMI 28.41 kg/m    Body mass index is 28.41 kg/m .  Physical Exam       General Appearance:  Alert, well-appearing, cooperative, no distress, appears stated age   HEENT:  Normal.  No acute findings.   Neck: Supple, symmetrical, no adenopathy.   Lungs:   Clear to auscultation bilaterally, respirations unlabored.  No expiratory wheeze or inspiratory crackles noted.   Heart:  Regular rate and rhythm, S1, S2 normal, no murmur, rub or gallop   Abdomen:   Soft, non-tender, positive bowel sounds, no masses, no organomegaly   Extremities: Extremities normal.  No cyanosis or edema   Skin: Warm, dry.  Skin color, texture, turgor normal, no rashes or lesions   Neurologic:  Grossly intact.                   .  ..

## 2022-08-03 ENCOUNTER — TELEPHONE (OUTPATIENT)
Dept: FAMILY MEDICINE | Facility: CLINIC | Age: 72
End: 2022-08-03

## 2022-08-03 LAB — DEPRECATED CALCIDIOL+CALCIFEROL SERPL-MC: 50 UG/L (ref 20–75)

## 2022-08-19 ENCOUNTER — TRANSFERRED RECORDS (OUTPATIENT)
Dept: HEALTH INFORMATION MANAGEMENT | Facility: CLINIC | Age: 72
End: 2022-08-19

## 2022-08-30 RX ORDER — GLIPIZIDE 5 MG/1
TABLET ORAL
Qty: 90 TABLET | Refills: 3 | OUTPATIENT
Start: 2022-08-30

## 2022-08-30 NOTE — TELEPHONE ENCOUNTER
glipiZIDE (GLUCOTROL) 5 MG tablet (Discontinued) 90 tablet 3 6/11/2021 8/2/2022 No   Sig - Route: [GLIPIZIDE (GLUCOTROL) 5 MG TABLET] Take 0.5 tablets (2.5 mg total) by mouth daily. - Oral   Patient taking differently: Take 5 mg by mouth daily        Class: Normal   Reason for Discontinue: Dose adjustment   Order: [id number]

## 2022-09-07 ENCOUNTER — OFFICE VISIT (OUTPATIENT)
Dept: INTERNAL MEDICINE | Facility: CLINIC | Age: 72
End: 2022-09-07
Payer: COMMERCIAL

## 2022-09-07 VITALS
DIASTOLIC BLOOD PRESSURE: 76 MMHG | HEIGHT: 66 IN | SYSTOLIC BLOOD PRESSURE: 144 MMHG | HEART RATE: 91 BPM | OXYGEN SATURATION: 98 % | WEIGHT: 180 LBS | BODY MASS INDEX: 28.93 KG/M2

## 2022-09-07 DIAGNOSIS — E11.65 TYPE 2 DIABETES MELLITUS WITH HYPERGLYCEMIA, WITHOUT LONG-TERM CURRENT USE OF INSULIN (H): ICD-10-CM

## 2022-09-07 DIAGNOSIS — Z01.818 PRE-OP EXAM: Primary | ICD-10-CM

## 2022-09-07 DIAGNOSIS — I10 ESSENTIAL HYPERTENSION: ICD-10-CM

## 2022-09-07 DIAGNOSIS — M19.012 OSTEOARTHRITIS OF LEFT SHOULDER, UNSPECIFIED OSTEOARTHRITIS TYPE: ICD-10-CM

## 2022-09-07 LAB
ANION GAP SERPL CALCULATED.3IONS-SCNC: 13 MMOL/L (ref 7–15)
ATRIAL RATE - MUSE: 90 BPM
BUN SERPL-MCNC: 26.8 MG/DL (ref 8–23)
CALCIUM SERPL-MCNC: 9.5 MG/DL (ref 8.8–10.2)
CHLORIDE SERPL-SCNC: 101 MMOL/L (ref 98–107)
CREAT SERPL-MCNC: 1.28 MG/DL (ref 0.51–0.95)
DEPRECATED HCO3 PLAS-SCNC: 24 MMOL/L (ref 22–29)
DIASTOLIC BLOOD PRESSURE - MUSE: NORMAL MMHG
ERYTHROCYTE [DISTWIDTH] IN BLOOD BY AUTOMATED COUNT: 11.8 % (ref 10–15)
GFR SERPL CREATININE-BSD FRML MDRD: 44 ML/MIN/1.73M2
GLUCOSE SERPL-MCNC: 219 MG/DL (ref 70–99)
HCT VFR BLD AUTO: 40.7 % (ref 35–47)
HGB BLD-MCNC: 14.1 G/DL (ref 11.7–15.7)
INTERPRETATION ECG - MUSE: NORMAL
MCH RBC QN AUTO: 32 PG (ref 26.5–33)
MCHC RBC AUTO-ENTMCNC: 34.6 G/DL (ref 31.5–36.5)
MCV RBC AUTO: 92 FL (ref 78–100)
P AXIS - MUSE: 59 DEGREES
PLATELET # BLD AUTO: 227 10E3/UL (ref 150–450)
POTASSIUM SERPL-SCNC: 4.3 MMOL/L (ref 3.4–5.3)
PR INTERVAL - MUSE: 144 MS
QRS DURATION - MUSE: 76 MS
QT - MUSE: 358 MS
QTC - MUSE: 437 MS
R AXIS - MUSE: 55 DEGREES
RBC # BLD AUTO: 4.41 10E6/UL (ref 3.8–5.2)
SODIUM SERPL-SCNC: 138 MMOL/L (ref 136–145)
SYSTOLIC BLOOD PRESSURE - MUSE: NORMAL MMHG
T AXIS - MUSE: 68 DEGREES
VENTRICULAR RATE- MUSE: 90 BPM
WBC # BLD AUTO: 5.2 10E3/UL (ref 4–11)

## 2022-09-07 PROCEDURE — 85027 COMPLETE CBC AUTOMATED: CPT | Performed by: INTERNAL MEDICINE

## 2022-09-07 PROCEDURE — 93010 ELECTROCARDIOGRAM REPORT: CPT | Performed by: INTERNAL MEDICINE

## 2022-09-07 PROCEDURE — 36415 COLL VENOUS BLD VENIPUNCTURE: CPT | Performed by: INTERNAL MEDICINE

## 2022-09-07 PROCEDURE — 80048 BASIC METABOLIC PNL TOTAL CA: CPT | Performed by: INTERNAL MEDICINE

## 2022-09-07 PROCEDURE — 93005 ELECTROCARDIOGRAM TRACING: CPT | Performed by: INTERNAL MEDICINE

## 2022-09-07 PROCEDURE — 99214 OFFICE O/P EST MOD 30 MIN: CPT | Performed by: INTERNAL MEDICINE

## 2022-09-07 NOTE — PATIENT INSTRUCTIONS
Stop your Celebrex now.  Do not take aspirin, Aleve or ibuprofen.  You can take Tylenol.    Check your COVID test prior to surgery, as instructed.    Do not take your lisinopril or hydrochlorothiazide on the morning of surgery.    Do not take your glipizide in the morning of surgery.    You do not have to take your citalopram on the day of surgery.    Nothing to eat after midnight for day of surgery.    You will follow-up with the surgery clinic for wound management, activity instructions and pain management after surgery.    Follow-up with your regular clinic in approximately 1 month to discuss diabetes care.    Plan to restart your usual medication after the surgery.    Watch out for low blood sugars that can occur with glipizide use.  Consider getting a glucometer to monitor blood sugars.

## 2022-09-07 NOTE — PROGRESS NOTES
[facility]  [address] [address]  Phone: [phone]  Fax: [phone]  Primary Provider: Adelina Donaldson  Pre-op Performing Provider: WALTER PEARSON      PREOPERATIVE EVALUATION:  Today's date: 9/7/2022    Ana Maria De La Garza is a 72 year old female who presents for a preoperative evaluation.    Surgical Information:  Surgery/Procedure: Left reverse total shoulder arthroplasty  Surgery Location: [facility]  Surgeon: Dr. Grullon  Surgery Date: 9/16/22  Time of Surgery:   Where patient plans to recover: At home with family  Fax number for surgical facility: [phone]    Type of Anesthesia Anticipated: General    Assessment & Plan     The proposed surgical procedure is considered INTERMEDIATE risk.    Pre-op exam  Patient is cleared to proceed with the reverse shoulder arthroplasty as planned.    We did discuss some risks of surgery including anesthesia risk, infection risk, bleeding risk and other risks.  She accepts these risks and wishes to proceed.      - EKG 12-lead, tracing only  - CBC with platelets  - Basic metabolic panel    Patient instructions:  Stop your Celebrex now.  Do not take aspirin, Aleve or ibuprofen.  You can take Tylenol.    Check your COVID test prior to surgery, as instructed.    Do not take your lisinopril or hydrochlorothiazide on the morning of surgery.    Do not take your glipizide in the morning of surgery.    You do not have to take your citalopram on the day of surgery.    Nothing to eat after midnight for day of surgery.    You will follow-up with the surgery clinic for wound management, activity instructions and pain management after surgery.    Follow-up with your regular clinic in approximately 1 month to discuss diabetes care.    Plan to restart your usual medication after the surgery.    Watch out for low blood sugars that can occur with glipizide use.  Consider getting a glucometer to monitor blood  sugars.      Osteoarthritis of left shoulder, unspecified osteoarthritis type  Chronic pain.  Also history of rotator cuff tear.  She will stop Celebrex now in preparation for the surgery    Type 2 diabetes mellitus with hyperglycemia, without long-term current use of insulin (H)  Continued hyperglycemia but she does not check blood sugars.  I encouraged her to follow-up with her usual primary care clinic to discuss further treatment for diabetes.  Could consider metformin or GLP-1 agonist.    She will skip the glipizide the morning of surgery and she was warned about low blood sugar risk with glipizide.    Essential hypertension  Controlled.  She will skip the lisinopril and hydrochlorothiazide in the morning of surgery    History of anxiety, controlled.  She can skip the citalopram on morning of surgery.               RECOMMENDATION:  APPROVAL GIVEN to proceed with proposed procedure pending review of diagnostic evaluation.  Labs pending  [id number]}    Subjective     HPI related to upcoming procedure: 72-year-old female with a number of year history of left shoulder pain with DJD.  She also states she has a rotator cuff tear.  She says that was likely exacerbated with paddle boarding she is done in the past but cannot identify specific injury date.    Patient plans to undergo a left shoulder reverse total arthroplasty on September 16.  She is planning to be admitted to the hospital.    She currently lives independently at home.    She does have diabetes with hyperglycemia.  Hemoglobin A1c level was 8.7% last month.  She does not check blood sugars.  She did have her glipizide increased to 10 mg a day last month.  She denies any low blood sugar events.    Her creatinine level last month was 1.0.    Hypertension has been well controlled with lisinopril 20 mg a day and HCTZ 25 mg a day in the morning.    She has been on Celebrex 100 mg twice a day for pain management.    Pain is not severe, but constant.    She is able  to walk 2 blocks, but has been limiting her activity because of some right knee arthritis.    She can walk 2 blocks without stopping and no increasing shortness of breath beyond her baseline mild dyspnea on exertion.  She denies any chest pain or chest pressure.    No history of arrhythmias or palpitations or syncope.    December 2020 she had a stress echo that was normal, negative for ischemia.    She has some mild hypercholesterolemia with an LDL of 143 earlier this year but has not been on a statin.    She is never smoked.  No new cough.    No urinary tract infection symptoms.    History of chronic anxiety, controlled.  On citalopram 20 mg in the morning.    No history of DVT.  No history of sleep apnea.  No history of stomach ulcer or GI bleeding.    Previous left knee replacement 2015.  Previous right ankle surgery.  No problems with anesthesia in the past.    Preop Questions 9/1/2022   1. Have you ever had a heart attack or stroke? No   2. Have you ever had surgery on your heart or blood vessels, such as a stent placement, a coronary artery bypass, or surgery on an artery in your head, neck, heart, or legs? No   3. Do you have chest pain with activity? No   4. Do you have a history of  heart failure? No   5. Do you currently have a cold, bronchitis or symptoms of other infection? No   6. Do you have a cough, shortness of breath, or wheezing? No   7. Do you or anyone in your family have previous history of blood clots? No   8. Do you or does anyone in your family have a serious bleeding problem such as prolonged bleeding following surgeries or cuts? No   9. Have you ever had problems with anemia or been told to take iron pills? No   10. Have you had any abnormal blood loss such as black, tarry or bloody stools, or abnormal vaginal bleeding? No   11. Have you ever had a blood transfusion? UNKNOWN -    12. Are you willing to have a blood transfusion if it is medically needed before, during, or after your surgery?  Yes   13. Have you or any of your relatives ever had problems with anesthesia? No   14. Do you have sleep apnea, excessive snoring or daytime drowsiness? No   15. Do you have any artifical heart valves or other implanted medical devices like a pacemaker, defibrillator, or continuous glucose monitor? No   16. Do you have artificial joints? YES -left knee replacement   17. Are you allergic to latex? No       Health Care Directive:  Patient does not have a Health Care Directive or Living Will: \Patient is full code    Preoperative Review of :   reviewed - no record of controlled substances prescribed.          Review of Systems  Constitutional, neuro, ENT, endocrine, pulmonary, cardiac, gastrointestinal, genitourinary, musculoskeletal, integument and psychiatric systems are negative, except as otherwise noted.    Patient Active Problem List    Diagnosis Date Noted     Chronic kidney disease, stage 3 (H) 11/04/2020     Priority: Medium     Osteopenia 10/09/2018     Priority: Medium     High risk of fracture per DEXA 8/2018         Hyperlipidemia      Priority: Medium     Created by Conversion         Type 2 diabetes mellitus with complication, without long-term current use of insulin (H) 01/26/2017     Priority: Medium     Anxiety      Priority: Medium     Created by Conversion  Replacement Utility updated for latest IMO load         Insomnia 06/22/2016     Priority: Medium     Status post total knee replacement 02/18/2015     Priority: Medium     Left (2/18/2015)         Benign Essential Hypertension      Priority: Medium     Created by Conversion         Cervical Disc Degeneration      Priority: Medium     Created by Conversion         Female Stress Incontinence      Priority: Medium     Created by Conversion  [facility] Annotation: May 18 2010  9:18AM - Kristen Navarrete: s/p   bladder   sling Dr. Eugene         Recurrent Major Depression In Full Remission      Priority: Medium     Created by Conversion          Cervical Spondylosis      Priority: Medium     Created by Conversion          Past Medical History:   Diagnosis Date     Depression      Hypertension      Insomnia      Past Surgical History:   Procedure Laterality Date     HC SLING OPERATION FOR STRESS INCONTINENCE      Description: Mid-Urethral Sling Operation;  Recorded: 05/18/2010;     JOINT REPLACEMENT Left 2008    knee     OPEN REDUCTION INTERNAL FIXATION ANKLE Right     2008 and pin removal 2009     [facility] APPENDECTOMY      Description: Appendectomy;  Recorded: 02/26/2008;     ZC TOTAL KNEE ARTHROPLASTY Left 2/18/2015    Procedure:   LEFT TOTAL KNEE ARTHROPLASTY;  Surgeon: Sina SHARP MD;  Location: [facility];  Service: Orthopedics     Current Outpatient Medications   Medication Sig Dispense Refill     calcium-vitamin D (CALCIUM-VITAMIN D) 500 mg(1,250mg) -200 unit per tablet [CALCIUM-VITAMIN D (CALCIUM-VITAMIN D) 500 MG(1,250MG) -200 UNIT PER TABLET] Take 2 tablets by mouth daily.       celecoxib (CELEBREX) 100 MG capsule Take 1 capsule (100 mg) by mouth 2 times daily 60 capsule 1     citalopram (CELEXA) 20 MG tablet Take 1 tablet (20 mg) by mouth daily 30 tablet 3     DENTAGEL 1.1 % GEL topical gel        glipiZIDE (GLUCOTROL XL) 10 MG 24 hr tablet Take 1 tablet (10 mg) by mouth daily 90 tablet 4     hydrochlorothiazide (HYDRODIURIL) 25 MG tablet TAKE 1 TABLET BY MOUTH ONCE DAILY 90 tablet 1     lisinopril (ZESTRIL) 20 MG tablet Take 1 tablet (20 mg) by mouth in the morning. 90 tablet 1     multivitamin therapeutic (THERAGRAN) tablet [MULTIVITAMIN THERAPEUTIC (THERAGRAN) TABLET] Take 1 tablet by mouth daily.       OMEGA-3/DHA/EPA/FISH OIL (FISH OIL-OMEGA-3 FATTY ACIDS) 300-1,000 mg capsule [OMEGA-3/DHA/EPA/FISH OIL (FISH OIL-OMEGA-3 FATTY ACIDS) 300-1,000 MG CAPSULE] Take 2 g by mouth daily.       red yeast rice 600 mg cap [RED YEAST RICE 600 MG CAP]          Allergies   Allergen Reactions     Atorvastatin Muscle Pain (Myalgia)     Metformin  "Unknown     Body aches, headache     Morphine (Pf) [Morphine] Nausea and Vomiting     Pravastatin Muscle Pain (Myalgia)        Social History     Tobacco Use     Smoking status: Never Smoker     Smokeless tobacco: Never Used   Substance Use Topics     Alcohol use: Not Currently     Alcohol/week: 2.0 standard drinks     Family History   Problem Relation Age of Onset     Diabetes Mother      Hypertension Mother      Hypertension Father      Cancer Brother      Clotting Disorder No family hx of      History   Drug Use No         Objective     BP (!) 144/76 (BP Location: Right arm, Patient Position: Sitting, Cuff Size: Adult Regular)   Pulse 91   Ht 1.676 m (5' 6\")   Wt 81.6 kg (180 lb)   SpO2 98%   BMI 29.05 kg/m      Physical Exam  Mildly overweight female.  Alert and oriented x3.  Normal cognition.  Normal mobility.  Pupils and irises equal and reactive.  Extraocular muscles intact.  No jaundice or conjunctivitis.  Teeth in good condition, no loose teeth.  Pharynx is normal.  No cervical or supraclavicular adenopathy.  No JVD and no carotid bruits.  Left shoulder skin intact with no rash or scars.  Lungs are clear to auscultation.  Normal respiratory excursion.  Heart is regular without murmur rub or gallop.  Abdomen is nontender, no hepatosplenomegaly and no pulsatile abdominal mass.  No ankle edema.  Gait normal.    Recent Labs   Lab Test 08/02/22  1102 02/02/22  1040   HGB 15.4 15.3    230    138   POTASSIUM 4.6 4.4   CR 1.07* 1.17*   A1C 8.7* 8.4*        Diagnostics:  Labs pending at this time.  Results will be reviewed when available.  Recent Results (from the past 720 hour(s))   EKG 12-lead, tracing only    Collection Time: 09/07/22  1:11 PM   Result Value Ref Range    Systolic Blood Pressure  mmHg    Diastolic Blood Pressure  mmHg    Ventricular Rate 90 BPM    Atrial Rate 90 BPM    DC Interval 144 ms    QRS Duration 76 ms     ms    QTc 437 ms    P Axis 59 degrees    R AXIS 55 degrees    " T Axis 68 degrees    Interpretation ECG       Sinus rhythm  Normal ECG  When compared with ECG of 14-AUG-2017 09:18,  Vent. rate has increased BY  36 BPM        EKG personally reviewed by me today, normal EKG and unchanged from previous.    Revised Cardiac Risk Index (RCRI):  The patient has the following serious cardiovascular risks for perioperative complications:   - No serious cardiac risks = 0 points     RCRI Interpretation: 0 points: Class I (very low risk - 0.4% complication rate)           Signed Electronically by: Ronald Loza MD  Copy of this evaluation report is provided to requesting physician.

## 2022-09-08 ENCOUNTER — TELEPHONE (OUTPATIENT)
Dept: INTERNAL MEDICINE | Facility: CLINIC | Age: 72
End: 2022-09-08

## 2022-09-29 ENCOUNTER — TRANSFERRED RECORDS (OUTPATIENT)
Dept: HEALTH INFORMATION MANAGEMENT | Facility: CLINIC | Age: 72
End: 2022-09-29

## 2022-09-30 ENCOUNTER — NURSE TRIAGE (OUTPATIENT)
Dept: NURSING | Facility: CLINIC | Age: 72
End: 2022-09-30

## 2022-09-30 NOTE — TELEPHONE ENCOUNTER
"Pt is calling with pain in her right arm and right ear.    Had left shoulder surgery two weeks ago. Since then, she has noticed some intermittent pain on the back of her right arm. She says it runs from the elbow to mid upper arm and  \"feels like my skin is on fire.\" She says it feels achy and she can feel it most when she leans on it in her chair. She has been taking pain meds to help with her should pain after surgery and says \"nothing is touching that pain in my right arm.\"    Also reports that she has been having intense pain on the exterior or her right ear when she lays on it at night. Says it feels like it's burning/aching. No internal ear pain. No redness or rash noted around the ear. No fever.    Protocol recommends pt be seen in office within the next 3 days. Care advice reviewed including trying heat or ice to see if either one feels good. Pt transferred to scheduling to try and get an appointment within the next week. If unable, should go [place] [facility] [place] worsens. Pt verbalized understanding.    Unique Moon, RN, BSN  [facility]   Triage Nurse Advisor      Reason for Disposition    MODERATE pain (e.g., interferes with normal activities) and present > 3 days    Additional Information    Negative: Shock suspected (e.g., cold/pale/clammy skin, too weak to stand, low BP, rapid pulse)    Negative: Similar pain previously and it was from 'heart attack'    Negative: Similar pain previously from 'angina' and not relieved by nitroglycerin    Negative: Sounds like a life-threatening emergency to the triager    Negative: Followed an injury to arm    Negative: Chest pain    Negative: Wound looks infected    Negative: Elbow pain is main symptom    Negative: Hand or wrist pain is main symptom    Negative: Difficulty breathing or unusual sweating (e.g., sweating without exertion)    Negative: Chest pain lasting longer than 5 minutes    Negative: Age > 40 and no obvious cause for pain, pain still present even when " not moving the arm    Negative: Fever and red area (or area very tender to touch)    Negative: Swollen joint and fever    Negative: Entire arm is swollen    Negative: Patient sounds very sick or weak to the triager    Negative: SEVERE pain (e.g., excruciating, unable to do any normal activities)    Negative: Red area or streak > 2 inches (or 5 cm)    Negative: Cast on wrist or arm and now increasing pain    Negative: Weakness (i.e., loss of strength) in hand or fingers    Negative: Arm pains with exertion (e.g., occurs with walking; goes away on resting)    Negative: Painful rash with multiple small blisters grouped together (i.e., dermatomal distribution or 'band' or 'stripe')    Negative: Looks like a boil, infected sore, deep ulcer, or other infected rash (spreading redness, pus)    Negative: Localized rash is very painful (no fever)    Negative: Numbness (i.e., loss of sensation) in hand or fingers    Negative: Localized pain, redness or hard lump along vein    Negative: Patient wants to be seen    Protocols used: ARM PAIN-A-OH

## 2022-10-01 ENCOUNTER — HEALTH MAINTENANCE LETTER (OUTPATIENT)
Age: 72
End: 2022-10-01

## 2022-10-03 ENCOUNTER — OFFICE VISIT (OUTPATIENT)
Dept: FAMILY MEDICINE | Facility: CLINIC | Age: 72
End: 2022-10-03
Payer: COMMERCIAL

## 2022-10-03 VITALS
DIASTOLIC BLOOD PRESSURE: 78 MMHG | HEIGHT: 66 IN | TEMPERATURE: 98.2 F | WEIGHT: 178.5 LBS | HEART RATE: 68 BPM | SYSTOLIC BLOOD PRESSURE: 138 MMHG | RESPIRATION RATE: 16 BRPM | BODY MASS INDEX: 28.69 KG/M2

## 2022-10-03 DIAGNOSIS — M47.812 CERVICAL SPONDYLOSIS WITHOUT MYELOPATHY: Primary | ICD-10-CM

## 2022-10-03 DIAGNOSIS — N18.31 STAGE 3A CHRONIC KIDNEY DISEASE (H): ICD-10-CM

## 2022-10-03 DIAGNOSIS — Z23 NEED FOR VACCINATION: ICD-10-CM

## 2022-10-03 PROCEDURE — G0008 ADMIN INFLUENZA VIRUS VAC: HCPCS | Performed by: FAMILY MEDICINE

## 2022-10-03 PROCEDURE — 99214 OFFICE O/P EST MOD 30 MIN: CPT | Mod: 25 | Performed by: FAMILY MEDICINE

## 2022-10-03 PROCEDURE — 90662 IIV NO PRSV INCREASED AG IM: CPT | Performed by: FAMILY MEDICINE

## 2022-10-03 RX ORDER — ACETAMINOPHEN 325 MG/1
650 TABLET ORAL PRN
COMMUNITY
Start: 2022-09-17 | End: 2023-01-04

## 2022-10-03 RX ORDER — ONDANSETRON 4 MG/1
4 TABLET, ORALLY DISINTEGRATING ORAL PRN
COMMUNITY
Start: 2022-09-17 | End: 2022-11-28

## 2022-10-03 RX ORDER — GABAPENTIN 300 MG/1
300-600 CAPSULE ORAL AT BEDTIME
Qty: 60 CAPSULE | Refills: 1 | Status: SHIPPED | OUTPATIENT
Start: 2022-10-03 | End: 2023-01-04

## 2022-10-03 RX ORDER — TRAMADOL HYDROCHLORIDE 50 MG/1
25-50 TABLET ORAL
COMMUNITY
Start: 2022-09-17 | End: 2022-11-28

## 2022-10-03 ASSESSMENT — PAIN SCALES - GENERAL: PAINLEVEL: EXTREME PAIN (8)

## 2022-10-03 ASSESSMENT — PATIENT HEALTH QUESTIONNAIRE - PHQ9
SUM OF ALL RESPONSES TO PHQ QUESTIONS 1-9: 4
SUM OF ALL RESPONSES TO PHQ QUESTIONS 1-9: 4
10. IF YOU CHECKED OFF ANY PROBLEMS, HOW DIFFICULT HAVE THESE PROBLEMS MADE IT FOR YOU TO DO YOUR WORK, TAKE CARE OF THINGS AT HOME, OR GET ALONG WITH OTHER PEOPLE: NOT DIFFICULT AT ALL

## 2022-10-03 NOTE — PROGRESS NOTES
Problem List Items Addressed This Visit        Medium    Cervical Spondylosis - Primary     Physical exam and current neck pain is consistent with her foraminal narrowing at C4-6 on the right.  Most likely aggravated by current sleep position while working on comfort and recovery from recent left shoulder replacement.  Will add in gabapentin as per orders.  If needed will look at moving to 2 or 3 times a day depending upon response.  Additionally will plan to use Tylenol 3 times a day scheduled and then tramadol as needed for breakthrough pain.  Continue with physical therapy.           Relevant Medications    acetaminophen (TYLENOL) 325 MG tablet    traMADol (ULTRAM) 50 MG tablet    gabapentin (NEURONTIN) 300 MG capsule    Chronic kidney disease, stage 3 (H)     Not a good candidate for NSAIDs.  Can continue the Celebrex and will continue to monitor kidney function.  We will keep pain controlled to non-renally cleared substances.             Other Visit Diagnoses     Need for vaccination        Relevant Orders    INFLUENZA, QUAD, HIGH DOSE, PF, 65YR + (FLUZONE HD) (Completed)             Tristan Alfredo is a 72 year old who presents for the following health issues     Chief Complaint   Patient presents with     Arm Pain     Right w/Ear Pain (when lying on that side) since Left Shoulder Surgery on 9/16/22     Follow Up     Other Pain Management Issues      Right shoulder pain, right upper arm pain, right knee pain.  Of note has been significantly aggravated since her surgery 2 weeks ago and now that she is predominantly sleeping on her right side.  Review of an MRI from 2019 also shows foraminal narrowing at C4-7 on the right.  Symptoms today are consistent with slight compression in those areas which is most likely from her current sleeping position.  She continues to work physical therapy after her left shoulder replacement 2 weeks ago.  She is a bit frustrated that is not progressing as fast as she would  "like.  Discussed that this does take time and not to over push it but to follow the path that physical therapy is giving her.  She has not been taking any pain medication on a scheduled basis.  And the pain has been limiting physical therapy.  She has been taking Tylenol on and off through the day with some benefit and then some tramadol when severe pain but that has been limited in the amount that she is taking.    Arm Pain  This is a new problem. The current episode started 1 to 4 weeks ago. The problem occurs daily. The problem has been gradually worsening. Nothing aggravates the symptoms. She has tried ice for the symptoms. The treatment provided moderate relief.   History of Present Illness       Headaches:   Since the patient's last clinic visit, headaches are: no change  The patient is getting headaches:  3 x wk  She is not able to do normal daily activities when she has a migraine.  The patient is taking the following rescue/relief medications:  Tylenol   Patient states \"I get some relief\" from the rescue/relief medications.   The patient is taking the following medications to prevent migraines:  No medications to prevent migraines  In the past 4 weeks, the patient has gone to an Urgent Care or Emergency Room 0 times times due to headaches.    Reason for visit:  Pain  Symptom onset:  1-2 weeks ago  Symptoms include:  Pain. Pain  Symptom intensity:  Moderate  Symptom progression:  Staying the same  Had these symptoms before:  Yes  Has tried/received treatment for these symptoms:  No  What makes it worse:  No  What makes it better:  No    She eats 2-3 servings of fruits and vegetables daily.She consumes 0 sweetened beverage(s) daily.She exercises with enough effort to increase her heart rate 20 to 29 minutes per day.  She exercises with enough effort to increase her heart rate 3 or less days per week.   She is taking medications regularly.    Today's PHQ-9         PHQ-9 Total Score: 4    PHQ-9 Q9 Thoughts of " "better off dead/self-harm past 2 weeks :   Not at all    How difficult have these problems made it for you to do your work, take care of things at home, or get along with other people: Not difficult at all       Review of Systems   All other systems reviewed and are negative.           Objective    /78 (BP Location: Left arm, Patient Position: Sitting, Cuff Size: Adult Large)   Pulse 68   Temp 98.2  F (36.8  C) (Oral)   Resp 16   Ht 1.676 m (5' 6\")   Wt 81 kg (178 lb 8 oz)   LMP  (LMP Unknown)   Breastfeeding No   BMI 28.81 kg/m    Body mass index is 28.81 kg/m .  Physical Exam  Vitals and nursing note reviewed.   Constitutional:       General: She is not in acute distress.     Appearance: Normal appearance. She is not ill-appearing.   HENT:      Head: Normocephalic and atraumatic.   Eyes:      Extraocular Movements: Extraocular movements intact.      Conjunctiva/sclera: Conjunctivae normal.   Pulmonary:      Effort: Pulmonary effort is normal.   Musculoskeletal:      Right lower leg: No edema.      Left lower leg: No edema.      Comments: No tenderness to palpation of the lateral or medial epicondyles.  Positive Spurling's on the right with ipsilateral loading.  No spinous process tenderness.  Full active range of motion of the neck.   Skin:     Capillary Refill: Capillary refill takes less than 2 seconds.   Neurological:      Mental Status: She is alert and oriented to person, place, and time.   Psychiatric:         Attention and Perception: Attention normal.         Mood and Affect: Mood normal.         Speech: Speech normal.         Thought Content: Thought content normal.             This note has been dictated using voice recognition software. Any grammatical or context distortions are unintentional and inherent to the software      "

## 2022-10-04 NOTE — ASSESSMENT & PLAN NOTE
Physical exam and current neck pain is consistent with her foraminal narrowing at C4-6 on the right.  Most likely aggravated by current sleep position while working on comfort and recovery from recent left shoulder replacement.  Will add in gabapentin as per orders.  If needed will look at moving to 2 or 3 times a day depending upon response.  Additionally will plan to use Tylenol 3 times a day scheduled and then tramadol as needed for breakthrough pain.  Continue with physical therapy.

## 2022-10-04 NOTE — ASSESSMENT & PLAN NOTE
Not a good candidate for NSAIDs.  Can continue the Celebrex and will continue to monitor kidney function.  We will keep pain controlled to non-renally cleared substances.

## 2022-10-05 ENCOUNTER — TRANSFERRED RECORDS (OUTPATIENT)
Dept: HEALTH INFORMATION MANAGEMENT | Facility: CLINIC | Age: 72
End: 2022-10-05

## 2022-10-05 LAB — RETINOPATHY: NEGATIVE

## 2022-10-14 ENCOUNTER — TELEPHONE (OUTPATIENT)
Dept: FAMILY MEDICINE | Facility: CLINIC | Age: 72
End: 2022-10-14

## 2022-10-14 NOTE — TELEPHONE ENCOUNTER
Left voicemail for patient to return call to clinic RN.     Please give message below to patient when she calls back.        Unique Cleveland RN on 10/14/2022 at 11:52 AM

## 2022-10-14 NOTE — TELEPHONE ENCOUNTER
Symptoms    Per patient she is almost one month from shoulder surgery.  Completed appointment with Dr. Jones on 10/3 due to pain.    Unable to send SeeWhy Message to Dr. Donaldson calling clinic and requesting message is forwarded to     Describe your symptoms:     Any pain: Yes: patient describes as skin pain feels like burning.  When she applies pressure pain level is 6 on scan of 1 to 10.    How long have you been having symptoms: Since surgery    Have you been seen for this:  Yes: 10/03/2022 medication prescribed per patient is not providing any relief.  Hoping something else can be tried to provide relief.     Triage offered?: Yes: declined requests to get message to provider, wants to talk to Dr. Donaldson.     Preferred Pharmacy:   [facility]. [address]  [facility] [address]  Phone: [phone] Fax: [phone]      Could we send this information to you in SeeWhy or would you prefer to receive a phone call?:   Patient would prefer a phone call   Okay to leave a detailed message?: No at Home number on file [phone] (home)

## 2022-10-14 NOTE — TELEPHONE ENCOUNTER
Medication she was prescribed by Dr Jones on 10/3 was appropriate - if those are not working then I would recommend reaching out to her surgeon in case they want to see her (if they think this is surgery related) or she needs urgent care/ER for further evaluation and change in treatment plan.    Dr Donaldson

## 2022-10-17 ENCOUNTER — NURSE TRIAGE (OUTPATIENT)
Dept: NURSING | Facility: CLINIC | Age: 72
End: 2022-10-17

## 2022-10-17 NOTE — TELEPHONE ENCOUNTER
Shoulder surgery about a month ago, continues to have an unusual pain in her other elbow. Just the skin is painful, no rash, swelling, fever and not in the joint itself.     She did contact the surgeon and was told no correlation to shoulder surgery.    Feeling frustrated and upset since this discomfort is so bothersome and she didn't feel heard by Dr. Jones.      Per protocol advised being seen within 3 days again. She is open to going to  as no clinic appointments available. FNA also said note will be forwarded so Dr. Donaldson is aware this is ongoing issue. Please follow up via phone with further recommendations or if appointments open.      Patient voiced understanding and will follow disposition.   Brigida Winters RN  [facility] Nurse Advisor       Reason for Disposition    MODERATE pain (e.g., interferes with normal activities) and present > 3 days    Additional Information    Negative: Shock suspected (e.g., cold/pale/clammy skin, too weak to stand, low BP, rapid pulse)    Negative: Similar pain previously and it was from 'heart attack'    Negative: Similar pain previously from 'angina' and not relieved by nitroglycerin    Negative: Sounds like a life-threatening emergency to the triager    Negative: Followed an injury to arm    Negative: Chest pain    Negative: Wound looks infected    Negative: Elbow pain is main symptom    Negative: Hand or wrist pain is main symptom    Negative: Difficulty breathing or unusual sweating (e.g., sweating without exertion)    Negative: Chest pain lasting longer than 5 minutes    Negative: Age > 40 and no obvious cause for pain, pain still present even when not moving the arm    Negative: Fever and red area (or area very tender to touch)    Negative: Swollen joint and fever    Negative: Entire arm is swollen    Negative: Patient sounds very sick or weak to the triager    Negative: SEVERE pain (e.g., excruciating, unable to do any normal activities)    Negative: Red area or streak > 2  inches (or 5 cm)    Negative: Cast on wrist or arm and now increasing pain    Negative: Weakness (i.e., loss of strength) in hand or fingers    Negative: Arm pains with exertion (e.g., occurs with walking; goes away on resting)    Negative: Painful rash with multiple small blisters grouped together (i.e., dermatomal distribution or 'band' or 'stripe')    Negative: Looks like a boil, infected sore, deep ulcer, or other infected rash (spreading redness, pus)    Negative: Localized rash is very painful (no fever)    Negative: Numbness (i.e., loss of sensation) in hand or fingers    Negative: Localized pain, redness or hard lump along vein    Negative: Patient wants to be seen    Protocols used: ARM PAIN-A-[place]

## 2022-10-18 NOTE — TELEPHONE ENCOUNTER
Left a message for patient to return call. Please relay Dr. Donaldson's message below that she should be seen in urgent care if and when patient returns call.    Thanks,  Niki Roberson RN on 10/18/2022 at 12:16 PM

## 2022-10-18 NOTE — TELEPHONE ENCOUNTER
Pt informed and will go to urgent care.   Wondering if she needs a referral to Dr. Javed who is a spine surgeon at [facility].

## 2022-10-18 NOTE — TELEPHONE ENCOUNTER
Would need more information regarding referral. Is this for cervical/neck issues? Or other concern?   She can always check with insurance to see if referral is needed (it isn't always needed).    Dr Donaldson

## 2022-10-20 NOTE — TELEPHONE ENCOUNTER
Gavi Garcia CMA contacted [facility] on 10/20/22 and left a message. If patient calls back please relay Dr Donaldson's message below and ask questions .

## 2022-10-28 ENCOUNTER — TRANSFERRED RECORDS (OUTPATIENT)
Dept: HEALTH INFORMATION MANAGEMENT | Facility: CLINIC | Age: 72
End: 2022-10-28

## 2022-11-01 ENCOUNTER — TRANSFERRED RECORDS (OUTPATIENT)
Dept: HEALTH INFORMATION MANAGEMENT | Facility: CLINIC | Age: 72
End: 2022-11-01

## 2022-11-07 DIAGNOSIS — I10 ESSENTIAL HYPERTENSION, MALIGNANT: ICD-10-CM

## 2022-11-09 RX ORDER — HYDROCHLOROTHIAZIDE 25 MG/1
TABLET ORAL
Qty: 90 TABLET | Refills: 1 | Status: SHIPPED | OUTPATIENT
Start: 2022-11-09 | End: 2023-05-14

## 2022-11-09 NOTE — TELEPHONE ENCOUNTER
"Routing refill request to provider for review/approval because:  Labs out of range:  CR    Last Written Prescription Date:  3/24/22  Last Fill Quantity: 90,  # refills: 1   Last office visit provider:   9/7/22    Requested Prescriptions   Pending Prescriptions Disp Refills     hydrochlorothiazide (HYDRODIURIL) 25 MG tablet [Pharmacy Med Name: HYDROCHLOROTHIAZIDE 25 MG T 25 Tablet] 90 tablet 1     Sig: TAKE 1 TABLET BY MOUTH ONCE DAILY       Diuretics (Including Combos) Protocol Failed - 11/7/2022  2:18 PM        Failed - Normal serum creatinine on file in past 12 months     Recent Labs   Lab Test 09/07/22  1337   CR 1.28*              Passed - Blood pressure under 140/90 in past 12 months     BP Readings from Last 3 Encounters:   10/03/22 138/78   09/07/22 (!) 144/76   08/02/22 132/78                 Passed - Recent (12 mo) or future (30 days) visit within the authorizing provider's specialty     Patient has had an office visit with the authorizing provider or a provider within the authorizing providers department within the previous 12 mos or has a future within next 30 days. See \"Patient Info\" tab in inbasket, or \"Choose Columns\" in Meds & Orders section of the refill encounter.              Passed - Medication is active on med list        Passed - Patient is age 18 or older        Passed - No active pregancy on record        Passed - Normal serum potassium on file in past 12 months     Recent Labs   Lab Test 09/07/22  1337   POTASSIUM 4.3                    Passed - Normal serum sodium on file in past 12 months     Recent Labs   Lab Test 09/07/22  1337                 Passed - No positive pregnancy test in past 12 months             Elle You RN 11/09/22 3:13 PM  "

## 2022-11-10 ENCOUNTER — TRANSFERRED RECORDS (OUTPATIENT)
Dept: HEALTH INFORMATION MANAGEMENT | Facility: CLINIC | Age: 72
End: 2022-11-10

## 2022-11-21 ASSESSMENT — SLEEP AND FATIGUE QUESTIONNAIRES
HOW LIKELY ARE YOU TO NOD OFF OR FALL ASLEEP WHILE SITTING AND READING: MODERATE CHANCE OF DOZING
HOW LIKELY ARE YOU TO NOD OFF OR FALL ASLEEP WHILE SITTING INACTIVE IN A PUBLIC PLACE: WOULD NEVER DOZE
HOW LIKELY ARE YOU TO NOD OFF OR FALL ASLEEP WHILE WATCHING TV: MODERATE CHANCE OF DOZING
HOW LIKELY ARE YOU TO NOD OFF OR FALL ASLEEP IN A CAR, WHILE STOPPED FOR A FEW MINUTES IN TRAFFIC: WOULD NEVER DOZE
HOW LIKELY ARE YOU TO NOD OFF OR FALL ASLEEP WHEN YOU ARE A PASSENGER IN A CAR FOR AN HOUR WITHOUT A BREAK: SLIGHT CHANCE OF DOZING
HOW LIKELY ARE YOU TO NOD OFF OR FALL ASLEEP WHILE SITTING AND TALKING TO SOMEONE: WOULD NEVER DOZE
HOW LIKELY ARE YOU TO NOD OFF OR FALL ASLEEP WHILE LYING DOWN TO REST IN THE AFTERNOON WHEN CIRCUMSTANCES PERMIT: MODERATE CHANCE OF DOZING
HOW LIKELY ARE YOU TO NOD OFF OR FALL ASLEEP WHILE SITTING QUIETLY AFTER LUNCH WITHOUT ALCOHOL: WOULD NEVER DOZE

## 2022-11-27 NOTE — PROGRESS NOTES
Sayda is a 72 year old who is being evaluated via a billable video visit.      How would you like to obtain your AVS? MyChart  If the video visit is dropped, the invitation should be resent by: Text to cell phone: [phone]  Will anyone else be joining your video visit? No    Kay Mcgarry, Brant Facilitator/LPN      Video-Visit Details    Video Start Time: 9:55 AM    Type of service:  Video Visit    Video End Time:11:06 AM    Originating Location (pt. Location): Home        Distant Location (provider location):  Off-site    Platform used for Video Visit: [facility]    Outpatient Sleep Medicine Consultation:      Name: Ana Maria De La Garza MRN# [id number]   Age: 72 year old YOB: 1950     Date of Consultation: November 27, 2022  Consultation is requested by: TONNY Jackson CNP  [address]  [place] [address] Halley Cooper  Primary care provider: Adelina Donaldson       Reason for Sleep Consult:     Ana Maria De La Garza is sent by Halley Cooper for a sleep consultation regarding fatigue, concerns for sleep apnea.    Patient s Reason for visit  Ana Maria De La Garza main reason for visit: inability to sleep  Patient states problem(s) started: years ago  Ana Maria S Holli's goals for this visit: to get it resolved           Assessment and Plan:     Summary Sleep Diagnoses and Recommendations:  (F51.04) Chronic insomnia  (primary encounter diagnosis)  Comment: Sayda has had sleep trouble for years. She took trazodone years ago (still has about 10 tabs left), but it stopped working. She may still take a half tab in the middle of the night if she has been awake a couple of hours, and it seems to work better then. She wakes around 2-4 AM and takes something for pain. She can feel very tired, but when she lays down, her mind kicks on. She has a lot of stress. Her sleep schedule does not seem excessive. She has done CBT, but not specifically for insomnia.  Plan:  "We reviewed concepts of circadian and homeostatic sleep drives. We also discussed stimulus control and sleep compression. S/He was advised to start with a sleep schedule of 11 PM to 6 AM. S/He was advised to avoid sleeping in or napping. We discussed dealing with stress by setting aside a designated \"worry time\" in the early evening and s/he was given resources for guided imagery/relaxation.       (R06.83) Snoring, (I10) Benign Essential Hypertension, (R53.83) Other fatigue  Comment: Sayda has mild to moderate risk for KAMLESH. She is observed to snore, although is not observed to have pauses in breathing. She feels tired in the day and naps daily from 10 min to 2 hours. She often dozes watching TV in the evening. Her ESS is normal at 7/24 though. She has HTN and is over 50 years old (72). Negative risks include; no observed apnea, BMI <35 (27), female gender. We do not have a neck measurement.  Plan: Comprehensive Sleep Study          (G25.81) RLS (restless legs syndrome), (E61.1) Low iron  Comment:  Has symptoms 2-3 times per week. 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 Gabapentin was not clearly helpful but she says she has been on so many things, she hardly remembers what each medication was for or how she responded.  Plan: Ferritin        Try the gabapentin again at 300 mg. May go up to 600 mg. If still not noticing a benefit, go back to 300 mg for a week and then stop it. We reviewed side effects of weight gain, swelling and depression.  Check ferritin.      (R44.2) Hypnopompic hallucination  Comment: She occasionally feels she is awake and that there is an intruder in he house but she can't scream. She eventually wakes and realizes it must have been a dream.  Plan: We discussed that this can be a normal occurring phenomenon and is not harmful, as disturbing as it may be. Treating some of the above issues may help. Also treating anxiety may help.       Comorbid Diagnoses:  HTN, DM 2, CKD3, depression, anxiety, cervical " spondylosis    Summary Counseling:    Sleep Testing Reviewed  Obstructive Sleep Apnea Reviewed  Complications of Untreated Sleep Apnea Reviewed      Patient will follow up 2 weeks after sleep study.  Bennett Goltz, PA-C      Total time spent reviewing medical records, history and physical examination, review of previous testing and interpretation as well as documentation on this date: 80 min    CC: Halley Ana MariaTONNY Lopez CNP          History of Present Illness:     Sayda has had sleep trouble for years. She took trazodone years ago, but it stopped working. She may still take a half tab in the middle of the night if she has been awake a couple of hours. She wakes around 2-4 AM and takes something for pain. She can feel very tired, but when she lays down, her mind kicks on. She has a lot of stress. Lately, she has been worrying a lot about her son (unhappy with his career as a pharmacist), finances, finding a new doctor who is not booked out half a year. She plans to change out of [place].  She tried CBT in the past, for stress rather than insomnia.     Past Sleep Evaluations: none    SLEEP-WAKE SCHEDULE:     Work/School Days: Patient goes to school/work: No   Usually gets into bed at 10:30-11:30 PM, generally trying to get to sleep by 11:00 PM. Lately, she has been tying to push it to midnight.  Takes patient about 1-3 hours to fall asleep  Has trouble falling asleep 6 nights nights per week  Wakes up in the middle of the night 2-3 times.  Wakes up due to Pain (neck pain interferes with getting back to sleep in the middle of the night), Use the bathroom, Anxiety, Nightmares  She has trouble falling back asleep 6 times times a week.   It usually takes 2 hours to get back to sleep  Patient is usually up at 4-6 am  Uses alarm: No    Weekends/Non-work Days/All Other Days:  Usually gets into bed at 11 pm   Takes patient about 1-2 hours to fall asleep  Patient is usually up at 4-6 am  Uses alarm: No    Sleep  Need  Patient gets  5-6 hours sleep on average   Patient thinks she needs about 7 hours sleep    Ana Maria De La Garza prefers to sleep in this position(s): Side   Patient states they do the following activities in bed: Read, Watch TV  She has tried progressive muscle relaxation about 40 years ago.     Naps  Patient takes a purposeful nap 7 times a week and naps are usually 10 min to 2 hours in duration  She feels better after a nap: Yes  She dozes off unintentionally 7 days per week. She dozes a lot watching the news in the evening.   Patient has had a driving accident or near-miss due to sleepiness/drowsiness: No      SLEEP DISRUPTIONS:    Breathing/Snoring  Patient snores:Yes  Other people complain about her snoring: Yes  Patient has been told she stops breathing in her sleep: No  She has issues with the following:  She wakes with headaches which is related to severe arthritis in her neck. She has an appointment tomorrow to discuss ablation.    Movement:  Patient gets pain, discomfort, with an urge to move:  Yes, has restlessness once in a while, no identifiable pattern. May interfere with sleep 2-3 times per week.   It happens when she is resting:  Yes  It happens more at night:  Yes  Patient has been told she kicks her legs at night:  No  She tried gabapentin 300-600 mg - did not help  She had left knee replacement several years ago. She has some pain from scar tissue, but overall the knee pain is better. She has right knee and hip pain. Infrequently, she may have knee pain in the middle of the night. She also gets hot feet in the evening. She denies numbness or tingling.         Behaviors in Sleep:  Ana Maria De La Garza has experienced the following behaviors while sleeping: Recurring Nightmares (felt there was someone breaking into her house)-Possibly more of a hypnopompic hallucination. She felt she was awake. She was trying to scream but couldn't. Night terrors (screaming,yelling or acting afraid but not  recalling event).  Pt denies bruxism, sleep talking, sleep walking, and dream enactment behavior. Pt denies sleep cataplexy.         Is there anything else you would like your sleep provider to know:          CAFFEINE AND OTHER SUBSTANCES:    Patient consumes caffeinated beverages per day:  coffee  Last caffeine use is usually: 10 am  List of any prescribed or over the counter stimulants that patient takes:    List of any prescribed or over the counter sleep medication patient takes:    List of previous sleep medications that patient has tried: trazodone years ago-lost effect  Patient drinks alcohol to help them sleep: No  Patient drinks alcohol near bedtime: No    Family History:  Patient has a family member been diagnosed with a sleep disorder: No      Social History  She is retired. 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  She lives with her .       SCALES:    EPWORTH SLEEPINESS SCALE      [place] Sleepiness Scale ( EDDY Nelson  0233-0106<br>ESS - [place]/English - Final version - 21 Nov 07 - [facility] [employer].) 11/21/2022   Sitting and reading Moderate chance of dozing   Watching TV Moderate chance of dozing   Sitting, inactive in a public place (e.g. a theatre or a meeting) Would never doze   As a passenger in a car for an hour without a break Slight chance of dozing   Lying down to rest in the afternoon when circumstances permit Moderate chance of dozing   Sitting and talking to someone Would never doze   Sitting quietly after a lunch without alcohol Would never doze   In a car, while stopped for a few minutes in traffic Would never doze   [place] Score (MC) 7   [place] Score (Sleep) 7         INSOMNIA SEVERITY INDEX (BORIS)      Insomnia Severity Index (BORIS) 11/21/2022   Difficulty falling asleep 2   Difficulty staying asleep 2   Problems waking up too early 3   How SATISFIED/DISSATISFIED are you with your CURRENT sleep pattern? 3   How NOTICEABLE to others do you think your  sleep problem is in terms of impairing the quality of your life? 3   How WORRIED/DISTRESSED are you about your current sleep problem? 3   To what extent do you consider your sleep problem to INTERFERE with your daily functioning (e.g. daytime fatigue, mood, ability to function at work/daily chores, concentration, memory, mood, etc.) CURRENTLY? 3   BORIS Total Score 19       Guidelines for Scoring/Interpretation:  Total score categories:  0-7 = No clinically significant insomnia   8-14 = Subthreshold insomnia   15-21 = Clinical insomnia (moderate severity)  22-28 = Clinical insomnia (severe)  Used via courtesy of www.Voucheres.va.gov with permission from Nicolás Hudson PhD., [facility]      STOP BANG     STOP BANG Questionnaire (  2008, the American Society of Anesthesiologists, Inc. [employer] 11/21/2022   1. Snoring - Do you snore loudly (louder than talking or loud enough to be heard through closed doors)? Yes   2. Tired - Do you often feel tired, fatigued, or sleepy during daytime? Yes   3. Observed - Has anyone observed you stop breathing during your sleep? No   4. Blood pressure - Do you have or are you being treated for high blood pressure? Yes   5. BMI - BMI more than 35 kg/m2? Yes   6. Age - Age over 50 yr old? Yes   7. Neck circumference - Neck circumference greater than 40 cm? No   8. Gender - Gender male? No   STOP BANG Score (MC): 6 (High risk of KAMLESH)   B/P Clinic: -   BMI Clinic: -         GAD7    KATT-7  4/29/2022   1. Feeling nervous, anxious, or on edge 2   2. Not being able to stop or control worrying 2   3. Worrying too much about different things 2   4. Trouble relaxing 2   5. Being so restless that it is hard to sit still 0   6. Becoming easily annoyed or irritable 2   7. Feeling afraid, as if something awful might happen 1   KATT-7 Total Score 11   If you checked any problems, how difficult have they made it for you to do your work, take care of things at home, or  "get along with other people? -         CAGE-AID    No flowsheet data found.    CAGE-AID reprinted with permission from the [facility] Journal, SOULEYMANE Maldonado. and ROSA Cabrera, \"Conjoint screening questionnaires for alcohol and drug abuse\" [place] Medical Journal 94: 135-140, 1995.      PATIENT HEALTH QUESTIONNAIRE-9 (PHQ - 9)    PHQ-9 ([employer]) 10/3/2022   1.  Little interest or pleasure in doing things 1   2.  Feeling down, depressed, or hopeless 1   3.  Trouble falling or staying asleep, or sleeping too much 1   4.  Feeling tired or having little energy 1   5.  Poor appetite or overeating 0   6.  Feeling bad about yourself 0   7.  Trouble concentrating 0   8.  Moving slowly or restless 0   9.  Suicidal or self-harm thoughts 0   PHQ-9 Total Score 4   1.  Little interest or pleasure in doing things Several days   2.  Feeling down, depressed, or hopeless Several days   3.  Trouble falling or staying asleep, or sleeping too much Several days   4.  Feeling tired or having little energy Several days   5.  Poor appetite or overeating Not at all   6.  Feeling bad about yourself Not at all   7.  Trouble concentrating Not at all   8.  Moving slowly or restless Not at all   9.  Suicidal or self-harm thoughts Not at all   PHQ-9 via [employer] TOTAL SCORE-----> 4 (Minimal depression)   Difficulty at work, home, or with people Not difficult at all       Developed by Armaan Sharpe, Komal Patton, Tyrone Ceja and colleagues, with an educational haily from [employer] Inc. No permission required to reproduce, translate, display or distribute.        Allergies:    Allergies   Allergen Reactions     Atorvastatin Muscle Pain (Myalgia)     Metformin Unknown     Body aches, headache     Morphine (Pf) [Morphine] Nausea and Vomiting     Pravastatin Muscle Pain (Myalgia)       Medications:    Current Outpatient Medications   Medication Sig Dispense Refill     acetaminophen (TYLENOL) 325 MG tablet Take 650 mg by mouth as needed  "      calcium-vitamin D (CALCIUM-VITAMIN D) 500 mg(1,250mg) -200 unit per tablet [CALCIUM-VITAMIN D (CALCIUM-VITAMIN D) 500 MG(1,250MG) -200 UNIT PER TABLET] Take 2 tablets by mouth daily.       celecoxib (CELEBREX) 100 MG capsule Take 1 capsule (100 mg) by mouth 2 times daily 60 capsule 1     citalopram (CELEXA) 20 MG tablet Take 1 tablet (20 mg) by mouth daily 30 tablet 3     DENTAGEL 1.1 % GEL topical gel        gabapentin (NEURONTIN) 300 MG capsule Take 1-2 capsules (300-600 mg) by mouth At Bedtime 60 capsule 1     glipiZIDE (GLUCOTROL XL) 10 MG 24 hr tablet Take 1 tablet (10 mg) by mouth daily 90 tablet 4     hydrochlorothiazide (HYDRODIURIL) 25 MG tablet TAKE 1 TABLET BY MOUTH ONCE DAILY 90 tablet 1     lisinopril (ZESTRIL) 20 MG tablet Take 1 tablet (20 mg) by mouth in the morning. 90 tablet 1     multivitamin therapeutic (THERAGRAN) tablet [MULTIVITAMIN THERAPEUTIC (THERAGRAN) TABLET] Take 1 tablet by mouth daily.       OMEGA-3/DHA/EPA/FISH OIL (FISH OIL-OMEGA-3 FATTY ACIDS) 300-1,000 mg capsule [OMEGA-3/DHA/EPA/FISH OIL (FISH OIL-OMEGA-3 FATTY ACIDS) 300-1,000 MG CAPSULE] Take 2 g by mouth daily.       ondansetron (ZOFRAN ODT) 4 MG ODT tab Take 4 mg by mouth as needed       red yeast rice 600 mg cap [RED YEAST RICE 600 MG CAP]        traMADol (ULTRAM) 50 MG tablet Take 25-50 mg by mouth         Problem List:  Patient Active Problem List    Diagnosis Date Noted     Chronic kidney disease, stage 3 (H) 11/04/2020     Priority: Medium     Osteopenia 10/09/2018     Priority: Medium     High risk of fracture per DEXA 8/2018         Hyperlipidemia      Priority: Medium     Created by Conversion         Type 2 diabetes mellitus with complication, without long-term current use of insulin (H) 01/26/2017     Priority: Medium     Anxiety      Priority: Medium     Created by Conversion  Replacement Utility updated for latest IMO load         Insomnia 06/22/2016     Priority: Medium     Status post total knee replacement  02/18/2015     Priority: Medium     Left (2/18/2015)         Benign Essential Hypertension      Priority: Medium     Created by Conversion         Cervical Disc Degeneration      Priority: Medium     Created by Conversion         Female Stress Incontinence      Priority: Medium     Created by Conversion  [facility] Annotation: May 18 2010  9:18AM - Kristen Navarrete: s/p   bladder   sling Dr. Eugene         Recurrent Major Depression In Full Remission      Priority: Medium     Created by Conversion         Cervical Spondylosis      Priority: Medium     Created by Conversion            Past Medical/Surgical History:  Past Medical History:   Diagnosis Date     Depression      Hypertension      Insomnia      Past Surgical History:   Procedure Laterality Date     HC SLING OPERATION FOR STRESS INCONTINENCE      Description: Mid-Urethral Sling Operation;  Recorded: 05/18/2010;     JOINT REPLACEMENT Left 2008    knee     OPEN REDUCTION INTERNAL FIXATION ANKLE Right     2008 and pin removal 2009     [facility] APPENDECTOMY      Description: Appendectomy;  Recorded: 02/26/2008;     [facility] TOTAL KNEE ARTHROPLASTY Left 2/18/2015    Procedure:   LEFT TOTAL KNEE ARTHROPLASTY;  Surgeon: Sina SHARP MD;  Location: [facility];  Service: Orthopedics       Social History:  Social History     Socioeconomic History     Marital status:      Spouse name: Not on file     Number of children: 2     Years of education: Not on file     Highest education level: Not on file   Occupational History     Not on file   Tobacco Use     Smoking status: Never     Smokeless tobacco: Never   Substance and Sexual Activity     Alcohol use: Not Currently     Alcohol/week: 2.0 standard drinks     Drug use: No     Sexual activity: Not on file   Other Topics Concern     Not on file   Social History Narrative     Not on file     Social Determinants of Health     Financial Resource Strain: Not on file   Food Insecurity: Not on file   Transportation  "Needs: Not on file   Physical Activity: Not on file   Stress: Not on file   Social Connections: Not on file   Intimate Partner Violence: Not on file   Housing Stability: Not on file       Family History:  Family History   Problem Relation Age of Onset     Diabetes Mother      Hypertension Mother      Hypertension Father      Cancer Brother      Clotting Disorder No family hx of        Review of Systems:  A complete review of systems reviewed by me is negative with the exeption of what has been mentioned in the history of present illness.  In the last TWO WEEKS have you experienced any of the following symptoms?  Fevers: No  Night Sweats: No  Weight Gain: No  Pain at Night: Yes  Double Vision: No  Changes in Vision: No  Difficulty Breathing through Nose: No  Sore Throat in Morning: No  Dry Mouth in the Morning: No  Shortness of Breath Lying Flat: No  Shortness of Breath With Activity: Yes  Awakening with Shortness of Breath: No  Increased Cough: No  Heart Racing at Night: No  Swelling in Feet or Legs: No  Diarrhea at Night: No  Heartburn at Night: No  Urinating More than Once at Night: No  Losing Control of Urine at Night: No  Joint Pains at Night: Yes  Headaches in Morning: Yes  Weakness in Arms or Legs: No  Depressed Mood: No  Anxiety: Yes     Physical Examination:  Vitals: Ht 1.676 m (5' 6\")   Wt 78 kg (172 lb)   LMP  (LMP Unknown)   BMI 27.76 kg/m             GENERAL APPEARANCE: healthy, alert, no distress and cooperative  EYES: Eyes grossly normal to inspection  HENT: oropharynx crowded, soft palate dependent and tongue base enlarged  NECK: no asymmetry, masses, or scars  RESP: no respiratory distress, cough or wheeze  Mallampati Class: IV.  Tonsillar Stage: not observed.         Data: All pertinent previous laboratory data reviewed     Recent Labs   Lab Test 09/07/22  1337 08/02/22  1102    137   POTASSIUM 4.3 4.6   CHLORIDE 101 100   CO2 24 26   ANIONGAP 13 11   * 238*   BUN 26.8* 25.7*   CR " 1.28* 1.07*   CUONG 9.5 10.3*       Recent Labs   Lab Test 09/07/22  1337   WBC 5.2   RBC 4.41   HGB 14.1   HCT 40.7   MCV 92   MCH 32.0   MCHC 34.6   RDW 11.8          Recent Labs   Lab Test 08/02/22  1102   PROTTOTAL 7.8   ALBUMIN 4.8   BILITOTAL 0.5   ALKPHOS 92   AST 31   ALT 31       TSH (uIU/mL)   Date Value   08/02/2022 1.74   06/11/2021 1.39   11/05/2020 1.79       No results found for: UAMP, UBARB, BENZODIAZEUR, UCANN, UCOC, OPIT, UPCP    No results found for: IRONSAT, [id number], NAVID    No results found for: PH, PHARTERIAL, PO2, [id number], SAT, PCO2, HCO3, BASEEXCESS, FABIOLA, BEB    @LABRCNTIPR(phv:4,pco2v:4,po2v:4,hco3v:4,laurie:4,o2per:4)@    Echocardiology: No results found for this or any previous visit (from the past 4320 hour(s)).    Chest x-ray: XR Chest 2 Views 08/02/2022    Narrative  EXAM: XR CHEST 2 VIEWS  LOCATION: [facility]  DATE/TIME: 8/2/2022 10:49 AM    INDICATION:  Other fatigue  COMPARISON: None.    Impression  IMPRESSION: Thoracic paraspinal osteophytes. Heart size appears normal. Lungs appear clear.      Chest CT: No results found for this or any previous visit from the past 365 days.      PFT: Most Recent [facility] Pulmonary Function Testing    No results found for: 20001      Bennett Ezra Goltz, PA-C, KEVAN 11/27/2022

## 2022-11-28 ENCOUNTER — TELEPHONE (OUTPATIENT)
Dept: SLEEP MEDICINE | Facility: CLINIC | Age: 72
End: 2022-11-28

## 2022-11-28 ENCOUNTER — VIRTUAL VISIT (OUTPATIENT)
Dept: SLEEP MEDICINE | Facility: CLINIC | Age: 72
End: 2022-11-28
Attending: NURSE PRACTITIONER
Payer: COMMERCIAL

## 2022-11-28 VITALS — WEIGHT: 172 LBS | HEIGHT: 66 IN | BODY MASS INDEX: 27.64 KG/M2

## 2022-11-28 DIAGNOSIS — F51.04 CHRONIC INSOMNIA: Primary | ICD-10-CM

## 2022-11-28 DIAGNOSIS — E61.1 LOW IRON: ICD-10-CM

## 2022-11-28 DIAGNOSIS — R06.83 SNORING: ICD-10-CM

## 2022-11-28 DIAGNOSIS — I10 ESSENTIAL HYPERTENSION, BENIGN: ICD-10-CM

## 2022-11-28 DIAGNOSIS — R53.83 OTHER FATIGUE: ICD-10-CM

## 2022-11-28 DIAGNOSIS — G25.81 RLS (RESTLESS LEGS SYNDROME): ICD-10-CM

## 2022-11-28 DIAGNOSIS — R44.2 HYPNOPOMPIC HALLUCINATION: ICD-10-CM

## 2022-11-28 PROCEDURE — 99205 OFFICE O/P NEW HI 60 MIN: CPT | Mod: 95 | Performed by: PHYSICIAN ASSISTANT

## 2022-11-28 NOTE — NURSING NOTE
Chief Complaint   Patient presents with     Video Visit     Fatigue       Patient confirms medications and allergies are accurate via patients echeck in completion, and or denies any changes since last reviewed/verified.     Flu shot given 10/3/2022    Pt states she is in [place] for this visit today    Kay Mcgarry, Virtual Facilitator/LPN

## 2022-11-28 NOTE — PATIENT INSTRUCTIONS
General recommendations for sleep problems (Insomnia)  Allow 2-4 weeks to see results     Establish a regular sleep schedule    Most people only need 7-8 hours of sleep.  Don't be in bed longer than you need     to sleep or you will end up spending more time awake in bed. This trains your    brain to think of the bed as a place to not sleep.  Go to bed at same time each night   Get up at same time each day - Set an alarm everyday (even weekends). This is one of    the most important tips. It prevents you from relying on your insomnia to get you    up on time for your day. That actually reinforces insomnia. It also will help your    body get into a pattern where you start feeling tired at a consistent time each    night.  The body functions best when you keep a consistent routine.  Avoid sleeping-in and napping. Anytime you sleep during the day, you will be less tired at    night. You may be tired enough to fall asleep, but you will wake more in the    middle of the night because you will have met your sleep need before the night is    done.   Cut down time in bed (if not asleep, get up)- Use your bed only for sleep and sex    Anytime you spend time in bed doing activities other than sleep (reading,    watching TV, working, playing on the computer or phone, or even just laying in    bed trying to sleep), you are training  your brain to think of the bed as a place to    do activities other than sleep. If you are not falling asleep within 20-30 minutes,    get out of bed. While out of bed, avoid bright lights. Avoid work or chores. Being    productive in the middle of the night reinforces waking up at night. Find relaxing,    not particularly entertaining activities like reading, listening to music, or relaxation    exercises. Go back to bed if you start feeling groggy, or after about 30 minutes,    even if not feeling very tired. Sometimes, just getting out of bed stops the pattern    of getting frustrated about  laying in bed not sleeping, and that can help you fall    asleep.   Avoid trying to force yourself to sleep- sleep is not like everything else. The harder you    work at most things, the more you can accomplish. The harder you work at    sleep, the less you will sleep.     Make the bedroom comfortable - quiet, dark and cool are better. Consider ear plugs    (silicon). Use dark blinds or wear an eye mask if needed     Make a relaxing routine prior to bedtime  Relaxation exercises:   Progressive muscle relaxation: Relax each muscle group individually    Begin with your feet, flex, then relax. Try to imagine your feet feeling heavy and sinking into the bed. Move to your calves, do the same thing. Work through each muscle group toward your head.    Relaxing Mental Imagery: Try to imagine a trip that you took and found relaxing, or imagine a day at the beach. Try to walk yourself through the day in your mind as if you were dreaming it. Try to imagine sensing the different experiences, such as feeling sand between your toes, the heat of the sun on your skin, seeing the waves crashing the shore, the smell of the salt water, etc.     Deal with your worries before bedtime    Set aside a worry time around dinner time for 10-15 minutes. Write down the    things that are on your mind. Plan time in the coming days to address those    issues. Brainstorm ideas on how you will deal with them. Try to identify issues    that are out of your control, and try to let those issues go.  Listen to relaxation tapes   Classical Music or Nature sounds   Back Massage   Get regular exercise each day (at least 1-2 hours before bedtime)   Take medications only as directed   Eat a light bedtime snack or warm drink   Warm milk   Warm herbal tea (non-caffeinated)       Things to avoid   No overstimulating activities just before bed   No competitive games before bedtime   No exciting television programs before bedtime   Avoid caffeine after lunchtime    Avoid chocolate   Do not use alcohol to induce sleep (worsens Insomnia)   Do not take someone else's sleeping pills   Do not look at the clock when awakening   Do not turn on light when getting up to use bathroom, use a nightlight     Online Programs   www.SHUTi.me (pronounced shut eye). There is a fee for this program. Enter the code  [facility]  if you decide to enroll in this program.    www.sleepIO.com (pronounced sleep ee oh). There is a fee for this program. Enter the code  [facility]  if you decide to enroll in this program.     Suggested Resources  Insomnia Treatment Books   Overcoming Insomnia by Alberto Felton and Camelia Mueller (2008)  No More Sleepless Nights by Timothy Martinez and Molly Salas (1996)  Say Mary to Insomnia by Nathan Crocker (2009)  The Insomnia Workbook by Zee Fernández and Nicolás Hudson (2009)  The Insomnia Answer by Ted Cerna and Shola Loomis (2006)      Stress Management and Relaxation Books  The Relaxation and Stress Reduction Workbook by Annie Meyer, Melissa Mi and Landry Luis (2008)  Stress Management Workbook: Techniques and Self-Assessment Procedures by Charito Lane and Woo Lewis (1997)  A Mindfulness-Based Stress Reduction Workbook by Ankit Adam and Dolores Munroe (2010)  The Complete Stress Management Workbook by Gordo Espitia, Roly Sutherland and Francisco Nicole (1996)  Assert Yourself by Meggan Collins and Milad Collins (1977)    Relaxation Resources for Computer Download   These websites offer resources to help you relax. This list is for information only. [facility] is not responsible for the quality of services or the actions of any person or organization.  Progressive Muscle Relaxation (PMR):   http://www.innerCortona3Dstudio.com/progressive-muscle-relaxation-exercise.html   http://studentsupport.[facility]/counseling/resources/self-help/relaxation-and-stress-management/   Deep Breathing  "Exercises:  http://www.[employer]ann[employer]/breathing-awareness.html     Meditation:   www.[employer]  www.GrabhouseguidedNot iTmeditation-site.Klout You may have to pay for some of these resources.    Guided Imagery:  http://www.Kann[employer]/guided-imagery-scripts.html   http://Fliqq/library/ugzutjivsn-ndaooj-oglqpdn/   Consider phone apps such as: Calm, Headspace or Insight Timer.    Counseling / Behavioral Health  [facility]  Visit [email or web address] or call [phone] to find a clinic close to you.  Or call [phone] for [facility].            MY TREATMENT INFORMATION FOR SLEEP APNEA-  Ana Maria De La Garza    DOCTOR : Bennett Ezra Goltz, PA-C, KEVAN    Am I having a sleep study at a sleep center?  --->Due to normal delays, you will be contacted within 2-4 weeks to schedule    Am I having a home sleep study?  --->Watch the video for the device you are using:    -/drop off device-   https://www.Fashion Projectube.com/watch?v=yGGFBdELGhk    -Disposable device sent out require phone/computer application-   https://www.Fashion Projectube.com/watch?v=BCce_vbiwxE      Frequently asked questions:  1. What is Obstructive Sleep Apnea (KAMLESH)? KAMLESH is the most common type of sleep apnea. Apnea means, \"without breath.\"  Apnea is most often caused by narrowing or collapse of the upper airway as muscles relax during sleep.   Almost everyone has occasional apneas. Most people with sleep apnea have had brief interruptions at night frequently for many years.  The severity of sleep apnea is related to how frequent and severe the events are.   2. What are the consequences of KAMLESH? Symptoms include: feeling sleepy during the day, snoring loudly, gasping or stopping of breathing, trouble sleeping, and occasionally morning headaches or heartburn at night.  Sleepiness can be serious and even increase the risk of falling asleep while driving. Other health consequences may include " development of high blood pressure and other cardiovascular disease in persons who are susceptible. Untreated KAMLESH  can contribute to heart disease, stroke and diabetes.   3. What are the treatment options? In most situations, sleep apnea is a lifelong disease that must be managed with daily therapy. Medications are not effective for sleep apnea and surgery is generally not considered until other therapies have been tried. Your treatment is your choice . Continuous Positive Airway (CPAP) works right away and is the therapy that is effective in nearly everyone. An oral device to hold your jaw forward is usually the next most reliable option. Other options include postioning devices (to keep you off your back), weight loss, and surgery including a tongue pacing device. There is more detail about some of these options below.  4. Are my sleep studies covered by insurance? Although we will request verification of coverage, we advise you also check in advance of the study to ensure there is coverage.    Important tips for those choosing CPAP and similar devices   Know your equipment:  CPAP is continuous positive airway pressure that prevents obstructive sleep apnea by keeping the throat from collapsing while you are sleeping. In most cases, the device is  smart  and can slowly self-adjusts if your throat collapses and keeps a record every day of how well you are treated-this information is available to you and your care team.  BPAP is bilevel positive airway pressure that keeps your throat open and also assists each breath with a pressure boost to maintain adequate breathing.  Special kinds of BPAP are used in patients who have inadequate breathing from lung or heart disease. In most cases, the device is  smart  and can slowly self-adjusts to assist breathing. Like CPAP, the device keeps a record of how well you are treated.  Your mask is your connection to the device. You get to choose what feels most comfortable and the  staff will help to make sure if fits. Here: are some examples of the different masks that are available:       Key points to remember on your journey with sleep apnea:  Sleep study.  PAP devices often need to be adjusted during a sleep study to show that they are effective and adjusted right.  Good tips to remember: Try wearing just the mask during a quiet time during the day so your body adapts to wearing it. A humidifier is recommended for comfort in most cases to prevent drying of your nose and throat. Allergy medication from your provider may help you if you are having nasal congestion.  Getting settled-in. It takes more than one night for most of us to get used to wearing a mask. Try wearing just the mask during a quiet time during the day so your body adapts to wearing it. A humidifier is recommended for comfort in most cases. Our team will work with you carefully on the first day and will be in contact within 4 days and again at 2 and 4 weeks for advice and remote device adjustments. Your therapy is evaluated by the device each day.   Use it every night. The more you are able to sleep naturally for 7-8 hours, the more likely you will have good sleep and to prevent health risks or symptoms from sleep apnea. Even if you use it 4 hours it helps. Occasionally all of us are unable to use a medical therapy, in sleep apnea, it is not dangerous to miss one night.   Communicate. Call our skilled team on the number provided on the first day if your visit for problems that make it difficult to wear the device. Over 2 out of 3 patients can learn to wear the device long-term with help from our team. Remember to call our team or your sleep providers if you are unable to wear the device as we may have other solutions for those who cannot adapt to mask CPAP therapy. It is recommended that you sleep your sleep provider within the first 3 months and yearly after that if you are not having problems.   Use it for your health. We  encourage use of CPAP masks during daytime quiet periods to allow your face and brain to adapt to the sensation of CPAP so that it will be a more natural sensation to awaken to at night or during naps. This can be very useful during the first few weeks or months of adapting to CPAP though it does not help medically to wear CPAP during wakefulness and  should not be used as a strategy just to meet guidelines.  Take care of your equipment. Make sure you clean your mask and tubing using directions every day and that your filter and mask are replaced as recommended or if they are not working.     BESIDES CPAP, WHAT OTHER THERAPIES ARE THERE?    Positioning Device  Positioning devices are generally used when sleep apnea is mild and only occurs on your back.This example shows a pillow that straps around the waist. It may be appropriate for those whose sleep study shows milder sleep apnea that occurs primarily when lying flat on one's back. Preliminary studies have shown benefit but effectiveness at home may need to be verified by a home sleep test. These devices are generally not covered by medical insurance.  Examples of devices that maintain sleeping on the back to prevent snoring and mild sleep apnea.    Belt type body positioner  http://[employer]/    Electronic reminder  http://nightshifttherapy.com/            Oral Appliance  What is oral appliance therapy?  An oral appliance device fits on your teeth at night like a retainer used after having braces. The device is made by a specialized dentist and requires several visits over 1-2 months before a manufactured device is made to fit your teeth and is adjusted to prevent your sleep apnea. Once an oral device is working properly, snoring should be improved. A home sleep test may be recommended at that time if to determine whether the sleep apnea is adequately treated.       Some things to remember:  -Oral devices are often, but not always, covered by your medical  insurance. Be sure to check with your insurance provider.   -If you are referred for oral therapy, you will be given a list of specialized dentists to consider or you may choose to visit the Web site of the American Academy of Dental Sleep Medicine  -Oral devices are less likely to work if you have severe sleep apnea or are extremely overweight.     More detailed information  An oral appliance is a small acrylic device that fits over the upper and lower teeth  (similar to a retainer or a mouth guard). This device slightly moves jaw forward, which moves the base of the tongue forward, opens the airway, improves breathing for effective treat snoring and obstructive sleep apnea in perhaps 7 out of 10 people .  The best working devices are custom-made by a dental device  after a mold is made of the teeth 1, 2, 3.  When is an oral appliance indicated?  Oral appliance therapy is recommended as a first-line treatment for patients with primary snoring, mild sleep apnea, and for patients with moderate sleep apnea who prefer appliance therapy to use of CPAP4, 5. Severity of sleep apnea is determined by sleep testing and is based on the number of respiratory events per hour of sleep.   How successful is oral appliance therapy?  The success rate of oral appliance therapy in patients with mild sleep apnea is 75-80% while in patients with moderate sleep apnea it is 50-70%. The chance of success in patients with severe sleep apnea is 40-50%. The research also shows that oral appliances have a beneficial effect on the cardiovascular health of KAMLESH patients at the same magnitude as CPAP therapy7.  Oral appliances should be a second-line treatment in cases of severe sleep apnea, but if not completely successful then a combination therapy utilizing CPAP plus oral appliance therapy may be effective. Oral appliances tend to be effective in a broad range of patients although studies show that the patients who have the highest  success are females, younger patients, those with milder disease, and less severe obesity. 3, 6.   Finding a dentist that practices dental sleep medicine  Specific training is available through the American Academy of Dental Sleep Medicine for dentists interested in working in the field of sleep. To find a dentist who is educated in the field of sleep and the use of oral appliances, near you, visit the Web site of the American Academy of Dental Sleep Medicine.    References  1. Sundeep, et al. Objectively measured vs self-reported compliance during oral appliance therapy for sleep-disordered breathing. Chest 2013; 144(5): 9019-2736.  2. Mitchel, et al. Objective measurement of compliance during oral appliance therapy for sleep-disordered breathing. Thorax 2013; 68(1): 91-96.  3. Henrique et al. Mandibular advancement devices in 620 men and women with KAMLESH and snoring: tolerability and predictors of treatment success. Chest 2004; 125: 8934-9626.  4. Yehuda, et al. Oral appliances for snoring and KAMLESH: a review. Sleep 2006; 29: 244-262.  5. Nereida et al. Oral appliance treatment for KAMLESH: an update. J Clin Sleep Med 2014; 10(2): 215-227.  6. Saige et al. Predictors of OSAH treatment outcome. J Dent Res 2007; 86: 5585-3806.      Weight Loss:    Weight loss is a long-term strategy that may improve sleep apnea in some patients.    Weight management is a personal decision and the decision should be based on your interest and the potential benefits.  If you are interested in exploring weight loss strategies, the following discussion covers the impact on weight loss on sleep apnea and the approaches that may be successful.    Being overweight does not necessarily mean you will have health consequences.  Those who have BMI over 35 or over 27 with existing medical conditions carries greater risk.   Weight loss decreases severity of sleep apnea in most people with obesity. For those with mild obesity who have  developed snoring with weight gain, even 15-30 pound weight loss can improve and occasionally eliminate sleep apnea.  Structured and life-long dietary and health habits are necessary to lose weight and keep healthier weight levels.     Though there may be significant health benefits from weight loss, long-term weight loss is very difficult to achieve- studies show success with dietary management in less than 10% of people. In addition, substantial weight loss may require years of dietary control and may be difficult if patients have severe obesity. In these cases, surgical management may be considered.  Finally, older individuals who have tolerated obesity without health complications may be less likely to benefit from weight loss strategies.      BMI 27    Surgery:    Surgery for obstructive sleep apnea is considered generally only when other therapies fail to work. Surgery may be discussed with you if you are having a difficult time tolerating CPAP and or when there is an abnormal structure that requires surgical correction.  Nose and throat surgeries often enlarge the airway to prevent collapse.  Most of these surgeries create pain for 1-2 weeks and up to half of the most common surgeries are not effective throughout life.  You should carefully discuss the benefits and drawbacks to surgery with your sleep provider and surgeon to determine if it is the best solution for you.   More information  Surgery for KAMLESH is directed at areas that are responsible for narrowing or complete obstruction of the airway during sleep.  There are a wide range of procedures available to enlarge and/or stabilize the airway to prevent blockage of breathing in the three major areas where it can occur: the palate, tongue, and nasal regions.  Successful surgical treatment depends on the accurate identification of the factors responsible for obstructive sleep apnea in each person.  A personalized approach is required because there is no  single treatment that works well for everyone.  Because of anatomic variation, consultation with an examination by a sleep surgeon is a critical first step in determining what surgical options are best for each patient.  In some cases, examination during sedation may be recommended in order to guide the selection of procedures.  Patients will be counseled about risks and benefits as well as the typical recovery course after surgery. Surgery is typically not a cure for a person s KAMLESH.  However, surgery will often significantly improve one s KAMLESH severity (termed  success rate ).  Even in the absence of a cure, surgery will decrease the cardiovascular risk associated with OSA7; improve overall quality of life8 (sleepiness, functionality, sleep quality, etc).  Palate Procedures:  Patients with KAMLESH often have narrowing of their airway in the region of their tonsils and uvula.  The goals of palate procedures are to widen the airway in this region as well as to help the tissues resist collapse.  Modern palate procedure techniques focus on tissue conservation and soft tissue rearrangement, rather than tissue removal.  Often the uvula is preserved in this procedure. Residual sleep apnea is common in patient after pharyngoplasty with an average reduction in sleep apnea events of 33%2.    Tongue Procedures:  ExamWhile patients are awake, the muscles that surround the throat are active and keep this region open for breathing. These muscles relax during sleep, allowing the tongue and other structures to collapse and block breathing.  There are several different tongue procedures available.  Selection of a tongue base procedure depends on characteristics seen on physical exam.  Generally, procedures are aimed at removing bulky tissues in this area or preventing the back of the tongue from falling back during sleep.  Success rates for tongue surgery range from 50-62%3.  Hypoglossal Nerve Stimulation:  Hypoglossal nerve stimulation  has recently received approval from the [place] Food and Drug Administration for the treatment of obstructive sleep apnea.  This is based on research showing that the system was safe and effective in treating sleep apnea6.  Results showed that the median AHI score decreased 68%, from 29.3 to 9.0. This therapy uses an implant system that senses breathing patterns and delivers mild stimulation to airway muscles, which keeps the airway open during sleep.  The system consists of three fully implanted components: a small generator (similar in size to a pacemaker), a breathing sensor, and a stimulation lead.  Using a small handheld remote, a patient turns the therapy on before bed and off upon awakening.    Candidates for this device must be greater than 18 years of age, have moderate to severe KAMLESH (AHI between 15-65), BMI less than 35, have tried CPAP/oral appliance for at least 8 weeks without success, and have appropriate upper airway anatomy (determined by a sleep endoscopy performed by Dr. Roger Quinones).    Hypoglossal Nerve Stimulation Pathway:    The sleep surgeon s office will work with the patient through the insurance prior-authorization process (including communications and appeals).    Nasal Procedures:  Nasal obstruction can interfere with nasal breathing during the day and night.  Studies have shown that relief of nasal obstruction can improve the ability of some patients to tolerate positive airway pressure therapy for obstructive sleep apnea1.  Treatment options include medications such as nasal saline, topical corticosteroid and antihistamine sprays, and oral medications such as antihistamines or decongestants. Non-surgical treatments can include external nasal dilators for selected patients. If these are not successful by themselves, surgery can improve the nasal airway either alone or in combination with these other options.      Combination Procedures:  Combination of surgical procedures and other  treatments may be recommended, particularly if patients have more than one area of narrowing or persistent positional disease.  The success rate of combination surgery ranges from 66-80%2,3.    References  Diane JARA. The Role of the Nose in Snoring and Obstructive Sleep Apnoea: An Update.  Eur Arch Otorhinolaryngol. 2011; 268: 1365-73.   Ritika SM; Erin JA; Roxane JR; Pallanch JF; Viral MB; Timmy SG; David CROFT. Surgical modifications of the upper airway for obstructive sleep apnea in adults: a systematic review and meta-analysis. SLEEP 2010;33(10):0121-8738. Niko SILVER. Hypopharyngeal surgery in obstructive sleep apnea: an evidence-based medicine review.  Arch Otolaryngol Head Neck Surg. 2006 Feb;132(2):206-13.  Tan YH1, Krystal Y, Ovidio ABDIRAHMAN. The efficacy of anatomically based multilevel surgery for obstructive sleep apnea. Otolaryngol Head Neck Surg. 2003 Oct;129(4):327-35.  Kezirian E, Goldberg A. Hypopharyngeal Surgery in Obstructive Sleep Apnea: An Evidence-Based Medicine Review. Arch Otolaryngol Head Neck Surg. 2006 Feb;132(2):206-13.  Osiris PJ et al. Upper-Airway Stimulation for Obstructive Sleep Apnea.  N Engl J Med. 2014 Jan 9;370(2):139-49.  Graciela Y et al. Increased Incidence of Cardiovascular Disease in Middle-aged Men with Obstructive Sleep Apnea. Am J Respir Crit Care Med; 2002 166: 159-165  Paez EM et al. Studying Life Effects and Effectiveness of Palatopharyngoplasty (SLEEP) study: Subjective Outcomes of Isolated Uvulopalatopharyngoplasty. Otolaryngol Head Neck Surg. 2011; 144: 623-631.        WHAT IF I ONLY HAVE SNORING?    Mandibular advancement devices, lateral sleep positioning, long-term weight loss and treatment of nasal allergies have been shown to improve snoring.  Exercising tongue muscles with a game (Sybarittps://apps.Netsize/us/rahel/soundly-reduce-snoring/vr9128087152) or stimulating the tongue during the day with a device (https://doi.org/10.3390/qcj97467403) have improved snoring in  some individuals.    Remember to Drive Safe... Drive Alive     Sleep health profoundly affects your health, mood, and your safety.  Thirty three percent of the population (one in three of us) is not getting enough sleep and many have a sleep disorder. Not getting enough sleep or having an untreated / undertreated sleep condition may make us sleepy without even knowing it. In fact, our driving could be dramatically impaired due to our sleep health. As your provider, here are some things I would like you to know about driving:     Here are some warning signs for impairment and dangerous drowsy driving:              -Having been awake more than 16 hours               -Looking tired               -Eyelid drooping              -Head nodding (it could be too late at this point)              -Driving for more than 30 minutes     Some things you could do to make the driving safer if you are experiencing some drowsiness:              -Stop driving and rest              -Call for transportation              -Make sure your sleep disorder is adequately treated     Some things that have been shown NOT to work when experiencing drowsiness while driving:              -Turning on the radio              -Opening windows              -Eating any  distracting  /  entertaining  foods (e.g., sunflower seeds, candy, or any other)              -Talking on the phone      Your decision may not only impact your life, but also the life of others. Please, remember to drive safe for yourself and all of us.    Instructions for taking gabapentin for restless legs:  Start with 300 mg in the evening. May increase to 600 mg if needed. If 600 mg is does not help, reduce to 300 mg for a week and then stop it. Possible side effects including weight gain, swelling and depression. Discontinue the medication if side effects occur.

## 2022-11-29 ENCOUNTER — TRANSFERRED RECORDS (OUTPATIENT)
Dept: HEALTH INFORMATION MANAGEMENT | Facility: CLINIC | Age: 72
End: 2022-11-29

## 2022-11-29 ENCOUNTER — LAB (OUTPATIENT)
Dept: LAB | Facility: CLINIC | Age: 72
End: 2022-11-29
Payer: COMMERCIAL

## 2022-11-29 DIAGNOSIS — E61.1 LOW IRON: ICD-10-CM

## 2022-11-29 DIAGNOSIS — G25.81 RLS (RESTLESS LEGS SYNDROME): ICD-10-CM

## 2022-11-29 PROCEDURE — 36415 COLL VENOUS BLD VENIPUNCTURE: CPT

## 2022-11-29 PROCEDURE — 82728 ASSAY OF FERRITIN: CPT

## 2022-11-30 LAB — FERRITIN SERPL-MCNC: 340 NG/ML (ref 11–328)

## 2022-12-09 ENCOUNTER — TRANSFERRED RECORDS (OUTPATIENT)
Dept: HEALTH INFORMATION MANAGEMENT | Facility: CLINIC | Age: 72
End: 2022-12-09

## 2022-12-13 ENCOUNTER — OFFICE VISIT (OUTPATIENT)
Dept: FAMILY MEDICINE | Facility: CLINIC | Age: 72
End: 2022-12-13
Payer: COMMERCIAL

## 2022-12-13 VITALS
SYSTOLIC BLOOD PRESSURE: 132 MMHG | HEART RATE: 98 BPM | DIASTOLIC BLOOD PRESSURE: 84 MMHG | WEIGHT: 182.2 LBS | BODY MASS INDEX: 29.41 KG/M2 | OXYGEN SATURATION: 97 % | RESPIRATION RATE: 20 BRPM

## 2022-12-13 DIAGNOSIS — F41.1 ANXIETY STATE: ICD-10-CM

## 2022-12-13 DIAGNOSIS — E11.8 TYPE 2 DIABETES MELLITUS WITH COMPLICATION, WITHOUT LONG-TERM CURRENT USE OF INSULIN (H): Primary | ICD-10-CM

## 2022-12-13 PROCEDURE — 99214 OFFICE O/P EST MOD 30 MIN: CPT | Performed by: FAMILY MEDICINE

## 2022-12-13 RX ORDER — GABAPENTIN 300 MG/1
CAPSULE ORAL
Qty: 120 CAPSULE | Refills: 1 | Status: SHIPPED | OUTPATIENT
Start: 2022-12-13 | End: 2023-01-04

## 2022-12-13 RX ORDER — TRAZODONE HYDROCHLORIDE 100 MG/1
100 TABLET ORAL AT BEDTIME
Qty: 60 TABLET | Refills: 1 | Status: SHIPPED | OUTPATIENT
Start: 2022-12-13 | End: 2023-05-20

## 2022-12-13 ASSESSMENT — PAIN SCALES - GENERAL: PAINLEVEL: NO PAIN (0)

## 2022-12-13 NOTE — PROGRESS NOTES
Assessment & Plan     Type 2 diabetes mellitus with complication, without long-term current use of insulin (H)  No change in therapy or diet    Anxiety state  Going to add back trazodone 100 mg she may take it every other night to induce and maintain sleep; she is to keep her sleep study appointment here late in the month; follow-up with Dr. Donaldson 4 months with an add  - traZODone (DESYREL) 100 MG tablet  Dispense: 60 tablet; Refill: 1  - gabapentin (NEURONTIN) 300 MG capsule  Dispense: 120 capsule; Refill: 1                   Return in about 4 months (around 4/13/2023) for Follow up.    Marko Hernandez MD  [facility] Alfredo is a 72 year old, presenting for the following health issues:  Anxiety (Discuss stress )      HPI patient is here to discuss her increasing anxiety and inability to sleep.  She has a sleep study scheduled for next month.  No history of sleep apnea prior to this.  She may come to needing CPAP therapy.  Currently she is on citalopram 20 mg and has been on it for some time.  She is also having difficulty inducing sleep and keeping sleep.  Previously she tolerated trazodone but went off of it some years ago.  She has cervical disc disease takes gabapentin 1 300 mg tablet at bedtime she complete planes of paresthesias in her arms increasing.  She is little frustrated with the fact that she cannot get into see her primary care provider we explained that back up to her I think to her satisfaction here today.  Plan here is to increase her gabapentin to 300 mg twice daily; add back trazodone 100 mg at bedtime.  She is to continue her citalopram 20 mg.  Time of visit face-to-face and non-face-to-face was 30 minutes          Review of Systems   Constitutional, HEENT, cardiovascular, pulmonary, GI, , musculoskeletal, neuro, skin, endocrine and psych systems are negative, except as otherwise noted.      Objective    /84 (BP Location: Left arm, Patient Position:  Sitting, Cuff Size: Adult Regular)   Pulse 98   Resp 20   Wt 82.6 kg (182 lb 3.2 oz)   LMP  (LMP Unknown)   SpO2 97%   BMI 29.41 kg/m    Body mass index is 29.41 kg/m .  Physical Exam   GENERAL: healthy, alert and no distress  NECK: no adenopathy, no asymmetry, masses, or scars and thyroid normal to palpation  RESP: lungs clear to auscultation - no rales, rhonchi or wheezes  CV: regular rate and rhythm, normal S1 S2, no S3 or S4, no murmur, click or rub, no peripheral edema and peripheral pulses strong  ABDOMEN: soft, nontender, no hepatosplenomegaly, no masses and bowel sounds normal  MS: no gross musculoskeletal defects noted, no edema

## 2023-01-03 ENCOUNTER — TELEPHONE (OUTPATIENT)
Dept: FAMILY MEDICINE | Facility: CLINIC | Age: 73
End: 2023-01-03

## 2023-01-03 NOTE — TELEPHONE ENCOUNTER
Patient is exteremly frustrated with our access I reassured patient this isn't just an [place] problem. Patient is frustrated that Dr. Donaldson can't manage her anxiety medications, during this time I emphasized with patient. Patient does have an upcoming appointment with another provider and I reassured her that provider is an excellent choice. Patient is aware of my phone number if she has additional questions with scheduling.

## 2023-01-03 NOTE — TELEPHONE ENCOUNTER
Pt is calling wanting to talk with our Clinic Manager - about some complaints she has with the scheduling process and availability.     She would like to talk with Rebeca today if possible. Please call patient at [phone] thank you!

## 2023-01-04 ENCOUNTER — VIRTUAL VISIT (OUTPATIENT)
Dept: FAMILY MEDICINE | Facility: CLINIC | Age: 73
End: 2023-01-04
Payer: COMMERCIAL

## 2023-01-04 DIAGNOSIS — F41.1 ANXIETY STATE: Primary | ICD-10-CM

## 2023-01-04 DIAGNOSIS — I10 ESSENTIAL HYPERTENSION, BENIGN: ICD-10-CM

## 2023-01-04 DIAGNOSIS — M19.90 ARTHRITIS: ICD-10-CM

## 2023-01-04 PROCEDURE — 99214 OFFICE O/P EST MOD 30 MIN: CPT | Mod: 95 | Performed by: FAMILY MEDICINE

## 2023-01-04 RX ORDER — LISINOPRIL 20 MG/1
20 TABLET ORAL DAILY
Qty: 90 TABLET | Refills: 3 | Status: SHIPPED | OUTPATIENT
Start: 2023-01-04 | End: 2024-02-12

## 2023-01-04 RX ORDER — DULOXETIN HYDROCHLORIDE 30 MG/1
30 CAPSULE, DELAYED RELEASE ORAL DAILY
Qty: 90 CAPSULE | Refills: 3 | Status: SHIPPED | OUTPATIENT
Start: 2023-01-04 | End: 2023-01-30

## 2023-01-04 RX ORDER — CITALOPRAM HYDROBROMIDE 20 MG/1
20 TABLET ORAL DAILY
Qty: 90 TABLET | Refills: 3 | Status: CANCELLED | OUTPATIENT
Start: 2023-01-04

## 2023-01-04 RX ORDER — CELECOXIB 100 MG/1
100 CAPSULE ORAL 2 TIMES DAILY
Qty: 180 CAPSULE | Refills: 3 | Status: SHIPPED | OUTPATIENT
Start: 2023-01-04 | End: 2023-10-19

## 2023-01-04 RX ORDER — LORAZEPAM 0.5 MG/1
0.5 TABLET ORAL EVERY 8 HOURS PRN
Qty: 30 TABLET | Refills: 0 | Status: SHIPPED | OUTPATIENT
Start: 2023-01-04 | End: 2023-11-16

## 2023-01-04 RX ORDER — HYDROXYZINE HYDROCHLORIDE 25 MG/1
25 TABLET, FILM COATED ORAL EVERY 6 HOURS PRN
Qty: 30 TABLET | Refills: 1 | Status: SHIPPED | OUTPATIENT
Start: 2023-01-04 | End: 2024-02-12

## 2023-01-04 ASSESSMENT — ANXIETY QUESTIONNAIRES
7. FEELING AFRAID AS IF SOMETHING AWFUL MIGHT HAPPEN: SEVERAL DAYS
IF YOU CHECKED OFF ANY PROBLEMS ON THIS QUESTIONNAIRE, HOW DIFFICULT HAVE THESE PROBLEMS MADE IT FOR YOU TO DO YOUR WORK, TAKE CARE OF THINGS AT HOME, OR GET ALONG WITH OTHER PEOPLE: NOT DIFFICULT AT ALL
GAD7 TOTAL SCORE: 16
6. BECOMING EASILY ANNOYED OR IRRITABLE: NEARLY EVERY DAY
3. WORRYING TOO MUCH ABOUT DIFFERENT THINGS: NEARLY EVERY DAY
2. NOT BEING ABLE TO STOP OR CONTROL WORRYING: NEARLY EVERY DAY
GAD7 TOTAL SCORE: 16
GAD7 TOTAL SCORE: 16
5. BEING SO RESTLESS THAT IT IS HARD TO SIT STILL: NOT AT ALL
4. TROUBLE RELAXING: NEARLY EVERY DAY
8. IF YOU CHECKED OFF ANY PROBLEMS, HOW DIFFICULT HAVE THESE MADE IT FOR YOU TO DO YOUR WORK, TAKE CARE OF THINGS AT HOME, OR GET ALONG WITH OTHER PEOPLE?: NOT DIFFICULT AT ALL
7. FEELING AFRAID AS IF SOMETHING AWFUL MIGHT HAPPEN: SEVERAL DAYS
1. FEELING NERVOUS, ANXIOUS, OR ON EDGE: NEARLY EVERY DAY

## 2023-01-04 ASSESSMENT — PATIENT HEALTH QUESTIONNAIRE - PHQ9
SUM OF ALL RESPONSES TO PHQ QUESTIONS 1-9: 5
SUM OF ALL RESPONSES TO PHQ QUESTIONS 1-9: 5
10. IF YOU CHECKED OFF ANY PROBLEMS, HOW DIFFICULT HAVE THESE PROBLEMS MADE IT FOR YOU TO DO YOUR WORK, TAKE CARE OF THINGS AT HOME, OR GET ALONG WITH OTHER PEOPLE: NOT DIFFICULT AT ALL

## 2023-01-04 NOTE — PROGRESS NOTES
Sayda is a 72 year old who is being evaluated via a billable video visit.      How would you like to obtain your AVS? MyChart  If the video visit is dropped, the invitation should be resent by: Text to cell phone: [phone]  Will anyone else be joining your video visit? No          Assessment & Plan     Anxiety  She has tried a number of medications which have not been adequately effective for treatment of her anxiety.  Suggested genetic testing to help guide medication therapy and treatment.  Referral placed for this.  In the meantime, recommend trial of duloxetine as she also has underlying osteoarthritis.  We discussed that duloxetine can be helpful for arthritis pain.  We will have her discontinue citalopram and start duloxetine 30 mg daily.  Counseled her on use of medication and side effects.  She will follow-up in 1 month.  Has physical scheduled with Dr. Navarrete.  Also provided her with a prescription for hydroxyzine to use as needed for anxiety.  Discussed use of this medication.  Also provided limited number of lorazepam and discussed risks and side effects with this medication and cautioned her to use it only very sparingly and when absolutely needed.  - DULoxetine (CYMBALTA) 30 MG capsule  Dispense: 90 capsule; Refill: 3  - Adult Genetics & Metabolism Referral  - LORazepam (ATIVAN) 0.5 MG tablet  Dispense: 30 tablet; Refill: 0  - hydrOXYzine (ATARAX) 25 MG tablet  Dispense: 30 tablet; Refill: 1    Arthritis  Refill provided on Celebrex.  Start duloxetine as discussed above.  Follow-up in 1 month.  Has physical scheduled with Dr. Navarrete  - celecoxib (CELEBREX) 100 MG capsule  Dispense: 180 capsule; Refill: 3    Benign Essential Hypertension  Refill provided on lisinopril  - lisinopril (ZESTRIL) 20 MG tablet  Dispense: 90 tablet; Refill: 3    Insomnia  Recommend that she take trazodone on a daily basis.  She will follow-up with the sleep clinic               No follow-ups on file.    Elise Montgomery,  MD [facility] Alfredo is a 72 year old, presenting for the following health issues:  Depression and Anxiety (Discuss trying new medication)      History of Present Illness       Mental Health Follow-up:  Patient presents to follow-up on Depression & Anxiety.Patient's depression since last visit has been:  No change  The patient is not having other symptoms associated with depression.  Patient's anxiety since last visit has been:  Worse  The patient is not having other symptoms associated with anxiety.  Any significant life events: No  Patient is feeling anxious or having panic attacks.  Patient has no concerns about alcohol or drug use.    She eats 2-3 servings of fruits and vegetables daily.She consumes 0 sweetened beverage(s) daily.She exercises with enough effort to increase her heart rate 30 to 60 minutes per day.  She exercises with enough effort to increase her heart rate 5 days per week.   She is taking medications regularly.    Today's PHQ-9         PHQ-9 Total Score: 5    PHQ-9 Q9 Thoughts of better off dead/self-harm past 2 weeks :   Not at all    How difficult have these problems made it for you to do your work, take care of things at home, or get along with other people: Not difficult at all  Today's KATT-7 Score: 16     She is evaluated today to follow-up on anxiety.  She has been experiencing significant anxiety.  Also having sleep difficulties.  She is being evaluated at the sleep clinic and does have a sleep study scheduled on Friday.  She was restarted on trazodone a couple weeks ago.  Takes 100 mg of trazodone every other night.  States that the trazodone has been helpful but is not as effective as she would like.  On the nights that she does not take trazodone she really struggles.  She has tried a number of medications for anxiety in the past.  Venlafaxine was not helpful.  She has also taken Zoloft.  She is currently on citalopram and she is not sure that is  very helpful.  She has been prescribed gabapentin for sleep and anxiety.  States that this caused drowsiness and she felt drunk during the day after she had taken the medication.  Her son is a pharmacist and has suggested that she discuss getting a prescription for alprazolam, buspirone or hydroxyzine.  She does have underlying arthritis.  Request refill of Celebrex.  She also has hypertension and requests refill of lisinopril.        Review of Systems         Objective           Vitals:  No vitals were obtained today due to virtual visit.    Physical Exam   GENERAL: Healthy, alert and no distress  EYES: Eyes grossly normal to inspection.  No discharge or erythema, or obvious scleral/conjunctival abnormalities.  RESP: No audible wheeze, cough, or visible cyanosis.  No visible retractions or increased work of breathing.    SKIN: Visible skin clear. No significant rash, abnormal pigmentation or lesions.  NEURO: Cranial nerves grossly intact.  Mentation and speech appropriate for age.  PSYCH: Mentation appears normal, affect normal/bright, judgement and insight intact, normal speech and appearance well-groomed.                Video-Visit Details    Type of service:  Video Visit   Video Start Time: 5:05  Video End Time:5:34    Originating Location (pt. Location): Home    Distant Location (provider location):  On-site  Platform used for Video Visit: Saba

## 2023-01-04 NOTE — PATIENT INSTRUCTIONS
Stop citalopram. Start duloxetine in its place    Take trazodone every night for sleep    Use lorazepam as needed for anxiety or sleep. Take caution with this medication and use sparingly    Try hydroxyzine every 6 hours as needed for sleep    Check with insurance about coverage for genetic test for anxiety/depression medication

## 2023-01-05 ENCOUNTER — TELEPHONE (OUTPATIENT)
Dept: CONSULT | Facility: CLINIC | Age: 73
End: 2023-01-05

## 2023-01-06 ENCOUNTER — THERAPY VISIT (OUTPATIENT)
Dept: SLEEP MEDICINE | Facility: CLINIC | Age: 73
End: 2023-01-06
Attending: PHYSICIAN ASSISTANT
Payer: COMMERCIAL

## 2023-01-06 DIAGNOSIS — I10 ESSENTIAL HYPERTENSION, BENIGN: ICD-10-CM

## 2023-01-06 DIAGNOSIS — R53.83 OTHER FATIGUE: ICD-10-CM

## 2023-01-06 DIAGNOSIS — R06.83 SNORING: ICD-10-CM

## 2023-01-06 PROCEDURE — 95810 POLYSOM 6/> YRS 4/> PARAM: CPT | Performed by: INTERNAL MEDICINE

## 2023-01-06 NOTE — TELEPHONE ENCOUNTER
Spoke with patient and assisted in scheduling appointment.     Future Appointments   Date Time Provider Department Center   1/19/2023 10:00 AM Jessica Kwon [facility]

## 2023-01-07 NOTE — PATIENT INSTRUCTIONS
Diagnostic PSG completed per provider order.  Patient did not meet criteria for PAP therapy.    Principal Discharge DX:	Viral pharyngitis

## 2023-01-10 LAB — SLPCOMP: NORMAL

## 2023-01-10 NOTE — PROCEDURES
" SLEEP STUDY INTERPRETATION  DIAGNOSTIC POLYSOMNOGRAPHY REPORT      Patient: CHEN HUFF  YOB: 1950  Study Date: 1/6/2023  MRN: [id number]  Referring Provider: Halley Rodgers Gunner APRN CNP  Ordering Provider: Bennett Goltz PA-C    Indications for Polysomnography: The patient is a 72 year old Female who is 5' 6\" and weighs 182.0 lbs. Her BMI is 29.6, [place] sleepiness scale 7/24 and neck circumference is 41 cm. A diagnostic polysomnogram was performed to evaluate for sleep apnea.    Polysomnogram Data: A full night polysomnogram recorded the standard physiologic parameters including EEG, EOG, EMG, ECG, nasal and oral airflow. Respiratory parameters of chest and abdominal movements were recorded with respiratory inductance plethysmography. Oxygen saturation was recorded by pulse oximetry. Hypopnea scoring rule used: 1B 4%.    Sleep Architecture: Fragmented sleep.   The total recording time of the polysomnogram was 441.6 minutes. The total sleep time was 385.5 minutes. Sleep latency was increased at 23.5 minutes with the use of Trazodone as a sleep aid. REM latency was 181.0 minutes. Arousal index was increased at 35.6 arousals per hour. Sleep efficiency was normal at 87.3%. Wake after sleep onset was 32.5 minutes. The patient spent 5.4% of total sleep time in Stage N1, 22.7% in Stage N2, 37.2% in Stage N3, and 34.6% in REM. Time in REM supine was 3.0 minutes.    Respiration: Mild obstructive sleep apnea.     Events ? The polysomnogram revealed a presence of 37 obstructive, 2 central, and 2 mixed apneas resulting in an apnea index of 6.4 events per hour. There were 29 obstructive hypopneas and - central hypopneas resulting in an obstructive hypopnea index of 4.5 and central hypopnea index of - events per hour. The combined apnea/hypopnea index was 10.9 events per hour (central apnea/hypopnea index was 0.3 events per hour). The REM AHI was 14.8 events per hour. The supine AHI was 26.7 events per " hour. The RERA index was 5.0 events per hour.  The RDI was 15.9 events per hour.    Snoring - was reported as moderate.    Respiratory rate and pattern - was notable for normal respiratory rate and pattern.    Sustained Sleep Associated Hypoventilation - Transcutaneous carbon dioxide monitoring was not used; however significant hypoventilation was not suggested by oximetry.    Sleep Associated Hypoxemia - (Greater than 5 minutes O2 sat at or below 88%) was present. Baseline oxygen saturation was 92.4%. Lowest oxygen saturation was 71.0%. Time spent less than or equal to 88% was 8.2 minutes. Time spent less than or equal to 89% was 14.8 minutes.    Movement Activity: Negative for significant movement abnormalities.     Periodic Limb Activity - There were 59 PLMs during the entire study. The PLM index was 9.2 movements per hour. The PLM Arousal Index was 4.8 per hour.    REM EMG Activity - Excessive transient/sustained muscle activity was not present.    Nocturnal Behavior - Abnormal sleep related behaviors were not noted during/arising out of NREM / REM sleep.     Bruxism - None apparent.    Cardiac Summary: Sinus rhythm.   The average pulse rate was 71.2 bpm. The minimum pulse rate was 58.0 bpm while the maximum pulse rate was 101.0 bpm.  Arrhythmias were not noted.    Assessment:     This sleep study shows obstructive sleep apnea with an overall apnea hypopnea index in the mild range. Sleep apnea events were exacerbated in supine sleep. Of note, there was limited supine REM.      Recommendations:    Depending on clinical indications for treatment of mild obstructive sleep apnea, consider following therapy options.    If there is excessive daytime sleepiness or other qualifying medical comorbidity, treatment could be empirically initiated with Auto?titrating PAP therapy with a range of 5 to 15 cmH2O. Recommend clinical follow up with sleep management team.    Alternatively, patient may be a candidate for dental  appliance through referral to Sleep Dentistry for the treatment of obstructive sleep apnea.    If devices are not acceptable or effective, patient may benefit from evaluation of possible surgical options. If she is interested, would recommend referral to specialized ENT-Sleep provider.    Diagnostic Codes:   Obstructive Sleep Apnea G47.33  Sleep Hypoxemia G47.36   Repetitive Intrusions Into Sleep F51.8    1/6/2023 [facility] Sleep Study (182.0 lbs) - AHI 10.9, RDI 15.9, Supine AHI 26.7, REM AHI 14.8, Low O2 71.0%, Time Spent ?88% 8.2 minutes / Time Spent ?89% 14.8 minutes.     _____________________________________   Electronically Signed By: Mike Stinson MD 1/10/2023

## 2023-01-19 ENCOUNTER — VIRTUAL VISIT (OUTPATIENT)
Dept: CONSULT | Facility: CLINIC | Age: 73
End: 2023-01-19
Attending: FAMILY MEDICINE
Payer: COMMERCIAL

## 2023-01-19 VITALS — BODY MASS INDEX: 27.32 KG/M2 | HEIGHT: 66 IN | WEIGHT: 170 LBS

## 2023-01-19 DIAGNOSIS — Z13.71 ENCOUNTER FOR NONPROCREATIVE GENETIC COUNSELING AND TESTING: ICD-10-CM

## 2023-01-19 DIAGNOSIS — T50.905A ADVERSE EFFECT OF DRUG, INITIAL ENCOUNTER: ICD-10-CM

## 2023-01-19 DIAGNOSIS — Z71.83 ENCOUNTER FOR NONPROCREATIVE GENETIC COUNSELING AND TESTING: ICD-10-CM

## 2023-01-19 DIAGNOSIS — F41.1 ANXIETY STATE: Primary | ICD-10-CM

## 2023-01-19 DIAGNOSIS — Z78.9 LACK OF DRUG ACTION (PROPERLY PRESCRIBED AND ADMINISTERED): ICD-10-CM

## 2023-01-19 PROCEDURE — 999N000069 HC STATISTIC GENETIC COUNSELING, < 16 MIN: Mod: GT,95

## 2023-01-19 ASSESSMENT — PAIN SCALES - GENERAL: PAINLEVEL: NO PAIN (0)

## 2023-01-19 NOTE — LETTER
1/19/2023      RE: Ana Maria De La Garza  [address] [place] [address]     Dear Colleague,    Thank you for the opportunity to participate in the care of your patient, An aMaria De La Garza, at the [facility] at [facility]. Please see a copy of my visit note below.      1/19/2023     E-Consult has been accepted.    Interprofessional consultation requested by:   Clinical Question/Purpose:      Patient assessment and information reviewed: See below.    Recommendations: Pharmacogenetic testing and result review with [facility] pharmacist.       The recommendations provided in this E-Consult are based on a review of clinical data pertinent to the clinical question presented, without a review of the patient's complete medical record or, the benefit of a comprehensive in-person or virtual patient evaluation. This consultation should not replace the clinical judgement and evaluation of the provider ordering this E-Consult. Any new clinical issues, or changes in patient status since the filing of this E-Consult will need to be taken into account when assessing these recommendations. Please contact me if you have further questions.    My total time spent reviewing clinical information and formulating assessment was 20 minutes.        Jessica Kwon [facility]    Date of visit: 01/19/23    Presenting Information:   Ana Maria De La Garza is a 72 year old female referred to the [facility] at the request of Elise Montgomery MD today. Ana Maria was seen for a genetic counseling appointment to discuss the details of pharmacogenomic testing, including her personal medical history, personal medication history including adverse medication responses, her family history of relevant medication responses, and testing logistics. History is obtained from Sayda and review of the medical record.     Sayda reports a history of anxiety and  "depression spanning over the course of her lifetime.     She is currently taking:   Current Outpatient Medications   Medication     calcium-vitamin D (CALCIUM-VITAMIN D) 500 mg(1,250mg) -200 unit per tablet     celecoxib (CELEBREX) 100 MG capsule     DENTAGEL 1.1 % GEL topical gel     DULoxetine (CYMBALTA) 30 MG capsule     glipiZIDE (GLUCOTROL XL) 10 MG 24 hr tablet     hydrochlorothiazide (HYDRODIURIL) 25 MG tablet     hydrOXYzine (ATARAX) 25 MG tablet     lisinopril (ZESTRIL) 20 MG tablet     LORazepam (ATIVAN) 0.5 MG tablet     multivitamin therapeutic (THERAGRAN) tablet     OMEGA-3/DHA/EPA/FISH OIL (FISH OIL-OMEGA-3 FATTY ACIDS) 300-1,000 mg capsule     red yeast rice 600 mg cap     traZODone (DESYREL) 100 MG tablet     No current facility-administered medications for this visit.     She has also previously tried the following medications:    Venlafaxine - limited therapeutic effect, dosage raised until \"just couldn't take it\"    Gabapentin - body couldn't take it; seems like it affects physically but \"doesn't quiet mind\"; physically uncoordinated    Duloxetine - Really dizzy for a week, stuck with it for a week     Pain medications - nausea     Ana Maria De La Garza is pursuing pharmacogenetic testing because she hopes to better manage her mental health while minimizing side effects and trial-and-error medication process.     Family History: A three generation pedigree was obtained and scanned into the electronic medical record. The relevant portions are described below:      Children- Ana Maria De La Garza has two children. Her son is healthy; he works as a pharmacist. Her daughter is healthy. Her son has a 9 year old son and 11 year old daughter. Her daughter has a 10 year old daughter. Sayda's grandchildren are well.     Siblings- Sayda has a brother and a sister; her sister has been battling lung cancer for four years but is otherwise well. She notes she is not close with her siblings and as such has minimal " information about medication history.     Parents- Sayda's father  at 73 and had a history of hypertension. Sayda's mother lived to age 92 and had a history of hypertension and diabetes.     Family history is otherwise largely non-contributory. Ancestry is Citizen of [place] and French.     Genetic Counseling Discussion:  For review, our bodies are made of cells that contain our chromosomes which are made up of long stretches of DNA containing our genes. Our genes serve as the instructions for our bodies to grow and function. We have two copies of each gene, one inherited from our mother and one inherited from our father.     What pharmacogenomic testing can tell us:  There are several factors that can determine how an individual responds to medications. Some of these factors include environmental factors such as lifestyle choices (diet, alcohol and/or tobacco use) or other medications an individual might be taking, and other factors can include a person's age, sex, ethnic background, disease, and underlying genetic factors. There are several genes in our body that provide instructions for breaking down the medications we take. Changes in these genes (sometimes called variants or polymorphisms/SNPs) can alter how the body breaks down certain medications. For example, a change in a gene can slow down the process of medication breakdown leading to too much of that medication/drug in the body which can cause side effects. On the other hand, a change in a gene can speed up the breakdown of a medication so a therapeutic level is not reached and the medication may not work well at the standard doses. Therefore, identifying changes in these genes via pharmacogenomic testing can help guide which medications might work best for an individual, what dose of a medication may be best, or if a certain medication has a high risk for causing serious side effects.     The pharmacogenomic testing offered at [facility] analyzes several  different polymorphisms in different genes associated with drug metabolism. These variants have been determined to be medically actionable by practice guidelines including CPIC (Clinical Pharmacogenetics Implementation Consortium), PharmGKB and/or FDA gene-drug interaction guidelines. Therefore, prescribing recommendations can be made by the results of this test, so this testing for Ana Maria De La Garza is DIAGNOSTIC and is NOT investigational.     The results of this test can provide guidance for several different types of drugs such as antiinflammatories, antithrombotics, lipid-lowering medication, acid-lowering medications, antiemetics, immunosuppressants, antivirals, antifungals, antidepressants, ADHD medications, antimetabolites, and/or hormone antagonists. This means that, while this testing was recommended for a specific reason right now (Ana Maria De La Garza's mental health management), it may provide guidance for future medication use.     As previously mentioned, we inherit our genes from our parents. This means that some of the pharmacogenomic variants found on this test may be shared by other relatives. These results could be helpful to share with other relatives, but it is important to remember that there are many factors that can contribute to how an individual responds to medications. Relatives should consider their own pharmacogenomics testing to better determine their recommendations and they should consult with their health care providers before making any changes.     What pharmacogenomic testing cannot tell us:  This pharmacogenomic testing is not looking at every known pharmacogenomic polymorphism and therefore the results cannot tell us about every possible gene-drug interaction. It is also not looking at genes outside of the scope of pharmacogenomics, and therefore, the results will not tell us if Ana Maria De La Garza is at risk for other genetic conditions. If Ana Maria De La Garza has questions  about a possible underlying genetic condition, she should talk with her primary care provider about seeking an appointment in our Genetics Clinic.     Testing logistics:  [facility] will try to obtain insurance coverage for the pharmacogenomic testing; however, this is not always a covered service. A cost waiver form (ABN) is required, but cost to the patient is not expected to exceed $350.     A blood or buccal sample will be collected for DNA analysis. The results of this test take 1-2 weeks. An appointment will be made with the pharmacist to discuss these results in detail and to discuss the medication recommendations based on these results. These test results will be accessible in [employer] and may be viewable prior to the appointment with the pharmacist, but NO CHANGES SHOULD BE MADE TO MEDICATIONS BEFORE TALKING TO THE PHARMACIST OR HEALTHCARE PROVIDER.     Ana Maria De La Garza consented to pharmacogenomic testing today. The insurance and billing were explained and she agreed to the billing plan. Due to the fact that this was a virtual visit, we reviewed the content of the consent forms and Sayda gave me verbal permission/consent to sign both on her behalf.    It was a pleasure meeting with Sayda De La Garza today. She vocalized understanding of the information we discussed today and all her questions and concerns were addressed. She has been provided with my contact information should any questions arise regarding our visit or plan moving forward. In total, 20 minutes were spent in televideo counseling with this patient.     Plan:  1. Ana Maria De La Garza will arrange a lab appointment at a [facility] laboratory to have a blood sample drawn for the [facility] Pharmacogenomic Test.   2. Ana Maria De La Garza will be scheduled with one of our [facility] pharmacists 1-2 weeks after her sample is collected to review the results of the Pharmacogenomics Profile. This appointment can be scheduled by calling [phone]  after the sample is collected.     Jessica Kwon MS, [facility]  Genetic Counselor  [facility]   Phone: [phone]  Email: [email or web address]

## 2023-01-19 NOTE — PROGRESS NOTES
1/19/2023     E-Consult has been accepted.    Interprofessional consultation requested by:   Clinical Question/Purpose:      Patient assessment and information reviewed: See below.    Recommendations: Pharmacogenetic testing and result review with [facility] pharmacist.       The recommendations provided in this E-Consult are based on a review of clinical data pertinent to the clinical question presented, without a review of the patient's complete medical record or, the benefit of a comprehensive in-person or virtual patient evaluation. This consultation should not replace the clinical judgement and evaluation of the provider ordering this E-Consult. Any new clinical issues, or changes in patient status since the filing of this E-Consult will need to be taken into account when assessing these recommendations. Please contact me if you have further questions.    My total time spent reviewing clinical information and formulating assessment was 20 minutes.        Jessica Kwon [facility]    Date of visit: 01/19/23    Presenting Information:   Ana Maria De La Garza is a 72 year old female referred to the [facility] at the request of Elise Montgomery MD today. Ana Maria was seen for a genetic counseling appointment to discuss the details of pharmacogenomic testing, including her personal medical history, personal medication history including adverse medication responses, her family history of relevant medication responses, and testing logistics. History is obtained from Sayda and review of the medical record.     Sayda reports a history of anxiety and depression spanning over the course of her lifetime.     She is currently taking:   Current Outpatient Medications   Medication     calcium-vitamin D (CALCIUM-VITAMIN D) 500 mg(1,250mg) -200 unit per tablet     celecoxib (CELEBREX) 100 MG capsule     DENTAGEL 1.1 % GEL topical gel     DULoxetine (CYMBALTA) 30 MG capsule     glipiZIDE (GLUCOTROL XL) 10 MG 24 hr tablet  "    hydrochlorothiazide (HYDRODIURIL) 25 MG tablet     hydrOXYzine (ATARAX) 25 MG tablet     lisinopril (ZESTRIL) 20 MG tablet     LORazepam (ATIVAN) 0.5 MG tablet     multivitamin therapeutic (THERAGRAN) tablet     OMEGA-3/DHA/EPA/FISH OIL (FISH OIL-OMEGA-3 FATTY ACIDS) 300-1,000 mg capsule     red yeast rice 600 mg cap     traZODone (DESYREL) 100 MG tablet     No current facility-administered medications for this visit.     She has also previously tried the following medications:    Venlafaxine - limited therapeutic effect, dosage raised until \"just couldn't take it\"    Gabapentin - body couldn't take it; seems like it affects physically but \"doesn't quiet mind\"; physically uncoordinated    Duloxetine - Really dizzy for a week, stuck with it for a week     Pain medications - nausea     Ana Maria De La Garza is pursuing pharmacogenetic testing because she hopes to better manage her mental health while minimizing side effects and trial-and-error medication process.     Family History: A three generation pedigree was obtained and scanned into the electronic medical record. The relevant portions are described below:      Children- Ana Maria De La Garza has two children. Her son is healthy; he works as a pharmacist. Her daughter is healthy. Her son has a 9 year old son and 11 year old daughter. Her daughter has a 10 year old daughter. Sayda's grandchildren are well.     Siblings- Sayda has a brother and a sister; her sister has been battling lung cancer for four years but is otherwise well. She notes she is not close with her siblings and as such has minimal information about medication history.     Parents- Sayda's father  at 73 and had a history of hypertension. Sayda's mother lived to age 92 and had a history of hypertension and diabetes.     Family history is otherwise largely non-contributory. Ancestry is [place] and [place].     Genetic Counseling Discussion:  For review, our bodies are made of cells that contain " our chromosomes which are made up of long stretches of DNA containing our genes. Our genes serve as the instructions for our bodies to grow and function. We have two copies of each gene, one inherited from our mother and one inherited from our father.     What pharmacogenomic testing can tell us:  There are several factors that can determine how an individual responds to medications. Some of these factors include environmental factors such as lifestyle choices (diet, alcohol and/or tobacco use) or other medications an individual might be taking, and other factors can include a person's age, sex, ethnic background, disease, and underlying genetic factors. There are several genes in our body that provide instructions for breaking down the medications we take. Changes in these genes (sometimes called variants or polymorphisms/SNPs) can alter how the body breaks down certain medications. For example, a change in a gene can slow down the process of medication breakdown leading to too much of that medication/drug in the body which can cause side effects. On the other hand, a change in a gene can speed up the breakdown of a medication so a therapeutic level is not reached and the medication may not work well at the standard doses. Therefore, identifying changes in these genes via pharmacogenomic testing can help guide which medications might work best for an individual, what dose of a medication may be best, or if a certain medication has a high risk for causing serious side effects.     The pharmacogenomic testing offered at [facility] analyzes several different polymorphisms in different genes associated with drug metabolism. These variants have been determined to be medically actionable by practice guidelines including CPIC (Clinical Pharmacogenetics Implementation Consortium), PharmGKB and/or FDA gene-drug interaction guidelines. Therefore, prescribing recommendations can be made by the results of this test, so  this testing for Ana Maria De La Garza is DIAGNOSTIC and is NOT investigational.     The results of this test can provide guidance for several different types of drugs such as antiinflammatories, antithrombotics, lipid-lowering medication, acid-lowering medications, antiemetics, immunosuppressants, antivirals, antifungals, antidepressants, ADHD medications, antimetabolites, and/or hormone antagonists. This means that, while this testing was recommended for a specific reason right now (Ana Maria De La Garza's mental health management), it may provide guidance for future medication use.     As previously mentioned, we inherit our genes from our parents. This means that some of the pharmacogenomic variants found on this test may be shared by other relatives. These results could be helpful to share with other relatives, but it is important to remember that there are many factors that can contribute to how an individual responds to medications. Relatives should consider their own pharmacogenomics testing to better determine their recommendations and they should consult with their health care providers before making any changes.     What pharmacogenomic testing cannot tell us:  This pharmacogenomic testing is not looking at every known pharmacogenomic polymorphism and therefore the results cannot tell us about every possible gene-drug interaction. It is also not looking at genes outside of the scope of pharmacogenomics, and therefore, the results will not tell us if Ana Maria De La Garza is at risk for other genetic conditions. If Ana Maria De La Garza has questions about a possible underlying genetic condition, she should talk with her primary care provider about seeking an appointment in our Genetics Clinic.     Testing logistics:  [facility] will try to obtain insurance coverage for the pharmacogenomic testing; however, this is not always a covered service. A cost waiver form (ABN) is required, but cost to the patient  is not expected to exceed $350.     A blood or buccal sample will be collected for DNA analysis. The results of this test take 1-2 weeks. An appointment will be made with the pharmacist to discuss these results in detail and to discuss the medication recommendations based on these results. These test results will be accessible in [employer] and may be viewable prior to the appointment with the pharmacist, but NO CHANGES SHOULD BE MADE TO MEDICATIONS BEFORE TALKING TO THE PHARMACIST OR HEALTHCARE PROVIDER.     Ana Maria De La Garza consented to pharmacogenomic testing today. The insurance and billing were explained and she agreed to the billing plan. Due to the fact that this was a virtual visit, we reviewed the content of the consent forms and Sayda gave me verbal permission/consent to sign both on her behalf.    It was a pleasure meeting with Sayda De La Garza today. She vocalized understanding of the information we discussed today and all her questions and concerns were addressed. She has been provided with my contact information should any questions arise regarding our visit or plan moving forward. In total, 20 minutes were spent in televideo counseling with this patient.     Plan:  1. Ana Maria De La Garza will arrange a lab appointment at a [facility] laboratory to have a blood sample drawn for the [facility] Pharmacogenomic Test.   2. Ana Maria De La Garza will be scheduled with one of our [facility] pharmacists 1-2 weeks after her sample is collected to review the results of the Pharmacogenomics Profile. This appointment can be scheduled by calling [phone] after the sample is collected.     Jessica Kwon MS, [facility]  Genetic Counselor  [facility]   Phone: [phone]  Email: [email or web address]

## 2023-01-19 NOTE — PROGRESS NOTES
Ana Maria De La Garza  is being evaluated via a billable video visit.      How would you like to obtain your AVS? [employer]  For the video visit, send the invitation by: Text to cell phone: [phone]  Will anyone else be joining your video visit? No

## 2023-01-20 ENCOUNTER — LAB (OUTPATIENT)
Dept: LAB | Facility: CLINIC | Age: 73
End: 2023-01-20
Payer: COMMERCIAL

## 2023-01-20 DIAGNOSIS — F41.1 ANXIETY STATE: ICD-10-CM

## 2023-01-20 DIAGNOSIS — Z78.9 LACK OF DRUG ACTION (PROPERLY PRESCRIBED AND ADMINISTERED): ICD-10-CM

## 2023-01-20 DIAGNOSIS — E11.8 TYPE 2 DIABETES MELLITUS WITH COMPLICATION, WITHOUT LONG-TERM CURRENT USE OF INSULIN (H): ICD-10-CM

## 2023-01-20 DIAGNOSIS — T50.905A ADVERSE EFFECT OF DRUG, INITIAL ENCOUNTER: ICD-10-CM

## 2023-01-20 DIAGNOSIS — N18.30 CHRONIC KIDNEY DISEASE, STAGE 3 (H): ICD-10-CM

## 2023-01-20 DIAGNOSIS — Z13.220 SCREENING FOR HYPERLIPIDEMIA: ICD-10-CM

## 2023-01-20 LAB
CHOLEST SERPL-MCNC: 234 MG/DL
CREAT UR-MCNC: 160 MG/DL
HBA1C MFR BLD: 8.9 % (ref 0–5.6)
HDLC SERPL-MCNC: 37 MG/DL
LAB DIRECTOR RESULTS: NORMAL
LDLC SERPL CALC-MCNC: 140 MG/DL
MICROALBUMIN UR-MCNC: <12 MG/L
MICROALBUMIN/CREAT UR: NORMAL MG/G{CREAT}
NONHDLC SERPL-MCNC: 197 MG/DL
TRIGL SERPL-MCNC: 287 MG/DL

## 2023-01-20 PROCEDURE — 81418 RX METAB GEN SEQ ALYS PNL 6: CPT | Mod: GA | Performed by: FAMILY MEDICINE

## 2023-01-20 PROCEDURE — 82043 UR ALBUMIN QUANTITATIVE: CPT

## 2023-01-20 PROCEDURE — 83036 HEMOGLOBIN GLYCOSYLATED A1C: CPT

## 2023-01-20 PROCEDURE — 80061 LIPID PANEL: CPT

## 2023-01-20 PROCEDURE — 36415 COLL VENOUS BLD VENIPUNCTURE: CPT

## 2023-01-20 PROCEDURE — 82570 ASSAY OF URINE CREATININE: CPT

## 2023-01-20 PROCEDURE — G0452 MOLECULAR PATHOLOGY INTERPR: HCPCS | Mod: 26 | Performed by: PATHOLOGY

## 2023-01-23 NOTE — PATIENT INSTRUCTIONS - HE
ANTICIPATORY GUIDANCE: NINE MONTH    NUTRITION:  Phase out the bottle. Children should get off the bottle as soon as possible after reaching one year of age. Bottle use or nursing past one year contributes to higher rates of tooth decay. If you have a hard time stopping the bottle, you can either fill the bottle with water (which will not harm the teeth) or remove the bottle as soon as it is empty. Encourage the use of a cup. Introduce finger foods: Table food can comprise a full diet as long as it is of a type that the baby can manage without choking. For that reason, avoid nuts, popcorn, raw vegetables, and foods like grapes and large hot dog pieces that, if swallowed whole, could block the airway.     Because we have less sunlight in [place] part of the [place], infants whose only source of milk is mother's milk should have nursing baby vitamins (available without prescription at the drug store) each day.  Formula fed babies should also take vitamins until they are drinking 32 oz of formula a day.      We no longer consider juice a healthy food. Your child does not need juice, unless they have constipation. Amounts of juice over 4 oz per day are associated with an increased risk of obesity.     DEVELOPMENT/BEHAVIOR:  Most babies are becoming more mobile at 9 months.  They may roll, scoot, or crawl.  Some will try to pull up to a standing position. Single consonant sounds (such as \"da\" or \"ba\") will lead to repetitive consonants (\"baba\" or \"gerry\"). You can encourage language development by pointing to objects, pictures, and body parts and saying the name. At this age babies grab everything and it all goes into the mouth. Show your approval for desirable actions with smiles and kind words. Begin to use \"no\" and to look away for a few seconds (a beginner's time-out) when undesirable behavior occurs. Most babies at this age can sleep all night, often ten to twelve hours. Most babies take at least one nap a day, but a few  Your follow-up visit is scheduled Wednesday 11/20 at 1:10pm.   do not nap at all.    ACCIDENT PREVENTION:  · Kitchen: Hot liquids, hot foods, and electrical cords must be kept out of reach. Move cleaning products up from under sinks. Use outlet protectors and hide extension cords.  · Stairs: Use hester or block stairs off with furniture.  · Windows: Use locks or guards, especially on upstairs windows. Babies have fallen through screens by leaning on them.  · Water Safety: Empty any buckets of water. Children have drowned in buckets, especially the five-gallon construction-material type. Fence off permanent pools and drain wading pools when not in use. Never leave Dustin alone in the tub. Even babies who have had swimming classes are not safe alone in the tub. Keep the toilet lid down.  · Medications: If prescribed medication needs to be refrigerated, please store it  at the back of the refrigerator to prevent Dustin from reaching it or spilling it.  · Poisoning:   · Remove the following items from reach or place them in a locked cabinet: Cleaning products, Kerosene (lamp oil), Paint, Pesticides, Purses (which sometimes contain medicines), Plants, Medicines, including vitamins and birth control pills.  · Store medicines and  out of sight.   · Be especially careful when visiting older persons or families without children in the house. They may be in the habit of keeping their medicines out on tables.  · Call the Poison Center at 1-417.714.6967 for any known or suspected poisoning.     CAR SAFETY:  An approved car seat in the back seat of the car is required by [place] law until Dustin is four years old and weighs at least 40 pounds. A rear-facing car seat in the back seat is the safest place for him. This is especially true if your car is equipped with passenger-side air bags. Forward-facing seats are not recommended until your child is over two years of age.    SMOKING: Children exposed to tobacco smoke have more ear infections and pneumonia. Cold symptoms last longer.  If you smoke, please quit.  If you cannot quit, smoke outside. Do not smoke near Dustin, and do not let others smoke near them.     TEETHING:  Teething children may have no symptoms at all, or they may experience drooling, rashes, crabbiness, or changes in diet. Do not assume that a fever is due to teething. Temperatures over 101 degrees are usually from some other cause. Once the teeth erupt, you should begin cleaning [place] teeth with a washcloth, gauze, or soft toothbrush. Toothpaste is not needed yet.           SUN EXPOSURE: Sunscreen should be used for all children 6 months and older.     MEDICATION FOR FEVER OR PAIN:   Acetaminophen liquid (e.g., Tylenol or Tempra) may be given every four hours as needed for pain or fever.  Be sure to check which product CONCENTRATION you are using.    INFANT/CHILDREN’S Tylenol/Acetaminophen  (160 MG/5 mL)  Child’s Weight:            Dose:  12 - 17 pounds:           80 mg (2.5 mL  (1/2 Teaspoon))  18 - 23 pounds:           120 mg (3.75 mL (3/4 Teaspoon))    INFANT Ibuprofen (50 mg/1.25 ml) liquid (for example Advil or Motrin) may be given every 6 hours as needed for pain or fever.  Child’s Weight:            Dose:  12 - 17 pounds:           50 mg (1.25 mL)    18 - 23 pounds:           75 mg (1.875 mL)    CHILDREN'S Ibuprofen (100 mg/5 mL) liquid (for example Advil or Motrin) may be given every 6 hours as needed for pain or fever.  Child’s Weight:            Dose:  12 - 17 pounds:           50 mg (2.5 mL (1/2 Teaspoon))  18 - 23 pounds:           75 mg (3.75  mL (3/4 Teaspoon))    If Dustin is outside these weight ranges, call your pediatrician's office for advice         IMMUNIZATIONS:  If Dustin develops any of the following reactions within 72 hours after an immunization, notify your pediatrician by calling the pediatric phone nurse:  · A temperature of 105 degrees or above.  · More than 3 hours of continuous crying.  · A shrill, high-pitched cry.  · A pale, limp  spell.  · A seizure or fainting spell. In this case, you should call 911 or go immediately to the emergency room.    NEXT VISIT: ONE YEAR OF AGE       Topical Sulfur Applications Pregnancy And Lactation Text: This medication is Pregnancy Category C and has an unknown safety profile during pregnancy. It is unknown if this topical medication is excreted in breast milk.

## 2023-01-27 NOTE — PROGRESS NOTES
Medication Therapy Management (MTM) Encounter    ASSESSMENT:                            Anxiety: Today we reviewed the results of her pharmacogenetics test.  She is an intermediate metabolizer for CYP 2C9 and a poor metabolizer for CYP 3A5.  We discussed that duloxetine does not go through these pathways, therefore since she has not seen a significant improvement in her anxiety, I would recommend increasing her dose to 60 mg daily.  I encouraged her to continue to use trazodone at bedtime and hydroxyzine as needed.  It sounds like she has not needed the lorazepam, but discussed that she can use it as needed for severe anxiety.    Dizziness: I recommended that she discuss this further with PCP on 2/9/2023.    Type 2 Diabetes: Last A1c not at goal less than 7%.  Reviewed that she should restart checking her blood sugars at home so that we can have an idea of how high her blood sugars are.  She would benefit from initiating a weekly GLP-1 agonist, but since we are making changes to her duloxetine, we will hold off on this for now.  I recommended that she start taking a baby aspirin especially since she is not taking a statin.  She may also benefit from Zetia or a PCSK9 inhibitor in the future.    Hypertension: Last blood pressure was near goal <130/80 mmHg.  We will continue to monitor.    Pain: She will continue to follow with orthopedics.  Recommended that she try celecoxib twice daily as prescribed, this may help with her morning headaches.  In addition, we will watch to see if increasing the duloxetine improves her pain.    Supplements: Okay to continue current supplements, but we did discuss that she could use vitamin C only as needed.  Last vitamin D level was at goal.  She is due for a DEXA scan, will defer to be discussed further at her upcoming physical.  I did recommend that she restart taking calcium at about 400 to 500 mg daily since she is not getting much in her diet, but her multivitamin likely contains  some calcium.      PLAN:                            1.  Pharmacogenetics results discussed today  2.  Increase duloxetine to 60 mg daily  3.  Restart checking blood sugars at home  4.  Restart taking aspirin 81 mg daily  5.  Increase celecoxib to 100 mg twice daily  6.  Start taking calcium carbonate 400-500 mg daily    Follow-up: 2/9/2023 physical with Dr. Navarrete, I will follow-up that day or after    SUBJECTIVE/OBJECTIVE:                          Ana Maria De La Garza is a 72 year old female contacted via secure video for an initial visit. She was referred to me from Dr. Montgomery and genetic counselor.    Reason for visit: Review PGx results   Medication Adherence/Access: Sayda was familiar with her medications and had them in front of her in order to confirm the doses.    Anxiety: Continues duloxetine 30 mg daily, hydroxyzine 25 mg as needed, and trazodone 100 mg nightly.  She met with Dr. Montgomery on 1/4/2023 and citalopram was discontinued, duloxetine, hydroxyzine, and lorazepam were all started.  She has not noticed any significant change since starting duloxetine.  The first week she was off balance, but that improved.  She always has underlying balance issues and has seen PT in the past.  Currently she is using trazodone at bedtime and if that is not helpful she will take hydroxyzine, but she is not using it nightly.  She has not used the lorazepam at all because she concerned about her breathing.  Her main issue is anxiety, it is affecting her sleep since she cannot shut off her thoughts.  She does have anxiety during the day as well.  No panic attacks.  She does also have a history of depression.  Past medications used:  Sertraline - stopped working  Fluoxetine -   Venlafaxine -   Citalopram     Dizziness: This is mainly a gait issue she reports.  Reports that she feels like she is going around a corner but her body wants to keep going straight.  She did complete PT and is not sure if it did anything.  She denies any  orthostatic hypotension.    Type 2 Diabetes: Currently taking glipizide XL 10 mg daily .   Body aches/headache with metformin  Tests BG 0 times daily.  She did recently get a new glucometer but is not in the habit of checking.  Last A1c checked 1/20/23 = 8.9%.   Hypoglycemia none  Hyperglycemia symptoms: none.   Microalbumin checked 1/20/2023  Is not taking aspirin 81 mg for primary prevention.   Is not taking a statin -myalgias with atorvastatin and pravastatin  Lab Test 01/20/23  0953 02/02/22  1040   CHOL 234* 231*   HDL 37* 42*   * 143*   TRIG 287* 228*   The 10-year ASCVD risk score (Merari GALINDO, et al., 2019) is: 33.7%    Values used to calculate the score:      Age: 72 years      Sex: Female      Is Non- : No      Diabetic: Yes      Tobacco smoker: No      Systolic Blood Pressure: 132 mmHg      Is BP treated: Yes      HDL Cholesterol: 37 mg/dL      Total Cholesterol: 234 mg/dL      Hypertension: Currently taking hydrochlorothiazide 25 mg daily and lisinopril 20 mg daily. Patient does not monitor BP at home.  BP Readings from Last 3 Encounters:   12/13/22 132/84   10/03/22 138/78   09/07/22 (!) 144/76     Pain: Currently taking celecoxib 100 mg daily AM.  Reports that she has a history of shoulder pain, she recently had shoulder surgery.  She also wakes up every morning with a headache due to neck pain, is working on getting nerve blocks.  Tylenol is not helpful.    Supplements: Vitamin D 50 mcg daily, vitamin C at 1000 mg daily, MVI, fish oil, and red yeast rice.   Last DEXA in 2018 showed a T score of -2.2  Dietary calcium = occasional lactose milk, sometimes yogurt.  She loves cheese but has been eating less due to her cholesterol.  Lab Results   Component Value Date    VITDT 50 08/02/2022         Today's Vitals: LMP  (LMP Unknown)      Allergies/ADRs: Reviewed in chart  Past Medical History: Reviewed in chart  Tobacco: She reports that she has never smoked. She has never used  smokeless tobacco.  Alcohol: Reviewed in chart    ----------------    I spent 60 minutes with this patient today. All changes were made via collaborative practice agreement with Adelina Donaldson MD. A copy of the visit note was provided to the patient's provider(s).    A summary of these recommendations was sent via [employer].    Kylie Jung, Pharm.D., BCACP   Medication Therapy Management Pharmacist   [facility]     Telemedicine Visit Details  Type of service:  Video Conference via [employer]  Start Time: 9 AM  End Time: 10 AM     Medication Therapy Recommendations  Anxiety state    Current Medication: DULoxetine (CYMBALTA) 30 MG capsule (Discontinued)   Rationale: Dose too low - Dosage too low - Effectiveness   Recommendation: Increase Dose   Status: Accepted per CPA         Cervical spondylosis without myelopathy    Current Medication: celecoxib (CELEBREX) 100 MG capsule   Rationale: Does not understand instructions - Adherence - Adherence   Recommendation: Provide Education   Status: Patient Agreed - Adherence/Education         Osteopenia    Current Medication: calcium-vitamin D (CALCIUM-VITAMIN D) 500 mg(1,250mg) -200 unit per tablet   Rationale: Patient forgets to take - Adherence - Adherence   Recommendation: Provide Adherence Intervention   Status: Accepted - no CPA Needed         Type 2 diabetes mellitus with complication, without long-term current use of insulin (H)    Rationale: Preventive therapy - Needs additional medication therapy - Indication   Recommendation: Start Medication - aspirin 81 MG EC tablet   Status: Accepted - no CPA Needed

## 2023-01-30 ENCOUNTER — VIRTUAL VISIT (OUTPATIENT)
Dept: PHARMACY | Facility: CLINIC | Age: 73
End: 2023-01-30
Payer: COMMERCIAL

## 2023-01-30 DIAGNOSIS — M85.80 OSTEOPENIA, UNSPECIFIED LOCATION: ICD-10-CM

## 2023-01-30 DIAGNOSIS — I10 ESSENTIAL HYPERTENSION, BENIGN: ICD-10-CM

## 2023-01-30 DIAGNOSIS — E11.8 TYPE 2 DIABETES MELLITUS WITH COMPLICATION, WITHOUT LONG-TERM CURRENT USE OF INSULIN (H): ICD-10-CM

## 2023-01-30 DIAGNOSIS — R42 DIZZINESS: Primary | ICD-10-CM

## 2023-01-30 DIAGNOSIS — F41.1 ANXIETY STATE: ICD-10-CM

## 2023-01-30 DIAGNOSIS — M50.30 DEGENERATION OF CERVICAL INTERVERTEBRAL DISC: ICD-10-CM

## 2023-01-30 PROCEDURE — 99605 MTMS BY PHARM NP 15 MIN: CPT | Performed by: PHARMACIST

## 2023-01-30 PROCEDURE — 99607 MTMS BY PHARM ADDL 15 MIN: CPT | Performed by: PHARMACIST

## 2023-01-30 RX ORDER — MULTIVIT WITH MINERALS/LUTEIN
1000 TABLET ORAL DAILY PRN
COMMUNITY
End: 2023-02-08

## 2023-01-30 RX ORDER — DULOXETIN HYDROCHLORIDE 60 MG/1
60 CAPSULE, DELAYED RELEASE ORAL DAILY
Qty: 90 CAPSULE | Refills: 0 | Status: SHIPPED | OUTPATIENT
Start: 2023-01-30 | End: 2023-03-01

## 2023-01-30 RX ORDER — CHOLECALCIFEROL (VITAMIN D3) 50 MCG
1 TABLET ORAL DAILY
COMMUNITY

## 2023-01-30 NOTE — LETTER
_  Medication List        Prepared on: Jan 30, 2023     Bring your Medication List when you go to the doctor, hospital, or   emergency room. And, share it with your family or caregivers.     Note any changes to how you take your medications.  Cross out medications when you no longer use them.    Medication How I take it Why I use it Prescriber   calcium-vitamin D (CALCIUM-VITAMIN D) 500 mg(1,250mg) -200 unit per tablet Take 1 tablet by mouth daily  bone health Adelina Donaldson MD   celecoxib (CELEBREX) 100 MG capsule Take 1 capsule (100 mg) by mouth 2 times daily Arthritis Elise Montgomery MD   DULoxetine (CYMBALTA) 60 MG capsule Take 1 capsule (60 mg) by mouth daily Anxiety Adelina Donaldson MD   glipiZIDE (GLUCOTROL XL) 10 MG 24 hr tablet Take 1 tablet (10 mg) by mouth daily Type 2 diabetes  TONNY Jackson CNP   hydrochlorothiazide 25 MG tablet TAKE 1 TABLET BY MOUTH ONCE DAILY Hypertension Adelina Donaldson MD   hydrOXYzine (ATARAX) 25 MG tablet Take 1 tablet (25 mg) by mouth every 6 hours as needed for anxiety Anxiety  Elise Montgomery MD   lisinopril (ZESTRIL) 20 MG tablet Take 1 tablet (20 mg) by mouth daily Hypertension Elise Montgomery MD   LORazepam (ATIVAN) 0.5 MG tablet Take 1 tablet (0.5 mg) by mouth every 8 hours as needed for anxiety or sleep Anxiety Elise Montgomery MD   multivitamin therapeutic (THERAGRAN) tablet Take 1 tablet by mouth daily. General Health   Adelina Donaldson MD   OMEGA-3/DHA/EPA/FISH OIL 2,000 mg capsule Take 2 g by mouth daily.  Cholesterol Adelina Donaldson MD   red yeast rice 600 mg cap  take 1 capsule by mouth daily  Cholesterol Adelina Donaldson MD   traZODone (DESYREL) 100 MG tablet Take 1 tablet (100 mg) by mouth at Bedtime Anxiety Marko Hernandez MD   vitamin C (ASCORBIC ACID) 1000 MG TABS Take 1,000 mg by mouth daily as needed General Health   Adelina Donaldson MD   vitamin D3 (CHOLECALCIFEROL) 50 mcg (2000 units) tablet Take 1  tablet by mouth daily  bone health Adelina Donaldson MD         Add new medications, over-the-counter drugs, herbals, vitamins, or  minerals in the blank rows below.    Medication How I take it Why I use it Prescriber                          Allergies:      atorvastatin; metformin; morphine (pf) [morphine]; pravastatin        Side effects I have had:               Other Information:              My notes and questions:

## 2023-01-30 NOTE — LETTER
January 30, 2023  Ana Maria De La Garza  [address] [place] [address]    Dear Ms. De La Garza, M [facility]     Thank you for talking with me on Jan 30, 2023 about your health and medications. As a follow-up to our conversation, I have included two documents:      1. Your Recommended To-Do List has steps you should take to get the best results from your medications.  2. Your Medication List will help you keep track of your medications and how to take them.    If you want to talk about these documents, please call Kylie Jung PharmD at phone: [phone], Monday-Friday 8-4:30pm.    I look forward to working with you and your doctors to make sure your medications work well for you.    Sincerely,  Kylie Jung PharmD  [facility] Pharmacist, [facility]

## 2023-01-30 NOTE — LETTER
"Recommended To-Do List      Prepared on: Jan 30, 2023       You can get the best results from your medications by completing the items on this \"To-Do List.\"      Bring your To-Do List when you go to your doctor. And, share it with your family or caregivers.    My To-Do List:  What we talked about: What I should do:   Your medication dosage being too low    Increase your dosage of DULoxetine (CYMBALTA) to 60 mg daily           What we talked about: What I should do:   The importance of taking your medication as intended    Increase celecoxib (Celebrex) to 100 mg twice daily           What we talked about: What I should do:   The importance of taking your medication as intended    Reminder to take your medication as prescribed for calcium carbonate-vitamin D -- aim for about 400-500 mg calcium supplement per day           What we talked about: What I should do:   A new medication that may be of benefit to you    Start taking aspirin 81 mg daily           What we talked about: What I should do:                       "

## 2023-02-03 ENCOUNTER — MYC MEDICAL ADVICE (OUTPATIENT)
Dept: FAMILY MEDICINE | Facility: CLINIC | Age: 73
End: 2023-02-03
Payer: COMMERCIAL

## 2023-02-08 ASSESSMENT — ENCOUNTER SYMPTOMS
BREAST MASS: 0
DIZZINESS: 1

## 2023-02-08 ASSESSMENT — ACTIVITIES OF DAILY LIVING (ADL): CURRENT_FUNCTION: NO ASSISTANCE NEEDED

## 2023-02-08 NOTE — PROGRESS NOTES
"Medication Therapy Management (MTM) Encounter    ASSESSMENT:                            Type 2 Diabetes: Last A1c not at goal less than 7%.  Home blood sugars are elevated.  Today we discussed initiating a weekly GLP-1 agonist.  She is interested, but she is worried about starting before she leaves for [place].  Today we discussed Ozempic and Trulicity.  I will look into the insurance coverage of both and we will initiate when she returns.  We did discuss administration, mechanism of action, and side effects today.  For now, we will have her increase glipizide to twice daily while she is in [place], and we will decrease it back when she returns.  Continue aspirin 81 mg daily.  She may also benefit from Zetia or a PCSK9 inhibitor in the future.    Anxiety: Patient is doing better on duloxetine 60 mg daily    Hypertension: Today's blood pressure was near goal <130/80 mmHg.  We will continue to monitor.    Pain: She will continue to follow with orthopedics.   Continue celecoxib twice daily.    Supplements: Okay to continue current supplements.  Last vitamin D level was at goal. DEXA was ordered today by PCP.    PLAN:                            1.  Increase glipizide XL 10 mg to twice daily today temporarily while in [place]   2.  I will look into coverage for Ozempic 0.25/0.5 mg weekly    Follow-up: After returning from [place] around 2/28/23    SUBJECTIVE/OBJECTIVE:                          Ana Maria De La Garza is a 72 year old female seen in person a follow-up visit. She was referred to me from Dr. Montgomery and genetic counselor initially.  She met with Dr. Navarrete today for an annual wellness visit prior to MTM appointment. Her  joins us today.     Reason for visit: Discuss diabetes  Medication Adherence/Access: Sayda was familiar with her medications. She is leaving for [place] on 2/14/2023 and returns on 2/27/23.    Type 2 Diabetes: Currently taking glipizide XL 10 mg daily.  Per 2/3 [facility] message \" I have been " checking my blood sugar and am concerned.  It has been high: 300, 253, 230, and I'm wondering if I should do anything about it now? I have been on strict diet. Guess glipizide isn't working.  A weekly GLP-1 agonist was recommended per PCP.  She brings her blood sugars today:   Before breakfast: 232, 169  After breakfast: 163, 214  Before lunch: 300  Before dinner: 193  After dinner: 253, 236, 206  Last A1c checked 1/20/23 = 8.9%.   Microalbumin checked 1/20/2023  Is not taking a statin -myalgias with atorvastatin and pravastatin  At last MTM appointment I recommended starting aspirin 81 mg daily, she did restart this.  Lab Test 01/20/23  0953 02/02/22  1040   CHOL 234* 231*   HDL 37* 42*   * 143*   TRIG 287* 228*   The 10-year ASCVD risk score (Merari GALINDO, et al., 2019) is: 35.3%    Values used to calculate the score:      Age: 72 years      Sex: Female      Is Non- : No      Diabetic: Yes      Tobacco smoker: No      Systolic Blood Pressure: 136 mmHg      Is BP treated: Yes      HDL Cholesterol: 37 mg/dL      Total Cholesterol: 234 mg/dL    Anxiety: Continues duloxetine 60 mg daily, hydroxyzine 25 mg as needed, and trazodone 100 mg nightly.  She met with Dr. Montgomery on 1/4/2023 and citalopram was discontinued, duloxetine, hydroxyzine, and lorazepam were all started.  She is using trazodone at bedtime and if that is not helpful she will take hydroxyzine, but she is not using it nightly.  She has not used the lorazepam at all because she concerned about her breathing.  At last MTM appointment we increase duloxetine from 30 mg to 60 mg, she has noticed an improvement already in her mood.  Past medications used:  Sertraline - stopped working  Fluoxetine -   Venlafaxine -   Citalopram     KATT-7 SCORE 2/2/2022 4/29/2022 1/4/2023   Total Score - 11 (moderate anxiety) 16 (severe anxiety)   Total Score 16 11 16     PHQ-9 score:    PHQ 1/4/2023   PHQ-9 Total Score 5   Q9: Thoughts of better off  "dead/self-harm past 2 weeks Not at all       Hypertension: Currently taking hydrochlorothiazide 25 mg daily and lisinopril 20 mg daily. Patient does not monitor BP at home.  BP Readings from Last 3 Encounters:   02/09/23 136/72   12/13/22 132/84   10/03/22 138/78     Pain: Currently taking celecoxib 100 mg twice daily.  Increased from once daily a.m. at last MTM appointment.  So far she has found this helpful and she is able to walk more.    Supplements: Vitamin D 50 mcg daily, vitamin C at 1000 mg daily, MVI, fish oil, and red yeast rice.   At last MTM appointment we initiated her on calcium carbonate 400 to 500 mg daily  Last DEXA in 2018 showed a T score of -2.2  Dietary calcium = occasional lactose milk, sometimes yogurt.  She loves cheese but has been eating less due to her cholesterol..  Ordered today per PCP  Lab Results   Component Value Date    VITDT 50 08/02/2022         Today's Vitals:   BP Readings from Last 1 Encounters:   02/09/23 136/72     Pulse Readings from Last 1 Encounters:   02/09/23 88     Wt Readings from Last 1 Encounters:   02/09/23 178 lb 14.4 oz (81.1 kg)     Ht Readings from Last 1 Encounters:   02/09/23 5' 5.75\" (1.67 m)     Estimated body mass index is 29.1 kg/m  as calculated from the following:    Height as of an earlier encounter on 2/9/23: 5' 5.75\" (1.67 m).    Weight as of an earlier encounter on 2/9/23: 178 lb 14.4 oz (81.1 kg).    Temp Readings from Last 1 Encounters:   02/09/23 98.6  F (37  C) (Temporal)         Allergies/ADRs: Reviewed in chart  Past Medical History: Reviewed in chart  Tobacco: She reports that she has never smoked. She has never used smokeless tobacco.  Alcohol: Reviewed in chart    ----------------    I spent 15 minutes with this patient today. All changes were made via collaborative practice agreement with Dr. Kristen Navarrete. A copy of the visit note was provided to the patient's provider(s).    A summary of these recommendations was sent via " Manda.    Kylie Jung, Pharm.D., BCACP   Medication Therapy Management Pharmacist   [facility]        Medication Therapy Recommendations  Type 2 diabetes mellitus with complication, without long-term current use of insulin (H)    Current Medication: glipiZIDE (GLUCOTROL XL) 10 MG 24 hr tablet (Discontinued)   Rationale: Dose too low - Dosage too low - Effectiveness   Recommendation: Increase Frequency   Status: Accepted per CPA          Current Medication: semaglutide (OZEMPIC, 0.25 OR 0.5 MG/DOSE,) 2 MG/1.5ML SOPN pen   Rationale: More effective medication available - Ineffective medication - Effectiveness   Recommendation: Start Medication   Status: Accepted per CPA

## 2023-02-09 ENCOUNTER — OFFICE VISIT (OUTPATIENT)
Dept: FAMILY MEDICINE | Facility: CLINIC | Age: 73
End: 2023-02-09
Payer: COMMERCIAL

## 2023-02-09 ENCOUNTER — OFFICE VISIT (OUTPATIENT)
Dept: PHARMACY | Facility: CLINIC | Age: 73
End: 2023-02-09
Payer: COMMERCIAL

## 2023-02-09 VITALS
SYSTOLIC BLOOD PRESSURE: 136 MMHG | TEMPERATURE: 98.6 F | DIASTOLIC BLOOD PRESSURE: 72 MMHG | HEIGHT: 66 IN | OXYGEN SATURATION: 96 % | WEIGHT: 178.9 LBS | HEART RATE: 88 BPM | BODY MASS INDEX: 28.75 KG/M2

## 2023-02-09 DIAGNOSIS — G47.00 INSOMNIA, UNSPECIFIED TYPE: ICD-10-CM

## 2023-02-09 DIAGNOSIS — Z78.0 ASYMPTOMATIC POSTMENOPAUSAL STATUS: ICD-10-CM

## 2023-02-09 DIAGNOSIS — Z12.31 ENCOUNTER FOR SCREENING MAMMOGRAM FOR MALIGNANT NEOPLASM OF BREAST: ICD-10-CM

## 2023-02-09 DIAGNOSIS — E78.2 MIXED HYPERLIPIDEMIA: ICD-10-CM

## 2023-02-09 DIAGNOSIS — F33.42 MAJOR DEPRESSIVE DISORDER, RECURRENT EPISODE, IN FULL REMISSION (H): ICD-10-CM

## 2023-02-09 DIAGNOSIS — Z00.00 MEDICARE ANNUAL WELLNESS VISIT, SUBSEQUENT: Primary | ICD-10-CM

## 2023-02-09 DIAGNOSIS — F41.1 ANXIETY STATE: Primary | ICD-10-CM

## 2023-02-09 DIAGNOSIS — F41.1 ANXIETY STATE: ICD-10-CM

## 2023-02-09 DIAGNOSIS — R26.81 UNSTEADINESS: ICD-10-CM

## 2023-02-09 DIAGNOSIS — I10 ESSENTIAL HYPERTENSION, BENIGN: ICD-10-CM

## 2023-02-09 DIAGNOSIS — E11.8 TYPE 2 DIABETES MELLITUS WITH COMPLICATION, WITHOUT LONG-TERM CURRENT USE OF INSULIN (H): ICD-10-CM

## 2023-02-09 DIAGNOSIS — M50.30 DEGENERATION OF CERVICAL INTERVERTEBRAL DISC: ICD-10-CM

## 2023-02-09 DIAGNOSIS — N18.31 STAGE 3A CHRONIC KIDNEY DISEASE (H): ICD-10-CM

## 2023-02-09 DIAGNOSIS — M85.80 OSTEOPENIA, UNSPECIFIED LOCATION: ICD-10-CM

## 2023-02-09 PROCEDURE — G0439 PPPS, SUBSEQ VISIT: HCPCS | Performed by: FAMILY MEDICINE

## 2023-02-09 PROCEDURE — 99606 MTMS BY PHARM EST 15 MIN: CPT | Performed by: PHARMACIST

## 2023-02-09 PROCEDURE — 99214 OFFICE O/P EST MOD 30 MIN: CPT | Mod: 25 | Performed by: FAMILY MEDICINE

## 2023-02-09 RX ORDER — GLIPIZIDE 10 MG/1
10 TABLET, FILM COATED, EXTENDED RELEASE ORAL 2 TIMES DAILY
Qty: 180 TABLET | Refills: 4 | Status: SHIPPED | OUTPATIENT
Start: 2023-02-09 | End: 2023-08-03

## 2023-02-09 RX ORDER — SEMAGLUTIDE 1.34 MG/ML
INJECTION, SOLUTION SUBCUTANEOUS
Qty: 1.5 ML | Refills: 0 | Status: SHIPPED | OUTPATIENT
Start: 2023-02-09 | End: 2023-05-23

## 2023-02-09 ASSESSMENT — PAIN SCALES - GENERAL: PAINLEVEL: NO PAIN (0)

## 2023-02-09 ASSESSMENT — ENCOUNTER SYMPTOMS
BREAST MASS: 0
DIZZINESS: 1

## 2023-02-09 ASSESSMENT — ACTIVITIES OF DAILY LIVING (ADL): CURRENT_FUNCTION: NO ASSISTANCE NEEDED

## 2023-02-09 NOTE — PATIENT INSTRUCTIONS
Patient Education   Personalized Prevention Plan  You are due for the preventive services outlined below.  Your care team is available to assist you in scheduling these services.  If you have already completed any of these items, please share that information with your care team to update in your medical record.  Health Maintenance Due   Topic Date Due     Depression Action Plan  Never done     Diabetic Foot Exam  02/02/2023       Signs of Hearing Loss      Hearing much better with one ear can be a sign of hearing loss.   Hearing loss is a problem shared by many people. In fact, it is one of the most common health problems, particularly as people age. Most people age 65 and older have some hearing loss. By age 80, almost everyone does. Hearing loss often occurs slowly over the years. So you may not realize your hearing has gotten worse.  Have your hearing checked  Call your healthcare provider if you:    Have to strain to hear normal conversation    Have to watch other people s faces very carefully to follow what they re saying    Need to ask people to repeat what they ve said    Often misunderstand what people are saying    Turn the volume of the television or radio up so high that others complain    Feel that people are mumbling when they re talking to you    Find that the effort to hear leaves you feeling tired and irritated    Notice, when using the phone, that you hear better with one ear than the other  [place] last reviewed this educational content on 1/1/2020 2000-2021 [employer]. All rights reserved. This information is not intended as a substitute for professional medical care. Always follow your healthcare professional's instructions.          Urinary Incontinence, Female (Adult)   Urinary incontinence means loss of bladder control. This problem affects many women, especially as they get older. If you have incontinence, you may be embarrassed to ask for help. But know that this  problem can be treated.   Types of Incontinence  There are different types of incontinence. Two of the main types are described here. You can have more than one type.     Stress incontinence. With this type, urine leaks when pressure (stress) is put on the bladder. This may happen when you cough, sneeze, or laugh. Stress incontinence most often occurs because the pelvic floor muscles that support the bladder and urethra are weak. This can happen after pregnancy and vaginal childbirth or a hysterectomy. It can also be due to excess body weight or hormone changes.    Urge incontinence (also called overactive bladder). With this type, a sudden urge to urinate is felt often. This may happen even though there may not be much urine in the bladder. The need to urinate often during the night is common. Urge incontinence most often occurs because of bladder spasms. This may be due to bladder irritation or infection. Damage to bladder nerves or pelvic muscles, constipation, and certain medicines can also lead to urge incontinence.  Treatment depends on the cause. Further evaluation is needed to find the type you have. This will likely include an exam and certain tests. Based on the results, you and your healthcare provider can then plan treatment. Until a diagnosis is made, the home care tips below can help ease symptoms.   Home care    Do pelvic floor muscle exercises, if they are prescribed. The pelvic floor muscles help support the bladder and urethra. Many women find that their symptoms improve when doing special exercises that strengthen these muscles. To do the exercises, contract the muscles you would use to stop your stream of urine. But do this when you re not urinating. Hold for 10 seconds, then relax. Repeat 10 to 20 times in a row, at least 3 times a day. Your healthcare provider may give you other instructions for how to do the exercises and how often.    Keep a bladder diary. This helps track how often and how  much you urinate over a set period of time. Bring this diary with you to your next visit with the provider. The information can help your provider learn more about your bladder problem.    Lose weight, if advised to by your provider. Extra weight puts pressure on the bladder. Your provider can help you create a weight-loss plan that s right for you. This may include exercising more and making certain diet changes.    Don't have foods and drinks that may irritate the bladder. These can include alcohol and caffeinated drinks.    Quit smoking. Smoking and other tobacco use can lead to a long-term (chronic) cough that strains the pelvic floor muscles. Smoking may also damage the bladder and urethra. Talk with your provider about treatments or methods you can use to quit smoking.    If drinking large amounts of fluid makes you have symptoms, you may be advised to limit your fluid intake. You may also be advised to drink most of your fluids during the day and to limit fluids at night.    If you re worried about urine leakage or accidents, you may wear absorbent pads to catch urine. Change the pads often. This helps reduce discomfort. It may also reduce the risk of skin or bladder infections.    Follow-up care  Follow up with your healthcare provider, or as directed. It may take some to find the right treatment for your problem. But healthy lifestyle changes can be made right away. These include such things as exercising on a regular basis, eating a healthy diet, losing weight (if needed), and quitting smoking. Your treatment plan may include special therapies or medicines. Certain procedures or surgery may also be options. Talk about any questions you have with your provider.   When to seek medical advice  Call the healthcare provider right away if any of these occur:    Fever of 100.4 F (38 C) or higher, or as directed by your provider    Bladder pain or fullness    Belly swelling    Nausea or vomiting    Back  pain    Weakness, dizziness, or fainting  [place] last reviewed this educational content on 1/1/2020 2000-2021 [employer]. All rights reserved. This information is not intended as a substitute for professional medical care. Always follow your healthcare professional's instructions.

## 2023-02-09 NOTE — PROGRESS NOTES
SUBJECTIVE:   Sayda is a 72 year old who presents for Preventive Visit.    Patient has been advised of split billing requirements and indicates understanding: Yes  Are you in the first 12 months of your Medicare coverage?  No    Here with her , Abimael.  Notes that in general she is been having more health problems after her shoulder surgery.  Specifically she is noted weight gain, increased stress, sleep difficulties, head and neck pain, and intermittent vertigo.  Has had longstanding difficulties with poor balance for last few years but worsened recently keeping her from using a kayak or paddle board.  When she is walking on she will reach for her 's arm especially with a longer walk or going up or down hills or stairs.  Can vary day-to-day, independent of how she is feeling otherwise or if she has eaten.  Feels that her body becomes tense and pushes forward.  She has apical therapy previously, has not been doing those exercises recently.  She is not regularly checking her blood sugars, is working on initiating a GLP-1 inhibitor with the assistance of pharmacy, metformin caused a headache and she remains compliant with glipizide but with inadequate control.  Remains on lisinopril due to chronic kidney disease stage III.  History of hypertension for which she is taking lisinopril and hydrochlorothiazide.  She does not tolerate that medication so she is taking Red cell yeast for her lipids.  Remains on duloxetine 60 mg in management of her depression and anxiety, this seems helpful.  Rare hydroxyzine or lorazepam.  She takes trazodone at bedtime to help with her sleep.  States she had a recent sleep study with a encouraged her to consider CPAP but she has not pursued.  History of osteopenia with high risk of fracture, we had done further evaluation but she was never started on treatment for this.  She had a left shoulder replacement last fall, is doing okay for shoulder standpoint though still improving.   "Uses Celebrex for management of hip and knee osteoarthritis symptoms.    Overall eating very healthy.  Exercising by walking or biking for 30 minutes most days.    Healthy Habits:     In general, how would you rate your overall health?  Good    Frequency of exercise:  4-5 days/week    Duration of exercise:  30-45 minutes    Do you usually eat at least 4 servings of fruit and vegetables a day, include whole grains    & fiber and avoid regularly eating high fat or \"junk\" foods?  Yes    Taking medications regularly:  Yes    Ability to successfully perform activities of daily living:  No assistance needed    Home Safety:  No safety concerns identified    Hearing Impairment:  Need to ask people to speak up or repeat themselves    In the past 6 months, have you been bothered by leaking of urine? Yes    In general, how would you rate your overall mental or emotional health?  Good      PHQ-2 Total Score: 2    Additional concerns today:  Yes      Have you ever done Advance Care Planning? (For example, a Health Directive, POLST, or a discussion with a medical provider or your loved ones about your wishes): Yes, advance care planning is on file.    Hearing Assessment:   Fall risk  Fallen 2 or more times in the past year?: Yes  Any fall with injury in the past year?: No    Cognitive Screening   1) Repeat 3 items (Leader, Season, Table)    2) Clock draw: NORMAL  3) 3 item recall: Recalls 3 objects  Results: 3 items recalled: COGNITIVE IMPAIRMENT LESS LIKELY    Mini-CogTM Copyright S Caitlin. Licensed by the author for use in [facility]; reprinted with permission (sherrill@.[facility]). All rights reserved.      Do you have sleep apnea, excessive snoring or daytime drowsiness?: no    Reviewed and updated as needed this visit by clinical staff   Tobacco  Allergies               Reviewed and updated as needed this visit by Provider                 Social History     Tobacco Use     Smoking status: Never     Smokeless tobacco: " Never   Substance Use Topics     Alcohol use: Yes     Alcohol/week: 2.0 standard drinks     Types: 2 Standard drinks or equivalent per week     Comment: once in a while, but not often         Alcohol Use 2/8/2023   Prescreen: >3 drinks/day or >7 drinks/week? No       Current providers sharing in care for this patient include:   Patient Care Team:  Adelina Donaldson MD as PCP - General (Family Practice)  Adelina Donaldson MD as Assigned PCP  Goltz, Bennett Ezra, PA-C as Assigned Neuroscience Provider  Kylie Jung, PharmD as MTM Pharamcist (Pharmacist)    The following health maintenance items are reviewed in Epic and correct as of today:  Health Maintenance   Topic Date Due     DEPRESSION ACTION PLAN  Never done     DIABETIC FOOT EXAM  02/02/2023     MEDICARE ANNUAL WELLNESS VISIT  02/02/2023     MAMMO SCREENING  04/28/2023     ANNUAL REVIEW OF HM ORDERS  04/29/2023     PHQ-9  07/04/2023     A1C  07/20/2023     BMP  09/07/2023     HEMOGLOBIN  09/07/2023     EYE EXAM  10/05/2023     LIPID  01/20/2024     MICROALBUMIN  01/20/2024     FALL RISK ASSESSMENT  02/09/2024     COLORECTAL CANCER SCREENING  10/09/2026     ADVANCE CARE PLANNING  02/02/2027     DTAP/TDAP/TD IMMUNIZATION (4 - Td or Tdap) 10/10/2031     DEXA  09/28/2033     HEPATITIS C SCREENING  Completed     INFLUENZA VACCINE  Completed     Pneumococcal Vaccine: 65+ Years  Completed     URINALYSIS  Completed     ZOSTER IMMUNIZATION  Completed     COVID-19 Vaccine  Completed     IPV IMMUNIZATION  Aged Out     MENINGITIS IMMUNIZATION  Aged Out     Lab work is in process  Labs reviewed in EPIC  BP Readings from Last 3 Encounters:   02/09/23 136/72   12/13/22 132/84   10/03/22 138/78    Wt Readings from Last 3 Encounters:   02/09/23 81.1 kg (178 lb 14.4 oz)   01/19/23 77.1 kg (170 lb)   12/13/22 82.6 kg (182 lb 3.2 oz)                  Patient Active Problem List   Diagnosis     Female Stress Incontinence     Hyperlipidemia     Recurrent Major  Depression In Full Remission     Benign Essential Hypertension     Cervical Spondylosis     Cervical Disc Degeneration     Anxiety     Status post total knee replacement     Insomnia     Type 2 diabetes mellitus with complication, without long-term current use of insulin (H)     Osteopenia     Chronic kidney disease, stage 3 (H)     Past Surgical History:   Procedure Laterality Date     HC SLING OPERATION FOR STRESS INCONTINENCE      Description: Mid-Urethral Sling Operation;  Recorded: 05/18/2010;     JOINT REPLACEMENT Left 2008    knee     OPEN REDUCTION INTERNAL FIXATION ANKLE Right     2008 and pin removal 2009     Z APPENDECTOMY      Description: Appendectomy;  Recorded: 02/26/2008;     ZZC TOTAL KNEE ARTHROPLASTY Left 2/18/2015    Procedure:   LEFT TOTAL KNEE ARTHROPLASTY;  Surgeon: Sina SHARP MD;  Location: [facility];  Service: Orthopedics       Social History     Tobacco Use     Smoking status: Never     Smokeless tobacco: Never   Substance Use Topics     Alcohol use: Yes     Alcohol/week: 2.0 standard drinks     Types: 2 Standard drinks or equivalent per week     Comment: once in a while, but not often     Family History   Problem Relation Age of Onset     Diabetes Mother      Hypertension Mother      Hypertension Father      Cancer Brother      Clotting Disorder No family hx of          Current Outpatient Medications   Medication Sig Dispense Refill     calcium-vitamin D (CALCIUM-VITAMIN D) 500 mg(1,250mg) -200 unit per tablet Take 1 tablet by mouth daily       celecoxib (CELEBREX) 100 MG capsule Take 1 capsule (100 mg) by mouth 2 times daily 180 capsule 3     DULoxetine (CYMBALTA) 60 MG capsule Take 1 capsule (60 mg) by mouth daily 90 capsule 0     hydrochlorothiazide (HYDRODIURIL) 25 MG tablet TAKE 1 TABLET BY MOUTH ONCE DAILY 90 tablet 1     hydrOXYzine (ATARAX) 25 MG tablet Take 1 tablet (25 mg) by mouth every 6 hours as needed for anxiety 30 tablet 1     lisinopril (ZESTRIL) 20 MG  tablet Take 1 tablet (20 mg) by mouth daily 90 tablet 3     LORazepam (ATIVAN) 0.5 MG tablet Take 1 tablet (0.5 mg) by mouth every 8 hours as needed for anxiety or sleep 30 tablet 0     multivitamin therapeutic (THERAGRAN) tablet [MULTIVITAMIN THERAPEUTIC (THERAGRAN) TABLET] Take 1 tablet by mouth daily.       OMEGA-3/DHA/EPA/FISH OIL (FISH OIL-OMEGA-3 FATTY ACIDS) 300-1,000 mg capsule [OMEGA-3/DHA/EPA/FISH OIL (FISH OIL-OMEGA-3 FATTY ACIDS) 300-1,000 MG CAPSULE] Take 2 g by mouth daily.       red yeast rice 600 mg cap Take 1 capsule by mouth daily       traZODone (DESYREL) 100 MG tablet Take 1 tablet (100 mg) by mouth At Bedtime 60 tablet 1     vitamin D3 (CHOLECALCIFEROL) 50 mcg (2000 units) tablet Take 1 tablet by mouth daily       glipiZIDE (GLUCOTROL XL) 10 MG 24 hr tablet Take 1 tablet (10 mg) by mouth 2 times daily 180 tablet 4     semaglutide (OZEMPIC, 0.25 OR 0.5 MG/DOSE,) 2 MG/1.5ML SOPN pen Inject 0.25 mg Subcutaneous once a week for 28 days, THEN 0.5 mg once a week for 14 days. 1.5 mL 0     Allergies   Allergen Reactions     Atorvastatin Muscle Pain (Myalgia)     Metformin Unknown     Body aches, headache     Morphine (Pf) [Morphine] Nausea and Vomiting     Pravastatin Muscle Pain (Myalgia)     Recent Labs   Lab Test 01/20/23  0953 09/07/22  1337 08/02/22  1102 02/02/22  1040 06/11/21  1252 06/11/21  1252 02/03/21  0927 11/05/20  1131 10/22/20  0912 07/17/20  1034   A1C 8.9*  --  8.7* 8.4*  --  8.2*   < >  --    < > 7.9*   *  --   --  143*  --   --   --   --   --  148*   HDL 37*  --   --  42*  --   --   --   --   --  46*   TRIG 287*  --   --  228*  --   --   --   --   --  246*   ALT  --   --  31  --   --  34  --  51*  --   --    CR  --  1.28* 1.07* 1.17*  --  0.97  --  1.10  --  1.06   GFRESTIMATED  --  44* 55* 49*   < > 57*  --  49*  --  51*   GFRESTBLACK  --   --   --   --   --  >60  --  59*  --  >60   POTASSIUM  --  4.3 4.6 4.4  --  4.7  --  3.8  --  4.3   TSH  --   --  1.74  --   --  1.39  --   "1.79  --   --     < > = values in this interval not displayed.          Review of Systems   Breasts:  Negative for tenderness, breast mass and discharge.   Genitourinary: Positive for urgency. Negative for pelvic pain, vaginal bleeding and vaginal discharge.   Neurological: Positive for dizziness.         OBJECTIVE:   /72 (BP Location: Left arm, Patient Position: Sitting, Cuff Size: Adult Regular)   Pulse 88   Temp 98.6  F (37  C) (Temporal)   Ht 1.67 m (5' 5.75\")   Wt 81.1 kg (178 lb 14.4 oz)   LMP  (LMP Unknown)   SpO2 96%   BMI 29.10 kg/m   Estimated body mass index is 29.1 kg/m  as calculated from the following:    Height as of this encounter: 1.67 m (5' 5.75\").    Weight as of this encounter: 81.1 kg (178 lb 14.4 oz).  Physical Exam  GENERAL APPEARANCE: healthy, alert and no distress  EYES: Eyes grossly normal to inspection, PERRL and conjunctivae and sclerae normal  HENT: ear canals and TM's normal, nose and mouth without ulcers or lesions, oropharynx clear and oral mucous membranes moist  NECK: no adenopathy, no asymmetry, masses, or scars and thyroid normal to palpation  RESP: lungs clear to auscultation - no rales, rhonchi or wheezes  BREAST: normal without masses, tenderness or nipple discharge and no palpable axillary masses or adenopathy  CV: regular rate and rhythm, normal S1 S2, no S3 or S4, no murmur, click or rub, no peripheral edema and peripheral pulses strong  ABDOMEN: soft, nontender, no hepatosplenomegaly, no masses and bowel sounds normal  MS: no musculoskeletal defects are noted and gait is age appropriate without ataxia  SKIN: no suspicious lesions or rashes  NEURO: Normal strength and tone, sensory exam grossly normal, mentation intact and speech normal.  Romberg negative.  Finger-nose-finger intact.  Rapid alternating movements intact.  Symmetric deep tendon reflexes.  Some difficulties with tandem gait.  Gait is otherwise normal with appropriate stance, on a single step she " did seem to widen her gait and that she but corrected with all other steps.  PSYCH: mentation appears normal and affect normal/bright  Diabetic foot exam: normal DP and PT pulses, no trophic changes or ulcerative lesions and normal sensory exam     Diagnostic Test Results:  Labs reviewed in Epic    ASSESSMENT / PLAN:     Medicare annual wellness visit, subsequent  At today's visit, we discussed lifestyle interventions to promote self-management and wellness, including maintenance of a healthy weight, healthy diet, regular physical activity and exercise, and falls prevention.  Vaccinations up-to-date.  Cancer screening up-to-date, near due for mammogram, she will get all this.  Order placed for follow-up bone density scan.    Type 2 diabetes mellitus with complication, without long-term current use of insulin (H)  Inadequate control.  I think a GLP-1 inhibitor would be a good option for her with the hopes that we can take down on glipizide.  Encouraged her in regards to her continued healthy lifestyle habits.    Stage 3a chronic kidney disease (H)  Continue to avoid nephrotoxic agents.  Could give consideration to reducing hydrochlorothiazide in the future which could be straining kidney function bit further as well.    Benign Essential Hypertension  Adequately controlled, consider testing hydrochlorothiazide in the future.    Mixed hyperlipidemia  She does not tolerate statins, continue Red cell yeast supplement.    Recurrent Major Depression In Full Remission  Anxiety  Overall in good control on duloxetine, feeling like herself.  Continue.    Insomnia, unspecified type  Stable on trazodone at bedtime.  Continue.  Avoid combining with hydroxyzine or lorazepam.    Osteopenia, unspecified location  She likely would benefit from alendronate.  Will obtain follow-up on density scan first.  - DX Hip/Pelvis/Spine; Future    Encounter for screening mammogram for malignant neoplasm of breast  - *MA Screening Digital  "Bilateral; Future    Asymptomatic postmenopausal status  - DX Hip/Pelvis/Spine; Future     Unsteadiness  Examination today is nonspecific aside from TMJ she has no obvious concerning findings.  I think the best route would be for her to resume her physical therapy exercises and have a low threshold to resume formal physical therapy.  She is in agreement to this plan.    Patient has been advised of split billing requirements and indicates understanding: Yes      COUNSELING:  Reviewed preventive health counseling, as reflected in patient instructions      BMI:   Estimated body mass index is 29.1 kg/m  as calculated from the following:    Height as of this encounter: 1.67 m (5' 5.75\").    Weight as of this encounter: 81.1 kg (178 lb 14.4 oz).   Weight management plan: Discussed healthy diet and exercise guidelines      She reports that she has never smoked. She has never used smokeless tobacco.      Appropriate preventive services were discussed with this patient, including applicable screening as appropriate for cardiovascular disease, diabetes, osteopenia/osteoporosis, and glaucoma.  As appropriate for age/gender, discussed screening for colorectal cancer, prostate cancer, breast cancer, and cervical cancer. Checklist reviewing preventive services available has been given to the patient.    Reviewed patients plan of care and provided an AVS. The Basic Care Plan (routine screening as documented in Health Maintenance) for Ana Maria meets the Care Plan requirement. This Care Plan has been established and reviewed with the Patient and spouse.          Kristen Navarrete MD  [facility]    Identified Health Risks:    The patient was provided with written information regarding signs of hearing loss.  Information on urinary incontinence and treatment options given to patient.    "

## 2023-02-10 NOTE — PROGRESS NOTES
I spoke to Sayda's pharmacy, the price of Ozempic was $599 when they build up for a 6-week supply.  They stated that the pharmacy would be losing $70 therefore they likely were not able to fill it.  She then changed a day supply to 4 weeks and the price was $552 and the pharmacy would only use $0.40.  I did ask if I could call in a $0 voucher and they would be okay with that, but patient returns from [place] on 2/27/2023, therefore I will wait to call it then.    I did speak with Sayda and she was stressed out about the cost, therefore we discussed the Wendy PAP.  She reports between her and her  their income is about $68,000 therefore they should qualify.  I will reach out to the Wendy PAP team to get started on the application so that we can complete it when she has returned.

## 2023-02-21 ENCOUNTER — TELEPHONE (OUTPATIENT)
Dept: FAMILY MEDICINE | Facility: CLINIC | Age: 73
End: 2023-02-21
Payer: COMMERCIAL

## 2023-02-21 NOTE — TELEPHONE ENCOUNTER
Free Drug Application Initiated  Medication:  Ozempic  Sponsor: [email or web address]  Phone #: [phone]  Fax #: [phone]  Additional Information: Emailed MD sanabria to Kylie MATIAS and created reminder to call pt on 2/28 when they are back from vacation

## 2023-02-27 ENCOUNTER — TRANSFERRED RECORDS (OUTPATIENT)
Dept: HEALTH INFORMATION MANAGEMENT | Facility: CLINIC | Age: 73
End: 2023-02-27

## 2023-03-01 ENCOUNTER — VIRTUAL VISIT (OUTPATIENT)
Dept: PHARMACY | Facility: CLINIC | Age: 73
End: 2023-03-01
Payer: COMMERCIAL

## 2023-03-01 DIAGNOSIS — E11.8 TYPE 2 DIABETES MELLITUS WITH COMPLICATION, WITHOUT LONG-TERM CURRENT USE OF INSULIN (H): Primary | ICD-10-CM

## 2023-03-01 DIAGNOSIS — F33.42 MAJOR DEPRESSIVE DISORDER, RECURRENT EPISODE, IN FULL REMISSION (H): ICD-10-CM

## 2023-03-01 PROCEDURE — 99606 MTMS BY PHARM EST 15 MIN: CPT | Performed by: PHARMACIST

## 2023-03-01 PROCEDURE — 99607 MTMS BY PHARM ADDL 15 MIN: CPT | Performed by: PHARMACIST

## 2023-03-01 RX ORDER — ESCITALOPRAM OXALATE 10 MG/1
10 TABLET ORAL DAILY
Qty: 30 TABLET | Refills: 0 | Status: SHIPPED | OUTPATIENT
Start: 2023-03-01 | End: 2023-04-01

## 2023-03-01 RX ORDER — ONDANSETRON 4 MG/1
4 TABLET, FILM COATED ORAL EVERY 8 HOURS PRN
Qty: 30 TABLET | Refills: 0 | Status: SHIPPED | OUTPATIENT
Start: 2023-03-01 | End: 2023-10-16

## 2023-03-01 NOTE — PROGRESS NOTES
Medication Therapy Management (MTM) Encounter    ASSESSMENT:                            Type 2 Diabetes: Last A1c not at goal less than 7%. I sent in the Ozempic to her pharmacy and she will pick it up when she is able to leave her house. We reviewed administration, dosing, and side effects. Since she is worried about nausea, I did send her ondansetron. I encouraged her to talk to the pharmacist about administration and I also will send her a video on [employer]. In the future we may be able to decrease her glipizide. Wendy PAP provider portion is complete, but I will have the liaisons start the patient portion. Ok to continue current glipizide dose for now.   Continue aspirin 81 mg daily.  She may also benefit from Zetia or a PCSK9 inhibitor in the future but we will closely monitor her lipids especially as her BG improve.     Anxiety: Patient has not seen an improvement in her mood at all, despite the increase in duloxetine. Will have her decrease to 30 mg for now and we discussed an alternative. She is an intermediate metabolizer for CYP 2C9 and a poor metabolizer for CYP 3A5.  We discussed that escitalopram does not go through these pathways - she was interested in starting. Will have her start with 10 mg daily once off duloxetine. Reviewed it will take about a month to see results. I encouraged her to use hydroxyzine PRN for now.     PLAN:                            1. I month free Ozempic sent to pharmacy.     2. I sent over a prescription for Zofran in case you have nausea.  3. Since the duloxetine has not been helpful, decrease to 30 mg daily then stop and start escitalopram 10 mg daily.  4. Use hydroxyzine One half to whole tablet as needed for anxiety   5. look for a phone call or Zerto message from [place] to get started on the Ozempic Application    Follow-up: 1-2 weeks on Zerto     SUBJECTIVE/OBJECTIVE:                          Ana Maria De La Garza is a 72 year old female called for a follow-up visit. She  was referred to me from Dr. Montgomery and genetic counselor initially.      Reason for visit: Discuss diabetes and anxiety  Medication Adherence/Access: Sayda is familiar with her medications. She was in [place] and got back yesterday. Snowed in and not able to leave house yet    Type 2 Diabetes: Currently taking glipizide XL 10 mg twice daily.  Admits has not checked BG since she left for [place].   At last [facility] appointment we started her on Ozempic -- since she is concerned about the price, I planned on sending in a $0 voucher while applying her for Wendy PAP. Since we last met, we were able to start the provider portion for the Wendy PAP, but she has not started her portion yet.   Today I did call in the $0 voucher and let her know.  She is concerned with nausea.   Last A1c checked 1/20/23 = 8.9%.   Microalbumin checked 1/20/2023  Is not taking a statin -myalgias with atorvastatin and pravastatin  Taking aspirin 81 mg daily  Lab Test 01/20/23  0953 02/02/22  1040   CHOL 234* 231*   HDL 37* 42*   * 143*   TRIG 287* 228*   The 10-year ASCVD risk score (Merari GALINDO, et al., 2019) is: 35.3%    Values used to calculate the score:      Age: 72 years      Sex: Female      Is Non- : No      Diabetic: Yes      Tobacco smoker: No      Systolic Blood Pressure: 136 mmHg      Is BP treated: Yes      HDL Cholesterol: 37 mg/dL      Total Cholesterol: 234 mg/dL    Anxiety: Continues duloxetine 60 mg daily, hydroxyzine 25 mg as needed, and trazodone 100 mg nightly.  She met with Dr. Montgomery on 1/4/2023 and citalopram was discontinued, duloxetine, hydroxyzine, and lorazepam were all started.  She is using trazodone at bedtime and is sleeping well.  She has not used the lorazepam at all.  On 1/30/23, we increased duloxetine from 30 mg to 60 mg. She has not noticed any change at all. Reports that while traveling, she was shaking and anxious -- she feels like duloxetine doesn't do anything for her.   She has not used  hydroxyzine at home.   Would like an alternative medication to duloxetine.   PGx testing on file.   Past medications used:  Sertraline - stopped working  Fluoxetine -   Venlafaxine -   Citalopram     KATT-7 SCORE 2/2/2022 4/29/2022 1/4/2023   Total Score - 11 (moderate anxiety) 16 (severe anxiety)   Total Score 16 11 16     PHQ 1/4/2023   PHQ-9 Total Score 5   Q9: Thoughts of better off dead/self-harm past 2 weeks Not at all       Allergies/ADRs: Reviewed in chart  Past Medical History: Reviewed in chart  Tobacco: She reports that she has never smoked. She has never used smokeless tobacco.  Alcohol: Reviewed in chart    ----------------    I spent 24 minutes with this patient today. All changes were made via collaborative practice agreement with Dr. Kristen Navarrete. A copy of the visit note was provided to the patient's provider(s).    Telemedicine Visit Details  Type of service:  Telephone visit  Start Time: 12:03 PM  End Time: 12:27 PM    A summary of these recommendations was sent via [employer].    Kylie Jung, Pharm.D., BCACP   Medication Therapy Management Pharmacist   [facility]      Medication Therapy Recommendations  Anxiety state    Current Medication: DULoxetine (CYMBALTA) 60 MG capsule (Discontinued)   Rationale: Condition refractory to medication - Ineffective medication - Effectiveness   Recommendation: Change Medication   Status: Accepted per CPA

## 2023-03-10 ENCOUNTER — TELEPHONE (OUTPATIENT)
Dept: FAMILY MEDICINE | Facility: CLINIC | Age: 73
End: 2023-03-10
Payer: COMMERCIAL

## 2023-03-10 NOTE — TELEPHONE ENCOUNTER
Reason for Call:  Other call back    Detailed comments:     [facility]- PATIENT ASSISTANCE PROGRAM    Per caller they need more information to be able to move forward with filling Ozempic    Missing:     Patient's part of the form that they need to fill out     Copy of the insurance card     Proof of income    Can be faxed: [phone]  Questions: [phone]        Call taken on 3/10/2023 at 9:59 AM by Sara Looney      Call taken on 3/10/2023 at 9:58 AM by Sara Looney

## 2023-03-15 ENCOUNTER — TRANSFERRED RECORDS (OUTPATIENT)
Dept: HEALTH INFORMATION MANAGEMENT | Facility: CLINIC | Age: 73
End: 2023-03-15
Payer: COMMERCIAL

## 2023-03-30 ENCOUNTER — MYC MEDICAL ADVICE (OUTPATIENT)
Dept: PHARMACY | Facility: CLINIC | Age: 73
End: 2023-03-30
Payer: COMMERCIAL

## 2023-04-15 ENCOUNTER — MYC MEDICAL ADVICE (OUTPATIENT)
Dept: FAMILY MEDICINE | Facility: CLINIC | Age: 73
End: 2023-04-15
Payer: COMMERCIAL

## 2023-04-18 ENCOUNTER — NURSE TRIAGE (OUTPATIENT)
Dept: FAMILY MEDICINE | Facility: CLINIC | Age: 73
End: 2023-04-18
Payer: COMMERCIAL

## 2023-04-18 NOTE — TELEPHONE ENCOUNTER
"Nurse Triage SBAR    Is this a 2nd Level Triage? YES, LICENSED PRACTITIONER REVIEW IS REQUIRED    Situation:   From Manda message:  \"Thank you!!!  I also have a question. April 1 I fell on the ice and hit my head.  Luckily i recovered without the ER.  However, now I am having a lot of pain on my hips/back and feel like I am having menstrual cramps. Should I do anything?  Thank you again.\"    Background:   Fell on ice 4/11/23.  Was seen at [facility] on 4/14/23 for has a big welt on \"rear end\" and since then hip has been hurting and tailbone has been hurting.    Assessment:   Lawrence \"head cracked\" on the ice when she fell. Been icing it and never went ER. When she was seen at [facility] last week, she did not really feel the hip pain or cramping yet. No X-ray was taken of her tailbones at [facility] or of any of her body parts; she is not sure if she has any fractures from fall.    Pain in hips rating of 7/10 and also having bloating and menstrual cramping. No bowel problem or any bleeding. Constant dull pain; not radiating cramping in mid-lower abdomen.     [place] and not sure if that aggravated the hip pain. Not sure if she injured her hips when she feel as all of these symptoms started after the fall.      Protocol Recommended Disposition:   See in Office Today    Recommendation:   Care advices given and assisted with scheduling appt for tomorrow with Dr Navarrete. Routing to PCP to advise as per nursing protocol, pt is to be seen today, but there is no available clinic appt for today and pt prefers to wait until tomorrow as she lives an hour away and is coming to town to get her DEXA scan done.     Patient advised to call back  or to go to ER to be seen right away if sx worsened or having numbness or tingling in legs or one side of her body; chest pain, or SOB/difficulty breathing; patient verbalized understanding.    Routed to provider    Does the patient meet one of the following criteria for " ADS visit consideration? 16+ years old, with an [facility] PCP     TIP  Providers, please consider if this condition is appropriate for management at one of our Acute and Diagnostic Services sites.     If patient is a good candidate, please use dotphrase <dot>triageresponse and select Refer to ADS to document.    Reason for Disposition    Abdominal cramps unrelated to menstrual period    MODERATE pain (e.g., interferes with normal activities that comes and goes (cramps) lasts > 24 hours  (Exception: Pain with Vomiting or Diarrhea - see that Protocol.)    Additional Information    Negative: Sounds like a life-threatening emergency to the triager    Negative: Passed out (i.e., fainted, collapsed and was not responding)    Negative: Shock suspected (e.g., cold/pale/clammy skin, too weak to stand, low BP, rapid pulse)    Negative: Sounds like a life-threatening emergency to the triager    Negative: Chest pain    Negative: Pain is mainly in upper abdomen (if needed ask: 'is it mainly above the belly button?')    Negative: Abdominal pain and pregnant < 20 weeks    Negative: Abdominal pain and pregnant 20 or more weeks    Negative: SEVERE abdominal pain (e.g., excruciating)    Negative: Vomiting red blood or black (coffee ground) material    Negative: Bloody, black, or tarry bowel movements  (Exception: Chronic-unchanged black-grey bowel movements and is taking iron pills or Pepto-Bismol.)    Negative: Constant abdominal pain lasting > 2 hours    Negative: Vomiting bile (green color)    Negative: Patient sounds very sick or weak to the triager    Negative: Vomiting and abdomen looks much more swollen than usual    Negative: White of the eyes have turned yellow (i.e., jaundice)    Negative: Blood in urine (red, pink, or tea-colored)    Negative: Fever > 103 F (39.4 C)    Negative: Fever > 101 F (38.3 C) and over 60 years of age    Negative: Fever > 100.0 F (37.8 C) and has diabetes mellitus or a weak immune system (e.g., HIV  positive, cancer chemotherapy, organ transplant, splenectomy, chronic steroids)    Negative: Fever > 100.0 F (37.8 C) and bedridden (e.g., nursing home patient, stroke, chronic illness, recovering from surgery)    Negative: Pregnant or could be pregnant (i.e., missed last menstrual period)    Protocols used: ABDOMINAL PAIN - MENSTRUAL CRAMPS-A-OH, ABDOMINAL PAIN - FEMALE-A-OH    VINNIE MariscalN, RN  [facility]

## 2023-04-18 NOTE — TELEPHONE ENCOUNTER
This is a PDF available by link in the lab section of patient's chart dated 1/20/23.  Can we mail her a copy?  ARONJ

## 2023-04-18 NOTE — TELEPHONE ENCOUNTER
As long as she is ambulating, discomfort is controlled with acetaminophen and conservative measures, pain is not progressing, andshe is not having either urinary symptoms or vaginal bleeding, she can keep her appointment with me tomorrow as scheduled.  If her symptoms are worsening in the interim, she should be seen more urgently.  JUANCHO

## 2023-04-18 NOTE — TELEPHONE ENCOUNTER
Provider Recommendation Follow Up:   Reached patient/caregiver. Informed of provider's recommendations. Patient verbalized understanding and agrees with the plan.     Patient denied Urinary symptoms and vaginal bleeding.     VINNIE MariscalN, RN  [facility]

## 2023-04-18 NOTE — TELEPHONE ENCOUNTER
Please see Nurse Triage encounter from today for details.    Bartolo Roberson, VINNIEN, RN  [facility]

## 2023-04-19 ENCOUNTER — OFFICE VISIT (OUTPATIENT)
Dept: FAMILY MEDICINE | Facility: CLINIC | Age: 73
End: 2023-04-19
Payer: COMMERCIAL

## 2023-04-19 ENCOUNTER — HOSPITAL ENCOUNTER (OUTPATIENT)
Dept: MAMMOGRAPHY | Facility: CLINIC | Age: 73
Discharge: HOME | End: 2023-04-19
Attending: FAMILY MEDICINE | Admitting: FAMILY MEDICINE
Payer: MEDICARE

## 2023-04-19 ENCOUNTER — HOSPITAL ENCOUNTER (OUTPATIENT)
Dept: CT IMAGING | Facility: HOSPITAL | Age: 73
Discharge: HOME | End: 2023-04-19
Attending: FAMILY MEDICINE | Admitting: FAMILY MEDICINE
Payer: MEDICARE

## 2023-04-19 ENCOUNTER — ANCILLARY PROCEDURE (OUTPATIENT)
Dept: BONE DENSITY | Facility: CLINIC | Age: 73
End: 2023-04-19
Attending: FAMILY MEDICINE
Payer: COMMERCIAL

## 2023-04-19 VITALS
SYSTOLIC BLOOD PRESSURE: 134 MMHG | DIASTOLIC BLOOD PRESSURE: 80 MMHG | RESPIRATION RATE: 16 BRPM | BODY MASS INDEX: 28.43 KG/M2 | OXYGEN SATURATION: 97 % | WEIGHT: 176.9 LBS | HEART RATE: 85 BPM | HEIGHT: 66 IN | TEMPERATURE: 98.2 F
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
## 2023-04-19 DIAGNOSIS — T14.8XXA HEMATOMA: ICD-10-CM

## 2023-04-19 DIAGNOSIS — R10.2 PELVIC PAIN IN FEMALE: Primary | ICD-10-CM

## 2023-04-19 DIAGNOSIS — Z12.31 ENCOUNTER FOR SCREENING MAMMOGRAM FOR MALIGNANT NEOPLASM OF BREAST: ICD-10-CM

## 2023-04-19 DIAGNOSIS — R10.2 PELVIC PAIN IN FEMALE: ICD-10-CM

## 2023-04-19 DIAGNOSIS — M85.80 OSTEOPENIA, UNSPECIFIED LOCATION: ICD-10-CM

## 2023-04-19 DIAGNOSIS — Z78.0 ASYMPTOMATIC POSTMENOPAUSAL STATUS: ICD-10-CM

## 2023-04-19 LAB
CREAT BLD-MCNC: 1.5 MG/DL (ref 0.6–1.1)
GFR SERPL CREATININE-BSD FRML MDRD: 36 ML/MIN/1.73M2

## 2023-04-19 PROCEDURE — 82565 ASSAY OF CREATININE: CPT

## 2023-04-19 PROCEDURE — G1010 CDSM STANSON: HCPCS

## 2023-04-19 PROCEDURE — 250N000011 HC RX IP 250 OP 636: Performed by: FAMILY MEDICINE

## 2023-04-19 PROCEDURE — 99214 OFFICE O/P EST MOD 30 MIN: CPT | Performed by: FAMILY MEDICINE

## 2023-04-19 PROCEDURE — 77067 SCR MAMMO BI INCL CAD: CPT

## 2023-04-19 PROCEDURE — 77080 DXA BONE DENSITY AXIAL: CPT | Mod: TC | Performed by: RADIOLOGY

## 2023-04-19 RX ORDER — IOPAMIDOL 755 MG/ML
75 INJECTION, SOLUTION INTRAVASCULAR ONCE
Status: COMPLETED | OUTPATIENT
Start: 2023-04-19 | End: 2023-04-19

## 2023-04-19 RX ADMIN — IOPAMIDOL 75 ML: 755 INJECTION, SOLUTION INTRAVENOUS at 16:59

## 2023-04-19 ASSESSMENT — PATIENT HEALTH QUESTIONNAIRE - PHQ9
SUM OF ALL RESPONSES TO PHQ QUESTIONS 1-9: 2
SUM OF ALL RESPONSES TO PHQ QUESTIONS 1-9: 2
10. IF YOU CHECKED OFF ANY PROBLEMS, HOW DIFFICULT HAVE THESE PROBLEMS MADE IT FOR YOU TO DO YOUR WORK, TAKE CARE OF THINGS AT HOME, OR GET ALONG WITH OTHER PEOPLE: NOT DIFFICULT AT ALL

## 2023-04-19 ASSESSMENT — PAIN SCALES - GENERAL: PAINLEVEL: NO PAIN (0)

## 2023-04-21 ASSESSMENT — PATIENT HEALTH QUESTIONNAIRE - PHQ9
10. IF YOU CHECKED OFF ANY PROBLEMS, HOW DIFFICULT HAVE THESE PROBLEMS MADE IT FOR YOU TO DO YOUR WORK, TAKE CARE OF THINGS AT HOME, OR GET ALONG WITH OTHER PEOPLE: NOT DIFFICULT AT ALL
SUM OF ALL RESPONSES TO PHQ QUESTIONS 1-9: 2

## 2023-04-21 NOTE — RESULT ENCOUNTER NOTE
Your CT scan shows degenerative disc disease (wear and tear of the spine and discs) in the low back, but no fractures, pelvic abnormalities, or other cause of your pain.  It also shows the hematoma of your buttock.  No concerning findings.

## 2023-04-21 NOTE — PROGRESS NOTES
Assessment & Plan     Pelvic pain in female  Pelvic exam without focal findings it.  I am concerned about her menstrual cramp feeling associated with the bloating and for that reason we will pursue imaging.  It is possible that she could have a pelvic floor fracture as well that is contributing to her symptoms and therefore will obtain CT scan nodule indicate both intrapelvic organs and bony pelvis.  Further follow-up and recommendations pending these results.  - CT Abdomen Pelvis w Contrast; Future    Hematoma of left buttock  Noted, unclear how much this could be also be contributing to symptoms.    Fall, subsequent encounter  She is at risk for more significant injury as a result of this.  No signs of traumatic brain injury or other concerning effects as result of the fall.  Balance overall is good, I do not feel we need to consider physical therapy or other measures at this time.  - CT Abdomen Pelvis w Contrast; Future                 Kristen Navarrete MD  [facility] Alfredo is a 72 year old, presenting for the following health issues:  Pain after fall (Fell 4-1-23, pain low back, buttock's, abdominal cramping. Went to urgent care 4-14-23)        2/9/2023     9:56 AM   Additional Questions   Roomed by maryse   Accompanied by self     Here to discuss some pelvic discomfort.  She fell on the ice on April 1 hitting her head rather hard receiving bumping her head but no loss of consciousness.  Able to get up on her own.  No focal neurologic symptoms.  Seen in urgent care on 4/14/2023 if she is having significant low back, buttock, pelvic discomfort.  Noted to have a hematoma of her left buttock.  She had tenderness at her tailbone but imaging was not recommended.  Imaging was not performed of her head either.  Continues to struggle with pain from her low back to her buttocks but also noting pain that is radiating down both legs and describes a menstrual cramp feeling associated  with bloating present for the past week or so.  It comes and goes.  No change in urination, no vaginal symptoms, no constipation or diarrhea.  Her blood sugars have been stable.  Denies any new focal numbness, tingling, or weakness.  She walks in a pool 3 times a week for an hour for exercise, has been able to do this but it has become increasingly uncomfortable.    History of Present Illness       Back Pain:  She presents for follow up of back pain. Patient's back pain is a new problem.    Original cause of back pain: a fall  First noticed back pain: 1-4 weeks ago  Patient feels back pain: constantlyLocation of back pain:  Right lower back and left lower back  Description of back pain: gnawing  Back pain spreads: right buttocks and left buttocks    Since patient first noticed back pain, pain is: always present, but gets better and worse  Does back pain interfere with her job:  No  On a scale of 1-10 (10 being the worst), patient describes pain as:  6  What makes back pain worse: nothing  Acupuncture: not tried  Acetaminophen: not tried  Activity or exercise: not tried  Chiropractor:  Not tried  Cold: not tried  Heat: not tried  Massage: not tried  Muscle relaxants: not tried  NSAIDS: not tried  Opioids: not tried  Physical Therapy: not tried  Rest: not tried  Steroid Injection: not tried  Stretching: not tried  Surgery: not tried  TENS unit: not tried  Topical pain relievers: not tried  Other healthcare providers patient is seeing for back pain: Chiropractor    She eats 2-3 servings of fruits and vegetables daily.She consumes 0 sweetened beverage(s) daily.She exercises with enough effort to increase her heart rate 30 to 60 minutes per day.  She exercises with enough effort to increase her heart rate 6 days per week.   She is taking medications regularly.    Today's PHQ-9         PHQ-9 Total Score: 2    PHQ-9 Q9 Thoughts of better off dead/self-harm past 2 weeks :   Not at all    How difficult have these problems  "made it for you to do your work, take care of things at home, or get along with other people: Not difficult at all               Review of Systems         Objective    /80   Pulse 85   Temp 98.2  F (36.8  C) (Oral)   Resp 16   Ht 1.67 m (5' 5.75\")   Wt 80.2 kg (176 lb 14.4 oz)   LMP  (LMP Unknown)   SpO2 97%   BMI 28.77 kg/m    Body mass index is 28.77 kg/m .  Physical Exam   Alert and very pleasant female.  Abdomen is soft and nontender.  Normal external female genitalia.  Bimanual exam is with midline uterus, nontender nonenlarged adnexa.  No palpable masses.  5 out of 5 strength throughout her lower extremities.  Hematoma noted left buttock.  No focal tenderness at the SI joint.  She develops lateral hip pain with YAMILE test.  Straight leg raise negative.  Sensation intact.                    "

## 2023-05-02 ENCOUNTER — MYC MEDICAL ADVICE (OUTPATIENT)
Dept: FAMILY MEDICINE | Facility: CLINIC | Age: 73
End: 2023-05-02
Payer: COMMERCIAL

## 2023-05-02 DIAGNOSIS — E78.2 MIXED HYPERLIPIDEMIA: Primary | ICD-10-CM

## 2023-05-09 DIAGNOSIS — T14.8XXA HEMATOMA: Primary | ICD-10-CM

## 2023-05-09 DIAGNOSIS — M54.50 ACUTE BILATERAL LOW BACK PAIN WITHOUT SCIATICA: ICD-10-CM

## 2023-05-10 NOTE — TELEPHONE ENCOUNTER
Dr. Navarrete,    Please see MyChart message from patient needing provider's direction.    Please respond directly to patient if appropriate.      Shala Sim RN, BSN  [facility]

## 2023-05-19 DIAGNOSIS — F41.1 ANXIETY STATE: ICD-10-CM

## 2023-05-20 NOTE — TELEPHONE ENCOUNTER
"Routing refill request to provider for review/approval because:  Drug interaction warning    Last Written Prescription Date:  12/13/22  Last Fill Quantity: 60,  # refills: 1   Last office visit provider:  4/19/2023     Requested Prescriptions   Pending Prescriptions Disp Refills     traZODone (DESYREL) 100 MG tablet [Pharmacy Med Name: TRAZODONE  MG TABS 100 Tablet] 60 tablet 1     Sig: Take 1 tablet (100 mg) by mouth At Bedtime       Serotonin Modulators Passed - 5/20/2023  8:44 AM        Passed - Recent (12 mo) or future (30 days) visit within the authorizing provider's specialty     Patient has had an office visit with the authorizing provider or a provider within the authorizing providers department within the previous 12 mos or has a future within next 30 days. See \"Patient Info\" tab in inbasket, or \"Choose Columns\" in Meds & Orders section of the refill encounter.              Passed - Medication is active on med list        Passed - Patient is age 18 or older        Passed - No active pregnancy on record        Passed - No positive pregnancy test in past 12 months             Unique Coyle RN 05/20/23 8:45 AM  "

## 2023-05-21 RX ORDER — TRAZODONE HYDROCHLORIDE 100 MG/1
100 TABLET ORAL AT BEDTIME
Qty: 90 TABLET | Refills: 3 | Status: SHIPPED | OUTPATIENT
Start: 2023-05-21 | End: 2023-11-16

## 2023-05-23 ENCOUNTER — ALLIED HEALTH/NURSE VISIT (OUTPATIENT)
Dept: FAMILY MEDICINE | Facility: CLINIC | Age: 73
End: 2023-05-23
Payer: COMMERCIAL

## 2023-05-23 ENCOUNTER — TELEPHONE (OUTPATIENT)
Dept: FAMILY MEDICINE | Facility: CLINIC | Age: 73
End: 2023-05-23

## 2023-05-23 DIAGNOSIS — E11.8 TYPE 2 DIABETES MELLITUS WITH COMPLICATION, WITHOUT LONG-TERM CURRENT USE OF INSULIN (H): Primary | ICD-10-CM

## 2023-05-23 DIAGNOSIS — E11.8 TYPE 2 DIABETES MELLITUS WITH COMPLICATION, WITHOUT LONG-TERM CURRENT USE OF INSULIN (H): ICD-10-CM

## 2023-05-23 PROCEDURE — 99207 PR NO CHARGE NURSE ONLY: CPT

## 2023-05-23 RX ORDER — EZETIMIBE 10 MG/1
10 TABLET ORAL DAILY
Qty: 90 TABLET | Refills: 1 | Status: CANCELLED | OUTPATIENT
Start: 2023-05-23

## 2023-05-23 RX ORDER — SEMAGLUTIDE 1.34 MG/ML
INJECTION, SOLUTION SUBCUTANEOUS
Qty: 1.5 ML | Refills: 0
Start: 2023-05-23 | End: 2023-07-04

## 2023-05-23 NOTE — TELEPHONE ENCOUNTER
Patient came in today to  her Ozempic (5 pens) and stated that Dr Navarrete stated for pt to start Zetia, but when she stopped by her pharmacy, there is no rx for Zetia. Writer stated writer will follow up with PCP and let pt know.    Rx pended. Please advise.    VINNIE MariscalN, RN  [facility]

## 2023-05-23 NOTE — PROGRESS NOTES
"Patient came in to  her supplies of Ozempic (5 pens) and stated she had done a couple of months of trial a while back and knows how to administer it.     Writer did play the ozempic video for pt to watch and stated writer will send it via [employer] message to pt in case she needs a refresher on how to administer or use the Ozempic pen. https://www.[employer].com/how-to-take/ozempic-dosing.html    Right before leaving the clinic, pt was wondering if writer could take a look at her cut, on there right thumb. She was cutting a cantoloupe and cut towards her instead of away from her.     Right thumb has a deep laceration about half and inch long, which is no longer bleeding, but is deep. RN pulled in RN Clinical Manager Unique to verified and agreed that it requires stitches. Writer was going to assist with pre-registering pt to the [facility], but pt and spouse stated they will go to the closest [facility] in [place] ([facility]) as they are heading out of town and also will be in [place] to watch their granddaughter.     Patient was instructed to call back with severe side effects once she starts using Ozempic and she verbalized understanding. Ozempic guide on \"How to Use Ozempic\" printed and given to pt along with Clinical references for \"Low Blood Sugar (Hypoglycemia).\"     Patient did want writer to check with provider about her rx for Zetia, stating that she went to the pharmacy and they did not have an rx for it.    VINNIE MariscalN, RN  [facility]       "

## 2023-05-23 NOTE — TELEPHONE ENCOUNTER
Upon review of chart, order for Ozempic is  with an end date of 3/23/23.     Patient was working with Kylie to get approval for Ozempic which took 3 months to get approval.    Medication is in clinic and patient will be picking up today and doing a nurse visit for teaching.    Could we get a new order to start this mediation.  Rx pended.    Elle Bradley, VINNIEN, RN  [facility]

## 2023-05-23 NOTE — TELEPHONE ENCOUNTER
Patient came in and picked up 5 Ozempic pens from clinic.    VINNIE MariscalN, RN  [facility]

## 2023-05-23 NOTE — TELEPHONE ENCOUNTER
Writer called patient to inform her that her ozempic is here to  in clinic. Also assisted patient with making a nurse visit today for her to get education on how to use.     VLADIMIR Griffin, RN  [facility]

## 2023-05-24 RX ORDER — EZETIMIBE 10 MG/1
10 TABLET ORAL DAILY
Qty: 90 TABLET | Refills: 4 | Status: SHIPPED | OUTPATIENT
Start: 2023-05-24 | End: 2023-10-16

## 2023-05-26 NOTE — TELEPHONE ENCOUNTER
Ade -- looks like she got Ozempic this week. can you follow up with her in a few weeks to see how she is doing on the Ozempic and dose titrate as needed? Thanks!

## 2023-05-30 ENCOUNTER — MYC MEDICAL ADVICE (OUTPATIENT)
Dept: FAMILY MEDICINE | Facility: CLINIC | Age: 73
End: 2023-05-30
Payer: COMMERCIAL

## 2023-05-30 ENCOUNTER — NURSE TRIAGE (OUTPATIENT)
Dept: FAMILY MEDICINE | Facility: CLINIC | Age: 73
End: 2023-05-30
Payer: COMMERCIAL

## 2023-05-30 NOTE — TELEPHONE ENCOUNTER
"Nurse Triage SBAR    Is this a 2nd Level Triage? YES, LICENSED PRACTITIONER REVIEW IS REQUIRED    Situation:  Pain in back of right leg for two weeks.    Background:   See [employer] message on 5/30/23    Assessment:  Patient reports pain from \"somewhere in her butt\" and down to the back of her right knee. No recent injury or infection to her right leg. Had a fall on 4/1/23, but she does not think the pain is related to the fall. Had a CT on 4/19/23, with procedure result note from Dr Navarrete stating:    \"Your CT scan shows degenerative disc disease (wear and tear of the spine and discs) in the low back, but no fractures, pelvic abnormalities, or other cause of your pain.  It also shows the hematoma of your buttock.  No concerning findings.\"    Patient rates her pain as 8 out of ten. She has had trouble sleeping, walking, and standing due to right leg pain. She is still able to perform activities of daily living, but it is difficult due to pain. She is unable to complete strenuous exercise.    Patient tries to move around to relieve pain. She has not been taking any pain medication.    Denies chest pain, SOB, trouble breathing, abdominal pain, dysuria, weakness or numbness on either side of the body today.    She did state she had some weakness on the right side of her body this past weekend when getting into a friend's large truck as it was harder to lift herself into it than usual.    Protocol Recommended Disposition:   See in office today or tomorrow.    Recommendation:   Gave care advice. Recommended patient be seen in clinic today or tomorrow. Patient verbalized understanding and declined to come in today or tomorrow as she lives some distance away from the clinic. She will be in the [facility] on Thursday, 6/1/23, so she made an appointment with the PCP on 6/1/23. Will route to provider to verify it is ok to wait to be seen until scheduled appointment. Sent OTC pain medication information to the patient. Recommended " the patient call back for worsening symptoms.    Routed to provider    Does the patient meet one of the following criteria for ADS visit consideration? 16+ years old, with an [facility] PCP     TIP  Providers, please consider if this condition is appropriate for management at one of our Acute and Diagnostic Services sites.     If patient is a good candidate, please use dotphrase <dot>triageresponse and select Refer to ADS to document.    Reason for Disposition    Back pain radiating (shooting) into leg(s)    Age > 50 and no history of prior similar back pain    Additional Information    Negative: Looks like a broken bone or dislocated joint (e.g., crooked or deformed)    Negative: Sounds like a life-threatening emergency to the triager    Negative: Followed a hip injury    Negative: Followed a knee injury    Negative: Followed an ankle or foot injury    Negative: Passed out (i.e., fainted, collapsed and was not responding)    Negative: Shock suspected (e.g., cold/pale/clammy skin, too weak to stand, low BP, rapid pulse)    Negative: Sounds like a life-threatening emergency to the triager    Negative: Major injury to the back (e.g., MVA, fall > 10 feet or 3 meters, penetrating injury, etc.)    Negative: Pain in the upper back over the ribs (rib cage) that radiates (travels) into the chest    Negative: Pain in the upper back over the ribs (rib cage) and worsened by coughing (or clearly increases with breathing)    Negative: Back pain during pregnancy    Negative: SEVERE back pain of sudden onset and age > 60 years    Negative: SEVERE abdominal pain (e.g., excruciating)    Negative: Abdominal pain and age > 60 years    Negative: Unable to urinate (or only a few drops) and bladder feels very full    Negative: Loss of bladder or bowel control (urine or bowel incontinence; wetting self, leaking stool) of new-onset    Negative: Numbness (loss of sensation) in groin or rectal area    Negative: Pain radiates into groin,  scrotum    Negative: Blood in urine (red, pink, or tea-colored)    Negative: Vomiting and pain over lower ribs of back (i.e., flank - kidney area)    Negative: Weakness of a leg or foot (e.g., unable to bear weight, dragging foot)    Negative: Patient sounds very sick or weak to the triager    Negative: Fever > 100.4 F (38.0 C) and flank pain    Negative: Pain or burning with passing urine (urination)    Negative: SEVERE back pain (e.g., excruciating, unable to do any normal activities) and not improved after pain medicine and CARE ADVICE    Negative: Numbness in an arm or hand (i.e., loss of sensation) and upper back pain    Negative: Numbness in a leg or foot (i.e., loss of sensation)    Negative: Soft tissue infection (e.g., abscess, cellulitis) or other serious infection (e.g., bacteremia) in last 2 weeks    Negative: Painful rash with multiple small blisters grouped together (i.e., dermatomal distribution or 'band' or 'stripe')    Negative: High-risk adult (e.g., history of cancer, history of HIV, or history of IV Drug Use)    Negative: Pain radiates into the thigh or further down the leg, and in both legs    Protocols used: LEG PAIN-A-OH, BACK PAIN-A-OH    Shala Sim RN, BSN  [facility]

## 2023-06-01 ENCOUNTER — OFFICE VISIT (OUTPATIENT)
Dept: FAMILY MEDICINE | Facility: CLINIC | Age: 73
End: 2023-06-01
Payer: COMMERCIAL

## 2023-06-01 ENCOUNTER — OFFICE VISIT (OUTPATIENT)
Dept: SLEEP MEDICINE | Facility: CLINIC | Age: 73
End: 2023-06-01
Payer: COMMERCIAL

## 2023-06-01 VITALS
DIASTOLIC BLOOD PRESSURE: 76 MMHG | WEIGHT: 180.1 LBS | HEIGHT: 65 IN | HEART RATE: 85 BPM | BODY MASS INDEX: 30.01 KG/M2 | OXYGEN SATURATION: 97 % | RESPIRATION RATE: 15 BRPM | TEMPERATURE: 98.3 F | SYSTOLIC BLOOD PRESSURE: 143 MMHG

## 2023-06-01 VITALS
HEART RATE: 74 BPM | BODY MASS INDEX: 28.95 KG/M2 | DIASTOLIC BLOOD PRESSURE: 93 MMHG | WEIGHT: 178 LBS | SYSTOLIC BLOOD PRESSURE: 149 MMHG | OXYGEN SATURATION: 96 %

## 2023-06-01 DIAGNOSIS — G25.81 RESTLESS LEGS SYNDROME (RLS): ICD-10-CM

## 2023-06-01 DIAGNOSIS — G47.33 OSA (OBSTRUCTIVE SLEEP APNEA): Primary | ICD-10-CM

## 2023-06-01 DIAGNOSIS — M54.16 RIGHT LUMBAR RADICULITIS: Primary | ICD-10-CM

## 2023-06-01 DIAGNOSIS — F51.04 CHRONIC INSOMNIA: ICD-10-CM

## 2023-06-01 PROCEDURE — 99215 OFFICE O/P EST HI 40 MIN: CPT | Performed by: PHYSICIAN ASSISTANT

## 2023-06-01 PROCEDURE — 99213 OFFICE O/P EST LOW 20 MIN: CPT | Performed by: FAMILY MEDICINE

## 2023-06-01 RX ORDER — GABAPENTIN 300 MG/1
CAPSULE ORAL
Qty: 30 CAPSULE | Refills: 1 | Status: SHIPPED | OUTPATIENT
Start: 2023-06-01 | End: 2023-07-20

## 2023-06-01 RX ORDER — METHYLPREDNISOLONE 4 MG
TABLET, DOSE PACK ORAL
Qty: 21 TABLET | Refills: 0 | Status: SHIPPED | OUTPATIENT
Start: 2023-06-01 | End: 2023-07-20

## 2023-06-01 ASSESSMENT — PAIN SCALES - GENERAL: PAINLEVEL: SEVERE PAIN (7)

## 2023-06-01 NOTE — PROGRESS NOTES
Assessment & Plan     Right lumbar radiculitis  Reviewed nature of condition.  No red flag symptoms.  Referrals placed to physical therapy.  Does not tolerate NSAIDs or Celebrex.  Has tolerated steroids in the past, will provide prescription for Medrol dose pack.  Prescription provided for gabapentin to start at 300 mg at bedtime and increase as needed and as tolerated up to 3 times daily.  Discussed, side effects.  Notify with onset additional symptoms, worsening, or failure to improve with conservative measures, Next step would be to see spine care center.  - gabapentin (NEURONTIN) 300 MG capsule; Take 1 tab by mouth at bedtime.  May increase as needed up to 3 times daily.  - methylPREDNISolone (MEDROL DOSEPAK) 4 MG tablet therapy pack; Follow Package Directions  - Physical Therapy Referral; Future                 Kristen Navarrete MD  [facility]    Tristan Alfredo is a 72 year old, presenting for the following health issues:  Musculoskeletal Problem (Right leg pain - it could be a rupture disk/// //)        6/1/2023     2:55 PM   Additional Questions   Roomed by Jane     Fall with left buttock hematoma early April, that has healed, states that even prior to that had had some right mid back pain for several months.  Over the past few weeks has had progressive pain in her right buttock radiating down to her right posterior thigh, nothing into the knee or below.  No paresthesias.  Maybe a little weak at times when climbing but no loss of function.  Its been disrupting her sleep, difficult to stand up after sitting on a sofa.  Denies bowel or bladder symptoms, saddle anesthesia.  No new injuries.    Musculoskeletal Problem    History of Present Illness       Reason for visit:  Leg    She eats 2-3 servings of fruits and vegetables daily.She consumes 0 sweetened beverage(s) daily.She exercises with enough effort to increase her heart rate 60 or more minutes per day.  She exercises with enough  "effort to increase her heart rate 3 or less days per week.   She is taking medications regularly.               Review of Systems         Objective    BP (!) 143/76 (BP Location: Right arm, Patient Position: Sitting, Cuff Size: Adult Regular)   Pulse 85   Temp 98.3  F (36.8  C) (Oral)   Resp 15   Ht 1.663 m (5' 5.47\")   Wt 81.7 kg (180 lb 1.6 oz)   LMP  (LMP Unknown)   SpO2 97%   BMI 29.54 kg/m    Body mass index is 29.54 kg/m .  Physical Exam   Alert female.  Excellent range of motion throughout range of motion of her back.  5 out of 5 strength throughout her lower extremities.  She has 3+ reflexes at the patella bilaterally, 2+ at the ankle bilaterally, symmetric.  Normal tone.  Straight leg raise negative bilaterally.  No midline spinal tenderness.  No SI joint tenderness.                    "

## 2023-06-01 NOTE — PATIENT INSTRUCTIONS
I suspect your pain is due to a pinched nerve from your low back.  Begin gabapentin 300 mg at bedtime.  You may increase as needed and as tolerated up to 3 times daily.  It can cause fatigue, so be cautious.  I sent in a prescription for Medrol Dosepak to help with inflammation.  Follow the instructions.  Be sure to stop if you get stomach irritation.    Since Celebrex was not been helpful, stop the Celebrex.  May continue acetaminophen on an as needed or scheduled basis to help manage your pain.    I have placed an order for physical therapy for your back.    If your pain is worse or if you develop any new weakness or loss of sensation, reach out to me.

## 2023-06-01 NOTE — NURSING NOTE
"Chief Complaint   Patient presents with     Sleep Problem     Follow up sleep study results        Initial BP (!) 156/89   Pulse 74   Wt 80.7 kg (178 lb)   LMP  (LMP Unknown)   SpO2 96%   BMI 28.95 kg/m   Estimated body mass index is 28.95 kg/m  as calculated from the following:    Height as of 4/19/23: 1.67 m (5' 5.75\").    Weight as of this encounter: 80.7 kg (178 lb).    Medication Reconciliation: complete    ESS 7  BORIS 12  Norma Madera MA    "

## 2023-06-01 NOTE — PROGRESS NOTES
Sleep Follow-Up Visit:    Date on this visit: 6/1/2023    Ana Maria De La Garza comes in today for follow-up of her sleep study done on 1/6/23. Ana Maria De La Garza was initially seen for difficulty sleeping, RLS.     PSG  Results:  Weight: 182 pounds  Sleep Architecture: Fragmented sleep.   The total recording time of the polysomnogram was 441.6 minutes. The total sleep time was 385.5 minutes. Sleep latency was increased at 23.5 minutes with the use of Trazodone as a sleep aid. REM latency was 181.0 minutes. Arousal index was increased at 35.6 arousals per hour. Sleep efficiency was normal at 87.3%. Wake after sleep onset was 32.5 minutes. The patient spent 5.4% of total sleep time in Stage N1, 22.7% in Stage N2, 37.2% in Stage N3, and 34.6% in REM. Time in REM supine was 3.0 minutes.     Respiration: Mild obstructive sleep apnea.     Events ? The polysomnogram revealed a presence of 37 obstructive, 2 central, and 2 mixed apneas resulting in an apnea index of 6.4 events per hour. There were 29 obstructive hypopneas and - central hypopneas resulting in an obstructive hypopnea index of 4.5 and central hypopnea index of - events per hour. The combined apnea/hypopnea index was 10.9 events per hour (central apnea/hypopnea index was 0.3 events per hour). The REM AHI was 14.8 events per hour. The supine AHI was 26.7 events per hour. The RERA index was 5.0 events per hour.  The RDI was 15.9 events per hour.    Snoring - was reported as moderate.    Respiratory rate and pattern - was notable for normal respiratory rate and pattern.    Sustained Sleep Associated Hypoventilation - Transcutaneous carbon dioxide monitoring was not used; however significant hypoventilation was not suggested by oximetry.    Sleep Associated Hypoxemia - (Greater than 5 minutes O2 sat at or below 88%) was present. Baseline oxygen saturation was 92.4%. Lowest oxygen saturation was 71.0%. Time spent less than or equal to 88% was 8.2 minutes. Time spent  less than or equal to 89% was 14.8 minutes.     Movement Activity: Negative for significant movement abnormalities.     Periodic Limb Activity - There were 59 PLMs during the entire study. The PLM index was 9.2 movements per hour. The PLM Arousal Index was 4.8 per hour.    REM EMG Activity - Excessive transient/sustained muscle activity was not present.    Nocturnal Behavior - Abnormal sleep related behaviors were not noted during/arising out of NREM / REM sleep.     Bruxism - None apparent.     Cardiac Summary: Sinus rhythm.   The average pulse rate was 71.2 bpm. The minimum pulse rate was 58.0 bpm while the maximum pulse rate was 101.0 bpm.  Arrhythmias were not noted.    RLS: Ferritin was 340 in 11/2022. 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 She has tried gabapentin up to 600 mg but did not notice a benefit, so got off of it. Lately, it has not been that bad, maybe once per week. It occurs between 6-9 PM and has not been interfering with sleep.     She has been taking trazodone at 10 PM, trying to go to bed at 11 PM. She is not sure the trazodone is really doing anything, but later says she can get to sleep ok. Sometimes she has difficulty staying up until 11 PM. She continues to get up around 5 AM. Her energy in the day is low if she gets up before 5 PM. She has to nap then. If she sleeps past 6 AM, she is ok. She naps at 6-7 AM about 4 times per week for about 1-2 hours. She tried listening to something online and it only worked for about 4 days. She was started on escitalopram which kind of helped.     She has not taken the hydroxyzine.     Past medical/surgical history, family history, social history, medications and allergies were reviewed.      Problem List:  Patient Active Problem List    Diagnosis Date Noted     Chronic kidney disease, stage 3 (H) 11/04/2020     Priority: Medium     Osteopenia 10/09/2018     Priority: Medium     High risk of fracture per DEXA 8/2018         Hyperlipidemia      Priority:  Medium     Created by Conversion         Type 2 diabetes mellitus with complication, without long-term current use of insulin (H) 01/26/2017     Priority: Medium     Anxiety      Priority: Medium     Created by Conversion  Replacement Utility updated for latest IMO load         Insomnia 06/22/2016     Priority: Medium     Status post total knee replacement 02/18/2015     Priority: Medium     Left (2/18/2015)         Benign Essential Hypertension      Priority: Medium     Created by Conversion         Cervical Disc Degeneration      Priority: Medium     Created by Conversion         Female Stress Incontinence      Priority: Medium     Created by Conversion  [facility] Annotation: May 18 2010  9:18AM - Kristen Navarrete: s/p   bladder   sling Dr. Eugene         Recurrent Major Depression In Full Remission      Priority: Medium     Created by Conversion         Cervical Spondylosis      Priority: Medium     Created by Conversion            Impression/Plan:    (G47.33) KAMLESH (obstructive sleep apnea)  (primary encounter diagnosis)  Comment: This study showed mild KAMLESH on average.  Her apnea was significantly positional, supine AHI 26/h, lateral AHI 7/h.  Sayda very much wants to avoid having to use CPAP.  Plan: She will start with positional restriction. She was shown Sleep Noodle and MedCline pillow. We may consider a dental appliance if she has difficulty avoiding supine sleep.     (F51.04) Chronic insomnia  Comment: Sayda has been taking trazodone at 10 PM and having difficulty staying awake until 11 PM.  She continues to wake around 5 AM on average.  Sometimes she will go back to sleep for an hour or 2 around 6 or 7 AM.  Her sleep study showed more sleep disruption in the first few hours of the night and more consolidated sleep and even deep sleep in the second half of the night.  Some of that may have been due to the trazodone, but given her sleep study findings and the fact that she is going back to bed in the  morning, I wonder if her circadian preference is later than her attempted sleep schedule.  Plan: Try a sleep schedule of 12-7 AM, take either the trazodone or hydroxyzine about 30 minutes before bedtime.  If this schedule seems unnatural, she may go back to trying to sleep 11 PM to 6 AM, but move the trazodone to 10:30 PM instead of 10 PM.  If she continues to feel trazodone and hydroxyzine are ineffective, we may consider trial of mirtazapine.    (G25.81) Restless legs syndrome (RLS)  Comment: Her RLS symptoms have not been very bad lately.  She did not respond to gabapentin.  Her ferritin was high in the past.  Symptoms may be driven in part by arthritis.  Plan: We will not pursue further intervention at this point.        She will follow up with me in about 4 month(s).     50 minutes were spent on the date of the encounter doing chart review, history and exam, documentation and further activities as noted above.      Bennett Goltz, PA-C    CC: No ref. provider found

## 2023-06-01 NOTE — PATIENT INSTRUCTIONS
Try the Sleep Noodle positional restriction device to help you stay off of your back during sleep.    Try a sleep schedule of 12-7 AM, take either the trazodone or hydroxyzine about 30 minutes before bedtime.  If this schedule seems unnatural, go back to trying to sleep 11 PM to 6 AM, but move the trazodone to 10:30 PM instead of 10 PM.

## 2023-06-05 ENCOUNTER — TRANSFERRED RECORDS (OUTPATIENT)
Dept: HEALTH INFORMATION MANAGEMENT | Facility: CLINIC | Age: 73
End: 2023-06-05
Payer: COMMERCIAL

## 2023-06-19 ENCOUNTER — TRANSFERRED RECORDS (OUTPATIENT)
Dept: HEALTH INFORMATION MANAGEMENT | Facility: CLINIC | Age: 73
End: 2023-06-19
Payer: COMMERCIAL

## 2023-06-25 ENCOUNTER — MYC MEDICAL ADVICE (OUTPATIENT)
Dept: FAMILY MEDICINE | Facility: CLINIC | Age: 73
End: 2023-06-25
Payer: COMMERCIAL

## 2023-06-26 ENCOUNTER — NURSE TRIAGE (OUTPATIENT)
Dept: FAMILY MEDICINE | Facility: CLINIC | Age: 73
End: 2023-06-26
Payer: COMMERCIAL

## 2023-06-26 DIAGNOSIS — M54.16 RIGHT LUMBAR RADICULITIS: Primary | ICD-10-CM

## 2023-06-26 DIAGNOSIS — M19.90 ARTHRITIS: ICD-10-CM

## 2023-06-26 NOTE — TELEPHONE ENCOUNTER
"Summary: Gave care advise. Writer recommended the patient be seen by a provider in the next three days, but the patient declined. Patient would like to get a higher dose of Celebrex, if possible. She would also like to ask the provider if she needs needs a referral to be seen at a pain clinic for her chronic pain.      (She also stopped her gabapentin as it made her dizzy and lightheaded. )     Will route to the provider to review and advise.      Nurse Triage SBAR    Is this a 2nd Level Triage? YES, LICENSED PRACTITIONER REVIEW IS REQUIRED    Situation:  See Virajsilvestret message from patient on 6/25/23:    \"Please help me!  Just had two awful vacations up [place].  Pain prevented me from doing any hiking. Gabapentin made me too dizzy.  Celebrex worked great but it stopped being effective.  Any other ideas or should I see a  pain clinic? So frustrating!!!  Thanks,  Sayda\"    Background:   Hx of cervical spondylosis and disc degeneration. Hx of HTN, CKD3, and left total knee replacement on 2/18/2015.    Assessment:   Patient reports she was unable to perform ADLs this past weekend. She is an avid hiker and was unable to hike due to moderately severe pain in her knees.     States her right knee is more painful than the left side. Left knee is still painful due to scar tissue per patient and had a TKA in 2/2015. Patient used to dance with clogs for 30 years.     Has had steroid injections in her knees in the past, but overall the injections were unsuccessful for her pain control.     Reports her hips are also painful and the left buttock has a sharp pain some of the time (comes in goes). Reports  bursitis in right side of pelvic joint, where her legs cross    Has PT scheduled tomorrow at [facility].     States that Celebrex worked for a while for pain, but now it seems less effective. Patient wonders if she could maybe get a higher dose of Celebrex prescribed.    She also stopped her gabapentin as it made her dizzy and lightheaded. "     Reports she uses Tylenol for pain otherwise with some pain relief.    Denies chest pain, SOB, abdominal pain, lower extremity edema, trouble breathing.    Protocol Recommended Disposition:   See in Office Within 3 Days, See More Appropriate Protocol    Recommendation:   Gave care advise. Writer recommended the patient be seen by a provider in the next three days, but the patient declined. Patient would like to get a higher dose of Celebrex, if possible. She would also like to ask the provider if she needs needs a referral to be seen at a pain clinic for her chronic pain.     (She also stopped her gabapentin as it made her dizzy and lightheaded. )    Will route to the provider to review and advise.    Routed to provider    Does the patient meet one of the following criteria for ADS visit consideration? 16+ years old, with an [facility] PCP     TIP  Providers, please consider if this condition is appropriate for management at one of our Acute and Diagnostic Services sites.     If patient is a good candidate, please use dotphrase <dot>triageresponse and select Refer to ADS to document.      Reason for Disposition    Back pain radiating (shooting) into leg(s)    Pain radiates into the thigh or further down the leg    Additional Information    Negative: Sounds like a life-threatening emergency to the triager    Negative: Looks like a broken bone or dislocated joint (e.g., crooked or deformed)    Negative: Followed a hip injury    Negative: Followed a knee injury    Negative: Followed an ankle or foot injury    Negative: Passed out (i.e., fainted, collapsed and was not responding)    Negative: Shock suspected (e.g., cold/pale/clammy skin, too weak to stand, low BP, rapid pulse)    Negative: Sounds like a life-threatening emergency to the triager    Negative: Major injury to the back (e.g., MVA, fall > 10 feet or 3 meters, penetrating injury, etc.)    Negative: Back pain during pregnancy    Negative: Pain in the upper back  over the ribs (rib cage) that radiates (travels) into the chest    Negative: Pain in the upper back over the ribs (rib cage) and worsened by coughing (or clearly increases with breathing)    Negative: SEVERE back pain of sudden onset and age > 60 years    Negative: SEVERE abdominal pain (e.g., excruciating)    Negative: Abdominal pain and age > 60 years    Negative: Unable to urinate (or only a few drops) and bladder feels very full    Negative: Loss of bladder or bowel control (urine or bowel incontinence; wetting self, leaking stool) of new-onset    Negative: Numbness (loss of sensation) in groin or rectal area    Negative: Pain radiates into groin, scrotum    Negative: Blood in urine (red, pink, or tea-colored)    Negative: Vomiting and pain over lower ribs of back (i.e., flank - kidney area)    Negative: Weakness of a leg or foot (e.g., unable to bear weight, dragging foot)    Negative: Patient sounds very sick or weak to the triager    Negative: Fever > 100.4 F (38.0 C) and flank pain    Negative: Pain or burning with passing urine (urination)    Negative: SEVERE back pain (e.g., excruciating, unable to do any normal activities) and not improved after pain medicine and CARE ADVICE    Negative: Numbness in an arm or hand (i.e., loss of sensation) and upper back pain    Negative: Numbness in a leg or foot (i.e., loss of sensation)    Negative: High-risk adult (e.g., history of cancer, history of HIV, or history of IV Drug Use)    Negative: Soft tissue infection (e.g., abscess, cellulitis) or other serious infection (e.g., bacteremia) in last 2 weeks    Negative: Painful rash with multiple small blisters grouped together (i.e., dermatomal distribution or 'band' or 'stripe')    Negative: Pain radiates into the thigh or further down the leg, and in both legs    Negative: Age > 50 and no history of prior similar back pain    Negative: MODERATE back pain (e.g., interferes with normal activities) and present > 3  "days    Answer Assessment - Initial Assessment Questions  1. ONSET: \"When did the pain start?\"         Ongoing pain for a long time, but getting worse over time. Was unable to hike    2. LOCATION: \"Where is the pain located?\"        Both knees-scar tissue on the left side    3. PAIN: \"How bad is the pain?\"    (Scale 1-10; or mild, moderate, severe)    -  MILD (1-3): doesn't interfere with normal activities     -  MODERATE (4-7): interferes with normal activities (e.g., work or school) or awakens from sleep, limping     -  SEVERE (8-10): excruciating pain, unable to do any normal activities, unable to walk    Rated pain in knees as 7 out of ten    4. WORK OR EXERCISE: \"Has there been any recent work or exercise that involved this part of the body?\"     Not noted    5. CAUSE: \"What do you think is causing the leg pain?\"    Arthritis in both knees-has had her left knee replaced, but still painful    6. OTHER SYMPTOMS: \"Do you have any other symptoms?\" (e.g., chest pain, back pain, breathing difficulty, swelling, rash, fever, numbness, weakness)    None noted    Protocols used: LEG PAIN-A-OH, BACK PAIN-A-OH      Shala Sim RN, BSN  [facility]      "

## 2023-06-26 NOTE — TELEPHONE ENCOUNTER
Provider Recommendation Follow Up:   Unable to reach patient/caregiver. Left detailed message with PCP recommendations.         VINNIE GriffinN, RN  [facility]

## 2023-06-26 NOTE — TELEPHONE ENCOUNTER
She is on the maximum dose of Celebrex, but she could try taking both doses at once (200 mg once daily) to see if this makes a difference in her pain and functioning daytime.  Referral is made to the spine care center as they are the experts in spine related pain.  ARONJ

## 2023-06-28 NOTE — PROGRESS NOTES
ASSESSMENT: Ana Maria De La Garza is a 72 year old female who presents for consultation at the request of PCP Kristen Navarrete, with a past medical history significant for hx diabetes, ckd 3, osteopenia, anxiety and depression, and hypertensionwho presents today for new patient evaluation of:    -low back pain with alternating pain radiation into her posterior thighs.    Patient is neurologically intact on exam. No myelopathic or red flag symptoms. She has so far completed trial of po steroids, nsaids, and did not tolerate trial of gabapentin d/t dizziness. She is currently in physical therapy without benefit so far. Recommended additional imaging for treatment planning. We will review results and decide next steps. Sayda agrees with plan.        6/30/2023     9:00 AM   OSWESTRY DISABILITY INDEX   Count 9   Sum 4   Oswestry Score (%) 8.89 %            Diagnoses and all orders for this visit:  Lumbar radiculopathy  -     MR Lumbar Spine w/o Contrast; Future  Right lumbar radiculitis  -     Spine  Referral      PLAN:  Reviewed spine anatomy and disease process. Discussed diagnosis and treatment options with the patient today. A shared decision making model was used.  The patient's values and choices were respected. The following represents what was discussed and decided upon by the provider and the patient.      -DIAGNOSTIC TESTS:  Images were personally reviewed and interpreted and explained to patient today using a spine model.   --Recommend MRI lumbar spine for further evaluation    -PHYSICAL THERAPY:    Recommended Sayda complete entire course of PT. She has already completed 2 sessions of and has 2 more left.  Discussed the importance of core strengthening, ROM, stretching exercises with the patient and how each of these entities is important in decreasing pain.  Explained to the patient that the purpose of physical therapy is to teach the patient a home exercise program.  These exercises need to be  performed every day in order to decrease pain and prevent future occurrences of pain.        -MEDICATIONS:   Discussed multiple medication options today with patient. Discussed risks, side effects, and proper use of medications. Patient verbalized understanding. We talked about adding lyrica at a low dose if MRI results are consistent with lumbar radiculopathy. I would like to see her results first before deciding on adding this to her regimen since she has dizziness/imbalance at baseline and this medication could exacerbate this for her. Would be worth risk/benefit discussion. If no findings consistent with mod-severe nerve impingement, I would be reluctant to add it. I would not change her NSAID (celebrex) at this time given her ckd3.    -INTERVENTIONS:    Briefly discussed risks and benefits of injections with patient today if her lumbar MRI shows disc herniation with nerve impingement or lumbar stenosis. She does have diabetes and given risk of hyperglyemia would need to weigh risks and benefits carefully prior to proceeding down this path.    -PATIENT EDUCATION:  Total time of 15 minutes, on the day of service, spent with the patient, reviewing the chart, placing orders, and documenting.   -Today we also discussed the pros and cons of the current treatment plan.    -FOLLOW-UP:   To be determined based on results of MRI scan.     Advised patient to call the [facility] if symptoms worsen, new numbness or weakness develops in the legs, or if they develop new or worsening problems controlling bladder or bowel function.   ______________________________________________________________________    SUBJECTIVE:    HPI:  Ana Maria De La Garza  Is a 72 year old female hx diabetes, ckd3, osteopenia, anxiety and depression, and hypertension who presents today for new patient evaluation of low back pain with alternating pain radiation into her posterior thighs. Symptoms started after a mechanical fall onto her buttocks in the  street with head injury) on 4/1/23. She was seen in urgent care on 4/14 for low back, buttock, and pelvic discomfort. There was no imaging performed at this visit. She then saw her PCP on 4/19 and CT abdomen pelvis demonstrated lumbar degenerative disc disease and a 3cm left gluteal hematoma. The pain began to radiate into her R posterior thigh in May. She was seen by her PCP on 6/1 and referred for PT, treated with a medrol dose pack and started on gabapentin (which she ultimately stopped after experiencing intolerable dizziness). A few weeks ago, the pain then moved to her left posterior thigh instead of her right. Now it moves back and forth. There does not seem to be a pattern to it. She describes a 'kink' in her lower back which used to be intermittent and is now constant. She is unable to hike or walk for long periods of time. She feels very little pain when she swims and walks in the water. She is currently in PT - has completed 2 sessions and has 2 more left. Her right side was noted to be weaker in PT. She does endorse chronic R leg weakness related to chronic R knee pain.  She is taking celebrex at her max dose per her PCP. She is here with her  today.    She does not experience any numbness/tingling or change in bowel or bladder control. She does endorse chronic dizziness/imbalance.    Here with her  today        -Treatment to Date:     -Medications:  -gabapentin (stopped)  -medrol dose pack  -celebrex      Current Outpatient Medications   Medication    calcium-vitamin D (CALCIUM-VITAMIN D) 500 mg(1,250mg) -200 unit per tablet    celecoxib (CELEBREX) 100 MG capsule    escitalopram (LEXAPRO) 10 MG tablet    ezetimibe (ZETIA) 10 MG tablet    gabapentin (NEURONTIN) 300 MG capsule    glipiZIDE (GLUCOTROL XL) 10 MG 24 hr tablet    hydrochlorothiazide (HYDRODIURIL) 25 MG tablet    hydrOXYzine (ATARAX) 25 MG tablet    lisinopril (ZESTRIL) 20 MG tablet    LORazepam (ATIVAN) 0.5 MG tablet     methylPREDNISolone (MEDROL DOSEPAK) 4 MG tablet therapy pack    multivitamin therapeutic (THERAGRAN) tablet    OMEGA-3/DHA/EPA/FISH OIL (FISH OIL-OMEGA-3 FATTY ACIDS) 300-1,000 mg capsule    ondansetron (ZOFRAN) 4 MG tablet    semaglutide (OZEMPIC, 0.25 OR 0.5 MG/DOSE,) 2 MG/1.5ML SOPN pen    traZODone (DESYREL) 100 MG tablet    vitamin D3 (CHOLECALCIFEROL) 50 mcg (2000 units) tablet     No current facility-administered medications for this visit.       Allergies   Allergen Reactions    Atorvastatin Muscle Pain (Myalgia)    Metformin Unknown     Body aches, headache    Morphine (Pf) [Morphine] Nausea and Vomiting    Pravastatin Muscle Pain (Myalgia)       Past Medical History:   Diagnosis Date    Depression     Hypertension     Insomnia         Patient Active Problem List   Diagnosis    Female Stress Incontinence    Hyperlipidemia    Recurrent Major Depression In Full Remission    Benign Essential Hypertension    Cervical Spondylosis    Cervical Disc Degeneration    Anxiety    Status post total knee replacement    Insomnia    Type 2 diabetes mellitus with complication, without long-term current use of insulin (H)    Osteopenia    Chronic kidney disease, stage 3 (H)       Past Surgical History:   Procedure Laterality Date     SLING OPERATION FOR STRESS INCONTINENCE      Description: Mid-Urethral Sling Operation;  Recorded: 05/18/2010;    JOINT REPLACEMENT Left 2008    knee    OPEN REDUCTION INTERNAL FIXATION ANKLE Right     2008 and pin removal 2009    [facility] APPENDECTOMY      Description: Appendectomy;  Recorded: 02/26/2008;    [facility] TOTAL KNEE ARTHROPLASTY Left 2/18/2015    Procedure:   LEFT TOTAL KNEE ARTHROPLASTY;  Surgeon: Sina SHARP MD;  Location: [facility];  Service: Orthopedics       Family History   Problem Relation Age of Onset    Diabetes Mother     Hypertension Mother     Hypertension Father     Cancer Brother     Clotting Disorder No family hx of        Reviewed past medical, surgical, and  "family history with patient found on new patient intake packet located in EMR Media tab.     SOCIAL HX: nonsmoker, no alcohol    ROS: per below. Specifically negative for bowel/bladder dysfunction, balance changes, headache, dizziness, foot drop, fevers, chills, appetite changes, nausea/vomiting, unexplained weight loss. Otherwise 13 systems reviewed are negative. Please see the patient's intake questionnaire from today for details.    OBJECTIVE:  /72   Pulse 97   Ht 5' 6\" (1.676 m)   Wt 176 lb (79.8 kg)   LMP  (LMP Unknown)   SpO2 96%   BMI 28.41 kg/m      PHYSICAL EXAMINATION:    --CONSTITUTIONAL:  Vital signs as above.  No acute distress.  The patient is well nourished and well groomed.  --PSYCHIATRIC:  Appropriate mood and affect. The patient is awake, alert, oriented to person, place, time and answering questions appropriately with clear speech.    --SKIN:  Skin over the face, bilateral lower extremities, and posterior torso is clean, dry, intact without rashes.    --RESPIRATORY: Normal rhythm and effort. No abnormal accessory muscle breathing patterns noted.   --ABDOMINAL:  Non-distended.    --GROSS MOTOR: Gait is non-antalgic. Easily arises from a seated position. Toe walking and heel walking are normal without significant difficulty.      --LOWER EXTREMITY MOTOR TESTING:  Hip flexion: left 5/5, right 5/5  Hip abduction: left 5/5, right 5/5  Hip adduction: left 5/5, right 5/5   Quads: left 5/5, right 5/5  Hamstrings: left 5/5, right 5/5  Dorsiflexion: left 5/5, right 5/5  Plantar flexion: left 5/5, right 5/5    Great toe MTP extension/EHL: left 5/5, right 5/5    --NEUROLOGICAL:  2/4 patellar, medial hamstring, and achilles reflexes bilaterally. Sensation to light touch is intact throughout both lower extremities. Babinski is negative. No clonus.      --STANDING EXAMINATION:  Normal lumbar lordosis noted, no lateral shift.    --MUSCULOSKELETAL: Lumbar spine inspection reveals no evidence of " deformity. Range of motion is not limited in lumbar flexion, extension, lateral rotation. No point tenderness to palpation lumbar spine. No paraspinal musculature tenderness. Straight leg raising is negative.     --HIPS: Full range of motion bilaterally. Negative FABERs and Negative FADDIRs on the involved lower extremity. Negative hip joint tenderness to palp.    --SACROILIAC JOINT: Distraction maneuver was negative. Thigh thrust negative. Sacroiliac Joint Compression Test was negative. One finger point test was negative.    --VASCULAR:  2/4 dorsalis pedis and posterior tibialsi pulses bilaterally.  Bilateral lower extremities are warm without any discoloration.  There is no pitting edema of the bilateral lower extremities.    RESULTS:   Prior medical records from [facility]where were reviewed today.    Imaging: Spine imaging was personally reviewed and interpreted today. The images were shown to the patient and the findings were explained using a spine model.      Narrative & Impression   EXAM: CT ABDOMEN PELVIS W CONTRAST  LOCATION: [facility]  DATE/TIME: 4/19/2023 5:01 PM CDT     INDICATION: Fall on 4 1 2023, and progressive pelvic discomfort with no more cramping and bloating.  Examination unremarkable.  Hematoma known right buttock.  Assess for pelvic mass hematoma, assess for pelvic fracture  COMPARISON: None.  TECHNIQUE: CT scan of the abdomen and pelvis was performed following injection of IV contrast. Multiplanar reformats were obtained. Dose reduction techniques were used.  CONTRAST: 75ml IV Isovue 370     FINDINGS:   LOWER CHEST: Subsegmental atelectasis seen in the right lung base.     HEPATOBILIARY: Mild Diffuse hepatic steatosis. No significant mass. No bile duct dilatation. No calcified gallstones.     PANCREAS: No significant mass, duct dilatation, or inflammatory change.     SPLEEN: Normal size.     ADRENAL GLANDS: No significant nodules.      KIDNEYS/BLADDER: No significant mass, stone, or hydronephrosis.     BOWEL: Diverticulosis of the descending and sigmoid colon. No acute inflammatory change. No obstruction.      LYMPH NODES: Normal.     VASCULATURE: No abdominal aortic aneurysm.     PELVIC ORGANS: No pelvic masses.     MUSCULOSKELETAL: Mild multilevel discogenic and posterior facet degenerative changes are seen in the lower lumbar spine. There is a 3 x 1.3 x 2.7 cm intermediate density fluid collection seen in the left gluteal subcutaneous tissues. The osseous   structures are intact.                                                                      IMPRESSION:   1.  3 cm left gluteal subcutaneous evolving hematoma, without underlying osseous injury   2.  Mild hepatic steatosis  3.  descending and sigmoid colonic diverticulosis without evidence of diverticulitis           Lilo FRANCOP-C  [facility]  O. [phone]

## 2023-06-30 ENCOUNTER — OFFICE VISIT (OUTPATIENT)
Dept: PHYSICAL MEDICINE AND REHAB | Facility: CLINIC | Age: 73
End: 2023-06-30
Attending: FAMILY MEDICINE
Payer: COMMERCIAL

## 2023-06-30 VITALS
OXYGEN SATURATION: 96 % | BODY MASS INDEX: 28.28 KG/M2 | WEIGHT: 176 LBS | HEIGHT: 66 IN | SYSTOLIC BLOOD PRESSURE: 137 MMHG | DIASTOLIC BLOOD PRESSURE: 72 MMHG | HEART RATE: 97 BPM

## 2023-06-30 DIAGNOSIS — M54.16 LUMBAR RADICULOPATHY: Primary | ICD-10-CM

## 2023-06-30 DIAGNOSIS — M54.16 RIGHT LUMBAR RADICULITIS: ICD-10-CM

## 2023-06-30 PROCEDURE — 99214 OFFICE O/P EST MOD 30 MIN: CPT | Performed by: NURSE PRACTITIONER

## 2023-06-30 ASSESSMENT — PAIN SCALES - GENERAL: PAINLEVEL: MODERATE PAIN (5)

## 2023-06-30 NOTE — LETTER
6/30/2023         RE: Ana Maria De La Garza  [address] [place] [address]        Dear Colleague,    Thank you for referring your patient, Ana Maria De La Garza, to the [facility]. Please see a copy of my visit note below.    ASSESSMENT: Ana Maria De La Garza is a 72 year old female who presents for consultation at the request of PCP Kristen Navarrete, with a past medical history significant for hx diabetes, ckd 3, osteopenia, anxiety and depression, and hypertensionwho presents today for new patient evaluation of:    -low back pain with alternating pain radiation into her posterior thighs.    Patient is neurologically intact on exam. No myelopathic or red flag symptoms. She has so far completed trial of po steroids, nsaids, and did not tolerate trial of gabapentin d/t dizziness. She is currently in physical therapy without benefit so far. Recommended additional imaging for treatment planning. We will review results and decide next steps. Sayda agrees with plan.        6/30/2023     9:00 AM   OSWESTRY DISABILITY INDEX   Count 9   Sum 4   Oswestry Score (%) 8.89 %            Diagnoses and all orders for this visit:  Lumbar radiculopathy  -     MR Lumbar Spine w/o Contrast; Future  Right lumbar radiculitis  -     Spine  Referral      PLAN:  Reviewed spine anatomy and disease process. Discussed diagnosis and treatment options with the patient today. A shared decision making model was used.  The patient's values and choices were respected. The following represents what was discussed and decided upon by the provider and the patient.      -DIAGNOSTIC TESTS:  Images were personally reviewed and interpreted and explained to patient today using a spine model.   --Recommend MRI lumbar spine for further evaluation    -PHYSICAL THERAPY:    Recommended Sayda complete entire course of PT. She has already completed 2 sessions of and has 2 more left.  Discussed the importance  of core strengthening, ROM, stretching exercises with the patient and how each of these entities is important in decreasing pain.  Explained to the patient that the purpose of physical therapy is to teach the patient a home exercise program.  These exercises need to be performed every day in order to decrease pain and prevent future occurrences of pain.        -MEDICATIONS:   Discussed multiple medication options today with patient. Discussed risks, side effects, and proper use of medications. Patient verbalized understanding. We talked about adding lyrica at a low dose if MRI results are consistent with lumbar radiculopathy. I would like to see her results first before deciding on adding this to her regimen since she has dizziness/imbalance at baseline and this medication could exacerbate this for her. Would be worth risk/benefit discussion. If no findings consistent with mod-severe nerve impingement, I would be reluctant to add it. I would not change her NSAID (celebrex) at this time given her ckd3.    -INTERVENTIONS:    Briefly discussed risks and benefits of injections with patient today if her lumbar MRI shows disc herniation with nerve impingement or lumbar stenosis. She does have diabetes and given risk of hyperglyemia would need to weigh risks and benefits carefully prior to proceeding down this path.    -PATIENT EDUCATION:  Total time of 15 minutes, on the day of service, spent with the patient, reviewing the chart, placing orders, and documenting.   -Today we also discussed the pros and cons of the current treatment plan.    -FOLLOW-UP:   To be determined based on results of MRI scan.     Advised patient to call the Spine Center if symptoms worsen, new numbness or weakness develops in the legs, or if they develop new or worsening problems controlling bladder or bowel function.   ______________________________________________________________________    SUBJECTIVE:    HPI:  Ana Maria De La Garza  Is a 72 year  old female hx diabetes, ckd3, osteopenia, anxiety and depression, and hypertension who presents today for new patient evaluation of low back pain with alternating pain radiation into her posterior thighs. Symptoms started after a mechanical fall onto her buttocks in the street with head injury) on 4/1/23. She was seen in urgent care on 4/14 for low back, buttock, and pelvic discomfort. There was no imaging performed at this visit. She then saw her PCP on 4/19 and CT abdomen pelvis demonstrated lumbar degenerative disc disease and a 3cm left gluteal hematoma. The pain began to radiate into her R posterior thigh in May. She was seen by her PCP on 6/1 and referred for PT, treated with a medrol dose pack and started on gabapentin (which she ultimately stopped after experiencing intolerable dizziness). A few weeks ago, the pain then moved to her left posterior thigh instead of her right. Now it moves back and forth. There does not seem to be a pattern to it. She describes a 'kink' in her lower back which used to be intermittent and is now constant. She is unable to hike or walk for long periods of time. She feels very little pain when she swims and walks in the water. She is currently in PT - has completed 2 sessions and has 2 more left. Her right side was noted to be weaker in PT. She does endorse chronic R leg weakness related to chronic R knee pain.  She is taking celebrex at her max dose per her PCP. She is here with her  today.    She does not experience any numbness/tingling or change in bowel or bladder control. She does endorse chronic dizziness/imbalance.    Here with her  today        -Treatment to Date:     -Medications:  -gabapentin (stopped)  -medrol dose pack  -celebrex      Current Outpatient Medications   Medication     calcium-vitamin D (CALCIUM-VITAMIN D) 500 mg(1,250mg) -200 unit per tablet     celecoxib (CELEBREX) 100 MG capsule     escitalopram (LEXAPRO) 10 MG tablet     ezetimibe  (ZETIA) 10 MG tablet     gabapentin (NEURONTIN) 300 MG capsule     glipiZIDE (GLUCOTROL XL) 10 MG 24 hr tablet     hydrochlorothiazide (HYDRODIURIL) 25 MG tablet     hydrOXYzine (ATARAX) 25 MG tablet     lisinopril (ZESTRIL) 20 MG tablet     LORazepam (ATIVAN) 0.5 MG tablet     methylPREDNISolone (MEDROL DOSEPAK) 4 MG tablet therapy pack     multivitamin therapeutic (THERAGRAN) tablet     OMEGA-3/DHA/EPA/FISH OIL (FISH OIL-OMEGA-3 FATTY ACIDS) 300-1,000 mg capsule     ondansetron (ZOFRAN) 4 MG tablet     semaglutide (OZEMPIC, 0.25 OR 0.5 MG/DOSE,) 2 MG/1.5ML SOPN pen     traZODone (DESYREL) 100 MG tablet     vitamin D3 (CHOLECALCIFEROL) 50 mcg (2000 units) tablet     No current facility-administered medications for this visit.       Allergies   Allergen Reactions     Atorvastatin Muscle Pain (Myalgia)     Metformin Unknown     Body aches, headache     Morphine (Pf) [Morphine] Nausea and Vomiting     Pravastatin Muscle Pain (Myalgia)       Past Medical History:   Diagnosis Date     Depression      Hypertension      Insomnia         Patient Active Problem List   Diagnosis     Female Stress Incontinence     Hyperlipidemia     Recurrent Major Depression In Full Remission     Benign Essential Hypertension     Cervical Spondylosis     Cervical Disc Degeneration     Anxiety     Status post total knee replacement     Insomnia     Type 2 diabetes mellitus with complication, without long-term current use of insulin (H)     Osteopenia     Chronic kidney disease, stage 3 (H)       Past Surgical History:   Procedure Laterality Date      SLING OPERATION FOR STRESS INCONTINENCE      Description: Mid-Urethral Sling Operation;  Recorded: 05/18/2010;     JOINT REPLACEMENT Left 2008    knee     OPEN REDUCTION INTERNAL FIXATION ANKLE Right     2008 and pin removal 2009     [facility] APPENDECTOMY      Description: Appendectomy;  Recorded: 02/26/2008;     [facility] TOTAL KNEE ARTHROPLASTY Left 2/18/2015    Procedure:   LEFT TOTAL KNEE  "ARTHROPLASTY;  Surgeon: Sina SHARP MD;  Location: [facility] OR;  Service: Orthopedics       Family History   Problem Relation Age of Onset     Diabetes Mother      Hypertension Mother      Hypertension Father      Cancer Brother      Clotting Disorder No family hx of        Reviewed past medical, surgical, and family history with patient found on new patient intake packet located in EMR Media tab.     SOCIAL HX: nonsmoker, no alcohol    ROS: per below. Specifically negative for bowel/bladder dysfunction, balance changes, headache, dizziness, foot drop, fevers, chills, appetite changes, nausea/vomiting, unexplained weight loss. Otherwise 13 systems reviewed are negative. Please see the patient's intake questionnaire from today for details.    OBJECTIVE:  /72   Pulse 97   Ht 5' 6\" (1.676 m)   Wt 176 lb (79.8 kg)   LMP  (LMP Unknown)   SpO2 96%   BMI 28.41 kg/m      PHYSICAL EXAMINATION:    --CONSTITUTIONAL:  Vital signs as above.  No acute distress.  The patient is well nourished and well groomed.  --PSYCHIATRIC:  Appropriate mood and affect. The patient is awake, alert, oriented to person, place, time and answering questions appropriately with clear speech.    --SKIN:  Skin over the face, bilateral lower extremities, and posterior torso is clean, dry, intact without rashes.    --RESPIRATORY: Normal rhythm and effort. No abnormal accessory muscle breathing patterns noted.   --ABDOMINAL:  Non-distended.    --GROSS MOTOR: Gait is non-antalgic. Easily arises from a seated position. Toe walking and heel walking are normal without significant difficulty.      --LOWER EXTREMITY MOTOR TESTING:  Hip flexion: left 5/5, right 5/5  Hip abduction: left 5/5, right 5/5  Hip adduction: left 5/5, right 5/5   Quads: left 5/5, right 5/5  Hamstrings: left 5/5, right 5/5  Dorsiflexion: left 5/5, right 5/5  Plantar flexion: left 5/5, right 5/5    Great toe MTP extension/EHL: left 5/5, right 5/5    --NEUROLOGICAL:  " 2/4 patellar, medial hamstring, and achilles reflexes bilaterally. Sensation to light touch is intact throughout both lower extremities. Babinski is negative. No clonus.      --STANDING EXAMINATION:  Normal lumbar lordosis noted, no lateral shift.    --MUSCULOSKELETAL: Lumbar spine inspection reveals no evidence of deformity. Range of motion is not limited in lumbar flexion, extension, lateral rotation. No point tenderness to palpation lumbar spine. No paraspinal musculature tenderness. Straight leg raising is negative.     --HIPS: Full range of motion bilaterally. Negative FABERs and Negative FADDIRs on the involved lower extremity. Negative hip joint tenderness to palp.    --SACROILIAC JOINT: Distraction maneuver was negative. Thigh thrust negative. Sacroiliac Joint Compression Test was negative. One finger point test was negative.    --VASCULAR:  2/4 dorsalis pedis and posterior tibialsi pulses bilaterally.  Bilateral lower extremities are warm without any discoloration.  There is no pitting edema of the bilateral lower extremities.    RESULTS:   Prior medical records from [facility] Everywhere were reviewed today.    Imaging: Spine imaging was personally reviewed and interpreted today. The images were shown to the patient and the findings were explained using a spine model.      Narrative & Impression   EXAM: CT ABDOMEN PELVIS W CONTRAST  LOCATION: [facility]  DATE/TIME: 4/19/2023 5:01 PM CDT     INDICATION: Fall on 4 1 2023, and progressive pelvic discomfort with no more cramping and bloating.  Examination unremarkable.  Hematoma known right buttock.  Assess for pelvic mass hematoma, assess for pelvic fracture  COMPARISON: None.  TECHNIQUE: CT scan of the abdomen and pelvis was performed following injection of IV contrast. Multiplanar reformats were obtained. Dose reduction techniques were used.  CONTRAST: 75ml IV Isovue 370     FINDINGS:   LOWER CHEST: Subsegmental  atelectasis seen in the right lung base.     HEPATOBILIARY: Mild Diffuse hepatic steatosis. No significant mass. No bile duct dilatation. No calcified gallstones.     PANCREAS: No significant mass, duct dilatation, or inflammatory change.     SPLEEN: Normal size.     ADRENAL GLANDS: No significant nodules.     KIDNEYS/BLADDER: No significant mass, stone, or hydronephrosis.     BOWEL: Diverticulosis of the descending and sigmoid colon. No acute inflammatory change. No obstruction.      LYMPH NODES: Normal.     VASCULATURE: No abdominal aortic aneurysm.     PELVIC ORGANS: No pelvic masses.     MUSCULOSKELETAL: Mild multilevel discogenic and posterior facet degenerative changes are seen in the lower lumbar spine. There is a 3 x 1.3 x 2.7 cm intermediate density fluid collection seen in the left gluteal subcutaneous tissues. The osseous   structures are intact.                                                                      IMPRESSION:   1.  3 cm left gluteal subcutaneous evolving hematoma, without underlying osseous injury   2.  Mild hepatic steatosis  3.  descending and sigmoid colonic diverticulosis without evidence of diverticulitis           Lilo FRANCOP-C  [facility]  O. [phone]             Again, thank you for allowing me to participate in the care of your patient.        Sincerely,        TONNY Dixon CNP

## 2023-06-30 NOTE — PATIENT INSTRUCTIONS
An MRI was ordered for you today.  You will be contacted by scheduling within 3 days.    If you are not contacted, please call Radiology at [phone].  [facility] [phone]. [place] [phone].     Imaging (Xray, CT, or MRI) has been ordered today.   Radiology will call you to schedule. Please call below if you do not hear from them in the next couple of days.     [facility]iology Scheduling:  Please call [phone] to schedule your image(s) (select option #1).    There are 3 different locations:    [facility]  [address] [address]    [facility] [place]  [address]   [place] [id number]    [place] [id number] [place] [id number]       Importance of specialized Physical Therapy:     Today, we discussed the importance of core strengthening, ROM, and stretching exercises, and how each of these are key in decreasing pain. Please continue your current course of PT and continue your water exercise.   The purpose of physical therapy is to teach patients a home exercise program individualized to them and their specific health concerns.  These exercises need to be performed every day in order to decrease pain and prevent future occurrences of pain.        Radicular Pain    Radicular pain in either the arm or leg is usually from a bulging disc in the spine. A portion of the herniated disc may press against the nerves as the nerves exit the spine. This causes pain which is felt down the arm or leg. Other causes of radicular pain may include:  Fractures.  Heart disease.  Cancer.  An abnormal and usually degenerative state of the nervous system or nerves (neuropathy).    In most cases, radicular pain is treated without imaging unless symptoms do not start to improve. If that is the case, your provider may order a CT or MRI scan to determine the cause.     Nerves in the cervical spine (neck) may cause  "radicular pain into the outer shoulder and down the arm. It can spread down to the thumb and fingers. The symptoms vary depending on which nerve root has been affected. In most cases, radicular pain improves with conservative treatment such as physical therapy, cervical traction, medications, and epidural steroid injections. A program for neck rehabilitation with stretching and strengthening exercises is an important part of management. Treatment may take months, and surgery may be considered as a last resort if the symptoms do not improve.    Nerves in the lumbar spine (lower back) may cause radicular pain into the hip and down the leg. The commonly used term for this type of pain is \"sciatica\". Conservative treatment is also recommended for this problem. Most patients feel better after 2 to 4 weeks of rest and other supportive care. You should avoid bending, lifting, and all other activities which can make your pain worse. Physical therapy, traction, medications, and epidural steroid injections can be good options to help with your recovery. A program for back injury rehabilitation with stretching and strengthening exercises is an important part of management. Surgery may be considered as a last resort if symptoms do not improve with conservative treatment.     You may take over-the-counter or prescription medicines for pain, discomfort, or fever as directed by your caregiver. Muscle relaxants may help by relieving more severe pain and spasm. Neuropathic medication (such as gabapentin, lyrica, or cymbalta) can help decrease your symptoms of nerve pain as well. Cold or massage can also give significant relief. Spinal manipulation is not recommended as it can increase the degree of disc protrusion. We do not recommend taking narcotic medication such as percocet, oxycodone, norco, dilaudid, or others unless pain is severe and not controlled with any other oral options.    Epidural steroid injections are often " effective treatment for radicular pain. These injections deliver strong anti-inflammatory medicine to the area directly around the nerve root in the space between your back bones (vertebrae). Your provider can give you more information about steroid injections. These injections are most effective when given within two weeks of the onset of acute pain. You should see your provider for follow up care as recommended.     In most cases, radicular pain is treated without imaging unless symptoms do not start to improve. If that is the case, your provider may order a CT or MRI scan to determine the cause.     SEEK IMMEDIATE MEDICAL CARE IF:  You develop increased pain, weakness, or numbness in your arm or leg.  You develop difficulty with bladder or bowel control.  You develop abdominal pain.    Document Released: 01/25/2006 Document Revised: 03/11/2013 Document Reviewed: 04/12/2010  Patient Information  2013 [employer].     Please contact the clinic at [phone] to review your MRI results after you have the MRI done. If you have any questions/concerns, or any worsening of your pain prior to that time. You are also welcome to contact Lilo Sim via [employer] [facility], but please be aware that responses to [employer] message may take 2-3 days due to the high volume of patients seen in clinic.     ~Please call our [facility] Nurse Navigation line [phone] with any questions or concerns about your treatment plan, if symptoms worsen and you would like to be seen urgently, or if you have any new or worsening numbness, weakness, or problems controlling bladder and bowel function.

## 2023-07-09 ENCOUNTER — HOSPITAL ENCOUNTER (OUTPATIENT)
Dept: MRI IMAGING | Facility: CLINIC | Age: 73
Discharge: HOME | End: 2023-07-09
Attending: NURSE PRACTITIONER | Admitting: NURSE PRACTITIONER
Payer: MEDICARE

## 2023-07-09 DIAGNOSIS — M54.16 LUMBAR RADICULOPATHY: ICD-10-CM

## 2023-07-09 PROCEDURE — 72148 MRI LUMBAR SPINE W/O DYE: CPT | Mod: MG

## 2023-07-13 ENCOUNTER — MYC MEDICAL ADVICE (OUTPATIENT)
Dept: PHYSICAL MEDICINE AND REHAB | Facility: CLINIC | Age: 73
End: 2023-07-13
Payer: COMMERCIAL

## 2023-07-13 NOTE — TELEPHONE ENCOUNTER
Pt called into nurse navigation line. Reviewed her MRI results with her. Pt would prefer follow-up appt with Lilo to review MRI prior to considering surgical opinion. Pt transferred to scheduling to make appt.   
quit 50 years ago, smoked 3 PPD for 1 year/cigarettes

## 2023-07-19 NOTE — PROGRESS NOTES
Medication Therapy Management (MTM) Encounter    ASSESSMENT:                            Medication Adherence/Access: No issues identified    Type 2 Diabetes:  Discussed option to increase Ozempic dose for additional blood sugar reduction in addition to weight loss benefits, would benefit from obtaining updated A1c to determine next steps for Ozempic dosing and if glipizide is needed. Suspect A1c will improve based on addition of Ozempic therapy in addition to current blood sugars being at/near goal of  fasting and <180 2 hours post prandial. Would recommend continuing aspirin for primary prevention as patient could not tolerate statin therapy. Would benefit from doing Ozempic injection after eating to avoid side effects.     Hyperlipidemia: Would benefit from LDL re-check today, discussed option of starting PSK9 inhibitor due to statin intolerance as LDL is above goal of <70, patient declined recommendation today but will re-consider in the future.     Depression/Anxiety/Insomnia: Patient would benefit from either a dose increase of escitalopram or considering an alternative agent such as Desvenlafaxine that would help with both pain and mood. Plan in place for patient to obtain updated kidney function to determine starting dosage of desvenlafaxine and taper plan.     Hypertension: Blood pressure today at/near goal of <130/80. Dizziness seems to un-related to medications, will continue to monitor.     Pain: Discussed that ideally patient should not be on celebrex given current kidney function, is working with Lilo LANCASTER CNP to determine etiology of pain. Plan in place to discuss consideration of celebrex therapy with Lilo LANCASTER CNP. See above for consideration of starting desvenlfaxine for both pain and mood.     Osteopenia: Given that the 10 year probability of major osteoporotic fracture is >20, and of hip fracture is >3%. Patient would be indicated for bone therapy. Would recommend starting  Prolia given patient's reduced kidney function. Patient declined this recommendation today but will re-consider in future. Educated on goal calcium intake.     Supplements: Stable.     PLAN:                            1. A1c today- this will help to determine if we should increase your Ozempic dose and maybe stop glipizide     2. We will also check your kidney function and cholesterol today     3. Try eating a meal before doing your Ozempic injection to prevent dizziness     4. Look into Praulent or Repatha, these medications can help to reduce your cholesterol and risk for a stroke of heart attack     5. I will send a message to Lilo Sim about pain and celebrex     6. We will wait to see what your kidney function results at in order to determine what dose of desvenlfaxine (Pristiq) we would like to try     7. Consider trying Prolia for bones, this is an injection that you would get in the clinic every 6 months to help prevent bone breakdown     8. Our goal calcium level is trying to obtain 1,200mg of calcium through supplement and food a day for bones     Follow-up: Pending lab results     SUBJECTIVE/OBJECTIVE:                          Sayda De La Garza is a 72 year old female coming in for a follow-up visit from 3/1/2023 with Kylie Watkins PharmD.       Reason for visit: Diabetes follow up.    Allergies/ADRs: Reviewed in chart  Past Medical History: Reviewed in chart    Medication Adherence/Access: no issues reported    Type 2 Diabetes:    Glipizide XL 10mg twice daily   Ozmepic 0.5mg weekly- She gets this medication from [employer] she notes that she still has about 3 months left (~3 pens). Has ondansetron on hand in case of nausea but has not needed to use it.   Aspirin 81mg daily- was recommended to start this by one of her providers.   Blood sugar monitorin time(s) daily; Ranges: (patient reported):    7: 115 after breakfast   7: 104 before lunch   7: 125 after lunch, 142 after dinner   7: 114  before breakfast   Current diabetes symptoms: Notes that she gets a little dizzy before doing the Ozempic injection in the morning when she has an empty stomach.   Diet/Exercise: Notes that appetite has decrease since starting Ozempic.   Urine Albumin:   Lab Results   Component Value Date    UMALCR  01/20/2023      Comment:      Unable to calculate, urine albumin and/or urine creatinine is outside detectable limits.  Microalbuminuria is defined as an albumin:creatinine ratio of 17 to 299 for males and 25 to 299 for females. A ratio of albumin:creatinine of 300 or higher is indicative of overt proteinuria.  Due to biologic variability, positive results should be confirmed by a second, first-morning random or 24-hour timed urine specimen. If there is discrepancy, a third specimen is recommended. When 2 out of 3 results are in the microalbuminuria range, this is evidence for incipient nephropathy and warrants increased efforts at glucose control, blood pressure control, and institution of therapy with an angiotensin-converting-enzyme (ACE) inhibitor (if the patient can tolerate it).        Lab Results   Component Value Date    A1C 8.9 (H) 01/20/2023     Hyperlipidemia:   Ezetimibe (Zetia) 10mg once daily  Per chart review unable to tolerate statin medications due to myalgias   Patient reports no significant myalgias or other side effects.  Recent Labs   Lab Test 01/20/23  0953 02/02/22  1040   CHOL 234* 231*   HDL 37* 42*   * 143*   TRIG 287* 228*     Depression/Anxiety/Insomnia:   escitalopram 10mg daily- is wondering if this could be increased, is not sure if this is effective but keeps telling herself it is, feels that depression is not controlled currently.   Hydroxyzine 25mg as needed anxiety- notes that she does not use   Lorazepam as needed anxiety- notes that she is not using currently   Trazodone 100mg at bedtime- notes that this has been helpful, works with someone at the sleep clinic to help with  "management of sleep.   Denies suicidal thoughts.   Medication history:   Duloxetine, venlfaxine, Zoloft.     Hypertension:   Hydrochlorothiazide 25mg daily   Lisinopril 20mg daily   Patient reports no current medication side effects. Sometimes has some dizziness here and there, not really related to position changes, and is not sure if related to medications.   Patient does not self-monitor blood pressure. Has a cuff at home if needed.    BP Readings from Last 3 Encounters:   06/30/23 137/72   06/01/23 (!) 143/76   06/01/23 (!) 149/93     Pulse Readings from Last 3 Encounters:   06/30/23 97   06/01/23 85   06/01/23 74     Pain:   celebrex 200mg daily    Medication history:   Gabapentin- not able to tolerate  Patient notes that at this time she is hesitant to get surgery.   [place] and this if [place].   Per chart review from Lilo LANCASTER CNP: I would not change her NSAID (celebrex) at this time given her ckd3.  Does have a follow up with Lilo LANCASTER CNP next week.     Osteopenia:   Vitamin D 2000 units once daily   Calcium (unknown strength) 1 tablet daily   DEXA History 4/19/2023:  DXA RESULTS  -Lumbar Spine: L1-L2: BMD: 0.961 g/cm2. T-score: -1.7. Z-score: -0.4.  -RIGHT Hip Total: BMD: 0.778 g/cm2. T-score: -1.8. Z-score: -0.5.  -RIGHT Hip Femoral neck: BMD: 0.805 g/cm2. T-score: -1.7. Z-score: -0.1.  -LEFT Hip Total: BMD: 0.753 g/cm2. T-score: -2.0. Z-score: -0.7.  -LEFT Hip Femoral neck: BMD: 0.761 g/cm2. T-score: -2.0. Z-score: -0.4.  INTERVAL CHANGE  -There has been a 0.4% decrease in lumbar spine BMD.  -There has been a 2.7% increase in bilateral hip BMD.  FRACTURE RISK  -FRAX Results: The 10 year probability of major osteoporotic fracture is 19.6%, and of hip fracture is 4.4%, based on left femoral neck BMD.  -TBS-adjusted FRAX Results: The 10 year probability of major osteoporotic fracture is 20.2%, and of hip fracture is 4.8%.  Patient is getting the following " sources of dietary calcium: milk, yogurt and ice cream.   Last vitamin D level:  Lab Results   Component Value Date    VITDT 50 08/02/2022     Supplements:   Fish oil 2000mg daily   Multivitamin 1 tablet daily   Denies side effects.     Today's Vitals: /84   Pulse 75   Wt 175 lb 1.6 oz (79.4 kg)   LMP  (LMP Unknown)   BMI 28.26 kg/m    ----------------  I spent 60 minutes with this patient today. All changes were made via collaborative practice agreement with Kristen Navarrete MD. A copy of the visit note was provided to the patient's provider(s).    A summary of these recommendations was given to the patient.    Ade Tovar, PharmD  Medication Therapy Management Pharmacist   [facility]      Medication Therapy Recommendations  Degeneration of cervical intervertebral disc    Current Medication: celecoxib (CELEBREX) 100 MG capsule (Discontinued)   Rationale: Unsafe medication for the patient - Adverse medication event - Safety   Recommendation: Change Medication - acetaminophen 500 MG tablet   Status: Accepted - no CPA Needed         Major depressive disorder, recurrent episode, in full remission (H24)    Current Medication: escitalopram (LEXAPRO) 10 MG tablet   Rationale: More effective medication available - Ineffective medication - Effectiveness   Recommendation: Change Medication - DESVENLAFAXINE   Status: Accepted per CPA         Osteopenia    Current Medication: CALCIUM PO   Rationale: Dose too low - Dosage too low - Effectiveness   Recommendation: Provide Education   Status: Patient Agreed - Adherence/Education         Type 2 diabetes mellitus with complication, without long-term current use of insulin (H)    Current Medication: semaglutide (OZEMPIC) 2 MG/3ML pen   Rationale: Medication requires monitoring - Needs additional monitoring   Recommendation: Order Lab   Status: Accepted per CPA

## 2023-07-20 ENCOUNTER — OFFICE VISIT (OUTPATIENT)
Dept: PHARMACY | Facility: CLINIC | Age: 73
End: 2023-07-20
Payer: COMMERCIAL

## 2023-07-20 ENCOUNTER — APPOINTMENT (OUTPATIENT)
Dept: LAB | Facility: CLINIC | Age: 73
End: 2023-07-20
Payer: COMMERCIAL

## 2023-07-20 VITALS
SYSTOLIC BLOOD PRESSURE: 129 MMHG | WEIGHT: 175.1 LBS | DIASTOLIC BLOOD PRESSURE: 84 MMHG | HEART RATE: 75 BPM | BODY MASS INDEX: 28.26 KG/M2

## 2023-07-20 DIAGNOSIS — F33.42 MAJOR DEPRESSIVE DISORDER, RECURRENT EPISODE, IN FULL REMISSION (H): ICD-10-CM

## 2023-07-20 DIAGNOSIS — F41.1 ANXIETY STATE: ICD-10-CM

## 2023-07-20 DIAGNOSIS — Z78.9 TAKES DIETARY SUPPLEMENTS: ICD-10-CM

## 2023-07-20 DIAGNOSIS — E78.5 HYPERLIPIDEMIA, UNSPECIFIED HYPERLIPIDEMIA TYPE: ICD-10-CM

## 2023-07-20 DIAGNOSIS — G47.00 INSOMNIA, UNSPECIFIED TYPE: ICD-10-CM

## 2023-07-20 DIAGNOSIS — E11.8 TYPE 2 DIABETES MELLITUS WITH COMPLICATION, WITHOUT LONG-TERM CURRENT USE OF INSULIN (H): Primary | ICD-10-CM

## 2023-07-20 DIAGNOSIS — I10 ESSENTIAL HYPERTENSION, BENIGN: ICD-10-CM

## 2023-07-20 DIAGNOSIS — M85.80 OSTEOPENIA, UNSPECIFIED LOCATION: ICD-10-CM

## 2023-07-20 DIAGNOSIS — M50.30 DEGENERATION OF CERVICAL INTERVERTEBRAL DISC: ICD-10-CM

## 2023-07-20 LAB
ANION GAP SERPL CALCULATED.3IONS-SCNC: 10 MMOL/L (ref 7–15)
BUN SERPL-MCNC: 28 MG/DL (ref 8–23)
CALCIUM SERPL-MCNC: 10.1 MG/DL (ref 8.8–10.2)
CHLORIDE SERPL-SCNC: 103 MMOL/L (ref 98–107)
CHOLEST SERPL-MCNC: 185 MG/DL
CREAT SERPL-MCNC: 1.37 MG/DL (ref 0.51–0.95)
DEPRECATED HCO3 PLAS-SCNC: 26 MMOL/L (ref 22–29)
GFR SERPL CREATININE-BSD FRML MDRD: 41 ML/MIN/1.73M2
GLUCOSE SERPL-MCNC: 84 MG/DL (ref 70–99)
HBA1C MFR BLD: 6.5 % (ref 0–5.6)
HDLC SERPL-MCNC: 47 MG/DL
LDLC SERPL CALC-MCNC: 113 MG/DL
NONHDLC SERPL-MCNC: 138 MG/DL
POTASSIUM SERPL-SCNC: 4.9 MMOL/L (ref 3.4–5.3)
SODIUM SERPL-SCNC: 139 MMOL/L (ref 136–145)
TRIGL SERPL-MCNC: 127 MG/DL

## 2023-07-20 PROCEDURE — 99607 MTMS BY PHARM ADDL 15 MIN: CPT

## 2023-07-20 PROCEDURE — 99606 MTMS BY PHARM EST 15 MIN: CPT

## 2023-07-20 PROCEDURE — 80048 BASIC METABOLIC PNL TOTAL CA: CPT

## 2023-07-20 PROCEDURE — 36415 COLL VENOUS BLD VENIPUNCTURE: CPT

## 2023-07-20 PROCEDURE — 83036 HEMOGLOBIN GLYCOSYLATED A1C: CPT

## 2023-07-20 PROCEDURE — 80061 LIPID PANEL: CPT

## 2023-07-20 RX ORDER — ASPIRIN 81 MG/1
81 TABLET ORAL DAILY
COMMUNITY

## 2023-07-20 NOTE — Clinical Note
Toi Nagy,   I saw Sayda today, I am a little concerned with celebrex and her kidney function. I was thinking of trying desvenlafaxine for pain and mood. Do you think there is something else we could consider trying to get her off celebrex? I know you do have a follow up with her next week.   Thanks,  Ade Tovar, PharmD Medication Therapy Management Pharmacist  [facility]

## 2023-07-20 NOTE — PATIENT INSTRUCTIONS
"Recommendations from today's MTM visit:                                                      1. A1c today- this will help to determine if we should increase your Ozempic dose and maybe stop glipizide     2. We will also check your kidney function and cholesterol today     3. Try eating a meal before doing your Ozempic injection to prevent dizziness     4. Look into Praulent or Repatha, these medications can help to reduce your cholesterol and risk for a stroke of heart attack     5. I will send a message to Lilo Sim about pain and celebrex     6. We will wait to see what your kidney function results at in order to determine what dose of desvenlfaxine (Pristiq) we would like to try     7. Consider trying Prolia for bones, this is an injection that you would get in the clinic every 6 months to help prevent bone breakdown     8. Our goal calcium level is trying to obtain 1,200mg of calcium through food and diet a day for bones     Follow-up: Pending lab results     It was great speaking with you today.  I value your experience and would be very thankful for your time in providing feedback in our clinic survey. In the next few days, you may receive an email or text message from [employer] with a link to a survey related to your  clinical pharmacist.\"     To schedule another MTM appointment, please call the clinic directly or you may call the MTM scheduling line at [phone] or toll-free at [phone].     My Clinical Pharmacist's contact information:                                                      Please feel free to contact me with any questions or concerns you have.      Ade Tovar, PharmD  Medication Therapy Management Pharmacist   [facility]    "

## 2023-07-20 NOTE — Clinical Note
Hi Dr. Navarrete,   I had the pleasure of meeting with Sayda today, a couple things we identified that I just wanted to let you know.   1. Going to try to change antidepressant to desvenlafaxine to see if this helps with both mood and pain  2. Educated on starting PSK9 inhibitor for cholesterol and Prolia for osteopenia given most recent DEXA, patient didn't want to start today but we did talk about them.   Thanks,  Ade Tovar, PharmD Medication Therapy Management Pharmacist  [facility]

## 2023-07-25 ENCOUNTER — OFFICE VISIT (OUTPATIENT)
Dept: PHYSICAL MEDICINE AND REHAB | Facility: CLINIC | Age: 73
End: 2023-07-25
Payer: COMMERCIAL

## 2023-07-25 ENCOUNTER — HOSPITAL ENCOUNTER (OUTPATIENT)
Dept: RADIOLOGY | Facility: HOSPITAL | Age: 73
Discharge: HOME | End: 2023-07-25
Attending: NURSE PRACTITIONER | Admitting: NURSE PRACTITIONER
Payer: MEDICARE

## 2023-07-25 VITALS — DIASTOLIC BLOOD PRESSURE: 83 MMHG | HEART RATE: 75 BPM | SYSTOLIC BLOOD PRESSURE: 165 MMHG

## 2023-07-25 DIAGNOSIS — M54.16 LUMBAR RADICULOPATHY: Primary | ICD-10-CM

## 2023-07-25 DIAGNOSIS — M62.830 BACK MUSCLE SPASM: ICD-10-CM

## 2023-07-25 DIAGNOSIS — M54.16 LUMBAR RADICULOPATHY: ICD-10-CM

## 2023-07-25 DIAGNOSIS — M48.061 SPINAL STENOSIS OF LUMBAR REGION WITHOUT NEUROGENIC CLAUDICATION: ICD-10-CM

## 2023-07-25 PROCEDURE — 99213 OFFICE O/P EST LOW 20 MIN: CPT | Performed by: NURSE PRACTITIONER

## 2023-07-25 PROCEDURE — 72120 X-RAY BEND ONLY L-S SPINE: CPT

## 2023-07-25 RX ORDER — METHOCARBAMOL 500 MG/1
500 TABLET, FILM COATED ORAL 4 TIMES DAILY PRN
Qty: 28 TABLET | Refills: 0 | Status: SHIPPED | OUTPATIENT
Start: 2023-07-25 | End: 2023-10-16

## 2023-07-25 ASSESSMENT — PAIN SCALES - GENERAL: PAINLEVEL: MILD PAIN (3)

## 2023-07-25 NOTE — PATIENT INSTRUCTIONS
Imaging (Xray, CT, or MRI) has been ordered today.   You can head to any of the locations below for a walk-in Xray.     [facility]  [address] [phone]    [facility] [place]  [address], [address]   [place] [phone]    [facility]  [address] [phone]       You have been referred to [facility] for a surgical consult. A surgeon OR an advanced practitioner will review your imaging and talk with you further at your appointment to see if a surgery could be helpful. This does NOT mean you will be offered surgery. Please be aware you may need more imaging or testing prior to or after your appointment if they feel it is needed. They will call you to schedule an appointment here at the [facility]ine [facility]. If you do not hear anything in a couple of days, please contact the Neurosurgery Department to schedule your appointment at #: [phone]     Robaxin (muscle relaxant medication) has been prescribed today. Please take as needed for your upper back pain. This medication may cause drowsiness. Please do not work or drive while taking this medication until you know how it effects you. If it does make you drowsy, you should only take it before bedtime or at times that you do not have to work/drive.     If neurosurgery feels like it is safe for you to continue conservative treatment, then they may refer you back to me to continue treatment of your back pain.

## 2023-07-25 NOTE — PROGRESS NOTES
ASSESSMENT: Ana Maria De La Garza is a 72 year old female who presents for consultation at the request of PCP Kristen Navarrete, with a past medical history significant for hx diabetes, ckd 3, osteopenia, anxiety and depression, and hypertension who presents today for new patient evaluation of:    -low back pain with alternating pain radiation into her posterior thighs.    We reviewed MRI imaging in person today.  She has severe L4-5 spinal canal stenosis with a grade 1 anterolisthesis and bilateral facet joint effusions at that level and what I think is a small left facet synovial cyst which was not reported. Severe canal stenosis poses a risk of progressive neurologic dysfunction if untreated.  She currently does not complain of any leg numbness, weakness, or change in bowel or bladder control, but this could occur in the future.  At this point, I would recommend a discussion with neurosurgery about options for treatment and to establish care. I will add flexion-extension lumbar x-rays for her to get to rule out instability prior to this appointment. Explained to patient, if after that discussion, she makes a decision to continue with conservative treatment, she may return to continue care with myself.  Would consider bilateral transforaminal epidural steroid injections at L4-5.  We could also consider medial branch blocks however this would not have any effect on leg symptoms.  We agreed that her right sided upper back pain is likely unrelated to this problem.  I will add an as needed muscle relaxer for her today.  She and her  agree with this plan.        6/30/2023     9:00 AM   OSWESTRY DISABILITY INDEX   Count 9   Sum 4   Oswestry Score (%) 8.89 %            Diagnoses and all orders for this visit:  Lumbar radiculopathy  -     XR Lumbar Bending Only 2/3 Views; Future  -     Neurosurgery Referral; Future  Spinal stenosis of lumbar region without neurogenic claudication  -     XR Lumbar Bending Only 2/3  "Views; Future  -     Neurosurgery Referral; Future  Back muscle spasm  -     methocarbamol (ROBAXIN) 500 MG tablet; Take 1 tablet (500 mg) by mouth 4 times daily as needed for muscle spasms      PLAN:    -DIAGNOSTIC TESTS:  Images were personally reviewed and interpreted and explained to patient today using a spine model.   --Recommend upright lumbar flexion extension XR for further evaluation    -REFERRALS:  - Referred to neurosurgery for surgical consult with Dr Long  ______________________________________________________________________    SUBJECTIVE:    HPI:  Per previous note \"Ana Maria De La Garza  Is a 72 year old female hx diabetes, ckd3, osteopenia, anxiety and depression, and hypertension who presents today for new patient evaluation of low back pain with alternating pain radiation into her posterior thighs. Symptoms started after a mechanical fall onto her buttocks in the street with head injury) on 4/1/23. She was seen in urgent care on 4/14 for low back, buttock, and pelvic discomfort. There was no imaging performed at this visit. She then saw her PCP on 4/19 and CT abdomen pelvis demonstrated lumbar degenerative disc disease and a 3cm left gluteal hematoma. The pain began to radiate into her R posterior thigh in May. She was seen by her PCP on 6/1 and referred for PT, treated with a medrol dose pack and started on gabapentin (which she ultimately stopped after experiencing intolerable dizziness). A few weeks ago, the pain then moved to her left posterior thigh instead of her right. Now it moves back and forth. There does not seem to be a pattern to it. She describes a 'kink' in her lower back which used to be intermittent and is now constant. She is unable to hike or walk for long periods of time. She feels very little pain when she swims and walks in the water. She is currently in PT - has completed 2 sessions and has 2 more left. Her right side was noted to be weaker in PT. She does endorse chronic " "R leg weakness related to chronic R knee pain.  She is taking celebrex at her max dose per her PCP. She is here with her  today.    She does not experience any numbness/tingling or change in bowel or bladder control. She does endorse chronic dizziness/imbalance.    Here with her  today\"        -Treatment to Date:     -Medications:  -gabapentin (stopped)  -medrol dose pack  -celebrex      OBJECTIVE:  BP (!) 165/83   Pulse 75   LMP  (LMP Unknown)     PHYSICAL EXAMINATION:    --CONSTITUTIONAL:  Vital signs as above.  No acute distress.  The patient is well nourished and well groomed.  --PSYCHIATRIC:  Appropriate mood and affect. The patient is awake, alert, oriented to person, place, time and answering questions appropriately with clear speech.    --SKIN:  Skin over the face, bilateral lower extremities, and posterior torso is clean, dry, intact without rashes.    --RESPIRATORY: Normal rhythm and effort. No abnormal accessory muscle breathing patterns noted.   --ABDOMINAL:  Non-distended.    --GROSS MOTOR: Gait is non-antalgic. Easily arises from a seated position.     MAIER x4    No vertebral point tenderness to palpation.  Patient asked that I elie an X where L4-5 was which I complied with    RESULTS:   Prior medical records from [facility]where were reviewed today.    Imaging: Spine imaging was personally reviewed and interpreted today. The images were shown to the patient and the findings were explained using a spine model.    Narrative & Impression   EXAM: MR LUMBAR SPINE W/O CONTRAST  LOCATION: [facility]  DATE: 7/9/2023     INDICATION: radicular bilateral leg pain, back pain since fall in april. no relief with PT  COMPARISON: None.  TECHNIQUE: Routine Lumbar Spine MRI without IV contrast.     FINDINGS:   Nomenclature is based on 5 lumbar type vertebral bodies. Grade 1 anterolisthesis L4 on L5 measuring approximately 2 mm. Vertebral body heights are " maintained. No acute anterior compression fracture. Normal distal spinal cord and cauda equina with conus   medullaris at the L1 level. No extraspinal abnormality. Unremarkable visualized bony pelvis.        T12-L1: Disc desiccation with moderate disc height loss. Disc bulge produces mild spinal canal stenosis. No neural foraminal stenosis.      L1-L2: Disc desiccation without disc height loss. No herniation. Mild facet degeneration. No spinal canal or neural foraminal stenosis.     L2-L3: Disc desiccation without disc height loss. Minimal disc bulge with superimposed right foraminal disc protrusion. Mild facet degeneration. There is mild spinal canal stenosis. Foraminal stenosis.      L3-L4: Disc desiccation without disc height loss. No disc bulge. Moderate facet arthrosis. Mild spinal canal stenosis. No neural foraminal stenosis.     L4-L5: Grade 1 anterolisthesis with disc uncovering and advanced facet degenerative change. Facet joint effusions are present measuring up to 4 mm. Dorsal annular fissure present. There is severe spinal canal stenosis with complete effacement of CSF   spaces. The thecal sac is compressed to approximately 4 mm. No neural foraminal compromise.     L5-S1: Disc desiccation without disc height loss. Advanced facet arthrosis. No spinal canal stenosis or neural foraminal stenosis.                                                                      IMPRESSION:  1.  Severe L4-L5 spinal canal stenosis in part due to grade 1 anterolisthesis. Facet joint effusions at this level could reflect underlying instability.  2.  Mild disc and overall moderate lumbar facet degeneration elsewhere.       Lilo Sim FNP-C  [facility]  O. [phone]

## 2023-07-25 NOTE — LETTER
7/25/2023         RE: Ana Maria De La Garza  [address] [place] [address]        Dear Colleague,    Thank you for referring your patient, Ana Maria De La Garza, to the [facility] SPINE AND NEUROSURGERY. Please see a copy of my visit note below.    ASSESSMENT: Ana Maria De La Garza is a 72 year old female who presents for consultation at the request of PCP Kristen Navarrete, with a past medical history significant for hx diabetes, ckd 3, osteopenia, anxiety and depression, and hypertension who presents today for new patient evaluation of:    -low back pain with alternating pain radiation into her posterior thighs.    We reviewed MRI imaging in person today.  She has severe L4-5 spinal canal stenosis with a grade 1 anterolisthesis and bilateral facet joint effusions at that level and what I think is a small left facet synovial cyst which was not reported. Severe canal stenosis poses a risk of progressive neurologic dysfunction if untreated.  She currently does not complain of any leg numbness, weakness, or change in bowel or bladder control, but this could occur in the future.  At this point, I would recommend a discussion with neurosurgery about options for treatment and to establish care. I will add flexion-extension lumbar x-rays for her to get to rule out instability prior to this appointment. Explained to patient, if after that discussion, she makes a decision to continue with conservative treatment, she may return to continue care with myself.  Would consider bilateral transforaminal epidural steroid injections at L4-5.  We could also consider medial branch blocks however this would not have any effect on leg symptoms.  We agreed that her right sided upper back pain is likely unrelated to this problem.  I will add an as needed muscle relaxer for her today.  She and her  agree with this plan.        6/30/2023     9:00 AM   OSWESTRY DISABILITY INDEX   Count 9   Sum 4   Oswestry Score  "(%) 8.89 %            Diagnoses and all orders for this visit:  Lumbar radiculopathy  -     XR Lumbar Bending Only 2/3 Views; Future  -     Neurosurgery Referral; Future  Spinal stenosis of lumbar region without neurogenic claudication  -     XR Lumbar Bending Only 2/3 Views; Future  -     Neurosurgery Referral; Future  Back muscle spasm  -     methocarbamol (ROBAXIN) 500 MG tablet; Take 1 tablet (500 mg) by mouth 4 times daily as needed for muscle spasms      PLAN:    -DIAGNOSTIC TESTS:  Images were personally reviewed and interpreted and explained to patient today using a spine model.   --Recommend upright lumbar flexion extension XR for further evaluation    -REFERRALS:  - Referred to neurosurgery for surgical consult with Dr oLng  ______________________________________________________________________    SUBJECTIVE:    HPI:  Per previous note \"Ana Maria De La Garza  Is a 72 year old female hx diabetes, ckd3, osteopenia, anxiety and depression, and hypertension who presents today for new patient evaluation of low back pain with alternating pain radiation into her posterior thighs. Symptoms started after a mechanical fall onto her buttocks in the street with head injury) on 4/1/23. She was seen in urgent care on 4/14 for low back, buttock, and pelvic discomfort. There was no imaging performed at this visit. She then saw her PCP on 4/19 and CT abdomen pelvis demonstrated lumbar degenerative disc disease and a 3cm left gluteal hematoma. The pain began to radiate into her R posterior thigh in May. She was seen by her PCP on 6/1 and referred for PT, treated with a medrol dose pack and started on gabapentin (which she ultimately stopped after experiencing intolerable dizziness). A few weeks ago, the pain then moved to her left posterior thigh instead of her right. Now it moves back and forth. There does not seem to be a pattern to it. She describes a 'kink' in her lower back which used to be intermittent and is now " "constant. She is unable to hike or walk for long periods of time. She feels very little pain when she swims and walks in the water. She is currently in PT - has completed 2 sessions and has 2 more left. Her right side was noted to be weaker in PT. She does endorse chronic R leg weakness related to chronic R knee pain.  She is taking celebrex at her max dose per her PCP. She is here with her  today.    She does not experience any numbness/tingling or change in bowel or bladder control. She does endorse chronic dizziness/imbalance.    Here with her  today\"        -Treatment to Date:     -Medications:  -gabapentin (stopped)  -medrol dose pack  -celebrex      OBJECTIVE:  BP (!) 165/83   Pulse 75   LMP  (LMP Unknown)     PHYSICAL EXAMINATION:    --CONSTITUTIONAL:  Vital signs as above.  No acute distress.  The patient is well nourished and well groomed.  --PSYCHIATRIC:  Appropriate mood and affect. The patient is awake, alert, oriented to person, place, time and answering questions appropriately with clear speech.    --SKIN:  Skin over the face, bilateral lower extremities, and posterior torso is clean, dry, intact without rashes.    --RESPIRATORY: Normal rhythm and effort. No abnormal accessory muscle breathing patterns noted.   --ABDOMINAL:  Non-distended.    --GROSS MOTOR: Gait is non-antalgic. Easily arises from a seated position.     MAIER x4    No vertebral point tenderness to palpation.  Patient asked that I elie an X where L4-5 was which I complied with    RESULTS:   Prior medical records from [facility]where were reviewed today.    Imaging: Spine imaging was personally reviewed and interpreted today. The images were shown to the patient and the findings were explained using a spine model.    Narrative & Impression   EXAM: MR LUMBAR SPINE W/O CONTRAST  LOCATION: [facility]  DATE: 7/9/2023     INDICATION: radicular bilateral leg pain, back pain since " fall in april. no relief with PT  COMPARISON: None.  TECHNIQUE: Routine Lumbar Spine MRI without IV contrast.     FINDINGS:   Nomenclature is based on 5 lumbar type vertebral bodies. Grade 1 anterolisthesis L4 on L5 measuring approximately 2 mm. Vertebral body heights are maintained. No acute anterior compression fracture. Normal distal spinal cord and cauda equina with conus   medullaris at the L1 level. No extraspinal abnormality. Unremarkable visualized bony pelvis.        T12-L1: Disc desiccation with moderate disc height loss. Disc bulge produces mild spinal canal stenosis. No neural foraminal stenosis.      L1-L2: Disc desiccation without disc height loss. No herniation. Mild facet degeneration. No spinal canal or neural foraminal stenosis.     L2-L3: Disc desiccation without disc height loss. Minimal disc bulge with superimposed right foraminal disc protrusion. Mild facet degeneration. There is mild spinal canal stenosis. Foraminal stenosis.      L3-L4: Disc desiccation without disc height loss. No disc bulge. Moderate facet arthrosis. Mild spinal canal stenosis. No neural foraminal stenosis.     L4-L5: Grade 1 anterolisthesis with disc uncovering and advanced facet degenerative change. Facet joint effusions are present measuring up to 4 mm. Dorsal annular fissure present. There is severe spinal canal stenosis with complete effacement of CSF   spaces. The thecal sac is compressed to approximately 4 mm. No neural foraminal compromise.     L5-S1: Disc desiccation without disc height loss. Advanced facet arthrosis. No spinal canal stenosis or neural foraminal stenosis.                                                                      IMPRESSION:  1.  Severe L4-L5 spinal canal stenosis in part due to grade 1 anterolisthesis. Facet joint effusions at this level could reflect underlying instability.  2.  Mild disc and overall moderate lumbar facet degeneration elsewhere.       Lilo Sim [facility]  [facility]  O. [phone]             Again, thank you for allowing me to participate in the care of your patient.        Sincerely,        TONNY Dixon CNP

## 2023-08-01 ENCOUNTER — OFFICE VISIT (OUTPATIENT)
Dept: NEUROSURGERY | Facility: CLINIC | Age: 73
End: 2023-08-01
Attending: NURSE PRACTITIONER
Payer: COMMERCIAL

## 2023-08-01 VITALS — SYSTOLIC BLOOD PRESSURE: 148 MMHG | DIASTOLIC BLOOD PRESSURE: 78 MMHG | HEART RATE: 78 BPM | OXYGEN SATURATION: 99 %

## 2023-08-01 DIAGNOSIS — M54.16 LUMBAR RADICULOPATHY: Primary | ICD-10-CM

## 2023-08-01 DIAGNOSIS — M48.061 SPINAL STENOSIS OF LUMBAR REGION WITHOUT NEUROGENIC CLAUDICATION: ICD-10-CM

## 2023-08-01 PROCEDURE — 99214 OFFICE O/P EST MOD 30 MIN: CPT | Performed by: SURGERY

## 2023-08-01 ASSESSMENT — PAIN SCALES - GENERAL: PAINLEVEL: MODERATE PAIN (5)

## 2023-08-01 NOTE — NURSING NOTE
"Ana Maria De La Garza is a 72 year old female who presents for:  Chief Complaint   Patient presents with    Consult        Initial Vitals:  BP (!) 148/78   Pulse 78   LMP  (LMP Unknown)   SpO2 99%  Estimated body mass index is 28.26 kg/m  as calculated from the following:    Height as of 6/30/23: 5' 6\" (1.676 m).    Weight as of 7/20/23: 175 lb 1.6 oz (79.4 kg).. There is no height or weight on file to calculate BSA. BP completed using cuff size: regular  Moderate Pain (5)    Philippe Lee    "

## 2023-08-01 NOTE — PROGRESS NOTES
NEUROSURGERY CONSULTATION NOTE    Neurosurgery was asked to see this patient by Lilo Sim for evaluation of lumbar stenosis.     CONSULTATION ASSESSMENT AND PLAN:    Patient is a 72-year-old female who presents with low back pain and bilateral lower extremity pain.  Lumbar x-ray shows anterolisthesis at lumbar 3-4 and lumbar 4-5. 4 mm at lumbar 3-4 and 3 mm at lumbar 4-5 which both reduced with extension.  MRI of her lumbar spine shows severe spinal canal stenosis at the lumbar 4-5 level with no significant foraminal narrowing.  He has mild central stenosis at the lumbar 3-4 level.  We discussed that her dynamic instability of lumbar 3-4 and lumbar 4-5 are likely the cause of her chronic axial low back pain.  Her more acute leg symptoms are likely due to her severe stenosis at lumbar 4-5.  Recommend lumbar 4-5 laminectomies with lumbar 3-5 instrumented fusion.  Risks and benefits were discussed in detail including but not limited to infection, hematoma, nerve damage including paralysis, post op radiculitis, durotomy, lack of a sold bone fusion, hardware malfunction, risks associated with the use of general anesthesia, blood clots in the lungs or legs.        If she develops worsening neurological symptoms, surgery at a later date is not guaranteed to improve those symptoms. She is hesitant to have surgery and appeared to understand that her lumbar disease is unlikely to improve without surgery. She will contact us if she experiences any new or worsening neurological symptoms.     Melissa Long MD      HPI: Patient is a 72-year-old female who presents with low back pain and bilateral lower extremity pain.  Patient has 2 types of pain.  One of her pains been more chronic in nature and is more of an axial mid lumbar low back pain.  This pain is worse with laying down but can really hurt in any position.  Leaning on a daily basis for years.  Had a fall on April 1 of this year and since that time  she is getting more acute radicular pain as well that radiates from her low back to her buttocks into her posterior thighs.  This will occur on and off since that time resolving and then reappearing for several days.  She has the radicular pain it can be very severe and bothersome to her she will stop all of her activities and the pain will improve with rest and then it will return again.    Physical therapy- maybe a little bit of improvement  Celebrex, Medrol dose pack, gabapentin- no relief     Osteopenia on 4/19/23.     Past Medical History:   Diagnosis Date    Depression     Hypertension     Insomnia        Past Surgical History:   Procedure Laterality Date    [facility] SLING OPERATION FOR STRESS INCONTINENCE      Description: Mid-Urethral Sling Operation;  Recorded: 05/18/2010;    JOINT REPLACEMENT Left 2008    knee    OPEN REDUCTION INTERNAL FIXATION ANKLE Right     2008 and pin removal 2009    [facility] APPENDECTOMY      Description: Appendectomy;  Recorded: 02/26/2008;    [facility] TOTAL KNEE ARTHROPLASTY Left 2/18/2015    Procedure:   LEFT TOTAL KNEE ARTHROPLASTY;  Surgeon: Sina SHARP MD;  Location: [facility];  Service: Orthopedics       REVIEW OF SYSTEMS:  See ROS form under media     MEDICATIONS:    Current Outpatient Medications   Medication Sig Dispense Refill    aspirin 81 MG EC tablet Take 81 mg by mouth daily      CALCIUM PO Take 1 tablet by mouth daily      celecoxib (CELEBREX) 100 MG capsule Take 1 capsule (100 mg) by mouth 2 times daily (Patient taking differently: Take 200 mg by mouth daily) 180 capsule 3    escitalopram (LEXAPRO) 10 MG tablet Take 1 tablet (10 mg) by mouth daily 90 tablet 3    ezetimibe (ZETIA) 10 MG tablet Take 1 tablet (10 mg) by mouth daily 90 tablet 4    glipiZIDE (GLUCOTROL XL) 10 MG 24 hr tablet Take 1 tablet (10 mg) by mouth 2 times daily 180 tablet 4    hydrochlorothiazide (HYDRODIURIL) 25 MG tablet TAKE 1 TABLET BY MOUTH ONCE DAILY 90 tablet 3    hydrOXYzine (ATARAX) 25 MG  tablet Take 1 tablet (25 mg) by mouth every 6 hours as needed for anxiety 30 tablet 1    lisinopril (ZESTRIL) 20 MG tablet Take 1 tablet (20 mg) by mouth daily 90 tablet 3    LORazepam (ATIVAN) 0.5 MG tablet Take 1 tablet (0.5 mg) by mouth every 8 hours as needed for anxiety or sleep 30 tablet 0    methocarbamol (ROBAXIN) 500 MG tablet Take 1 tablet (500 mg) by mouth 4 times daily as needed for muscle spasms 28 tablet 0    multivitamin therapeutic (THERAGRAN) tablet [MULTIVITAMIN THERAPEUTIC (THERAGRAN) TABLET] Take 1 tablet by mouth daily.      OMEGA-3/DHA/EPA/FISH OIL (FISH OIL-OMEGA-3 FATTY ACIDS) 300-1,000 mg capsule [OMEGA-3/DHA/EPA/FISH OIL (FISH OIL-OMEGA-3 FATTY ACIDS) 300-1,000 MG CAPSULE] Take 2 g by mouth daily.      ondansetron (ZOFRAN) 4 MG tablet Take 1 tablet (4 mg) by mouth every 8 hours as needed for nausea 30 tablet 0    semaglutide (OZEMPIC) 2 MG/3ML pen Inject 0.5 mg Subcutaneous every 7 days 3 mL 0    traZODone (DESYREL) 100 MG tablet Take 1 tablet (100 mg) by mouth At Bedtime 90 tablet 3    vitamin D3 (CHOLECALCIFEROL) 50 mcg (2000 units) tablet Take 1 tablet by mouth daily           ALLERGIES/SENSITIVITIES:     Allergies   Allergen Reactions    Atorvastatin Muscle Pain (Myalgia)    Metformin Unknown     Body aches, headache    Morphine (Pf) [Morphine] Nausea and Vomiting    Pravastatin Muscle Pain (Myalgia)       PERTINENT SOCIAL HISTORY:  Social History     Socioeconomic History    Marital status:      Spouse name: None    Number of children: 2    Years of education: None    Highest education level: None   Tobacco Use    Smoking status: Never    Smokeless tobacco: Never   Vaping Use    Vaping Use: Never used   Substance and Sexual Activity    Alcohol use: Yes     Alcohol/week: 2.0 standard drinks of alcohol     Types: 2 Standard drinks or equivalent per week     Comment: once in a while, but not often    Drug use: No         FAMILY HISTORY:  Family History   Problem Relation Age of  Onset    Diabetes Mother     Hypertension Mother     Hypertension Father     Cancer Brother     Clotting Disorder No family hx of         PHYSICAL EXAM:   Constitutional: BP (!) 148/78   Pulse 78   LMP  (LMP Unknown)   SpO2 99%      Mental Status: A & O in no acute distress.  Affect is appropriate.  Speech is fluent.  Recent and remote memory are intact.  Attention span and concentration are normal.    Motor:  Normal bulk and tone all muscle groups of upper and lower extremities.    Strength:  5/5 x 4     Sensory: Sensation intact bilaterally to light touch.     Coordination:  Some difficulty going from sitting to standing. Heel/toe/tandem gait intact.  Antalgic gait.     Reflexes: supinator, biceps, triceps, knee/ ankle jerk intact. No leyva's    IMAGING:  I personally reviewed all radiographic images         Cc:   Kristen Navarrete

## 2023-08-01 NOTE — LETTER
8/1/2023         RE: Ana Maria De La Garza  [address] [place] [address]        Dear Colleague,    Thank you for referring your patient, Ana Maria De La Garza, to the [facility] SPINE AND NEUROSURGERY. Please see a copy of my visit note below.    NEUROSURGERY CONSULTATION NOTE    Neurosurgery was asked to see this patient by Lilo Sim* for evaluation of lumbar stenosis.     CONSULTATION ASSESSMENT AND PLAN:    Patient is a 72-year-old female who presents with low back pain and bilateral lower extremity pain.  Lumbar x-ray shows anterolisthesis at lumbar 3-4 and lumbar 4-5. 4 mm at lumbar 3-4 and 3 mm at lumbar 4-5 which both reduced with extension.  MRI of her lumbar spine shows severe spinal canal stenosis at the lumbar 4-5 level with no significant foraminal narrowing.  He has mild central stenosis at the lumbar 3-4 level.  We discussed that her dynamic instability of lumbar 3-4 and lumbar 4-5 are likely the cause of her chronic axial low back pain.  Her more acute leg symptoms are likely due to her severe stenosis at lumbar 4-5.  Recommend lumbar 4-5 laminectomies with lumbar 3-5 instrumented fusion.  Risks and benefits were discussed in detail including but not limited to infection, hematoma, nerve damage including paralysis, post op radiculitis, durotomy, lack of a sold bone fusion, hardware malfunction, risks associated with the use of general anesthesia, blood clots in the lungs or legs.        If she develops worsening neurological symptoms, surgery at a later date is not guaranteed to improve those symptoms. She is hesitant to have surgery and appeared to understand that her lumbar disease is unlikely to improve without surgery. She will contact us if she experiences any new or worsening neurological symptoms.     Melissa Long MD      HPI: Patient is a 72-year-old female who presents with low back pain and bilateral lower extremity pain.  Patient has 2  types of pain.  One of her pains been more chronic in nature and is more of an axial mid lumbar low back pain.  This pain is worse with laying down but can really hurt in any position.  Leaning on a daily basis for years.  Had a fall on April 1 of this year and since that time she is getting more acute radicular pain as well that radiates from her low back to her buttocks into her posterior thighs.  This will occur on and off since that time resolving and then reappearing for several days.  She has the radicular pain it can be very severe and bothersome to her she will stop all of her activities and the pain will improve with rest and then it will return again.    Physical therapy- maybe a little bit of improvement  Celebrex, Medrol dose pack, gabapentin- no relief     Osteopenia on 4/19/23.     Past Medical History:   Diagnosis Date     Depression      Hypertension      Insomnia        Past Surgical History:   Procedure Laterality Date      SLING OPERATION FOR STRESS INCONTINENCE      Description: Mid-Urethral Sling Operation;  Recorded: 05/18/2010;     JOINT REPLACEMENT Left 2008    knee     OPEN REDUCTION INTERNAL FIXATION ANKLE Right     2008 and pin removal 2009     [facility] APPENDECTOMY      Description: Appendectomy;  Recorded: 02/26/2008;     [facility] TOTAL KNEE ARTHROPLASTY Left 2/18/2015    Procedure:   LEFT TOTAL KNEE ARTHROPLASTY;  Surgeon: Sina SHARP MD;  Location: [facility];  Service: Orthopedics       REVIEW OF SYSTEMS:  See ROS form under media     MEDICATIONS:    Current Outpatient Medications   Medication Sig Dispense Refill     aspirin 81 MG EC tablet Take 81 mg by mouth daily       CALCIUM PO Take 1 tablet by mouth daily       celecoxib (CELEBREX) 100 MG capsule Take 1 capsule (100 mg) by mouth 2 times daily (Patient taking differently: Take 200 mg by mouth daily) 180 capsule 3     escitalopram (LEXAPRO) 10 MG tablet Take 1 tablet (10 mg) by mouth daily 90 tablet 3     ezetimibe (ZETIA) 10  MG tablet Take 1 tablet (10 mg) by mouth daily 90 tablet 4     glipiZIDE (GLUCOTROL XL) 10 MG 24 hr tablet Take 1 tablet (10 mg) by mouth 2 times daily 180 tablet 4     hydrochlorothiazide (HYDRODIURIL) 25 MG tablet TAKE 1 TABLET BY MOUTH ONCE DAILY 90 tablet 3     hydrOXYzine (ATARAX) 25 MG tablet Take 1 tablet (25 mg) by mouth every 6 hours as needed for anxiety 30 tablet 1     lisinopril (ZESTRIL) 20 MG tablet Take 1 tablet (20 mg) by mouth daily 90 tablet 3     LORazepam (ATIVAN) 0.5 MG tablet Take 1 tablet (0.5 mg) by mouth every 8 hours as needed for anxiety or sleep 30 tablet 0     methocarbamol (ROBAXIN) 500 MG tablet Take 1 tablet (500 mg) by mouth 4 times daily as needed for muscle spasms 28 tablet 0     multivitamin therapeutic (THERAGRAN) tablet [MULTIVITAMIN THERAPEUTIC (THERAGRAN) TABLET] Take 1 tablet by mouth daily.       OMEGA-3/DHA/EPA/FISH OIL (FISH OIL-OMEGA-3 FATTY ACIDS) 300-1,000 mg capsule [OMEGA-3/DHA/EPA/FISH OIL (FISH OIL-OMEGA-3 FATTY ACIDS) 300-1,000 MG CAPSULE] Take 2 g by mouth daily.       ondansetron (ZOFRAN) 4 MG tablet Take 1 tablet (4 mg) by mouth every 8 hours as needed for nausea 30 tablet 0     semaglutide (OZEMPIC) 2 MG/3ML pen Inject 0.5 mg Subcutaneous every 7 days 3 mL 0     traZODone (DESYREL) 100 MG tablet Take 1 tablet (100 mg) by mouth At Bedtime 90 tablet 3     vitamin D3 (CHOLECALCIFEROL) 50 mcg (2000 units) tablet Take 1 tablet by mouth daily           ALLERGIES/SENSITIVITIES:     Allergies   Allergen Reactions     Atorvastatin Muscle Pain (Myalgia)     Metformin Unknown     Body aches, headache     Morphine (Pf) [Morphine] Nausea and Vomiting     Pravastatin Muscle Pain (Myalgia)       PERTINENT SOCIAL HISTORY:  Social History     Socioeconomic History     Marital status:      Spouse name: None     Number of children: 2     Years of education: None     Highest education level: None   Tobacco Use     Smoking status: Never     Smokeless tobacco: Never   Vaping  Use     Vaping Use: Never used   Substance and Sexual Activity     Alcohol use: Yes     Alcohol/week: 2.0 standard drinks of alcohol     Types: 2 Standard drinks or equivalent per week     Comment: once in a while, but not often     Drug use: No         FAMILY HISTORY:  Family History   Problem Relation Age of Onset     Diabetes Mother      Hypertension Mother      Hypertension Father      Cancer Brother      Clotting Disorder No family hx of         PHYSICAL EXAM:   Constitutional: BP (!) 148/78   Pulse 78   LMP  (LMP Unknown)   SpO2 99%      Mental Status: A & O in no acute distress.  Affect is appropriate.  Speech is fluent.  Recent and remote memory are intact.  Attention span and concentration are normal.    Motor:  Normal bulk and tone all muscle groups of upper and lower extremities.    Strength:  5/5 x 4     Sensory: Sensation intact bilaterally to light touch.     Coordination:  Some difficulty going from sitting to standing. Heel/toe/tandem gait intact.  Antalgic gait.     Reflexes: supinator, biceps, triceps, knee/ ankle jerk intact. No leyva's    IMAGING:  I personally reviewed all radiographic images         Cc:   Kristen Navarrete             Again, thank you for allowing me to participate in the care of your patient.        Sincerely,        Melissa Long MD

## 2023-08-01 NOTE — PATIENT INSTRUCTIONS
Recommend lumbar 4-5 laminectomies and instrumented fusion     Please review COMPLETE information about your proposed surgery, pre-operative requirements, post-operative course and expectations - available in a folder provided to you in clinic!    Your surgery scheduler will call you within 3 business days to begin the process of scheduling your surgery and appointments.     Pre-Operative    Pre-operative physical / Lab work with primary care physician within 30 days of surgical date. We prefer the pre-op exam to be done 2 weeks prior to surgery. We also prefer pre-op lab work be done at [facility] outpatient lab, 2 weeks prior to surgery.     If all pre-op appointments/test are not completed prior to your surgery date, you will be asked to reschedule your surgery.           As part of preparation for your upcoming procedure your primary care doctor may order a test to rule out a COVID-19 infection. This is no longer a requirement prior to surgery.     Readiness Visits    Prior to surgery, you may have a telephone or in person readiness visit with our RN team to discuss your upcomming surgery, results of your pre-op physical, and lab work.   If you will require a collar/neck brace after surgery, you will be fitted for one at your readiness visit prior to surgery (scheduled by the surgery scheduler).     Shower procedure    Hibiclens shower: Use one packet the night before surgery and one packet the morning of surgery for a whole body shower. Avoid face, hair, and genitals.      Eating/Drinking    Stop all solid foods 8 hours before surgery.  Stop all clear liquids 2 hours prior to arrival time     Clear liquids include water, clear juice, black coffee, or clear tea without milk, Gatorade, clear soda.     Medications - please refer to the pre-operative medication instructions sheet in your folder    Hold Aspirin, NSAIDs (Advil/Ibuprofen, Indocin, Naproxen,Nuprin,Relafen/Nabumetone,  Diclofenac,Meloxicam, Aleve, Celebrex) and all vitamins and supplements x 7-10 days prior to surgical date  You can take Tylenol (Acetaminophen) for pain up until the date of your surgery   Do not exceed 3,000 mg per day   Any other medications prescribed, please discuss with your primary care provider at your pre-operative physical. Please discuss when/if it is safe for you to stop taking blood thinners with your primary care provider.   We will NOT provide pain medications prior to surgery. We will prescribe post-op pain medications for up to 6 weeks after surgery.       FMLA/Short-term disability    If you are currently employed, you will likely need to be off work for 4-6 weeks for post-op recovery and healing.  Please fax any FMLA/short term disability paperwork to [phone], mail it into the clinic, drop it off in person, or send via a [employer] message.   You may also call our clinic with the date in which you'd like to return to work, and we can provide a work letter to your employer  We will support Short-Term Disability up to 12 weeks, beginning the date of your surgery. We do NOT support Long-Term Disability/Social Security Benefits.     Pain Management after surgery    Dealing with pain    As your body heals, you might feel a stabbing, burning, or aching pain. You may also have some numbness.  Everyone feels pain differently, we may ask you to rate your pain using a pain scale. This will let us know how much pain you feel.   Keep in mind that medicine won't take away all of your pain. It helps to try other ways to relax and ease pain.     Things to help with pain    After surgery, we will give you medicine for your pain. These medications work well, but they can make you drowsy, itchy, or sick to your stomach, and constipated. Try to take narcotics with food if they cause nausea.   For mild to moderate pain, you can take medication such as Tylenol or Ibuprofen. These can be used with narcotics and  muscle relaxants. *If you have had a fusion: do NOT use NSAIDs for 6 months after surgery.   Do NOT drive while taking narcotic pain medication  Do NOT drink alcohol while using narcotic pain medication  You can utilize ice as needed (no longer than 20 minutes at one time) you may apply this over your covered incision  Utilize heat for muscle spasms, do not apply heat over your incision  If a muscle relaxer is prescribed, please do NOT take it at the same time as your narcotic pain medication. Take them at least 90 minutes apart.   You may also use pain cream/patches on sore muscles. Do NOT apply these on your incision. Patches may be cut in 1/2 if needed.     *After your surgery, if you will be staying in-patient, a nursing team will be monitoring you closely throughout your stay and communicate your health status to your surgeon and other providers.  You will be seen by Advanced Practice Providers (e.g., nurse practitioners, clinic nurse specialists, and physician assistants) who will check on you regularly to assess the status of your surgical recovery.     Incision Care    Look at your incision site every day. You  may need a mirror, or family member to help you.   Do not submerge your incision in water such as pools, hot tubs, baths for at least 6 weeks or until incision is healed  You may get your incision wet in the shower. Allow water and soap to run over incision, and gently pat dry.   Remove the dressing the day after you are discharged from the hospital. Keep the incision clean and dry at all times. This may require several bandage changes.   Contact us right away if you have:   Fever over 101 degrees farenheit  Green or yellow drainage (pus) from your incision or increased bloody drainage   Redness, swelling, or warmth at your surgery site   Notify the clinic [phone].    Activity Restrictions    For the first 6 weeks, no lifting,pushing, or pulling > 5-10 pounds, no bending, twisting.  Use the  stairs in moderation   Walking: Walking is the best way to start exercise after surgery. Take short frequent walks. You may gradually increase the distance as tolerated. If you feel pain, decrease your activity, but DO NOT stop walking. Walking will help you regain strength.  Avoid prolonged positioning for longer than 30 minutes (change positions frequently while awake)  No contact sports until after follow up visit  No high impact activities such as; running/jogging, snowmobile or 4 gomez riding or any other recreational vehicles until deemed safe by your surgeon/care team.   Please call the clinic if you develop any of the following symptoms:  Swelling and/or warmth in one or both legs  Pain or tenderness in your leg, ankle, foot, or arm   Red or discolored/pale skin     Post-Op Follow Up Appointments    We will call you to schedule these appointments after your discharge from the hospital.   Incision assessment within 2 weeks with a Registered Nurse   6 week post-op with a Nurse Practitioner/Physician Assistant. Your surgeon will be available on this day.

## 2023-08-22 ENCOUNTER — TRANSFERRED RECORDS (OUTPATIENT)
Dept: HEALTH INFORMATION MANAGEMENT | Facility: CLINIC | Age: 73
End: 2023-08-22
Payer: COMMERCIAL

## 2023-08-24 ENCOUNTER — TELEPHONE (OUTPATIENT)
Dept: FAMILY MEDICINE | Facility: CLINIC | Age: 73
End: 2023-08-24
Payer: COMMERCIAL

## 2023-08-24 NOTE — TELEPHONE ENCOUNTER
Writer called patient and informed her that her Ozempic is in clinic to .     Patient states she will  next week.    Patient is wondering if there is anyway to stop getting auto fills until she calls because she has plenty of the medication.    VINNIE GriffinN, RN  [facility]

## 2023-08-25 ENCOUNTER — TRANSFERRED RECORDS (OUTPATIENT)
Dept: HEALTH INFORMATION MANAGEMENT | Facility: CLINIC | Age: 73
End: 2023-08-25
Payer: COMMERCIAL

## 2023-08-30 ENCOUNTER — TELEPHONE (OUTPATIENT)
Dept: FAMILY MEDICINE | Facility: CLINIC | Age: 73
End: 2023-08-30
Payer: COMMERCIAL

## 2023-08-30 DIAGNOSIS — E78.2 MIXED HYPERLIPIDEMIA: Primary | ICD-10-CM

## 2023-08-30 NOTE — TELEPHONE ENCOUNTER
We get the letters from Wendy.    I will reach out to patient going forward before sending in refill.     Ade Tovar, PharmD  Medication Therapy Management Pharmacist   [facility]

## 2023-08-30 NOTE — TELEPHONE ENCOUNTER
GITA PA REQUEST    Prior Authorization Retail Medication Request    Medication/Dose: Evolocumab 140 MG/ML Subcutaneous Solution Auto-injector (REPATHA). Inject 140mg subcutaneously every 14 days  ICD code (if different than what is on RX):  Hyperlipidemia, unspecified hyperlipidemia type  Previously Tried and Failed:  atorvastatin, pravastatin, and overall intolerance to statin medications  Rationale:  Patient has intolerance to statin medications, is currently taking Zetia at maximum dose and cholesterol is still not at goal.     Insurance Name:  [employer]  Insurance ID:  [id number]      Pharmacy Information (if different than what is on RX)  Name:  [facility] [address] [place] [address] [address] [facility]  Phone:  [phone]

## 2023-09-01 NOTE — TELEPHONE ENCOUNTER
Central Prior Authorization Team   Phone: [phone]    PA Initiation    Medication:   Insurance Company: [employer] Non-Specialty PA's - Phone [phone] Fax [phone]  Pharmacy Filling the Rx: [facility] [address] [place] [address]  Filling Pharmacy Phone: [phone]  Filling Pharmacy Fax:    Start Date: 9/1/2023

## 2023-09-05 NOTE — TELEPHONE ENCOUNTER
PRIOR AUTHORIZATION DENIED    Medication:     Denial Date: 9/5/2023    Denial Rational: Insurance requires Repatha to be prescribed by a specialist (cardiology, endrocrinologist)   Patient will need to have tried/failed separate trials of both atorvastatin and rosuvastatin with side effects of myalgia or rhabdomyolysis  Needs to try/failed Zetia             Appeal Information: This medication was denied. If physician would like to appeal because patient has contraindication or allergy to covered medication please write letter of medical necessity and route back to PA team to initiate.  If no further action is needed please close encounter thank you.

## 2023-09-13 ENCOUNTER — TRANSFERRED RECORDS (OUTPATIENT)
Dept: HEALTH INFORMATION MANAGEMENT | Facility: CLINIC | Age: 73
End: 2023-09-13

## 2023-09-18 ENCOUNTER — TELEPHONE (OUTPATIENT)
Dept: FAMILY MEDICINE | Facility: CLINIC | Age: 73
End: 2023-09-18
Payer: COMMERCIAL

## 2023-09-18 NOTE — TELEPHONE ENCOUNTER
PA Initiation    Medication:  evolocumab (REPATHA) 140 MG/ML prefilled autoinjector   Insurance Company:  [employer]  Pharmacy Filling the Rx:  [facility] [address]  Filling Pharmacy Phone:  [phone]  Filling Pharmacy Fax:  [phone]  Start Date:  9/18/2023

## 2023-09-20 NOTE — TELEPHONE ENCOUNTER
Duplicate request, please see encounter dated 8/30/2023.     Rui HORTON    9/5/23  9:51 AM  Note  PRIOR AUTHORIZATION DENIED     Medication:      Denial Date: 9/5/2023     Denial Rational: Insurance requires Repatha to be prescribed by a specialist (cardiology, endrocrinologist)   Patient will need to have tried/failed separate trials of both atorvastatin and rosuvastatin with side effects of myalgia or rhabdomyolysis  Needs to try/failed Zetia                Appeal Information: This medication was denied. If physician would like to appeal because patient has contraindication or allergy to covered medication please write letter of medical necessity and route back to PA team to initiate.  If no further action is needed please close encounter thank you.

## 2023-10-15 NOTE — PROGRESS NOTES
Virtual Visit Details    Type of service:  Video Visit   Video Start Time: 11:08 AM  Video End Time:11:43 AM    Originating Location (pt. Location): Home    Distant Location (provider location):  Off-site  Platform used for Video Visit: [facility]    Sleep Follow-Up Visit:    Date on this visit: 10/16/2023    Ana Maria De La Garza comes in today for follow-up of her management of positional apnea, RLS and insomnia. Ana Maria De La Garza was initially seen for inability to sleep, fatigue.     PSG Results:  1/6/2023 [facility] Diagnostic Sleep Study (182.0 lbs) - AHI 10.9, RDI 15.9, Supine AHI 26.7, Non-supine AHI 7/hr. REM AHI 14.8, Low O2 71.0%, Time Spent ?88% 8.2 minutes / Time Spent ?89% 14.8 minutes.     KAMLESH: She has been attempting positional restriction to manage her KAMLESH. She tried the Sleep Noodle but it was too disruptive. She thinks she is waking on her sides without it.    She has been on Ozempic and is down to 167#.    Insomnia: She is 2 weeks out of COVID. She has lately been waking with extreme coughing 2-3 times per night.   Bedtime: 10:30-11 PM. She wakes after 30 minutes. She has found it easier to get back to sleep. She has not been watching the clock, but it is about 30 minutes at most, vs 2 hours. She is up by 7 AM. Her energy was fine before COVID. She never naps, she does not feel tired. She takes sleep aid (100 mg trazodone) at 10:30 PM, but does not often fall asleep until 11 PM.    RLS: Ferritin was 340 in 11/2022. 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 She has tried gabapentin up to 600 mg but did not notice a benefit, so got off of it.    She has RLS at bedtime, now about 1-2 times per week. She has not been very physically active with COVID. She felt it was worse when she was more active prior to COVID.  She has 1 shot of Alberto Olson most days, does not think it affects her.     She has been diagnosed with degenerative spondylolisthesis. She has infrequent pains down the back of her legs. They go  away when laying down.       Past medical/surgical history, family history, social history, medications and allergies were reviewed.      Problem List:  Patient Active Problem List    Diagnosis Date Noted    Chronic kidney disease, stage 3 (H) 11/04/2020     Priority: Medium    Osteopenia 10/09/2018     Priority: Medium     High risk of fracture per DEXA 8/2018        Hyperlipidemia      Priority: Medium     Created by Conversion        Type 2 diabetes mellitus with complication, without long-term current use of insulin (H) 01/26/2017     Priority: Medium    Anxiety      Priority: Medium     Created by Conversion  Replacement Utility updated for latest IMO load        Insomnia 06/22/2016     Priority: Medium    Status post total knee replacement 02/18/2015     Priority: Medium     Left (2/18/2015)        Benign Essential Hypertension      Priority: Medium     Created by Conversion        Cervical Disc Degeneration      Priority: Medium     Created by Conversion        Female Stress Incontinence      Priority: Medium     Created by Conversion  [facility] Annotation: May 18 2010  9:18AM - Edwina Navarretee: s/p   bladder   sling Dr. Eugene        Recurrent Major Depression In Full Remission      Priority: Medium     Created by Conversion        Cervical Spondylosis      Priority: Medium     Created by Conversion            Impression/Plan:    (G47.33) KAMLESH (obstructive sleep apnea)  (primary encounter diagnosis)  Comment: Sayda had positional apnea, AHI 36 supine, 7/hr lateral. She did not tolerate the Sleep Noodle. She has lost weight from 182# to 167#.  Plan:  She will continue to avoid supine positioning. She may try making a tennis ball t-shirt    (F51.04) Chronic insomnia  Comment: Sayda has been having much shorter awakenings in the night 30 min instead of 2 hrs. She is not sleepy in the day. Her sleep opportunity is about 8.5 hours. She would like to get off of trazodone.   Plan:  Reduce time in bed to no more  than 8 hours. Try going without alcohol. May continue 100 mg trazodone. We talked about a strategy of weaning and compressing sleep.  Take 75 mg for 2-3 weeks, if starting to have difficulty sleeping, push your bedtime 15 minutes later every 2 weeks until sleeping reasonably well. Then cut down by another 25 mg. I sent in a script for 50 mg. Don't reduce below 6.5 hours in bed.    (G25.81) Restless legs syndrome (RLS)  Comment: She may have some symptoms in the evening 1-2 times per week, but it goes does not seem to interfere with sleep.  Plan:  let me know if symptoms worsen.      She will follow up with me in 5-6 months.     45 minutes were spent on the date of the encounter doing chart review, history and exam, documentation and further activities as noted above.      Bennett Goltz, PA-C    CC: Kristen Navarrete MD

## 2023-10-16 ENCOUNTER — VIRTUAL VISIT (OUTPATIENT)
Dept: SLEEP MEDICINE | Facility: CLINIC | Age: 73
End: 2023-10-16
Payer: COMMERCIAL

## 2023-10-16 VITALS
BODY MASS INDEX: 26.84 KG/M2 | DIASTOLIC BLOOD PRESSURE: 76 MMHG | SYSTOLIC BLOOD PRESSURE: 114 MMHG | HEIGHT: 66 IN | WEIGHT: 167 LBS

## 2023-10-16 DIAGNOSIS — F51.04 CHRONIC INSOMNIA: ICD-10-CM

## 2023-10-16 DIAGNOSIS — G25.81 RESTLESS LEGS SYNDROME (RLS): ICD-10-CM

## 2023-10-16 DIAGNOSIS — G47.33 OSA (OBSTRUCTIVE SLEEP APNEA): Primary | ICD-10-CM

## 2023-10-16 PROCEDURE — 99215 OFFICE O/P EST HI 40 MIN: CPT | Mod: VID | Performed by: PHYSICIAN ASSISTANT

## 2023-10-16 RX ORDER — TRAZODONE HYDROCHLORIDE 50 MG/1
TABLET, FILM COATED ORAL
Qty: 30 TABLET | Refills: 0 | Status: SHIPPED | OUTPATIENT
Start: 2023-10-16 | End: 2023-11-16

## 2023-10-16 ASSESSMENT — SLEEP AND FATIGUE QUESTIONNAIRES
HOW LIKELY ARE YOU TO NOD OFF OR FALL ASLEEP WHEN YOU ARE A PASSENGER IN A CAR FOR AN HOUR WITHOUT A BREAK: WOULD NEVER DOZE
HOW LIKELY ARE YOU TO NOD OFF OR FALL ASLEEP WHILE SITTING AND TALKING TO SOMEONE: WOULD NEVER DOZE
HOW LIKELY ARE YOU TO NOD OFF OR FALL ASLEEP WHILE SITTING QUIETLY AFTER LUNCH WITHOUT ALCOHOL: SLIGHT CHANCE OF DOZING
HOW LIKELY ARE YOU TO NOD OFF OR FALL ASLEEP WHILE WATCHING TV: SLIGHT CHANCE OF DOZING
HOW LIKELY ARE YOU TO NOD OFF OR FALL ASLEEP WHILE SITTING AND READING: SLIGHT CHANCE OF DOZING
HOW LIKELY ARE YOU TO NOD OFF OR FALL ASLEEP WHILE LYING DOWN TO REST IN THE AFTERNOON WHEN CIRCUMSTANCES PERMIT: SLIGHT CHANCE OF DOZING
HOW LIKELY ARE YOU TO NOD OFF OR FALL ASLEEP IN A CAR, WHILE STOPPED FOR A FEW MINUTES IN TRAFFIC: WOULD NEVER DOZE
HOW LIKELY ARE YOU TO NOD OFF OR FALL ASLEEP WHILE SITTING INACTIVE IN A PUBLIC PLACE: WOULD NEVER DOZE

## 2023-10-16 ASSESSMENT — PAIN SCALES - GENERAL: PAINLEVEL: NO PAIN (0)

## 2023-10-16 NOTE — PATIENT INSTRUCTIONS
Protocol to help wean off of trazodone: Cut the trazodone dose from 100 mg to 75 mg. I sent in a prescription for 50 mg tablets, so start with 1.5 tablets. Reduce your time in bed to no earlier than 11 PM and getting out of bed no later than 7 AM. Give that schedule 2-3 weeks with 75 mg trazodone. If not sleeping well, push your bedtime 15 minutes later, but keep your wake up time the same. If sleeping well, try reducing the trazodone by another 25 mg. Every 2-3 weeks, re-evaluate your sleep and either reduce the trazodone if sleeping well or push your bedtime 15 minutes later if sleeping poorly (do not reduce below 7 hours in bed).  Send me a MyChart note if you have any questions.

## 2023-10-16 NOTE — NURSING NOTE
Has patient had flu shot for current/most recent flu season? If so, when? Yes: 10/3/37726      Is the patient currently in the state of [place]? YES    Visit mode:VIDEO    If the visit is dropped, the patient can be reconnected by: VIDEO VISIT: Send to e-mail at: [email or web address]    Will anyone else be joining the visit? NO  (If patient encounters technical issues they should call [phone]    How would you like to obtain your AVS? MyChart    Are changes needed to the allergy or medication list? No    Reason for visit: RECHECK    Kenisha SWEET

## 2023-10-19 ENCOUNTER — OFFICE VISIT (OUTPATIENT)
Dept: PHARMACY | Facility: CLINIC | Age: 73
End: 2023-10-19
Payer: COMMERCIAL

## 2023-10-19 VITALS — HEART RATE: 82 BPM | SYSTOLIC BLOOD PRESSURE: 135 MMHG | DIASTOLIC BLOOD PRESSURE: 74 MMHG

## 2023-10-19 DIAGNOSIS — E11.8 TYPE 2 DIABETES MELLITUS WITH COMPLICATION, WITHOUT LONG-TERM CURRENT USE OF INSULIN (H): Primary | ICD-10-CM

## 2023-10-19 DIAGNOSIS — F41.1 ANXIETY STATE: ICD-10-CM

## 2023-10-19 DIAGNOSIS — I10 ESSENTIAL HYPERTENSION, BENIGN: ICD-10-CM

## 2023-10-19 DIAGNOSIS — M50.30 DEGENERATION OF CERVICAL INTERVERTEBRAL DISC: ICD-10-CM

## 2023-10-19 DIAGNOSIS — E78.5 HYPERLIPIDEMIA, UNSPECIFIED HYPERLIPIDEMIA TYPE: ICD-10-CM

## 2023-10-19 DIAGNOSIS — G47.00 INSOMNIA, UNSPECIFIED TYPE: ICD-10-CM

## 2023-10-19 DIAGNOSIS — F33.42 MAJOR DEPRESSIVE DISORDER, RECURRENT EPISODE, IN FULL REMISSION (H): ICD-10-CM

## 2023-10-19 PROCEDURE — 99606 MTMS BY PHARM EST 15 MIN: CPT

## 2023-10-19 PROCEDURE — 99607 MTMS BY PHARM ADDL 15 MIN: CPT

## 2023-10-19 RX ORDER — ACETAMINOPHEN 500 MG
500-1000 TABLET ORAL EVERY 4 HOURS PRN
COMMUNITY

## 2023-10-19 NOTE — PATIENT INSTRUCTIONS
Recommendations from today's MT visit:                                                      Get cholesterol labs after starting rosuvastatin and get A1c by making lab only appointment     Start taking Co-q10 100mg daily for 2 weeks to see if this improves general aches     With Ozempic make sure that you are eating smaller frequent meals and that they are nutritious    For your Ozempic let me know when you are down to about 1 month and 1/2 then we will send in the refill     STOP taking celecoxib, instead take Acetaminophen 500-1,000mg every 4-6 hours as needed up to a maximum of 4,000mg daily    Starting light therapy  Generally, most people with seasonal affective disorder begin treatment with light therapy in the early fall, when it typically becomes cloudy in many regions of the country. Treatment usually continues until spring, when outdoor light alone is sufficient to sustain a good mood and higher levels of energy.    If you typically have fall and winter depression, you may notice symptoms during prolonged periods of cloudy or rainy weather during other seasons. You and your doctor can adjust your light treatment based on the timing and duration of your symptoms.    If you want to try light therapy for nonseasonal depression or another condition, talk to your doctor about how light therapy can be most effective.    During light therapy  During light therapy sessions, you sit or work near a light box. To be effective, light from the light box must enter your eyes indirectly. You can't get the same effect merely by exposing your skin to the light.    While your eyes must be open, don't look directly at the light box, because the bright light can damage your eyes. Be sure to follow your doctor's recommendations and the 's directions.    Light therapy requires time and consistency. You can set your light box on a table or desk in your home or office. That way you can read, use a computer, write,  watch TV, talk on the phone or eat while having light therapy. Stick to your therapy schedule and don't overdo it.    Three key elements for effectiveness  Light therapy is most effective when you have the proper combination of light intensity, duration and timing.    Intensity. The intensity of the light box is recorded in lux, which is a measure of the amount of light you receive. For SAD, the typical recommendation is to use a 10,000-lux light box at a distance of about 16 to 24 inches (41 to 61 centimeters) from your face.    Duration. With a 10,000-lux light box, light therapy typically involves daily sessions of about 20 to 30 minutes. But a lower-intensity light box, such as 2,500 lux, may require longer sessions. Check the 's guidelines and follow your doctor's instructions. He or she may suggest you start with shorter sessions and gradually increase the time.    Timing. For most people, light therapy is most effective when it's done early in the morning, after you first wake up. Your doctor can help you determine the light therapy schedule that works best.    Results  Light therapy probably won't cure seasonal affective disorder, nonseasonal depression or other conditions. But it may ease symptoms, increase your energy levels, and help you feel better about yourself and life.    Light therapy can start to improve symptoms within just a few days. In some cases, though, it can take two or more weeks.    Getting the most out of light therapy  Light therapy isn't effective for everyone. But you can take steps to get the most out of your light therapy and help make it a success.    Get the right light box. Do some research and talk to your doctor before buying a light therapy box. That way you can be sure your light box is safe, the proper brightness, the right kind of light, and that its style and features make it convenient to use.    Be consistent. Stick to a daily routine of light therapy  "sessions to help ensure that you maintain improvements over time. If you simply can't do light therapy every day, take a day or two off, but monitor your mood and other symptoms -- you may have to find a way to fit in light therapy every day.    Track the timing. If you interrupt light therapy during the winter months or stop too soon in the spring when you're improving, your symptoms could return. Keep track of when you start light box therapy in the fall and when you stop in the spring so you know when to start and end your light therapy the following year.    Include other treatment. If your symptoms don't improve enough with light therapy, you may need additional treatment. Talk to your doctor about other treatment options, such as antidepressants or psychotherapy.    Future considerations: discontinue rosuvastatin and potentially increase escitalopram dose.       Follow-up: Pending lab results     It was great speaking with you today.  I value your experience and would be very thankful for your time in providing feedback in our clinic survey. In the next few days, you may receive an email or text message from [facility] [employer] with a link to a survey related to your  clinical pharmacist.\"     To schedule another MTM appointment, please call the clinic directly or you may call the MTM scheduling line at [phone] or toll-free at [phone].     My Clinical Pharmacist's contact information:                                                      Please feel free to contact me with any questions or concerns you have.      Ade Tovar, PharmD  Medication Therapy Management Pharmacist   [facility]    "

## 2023-10-19 NOTE — PROGRESS NOTES
Medication Therapy Management (MTM) Encounter    ASSESSMENT:                            Medication Adherence/Access: Recommend patient to reach out when only have about 1 month of Ozempic supply remaining to send in Wendy refill.     Diabetes: Patient reported blood sugars have been at/near goals of fasting  and 2 hour post prandial <200. Some 2 hour post prandial readings have been above goal thus recommend continuing on current Ozempic therapy. To help with appetite suppression recommend patient eat smaller more frequent meals and making sure to continue to avoid carbohydrates or limit intake and focus on eating nutritious foods. Would recommend obtaining an updated A1c, if elevated above goal of <7% have option to increase Ozempic dose.     Hypertension: Blood pressure at/near goal of <130/80. Did not discuss today but have option to change to lisinopril/hydrochlorothiazide combination blood pressure pill to reduce pill burden, will discuss at follow up.     Hyperlipidemia: Recommend patient try taking Co-Q10 supplement to see if this improves current body aches. If not effective, may need to trial off rosuvastatin to see if medication is causing current symptoms. Patient has upcoming follow up planned with cardiology to determine if they would be a candidate for PSK9 inhibitor. Would recommend obtaining updated cholesterol labs to determine efficacy of rosuvastatin.     Pain: Recommend patient avoid Celebrex use and instead try taking Acetaminophen as needed due to patient's current reduced kidney function.     Depression/Anxiety: Have option to increase escitalopram dose in the future. Will keep dose stable at this time while trying to determine if current body aches are due to statin to avoid making too many changes at once. Provided information to patient regarding light therapy to implement during the upcoming winter months.     Insomina: Continuing to work with pain provider to reduce trazodone.      PLAN:                            Get cholesterol labs after starting rosuvastatin and get A1c by making lab only appointment     Start taking Co-q10 100mg daily for 2 weeks to see if this improves general aches     With Ozempic make sure that you are eating smaller frequent meals and that they are nutritious    For your Ozempic let me know when you are down to about 1 month and 1/2 then we will send in the refill     STOP taking celecoxib (Celebrex), instead take Acetaminophen 500-1,000mg every 4-6 hours as needed up to a maximum of 4,000mg daily    Starting light therapy  Generally, most people with seasonal affective disorder begin treatment with light therapy in the early fall, when it typically becomes cloudy in many regions of the country. Treatment usually continues until spring, when outdoor light alone is sufficient to sustain a good mood and higher levels of energy.    If you typically have fall and winter depression, you may notice symptoms during prolonged periods of cloudy or rainy weather during other seasons. You and your doctor can adjust your light treatment based on the timing and duration of your symptoms.    If you want to try light therapy for nonseasonal depression or another condition, talk to your doctor about how light therapy can be most effective.    During light therapy  During light therapy sessions, you sit or work near a light box. To be effective, light from the light box must enter your eyes indirectly. You can't get the same effect merely by exposing your skin to the light.    While your eyes must be open, don't look directly at the light box, because the bright light can damage your eyes. Be sure to follow your doctor's recommendations and the 's directions.    Light therapy requires time and consistency. You can set your light box on a table or desk in your home or office. That way you can read, use a computer, write, watch TV, talk on the phone or eat while having  light therapy. Stick to your therapy schedule and don't overdo it.    Three key elements for effectiveness  Light therapy is most effective when you have the proper combination of light intensity, duration and timing.    Intensity. The intensity of the light box is recorded in lux, which is a measure of the amount of light you receive. For SAD, the typical recommendation is to use a 10,000-lux light box at a distance of about 16 to 24 inches (41 to 61 centimeters) from your face.    Duration. With a 10,000-lux light box, light therapy typically involves daily sessions of about 20 to 30 minutes. But a lower-intensity light box, such as 2,500 lux, may require longer sessions. Check the 's guidelines and follow your doctor's instructions. He or she may suggest you start with shorter sessions and gradually increase the time.    Timing. For most people, light therapy is most effective when it's done early in the morning, after you first wake up. Your doctor can help you determine the light therapy schedule that works best.    Results  Light therapy probably won't cure seasonal affective disorder, nonseasonal depression or other conditions. But it may ease symptoms, increase your energy levels, and help you feel better about yourself and life.    Light therapy can start to improve symptoms within just a few days. In some cases, though, it can take two or more weeks.    Getting the most out of light therapy  Light therapy isn't effective for everyone. But you can take steps to get the most out of your light therapy and help make it a success.    Get the right light box. Do some research and talk to your doctor before buying a light therapy box. That way you can be sure your light box is safe, the proper brightness, the right kind of light, and that its style and features make it convenient to use.    Be consistent. Stick to a daily routine of light therapy sessions to help ensure that you maintain improvements  over time. If you simply can't do light therapy every day, take a day or two off, but monitor your mood and other symptoms -- you may have to find a way to fit in light therapy every day.    Track the timing. If you interrupt light therapy during the winter months or stop too soon in the spring when you're improving, your symptoms could return. Keep track of when you start light box therapy in the fall and when you stop in the spring so you know when to start and end your light therapy the following year.    Include other treatment. If your symptoms don't improve enough with light therapy, you may need additional treatment. Talk to your doctor about other treatment options, such as antidepressants or psychotherapy.    Future considerations: discontinue rosuvastatin and potentially increase escitalopram dose in addition to change to lisinopril/hydrochlorothiazide combination medication.       Follow-up: Pending lab results     SUBJECTIVE/OBJECTIVE:                          Sayda De La Garza is a 73 year old female coming in for a follow-up visit from 2023.       Reason for visit: Diabetes follow up.    Allergies/ADRs: Reviewed in chart  Past Medical History: Reviewed in chart    Medication Adherence/Access: Refill order form sent from [facility] for Ozempic, patient notes that they still have about a 4 month supply currently and does not need an additional refill at this time.     Diabetes   Ozempic 0.5mg weekly  Glipizide 10mg twice daily (tired to discontinue in the past but blood sugars started to rise so was re-started).   Blood sugar monitorin-2 time(s) daily; Ranges:  AM: 101,133, 94,72  PM: 222, 213, 104, 148, 140, 193,134, 350,160, 222, 119, 141  Current diabetes symptoms: none  Diet/Exercise: Reduced appetite with Ozempic use, overall this is tolerable but patient asks if this is typical.     Urine Albumin:   Lab Results   Component Value Date    ALCR  2023      Comment:      Unable to calculate,  "urine albumin and/or urine creatinine is outside detectable limits.  Microalbuminuria is defined as an albumin:creatinine ratio of 17 to 299 for males and 25 to 299 for females. A ratio of albumin:creatinine of 300 or higher is indicative of overt proteinuria.  Due to biologic variability, positive results should be confirmed by a second, first-morning random or 24-hour timed urine specimen. If there is discrepancy, a third specimen is recommended. When 2 out of 3 results are in the microalbuminuria range, this is evidence for incipient nephropathy and warrants increased efforts at glucose control, blood pressure control, and institution of therapy with an angiotensin-converting-enzyme (ACE) inhibitor (if the patient can tolerate it).        Lab Results   Component Value Date    A1C 6.5 (H) 07/20/2023     Hypertension   Hydrochlorothiazide 25mg daily   Lisinopril 20mg daily   Patient reports no current medication side effects  Patient self monitors blood pressure.  Home BP monitoring 120s/70s .       BP Readings from Last 3 Encounters:   10/16/23 114/76   08/01/23 (!) 148/78   07/25/23 (!) 165/83     Pulse Readings from Last 3 Encounters:   08/01/23 78   07/25/23 75   07/20/23 75     Hyperlipidemia   rosuvastatin 5mg daily (recently started after most recent labs were drawn)- has been taking for about a month   Patient notes that her shoulders, knees, and hips have been sore, unsure if this is related to the medication.      Recent Labs   Lab Test 07/20/23  1002 01/20/23  0953   CHOL 185 234*   HDL 47* 37*   * 140*   TRIG 127 287*     Pain:   Patient currently uses Celebrex 200 as needed if going out for a long walk. Notes that she has not taken in sometime, can't remember the last time her most recent use was.   Acetaminophen- has not tired to take preventively for pain.     Depression/Anxiety:   escitalopram 10mg daily- patient notes that overall this dose has been \"going okay\" notes that mood may worsen in " the winter months and does not have a lot of social interaction based on where she currently lives, may look to moving closer to the city in the future.     Insomina:   Patient currently prescribed and using Trazodone for sleep, is working with sleep provider to try to wean off of the medication.   Per sleep provider: continue 100 mg trazodone. We talked about a strategy of weaning and compressing sleep.  Take 75 mg for 2-3 weeks, if starting to have difficulty sleeping, push your bedtime 15 minutes later every 2 weeks until sleeping reasonably well. Then cut down by another 25 mg. I sent in a script for 50 mg.     Today's Vitals: /74   Pulse 82   LMP  (LMP Unknown)   ----------------  I spent 30 minutes with this patient today. All changes were made via collaborative practice agreement with Kristen Navarrete MD. A copy of the visit note was provided to the patient's provider(s).    A summary of these recommendations was given to the patient.    Ade Tovar, PharmD  Medication Therapy Management Pharmacist   [facility]      Medication Therapy Recommendations  Hyperlipidemia    Current Medication: rosuvastatin (CRESTOR) 5 MG tablet   Rationale: Undesirable effect - Adverse medication event - Safety   Recommendation: Start Medication - COQ-10 PO   Status: Accepted - no CPA Needed

## 2023-10-19 NOTE — NURSING NOTE
Attempted to reach patient via phone unsuccessful left message to call back to schedule 5 months follow up appointment.         JOSEPH Mendoza  [facility]

## 2023-10-25 ENCOUNTER — LAB (OUTPATIENT)
Dept: LAB | Facility: CLINIC | Age: 73
End: 2023-10-25
Payer: COMMERCIAL

## 2023-10-25 ENCOUNTER — TRANSFERRED RECORDS (OUTPATIENT)
Dept: HEALTH INFORMATION MANAGEMENT | Facility: CLINIC | Age: 73
End: 2023-10-25

## 2023-10-25 DIAGNOSIS — E11.8 TYPE 2 DIABETES MELLITUS WITH COMPLICATION, WITHOUT LONG-TERM CURRENT USE OF INSULIN (H): ICD-10-CM

## 2023-10-25 LAB
ANION GAP SERPL CALCULATED.3IONS-SCNC: 8 MMOL/L (ref 7–15)
BUN SERPL-MCNC: 22.2 MG/DL (ref 8–23)
CALCIUM SERPL-MCNC: 9.7 MG/DL (ref 8.8–10.2)
CHLORIDE SERPL-SCNC: 105 MMOL/L (ref 98–107)
CHOLEST SERPL-MCNC: 136 MG/DL
CREAT SERPL-MCNC: 1.36 MG/DL (ref 0.51–0.95)
DEPRECATED HCO3 PLAS-SCNC: 28 MMOL/L (ref 22–29)
EGFRCR SERPLBLD CKD-EPI 2021: 41 ML/MIN/1.73M2
GLUCOSE SERPL-MCNC: 109 MG/DL (ref 70–99)
HBA1C MFR BLD: 5.9 % (ref 0–5.6)
HDLC SERPL-MCNC: 42 MG/DL
LDLC SERPL CALC-MCNC: 71 MG/DL
NONHDLC SERPL-MCNC: 94 MG/DL
POTASSIUM SERPL-SCNC: 4.6 MMOL/L (ref 3.4–5.3)
RETINOPATHY: NEGATIVE
SODIUM SERPL-SCNC: 141 MMOL/L (ref 135–145)
TRIGL SERPL-MCNC: 115 MG/DL

## 2023-10-25 PROCEDURE — 36415 COLL VENOUS BLD VENIPUNCTURE: CPT

## 2023-10-25 PROCEDURE — 83036 HEMOGLOBIN GLYCOSYLATED A1C: CPT

## 2023-10-25 PROCEDURE — 80061 LIPID PANEL: CPT

## 2023-10-25 PROCEDURE — 80048 BASIC METABOLIC PNL TOTAL CA: CPT

## 2023-11-07 ENCOUNTER — TELEPHONE (OUTPATIENT)
Dept: FAMILY MEDICINE | Facility: CLINIC | Age: 73
End: 2023-11-07

## 2023-11-16 ENCOUNTER — OFFICE VISIT (OUTPATIENT)
Dept: CARDIOLOGY | Facility: CLINIC | Age: 73
End: 2023-11-16
Payer: COMMERCIAL

## 2023-11-16 VITALS
TEMPERATURE: 98.5 F | HEIGHT: 66 IN | WEIGHT: 167.5 LBS | SYSTOLIC BLOOD PRESSURE: 135 MMHG | DIASTOLIC BLOOD PRESSURE: 78 MMHG | RESPIRATION RATE: 16 BRPM | OXYGEN SATURATION: 97 % | BODY MASS INDEX: 26.92 KG/M2 | HEART RATE: 83 BPM

## 2023-11-16 DIAGNOSIS — E78.2 MIXED HYPERLIPIDEMIA: ICD-10-CM

## 2023-11-16 PROCEDURE — 99204 OFFICE O/P NEW MOD 45 MIN: CPT | Performed by: INTERNAL MEDICINE

## 2023-11-16 NOTE — PROGRESS NOTES
HEART CARE ENCOUNTER CONSULTATON NOTE      [facility]  [phone]      Assessment/Recommendations   Assessment:  1.  Dyslipidemia: Given her history of diabetes mellitus type 2 would like to obtain a goal LDL of less than 70.  Recent lipids look very well controlled on current dose of Crestor 5 mg daily.  LDL 71, HDL 42, triglycerides 115.  Unfortunately she has a great deal of joint pains and is concerned that it may be related to the Crestor.  2.  Diabetes mellitus type 2 very well controlled  3.  Osteoarthritis    Plan:  1.  May hold the Crestor 5 mg daily for couple weeks and then restart to see if her symptoms correlate with statin.  Can also consider doing a CT coronary calcium scan for further risk assessment.  2.  If further issues with statin would try Zetia 10 mg daily.  She is reluctant to try PCSK9 inhibitor at this time.       History of Present Illness/Subjective    HPI: Ana Maria De La Garza is a 73 year old female with history of diabetes mellitus type 2, chronic back/joint pains, dyslipidemia who I am seeing today for initial consultation due to issues with statin intolerance.  She reports that years ago she was tried on red yeast rice and then placed on statins.  She was tried on various statins and cannot member all the names but quit taking them due to pain.  She is currently on her third statin Crestor 5 mg daily.  She is taking with co-Q10.  She complains of knee pain, pain in her hips and lower back as well as shoulder pain.  She also has significant joint issues.  She is unsure if the discomfort is truly related to statin intolerance.  She was started on Ozempic and lost 15 pounds.  She tries to exercise regularly.  She swims, dances and goes hiking.  Denies any exertional complaints.  A1c is very well controlled at 5.9%.         Physical Examination  Review of Systems   Vitals: /78 (BP Location: Left arm, Patient Position: Sitting, Cuff Size: Adult Regular)    "Pulse 83   Temp 98.5  F (36.9  C) (Oral)   Resp 16   Ht 1.676 m (5' 6\")   Wt 76 kg (167 lb 8 oz)   LMP  (LMP Unknown)   SpO2 97%   BMI 27.04 kg/m    BMI= Body mass index is 27.04 kg/m .  Wt Readings from Last 3 Encounters:   11/16/23 76 kg (167 lb 8 oz)   10/16/23 75.8 kg (167 lb)   07/20/23 79.4 kg (175 lb 1.6 oz)       General Appearance:   no distress, normal body habitus   ENT/Mouth: membranes moist, no oral lesions or bleeding gums.      EYES:  no scleral icterus, normal conjunctivae   Neck: no carotid bruits or thyromegaly   Chest/Lungs:   lungs are clear to auscultation   Cardiovascular:   Regular. Normal first and second heart sounds with no murmur no edema bilaterally    Abdomen:  bowel sounds are present   Extremities: no cyanosis or clubbing   Skin: no xanthelasma, warm.    Neurologic: normal  bilateral, no tremors     Psychiatric: alert and oriented x3, calm        Please refer above for cardiac ROS details.        Medical History  Surgical History Family History Social History   Past Medical History:   Diagnosis Date    Depression     Hypertension     Insomnia      Past Surgical History:   Procedure Laterality Date    HC SLING OPERATION FOR STRESS INCONTINENCE      Description: Mid-Urethral Sling Operation;  Recorded: 05/18/2010;    JOINT REPLACEMENT Left 2008    knee    OPEN REDUCTION INTERNAL FIXATION ANKLE Right     2008 and pin removal 2009    [facility] [place] APPENDECTOMY      Description: Appendectomy;  Recorded: 02/26/2008;    [facility] [place] TOTAL KNEE ARTHROPLASTY Left 2/18/2015    Procedure:   LEFT TOTAL KNEE ARTHROPLASTY;  Surgeon: Sina SHARP MD;  Location: [facility] OR;  Service: Orthopedics     Family History   Problem Relation Age of Onset    Diabetes Mother     Hypertension Mother     Hypertension Father     Cancer Brother     Clotting Disorder No family hx of         Social History     Socioeconomic History    Marital status:      Spouse name: Not on file    Number of children: 2    " Years of education: Not on file    Highest education level: Not on file   Occupational History    Not on file   Tobacco Use    Smoking status: Never    Smokeless tobacco: Never   Vaping Use    Vaping Use: Never used   Substance and Sexual Activity    Alcohol use: Yes     Alcohol/week: 2.0 standard drinks of alcohol     Types: 2 Standard drinks or equivalent per week     Comment: once in a while, but not often    Drug use: No    Sexual activity: Not Currently     Partners: Male   Other Topics Concern    Not on file   Social History Narrative    Not on file     Social Determinants of Health     Financial Resource Strain: Not on file   Food Insecurity: Not on file   Transportation Needs: Not on file   Physical Activity: Not on file   Stress: Not on file   Social Connections: Not on file   Interpersonal Safety: Not on file   Housing Stability: Not on file           Medications  Allergies   Current Outpatient Medications   Medication Sig Dispense Refill    acetaminophen (TYLENOL) 500 MG tablet Take 500-1,000 mg by mouth every 4 hours as needed for mild pain      aspirin 81 MG EC tablet Take 81 mg by mouth daily      blood glucose (NO BRAND SPECIFIED) test strip Use to test blood sugar 1 times daily or as directed. To accompany: Blood Glucose Monitor Brands: per insurance and patient request 100 strip 11    CALCIUM PO Take 1 tablet by mouth daily      escitalopram (LEXAPRO) 10 MG tablet Take 1 tablet (10 mg) by mouth daily 90 tablet 3    glipiZIDE (GLUCOTROL XL) 10 MG 24 hr tablet Take 1 tablet (10 mg) by mouth 2 times daily 180 tablet 1    hydrochlorothiazide (HYDRODIURIL) 25 MG tablet TAKE 1 TABLET BY MOUTH ONCE DAILY 90 tablet 3    hydrOXYzine (ATARAX) 25 MG tablet Take 1 tablet (25 mg) by mouth every 6 hours as needed for anxiety 30 tablet 1    lisinopril (ZESTRIL) 20 MG tablet Take 1 tablet (20 mg) by mouth daily 90 tablet 3    multivitamin therapeutic (THERAGRAN) tablet [MULTIVITAMIN THERAPEUTIC (THERAGRAN) TABLET]  "Take 1 tablet by mouth daily.      OMEGA-3/DHA/EPA/FISH OIL (FISH OIL-OMEGA-3 FATTY ACIDS) 300-1,000 mg capsule [OMEGA-3/DHA/EPA/FISH OIL (FISH OIL-OMEGA-3 FATTY ACIDS) 300-1,000 MG CAPSULE] Take 2 g by mouth daily.      rosuvastatin (CRESTOR) 5 MG tablet Take 1 tablet (5 mg) by mouth daily 60 tablet 1    semaglutide (OZEMPIC) 2 MG/3ML pen Inject 0.5 mg Subcutaneous every 7 days 3 mL 0    thin (NO BRAND SPECIFIED) lancets Use to test blood sugar 1 times daily or as directed. To accompany: Blood Glucose Monitor Brands: per insurance and patient request 100 each 11    vitamin D3 (CHOLECALCIFEROL) 50 mcg (2000 units) tablet Take 1 tablet by mouth daily         Allergies   Allergen Reactions    Atorvastatin Muscle Pain (Myalgia)    Metformin Unknown     Body aches, headache    Morphine (Pf) [Morphine] Nausea and Vomiting    Pravastatin Muscle Pain (Myalgia)          Lab Results    Chemistry/lipid CBC Cardiac Enzymes/BNP/TSH/INR   Recent Labs   Lab Test 10/25/23  0925   CHOL 136   HDL 42*   LDL 71   TRIG 115     Recent Labs   Lab Test 10/25/23  0925 07/20/23  1002 01/20/23  0953   LDL 71 113* 140*     Recent Labs   Lab Test 10/25/23  0925      POTASSIUM 4.6   CHLORIDE 105   CO2 28   *   BUN 22.2   CR 1.36*   GFRESTIMATED 41*   CUONG 9.7     Recent Labs   Lab Test 10/25/23  0925 07/20/23  1002 04/19/23  1658   CR 1.36* 1.37* 1.5*     Recent Labs   Lab Test 10/25/23  0925 07/20/23  1002 01/20/23  0953   A1C 5.9* 6.5* 8.9*          Recent Labs   Lab Test 09/07/22  1337   WBC 5.2   HGB 14.1   HCT 40.7   MCV 92        Recent Labs   Lab Test 09/07/22  1337 08/02/22  1102 02/02/22  1040   HGB 14.1 15.4 15.3    Recent Labs   Lab Test 11/05/20  1131   TROPONINI <0.01     Recent Labs   Lab Test 11/05/20  1131   BNP <10     Recent Labs   Lab Test 08/02/22  1102   TSH 1.74     No results for input(s): \"INR\" in the last 91909 hours.     Jovita Melgoza MD                                      "

## 2023-11-16 NOTE — LETTER
11/16/2023    Kristen Navarrete MD  [address]  [facility] [address]    RE: Ana Maria De La Garza       Dear Colleague,     I had the pleasure of seeing Ana Maria De La Garza in the [facility].    HEART CARE ENCOUNTER CONSULTATON NOTE      [facility]  [phone]      Assessment/Recommendations   Assessment:  1.  Dyslipidemia: Given her history of diabetes mellitus type 2 would like to obtain a goal LDL of less than 70.  Recent lipids look very well controlled on current dose of Crestor 5 mg daily.  LDL 71, HDL 42, triglycerides 115.  Unfortunately she has a great deal of joint pains and is concerned that it may be related to the Crestor.  2.  Diabetes mellitus type 2 very well controlled  3.  Osteoarthritis    Plan:  1.  May hold the Crestor 5 mg daily for couple weeks and then restart to see if her symptoms correlate with statin.  Can also consider doing a CT coronary calcium scan for further risk assessment.  2.  If further issues with statin would try Zetia 10 mg daily.  She is reluctant to try PCSK9 inhibitor at this time.       History of Present Illness/Subjective    HPI: Ana Maria De La Garza is a 73 year old female with history of diabetes mellitus type 2, chronic back/joint pains, dyslipidemia who I am seeing today for initial consultation due to issues with statin intolerance.  She reports that years ago she was tried on red yeast rice and then placed on statins.  She was tried on various statins and cannot member all the names but quit taking them due to pain.  She is currently on her third statin Crestor 5 mg daily.  She is taking with co-Q10.  She complains of knee pain, pain in her hips and lower back as well as shoulder pain.  She also has significant joint issues.  She is unsure if the discomfort is truly related to statin intolerance.  She was started on Ozempic and lost 15 pounds.  She tries to exercise regularly.  She swims, dances and goes hiking.  Denies  "any exertional complaints.  A1c is very well controlled at 5.9%.         Physical Examination  Review of Systems   Vitals: /78 (BP Location: Left arm, Patient Position: Sitting, Cuff Size: Adult Regular)   Pulse 83   Temp 98.5  F (36.9  C) (Oral)   Resp 16   Ht 1.676 m (5' 6\")   Wt 76 kg (167 lb 8 oz)   LMP  (LMP Unknown)   SpO2 97%   BMI 27.04 kg/m    BMI= Body mass index is 27.04 kg/m .  Wt Readings from Last 3 Encounters:   11/16/23 76 kg (167 lb 8 oz)   10/16/23 75.8 kg (167 lb)   07/20/23 79.4 kg (175 lb 1.6 oz)       General Appearance:   no distress, normal body habitus   ENT/Mouth: membranes moist, no oral lesions or bleeding gums.      EYES:  no scleral icterus, normal conjunctivae   Neck: no carotid bruits or thyromegaly   Chest/Lungs:   lungs are clear to auscultation   Cardiovascular:   Regular. Normal first and second heart sounds with no murmur no edema bilaterally    Abdomen:  bowel sounds are present   Extremities: no cyanosis or clubbing   Skin: no xanthelasma, warm.    Neurologic: normal  bilateral, no tremors     Psychiatric: alert and oriented x3, calm        Please refer above for cardiac ROS details.        Medical History  Surgical History Family History Social History   Past Medical History:   Diagnosis Date    Depression     Hypertension     Insomnia      Past Surgical History:   Procedure Laterality Date     SLING OPERATION FOR STRESS INCONTINENCE      Description: Mid-Urethral Sling Operation;  Recorded: 05/18/2010;    JOINT REPLACEMENT Left 2008    knee    OPEN REDUCTION INTERNAL FIXATION ANKLE Right     2008 and pin removal 2009    [facility] APPENDECTOMY      Description: Appendectomy;  Recorded: 02/26/2008;    [facility] TOTAL KNEE ARTHROPLASTY Left 2/18/2015    Procedure:   LEFT TOTAL KNEE ARTHROPLASTY;  Surgeon: Sina SHARP MD;  Location: [facility] OR;  Service: Orthopedics     Family History   Problem Relation Age of Onset    Diabetes Mother     Hypertension Mother  "    Hypertension Father     Cancer Brother     Clotting Disorder No family hx of         Social History     Socioeconomic History    Marital status:      Spouse name: Not on file    Number of children: 2    Years of education: Not on file    Highest education level: Not on file   Occupational History    Not on file   Tobacco Use    Smoking status: Never    Smokeless tobacco: Never   Vaping Use    Vaping Use: Never used   Substance and Sexual Activity    Alcohol use: Yes     Alcohol/week: 2.0 standard drinks of alcohol     Types: 2 Standard drinks or equivalent per week     Comment: once in a while, but not often    Drug use: No    Sexual activity: Not Currently     Partners: Male   Other Topics Concern    Not on file   Social History Narrative    Not on file     Social Determinants of Health     Financial Resource Strain: Not on file   Food Insecurity: Not on file   Transportation Needs: Not on file   Physical Activity: Not on file   Stress: Not on file   Social Connections: Not on file   Interpersonal Safety: Not on file   Housing Stability: Not on file           Medications  Allergies   Current Outpatient Medications   Medication Sig Dispense Refill    acetaminophen (TYLENOL) 500 MG tablet Take 500-1,000 mg by mouth every 4 hours as needed for mild pain      aspirin 81 MG EC tablet Take 81 mg by mouth daily      blood glucose (NO BRAND SPECIFIED) test strip Use to test blood sugar 1 times daily or as directed. To accompany: Blood Glucose Monitor Brands: per insurance and patient request 100 strip 11    CALCIUM PO Take 1 tablet by mouth daily      escitalopram (LEXAPRO) 10 MG tablet Take 1 tablet (10 mg) by mouth daily 90 tablet 3    glipiZIDE (GLUCOTROL XL) 10 MG 24 hr tablet Take 1 tablet (10 mg) by mouth 2 times daily 180 tablet 1    hydrochlorothiazide (HYDRODIURIL) 25 MG tablet TAKE 1 TABLET BY MOUTH ONCE DAILY 90 tablet 3    hydrOXYzine (ATARAX) 25 MG tablet Take 1 tablet (25 mg) by mouth every 6 hours  as needed for anxiety 30 tablet 1    lisinopril (ZESTRIL) 20 MG tablet Take 1 tablet (20 mg) by mouth daily 90 tablet 3    multivitamin therapeutic (THERAGRAN) tablet [MULTIVITAMIN THERAPEUTIC (THERAGRAN) TABLET] Take 1 tablet by mouth daily.      OMEGA-3/DHA/EPA/FISH OIL (FISH OIL-OMEGA-3 FATTY ACIDS) 300-1,000 mg capsule [OMEGA-3/DHA/EPA/FISH OIL (FISH OIL-OMEGA-3 FATTY ACIDS) 300-1,000 MG CAPSULE] Take 2 g by mouth daily.      rosuvastatin (CRESTOR) 5 MG tablet Take 1 tablet (5 mg) by mouth daily 60 tablet 1    semaglutide (OZEMPIC) 2 MG/3ML pen Inject 0.5 mg Subcutaneous every 7 days 3 mL 0    thin (NO BRAND SPECIFIED) lancets Use to test blood sugar 1 times daily or as directed. To accompany: Blood Glucose Monitor Brands: per insurance and patient request 100 each 11    vitamin D3 (CHOLECALCIFEROL) 50 mcg (2000 units) tablet Take 1 tablet by mouth daily         Allergies   Allergen Reactions    Atorvastatin Muscle Pain (Myalgia)    Metformin Unknown     Body aches, headache    Morphine (Pf) [Morphine] Nausea and Vomiting    Pravastatin Muscle Pain (Myalgia)          Lab Results    Chemistry/lipid CBC Cardiac Enzymes/BNP/TSH/INR   Recent Labs   Lab Test 10/25/23  0925   CHOL 136   HDL 42*   LDL 71   TRIG 115     Recent Labs   Lab Test 10/25/23  0925 07/20/23  1002 01/20/23  0953   LDL 71 113* 140*     Recent Labs   Lab Test 10/25/23  0925      POTASSIUM 4.6   CHLORIDE 105   CO2 28   *   BUN 22.2   CR 1.36*   GFRESTIMATED 41*   CUONG 9.7     Recent Labs   Lab Test 10/25/23  0925 07/20/23  1002 04/19/23  1658   CR 1.36* 1.37* 1.5*     Recent Labs   Lab Test 10/25/23  0925 07/20/23  1002 01/20/23  0953   A1C 5.9* 6.5* 8.9*          Recent Labs   Lab Test 09/07/22  1337   WBC 5.2   HGB 14.1   HCT 40.7   MCV 92        Recent Labs   Lab Test 09/07/22  1337 08/02/22  1102 02/02/22  1040   HGB 14.1 15.4 15.3    Recent Labs   Lab Test 11/05/20  1131   TROPONINI <0.01     Recent Labs   Lab Test  "11/05/20  1131   BNP <10     Recent Labs   Lab Test 08/02/22  1102   TSH 1.74     No results for input(s): \"INR\" in the last 20393 hours.     Jovita Melgoza MD      Thank you for allowing me to participate in the care of your patient.      Sincerely,     Jovita Melgoza MD     [facility]  cc:   Kristen Navarrete MD  [address] [place] [address]      "

## 2023-11-16 NOTE — PATIENT INSTRUCTIONS
Ms. Ana Maria De La Garza,     It was a pleasure to see you in the office today. My recommendations for you include:   1. Hold crestor for a couple weeks and then can restart to see if correlates with pain  2. May follow up as needed.       Please do not hesitate to call the [facility] clinic with any questions or concerns at [phone].    Sincerely,     Jovita Melgoza MD

## 2023-11-29 ENCOUNTER — TRANSFERRED RECORDS (OUTPATIENT)
Dept: HEALTH INFORMATION MANAGEMENT | Facility: CLINIC | Age: 73
End: 2023-11-29
Payer: COMMERCIAL

## 2023-12-01 ENCOUNTER — MYC MEDICAL ADVICE (OUTPATIENT)
Dept: CARDIOLOGY | Facility: CLINIC | Age: 73
End: 2023-12-01
Payer: COMMERCIAL

## 2023-12-01 DIAGNOSIS — E78.2 MIXED HYPERLIPIDEMIA: Primary | ICD-10-CM

## 2023-12-04 ENCOUNTER — MYC MEDICAL ADVICE (OUTPATIENT)
Dept: PHARMACY | Facility: CLINIC | Age: 73
End: 2023-12-04
Payer: COMMERCIAL

## 2023-12-04 NOTE — TELEPHONE ENCOUNTER
See [employer] message tree on 12/1/23 between the writer, Ade, and the patient on 12/1/23.    Writer called the patient and relayed to her that the clinic is unable to send back the 2 Ozempic pens that were ordered and sent into the clinic for her on 12/1/23.    Patient has 4 Ozempic pens at home and would like the Ozempic to be taken off auto-fill, if that is the reason she keeps having the Ozempic sent to the clinic.    She will notify the clinic when she needs the next refill    Writer also offered to talk with Ade FERNANDO, RojelioD, about the Ozempic refills and the re-enrollment into the [place] [facility]ication program, but the patient stated she will contact Ade about the above concerns.    Denies other questions or concerns at this time    Patient will be in the clinic in the next week or so to  the 2 Ozempic pens ordered for her.    Shala Sim RN, BSN  [facility]

## 2023-12-05 RX ORDER — EZETIMIBE 10 MG/1
10 TABLET ORAL DAILY
Qty: 90 TABLET | Refills: 3 | Status: SHIPPED | OUTPATIENT
Start: 2023-12-05

## 2023-12-05 NOTE — TELEPHONE ENCOUNTER
==View-only below this line===  ----- Message -----  From: Jovita Melgoza MD  Sent: 12/4/2023   4:25 PM CST  To: Omaira Doshi RN  Subject: FW: joint pain                                   Can you sent in script for zetia 10 mg 90 tabs 3 refills. Repeat FLP 6 mo  Prescription sent. FLP ordered.  message to patient to inform. CMM,Rn

## 2023-12-06 ENCOUNTER — DOCUMENTATION ONLY (OUTPATIENT)
Dept: FAMILY MEDICINE | Facility: CLINIC | Age: 73
End: 2023-12-06
Payer: COMMERCIAL

## 2023-12-06 NOTE — PROGRESS NOTES
Patient stopped in and picked up her Ozempic pens.    Bartolo Roberson, VINNIEN, RN  [facility]

## 2023-12-12 ENCOUNTER — TRANSFERRED RECORDS (OUTPATIENT)
Dept: HEALTH INFORMATION MANAGEMENT | Facility: CLINIC | Age: 73
End: 2023-12-12
Payer: COMMERCIAL

## 2023-12-28 ENCOUNTER — DOCUMENTATION ONLY (OUTPATIENT)
Dept: OTHER | Facility: CLINIC | Age: 73
End: 2023-12-28
Payer: COMMERCIAL

## 2024-01-03 ENCOUNTER — MEDICAL CORRESPONDENCE (OUTPATIENT)
Dept: SCHEDULING | Facility: CLINIC | Age: 74
End: 2024-01-03
Payer: COMMERCIAL

## 2024-01-10 ENCOUNTER — TELEPHONE (OUTPATIENT)
Dept: FAMILY MEDICINE | Facility: CLINIC | Age: 74
End: 2024-01-10
Payer: COMMERCIAL

## 2024-01-10 NOTE — TELEPHONE ENCOUNTER
Ashkan 10, 2024 I spoke with Sayda, she is in need of financial assistance for medication.    We reviewed the Prescription Assistance Program for manfacturer assistance programs, gross income, insurance and Rx list.     assistance applications will be completed for: Ozempic    We will contact Sayda when the application has been sent to her.    When approved, Sayda will receive this medication at no cost through December 2024.    Sayda is over income for the Pharmacy Assistance Fund haily $500.    Jane Mahajan  Prescription Assistance Supervisor  Pharmacy Assistance

## 2024-01-16 ENCOUNTER — HOSPITAL ENCOUNTER (OUTPATIENT)
Dept: CT IMAGING | Facility: CLINIC | Age: 74
Discharge: HOME | End: 2024-01-16
Attending: ORTHOPAEDIC SURGERY | Admitting: ORTHOPAEDIC SURGERY
Payer: MEDICARE

## 2024-01-16 DIAGNOSIS — M25.511 BILATERAL SHOULDER PAIN: ICD-10-CM

## 2024-01-16 DIAGNOSIS — M25.512 BILATERAL SHOULDER PAIN: ICD-10-CM

## 2024-01-16 PROCEDURE — 73200 CT UPPER EXTREMITY W/O DYE: CPT | Mod: LT

## 2024-01-19 ENCOUNTER — TRANSFERRED RECORDS (OUTPATIENT)
Dept: HEALTH INFORMATION MANAGEMENT | Facility: CLINIC | Age: 74
End: 2024-01-19
Payer: COMMERCIAL

## 2024-02-05 SDOH — HEALTH STABILITY: PHYSICAL HEALTH: ON AVERAGE, HOW MANY DAYS PER WEEK DO YOU ENGAGE IN MODERATE TO STRENUOUS EXERCISE (LIKE A BRISK WALK)?: 4 DAYS

## 2024-02-05 SDOH — HEALTH STABILITY: PHYSICAL HEALTH: ON AVERAGE, HOW MANY MINUTES DO YOU ENGAGE IN EXERCISE AT THIS LEVEL?: 50 MIN

## 2024-02-05 ASSESSMENT — SOCIAL DETERMINANTS OF HEALTH (SDOH): HOW OFTEN DO YOU GET TOGETHER WITH FRIENDS OR RELATIVES?: ONCE A WEEK

## 2024-02-11 PROBLEM — M48.061 SPINAL STENOSIS OF LUMBAR REGION, UNSPECIFIED WHETHER NEUROGENIC CLAUDICATION PRESENT: Status: ACTIVE | Noted: 2024-02-11

## 2024-02-12 ENCOUNTER — OFFICE VISIT (OUTPATIENT)
Dept: FAMILY MEDICINE | Facility: CLINIC | Age: 74
End: 2024-02-12
Payer: COMMERCIAL

## 2024-02-12 VITALS
WEIGHT: 167 LBS | SYSTOLIC BLOOD PRESSURE: 132 MMHG | DIASTOLIC BLOOD PRESSURE: 82 MMHG | TEMPERATURE: 97.2 F | OXYGEN SATURATION: 98 % | HEIGHT: 66 IN | BODY MASS INDEX: 26.84 KG/M2 | HEART RATE: 72 BPM | RESPIRATION RATE: 16 BRPM

## 2024-02-12 DIAGNOSIS — M48.061 SPINAL STENOSIS OF LUMBAR REGION, UNSPECIFIED WHETHER NEUROGENIC CLAUDICATION PRESENT: ICD-10-CM

## 2024-02-12 DIAGNOSIS — F41.1 ANXIETY STATE: ICD-10-CM

## 2024-02-12 DIAGNOSIS — N18.32 STAGE 3B CHRONIC KIDNEY DISEASE (H): ICD-10-CM

## 2024-02-12 DIAGNOSIS — Z00.00 MEDICARE ANNUAL WELLNESS VISIT, SUBSEQUENT: Primary | ICD-10-CM

## 2024-02-12 DIAGNOSIS — E78.5 HYPERLIPIDEMIA, UNSPECIFIED HYPERLIPIDEMIA TYPE: ICD-10-CM

## 2024-02-12 DIAGNOSIS — E11.8 TYPE 2 DIABETES MELLITUS WITH COMPLICATION, WITHOUT LONG-TERM CURRENT USE OF INSULIN (H): ICD-10-CM

## 2024-02-12 DIAGNOSIS — I10 ESSENTIAL HYPERTENSION, BENIGN: ICD-10-CM

## 2024-02-12 DIAGNOSIS — G47.00 INSOMNIA, UNSPECIFIED TYPE: ICD-10-CM

## 2024-02-12 DIAGNOSIS — M85.80 OSTEOPENIA, UNSPECIFIED LOCATION: ICD-10-CM

## 2024-02-12 DIAGNOSIS — N39.3 FEMALE STRESS INCONTINENCE: ICD-10-CM

## 2024-02-12 DIAGNOSIS — F33.42 MAJOR DEPRESSIVE DISORDER, RECURRENT EPISODE, IN FULL REMISSION (H): ICD-10-CM

## 2024-02-12 DIAGNOSIS — R26.89 POOR BALANCE: ICD-10-CM

## 2024-02-12 DIAGNOSIS — G47.33 OSA (OBSTRUCTIVE SLEEP APNEA): ICD-10-CM

## 2024-02-12 LAB
ALBUMIN SERPL BCG-MCNC: 4.3 G/DL (ref 3.5–5.2)
ALBUMIN UR-MCNC: NEGATIVE MG/DL
ALP SERPL-CCNC: 63 U/L (ref 40–150)
ALT SERPL W P-5'-P-CCNC: 44 U/L (ref 0–50)
ANION GAP SERPL CALCULATED.3IONS-SCNC: 9 MMOL/L (ref 7–15)
APPEARANCE UR: CLEAR
AST SERPL W P-5'-P-CCNC: 31 U/L (ref 0–45)
BACTERIA #/AREA URNS HPF: ABNORMAL /HPF
BILIRUB SERPL-MCNC: 0.4 MG/DL
BILIRUB UR QL STRIP: NEGATIVE
BUN SERPL-MCNC: 26.5 MG/DL (ref 8–23)
CALCIUM SERPL-MCNC: 9.8 MG/DL (ref 8.8–10.2)
CHLORIDE SERPL-SCNC: 100 MMOL/L (ref 98–107)
COLOR UR AUTO: YELLOW
CREAT SERPL-MCNC: 1.29 MG/DL (ref 0.51–0.95)
CREAT UR-MCNC: 99.6 MG/DL
DEPRECATED HCO3 PLAS-SCNC: 29 MMOL/L (ref 22–29)
EGFRCR SERPLBLD CKD-EPI 2021: 43 ML/MIN/1.73M2
ERYTHROCYTE [DISTWIDTH] IN BLOOD BY AUTOMATED COUNT: 12.6 % (ref 10–15)
FERRITIN SERPL-MCNC: 200 NG/ML (ref 11–328)
GLUCOSE SERPL-MCNC: 91 MG/DL (ref 70–99)
GLUCOSE UR STRIP-MCNC: NEGATIVE MG/DL
HBA1C MFR BLD: 6.1 % (ref 0–5.6)
HCT VFR BLD AUTO: 41.8 % (ref 35–47)
HGB BLD-MCNC: 14.4 G/DL (ref 11.7–15.7)
HGB UR QL STRIP: NEGATIVE
KETONES UR STRIP-MCNC: NEGATIVE MG/DL
LEUKOCYTE ESTERASE UR QL STRIP: ABNORMAL
MCH RBC QN AUTO: 31.7 PG (ref 26.5–33)
MCHC RBC AUTO-ENTMCNC: 34.4 G/DL (ref 31.5–36.5)
MCV RBC AUTO: 92 FL (ref 78–100)
MICROALBUMIN UR-MCNC: <12 MG/L
MICROALBUMIN/CREAT UR: NORMAL MG/G{CREAT}
NITRATE UR QL: NEGATIVE
PH UR STRIP: 7 [PH] (ref 5–8)
PLATELET # BLD AUTO: 202 10E3/UL (ref 150–450)
POTASSIUM SERPL-SCNC: 4.5 MMOL/L (ref 3.4–5.3)
PROT SERPL-MCNC: 6.9 G/DL (ref 6.4–8.3)
PTH-INTACT SERPL-MCNC: 8 PG/ML (ref 15–65)
RBC # BLD AUTO: 4.54 10E6/UL (ref 3.8–5.2)
RBC #/AREA URNS AUTO: ABNORMAL /HPF
SODIUM SERPL-SCNC: 138 MMOL/L (ref 135–145)
SP GR UR STRIP: 1.02 (ref 1–1.03)
SQUAMOUS #/AREA URNS AUTO: ABNORMAL /LPF
UROBILINOGEN UR STRIP-ACNC: 0.2 E.U./DL
VIT D+METAB SERPL-MCNC: 58 NG/ML (ref 20–50)
WBC # BLD AUTO: 6.4 10E3/UL (ref 4–11)
WBC #/AREA URNS AUTO: ABNORMAL /HPF

## 2024-02-12 PROCEDURE — 82728 ASSAY OF FERRITIN: CPT | Performed by: FAMILY MEDICINE

## 2024-02-12 PROCEDURE — 36415 COLL VENOUS BLD VENIPUNCTURE: CPT | Performed by: FAMILY MEDICINE

## 2024-02-12 PROCEDURE — 83970 ASSAY OF PARATHORMONE: CPT | Performed by: FAMILY MEDICINE

## 2024-02-12 PROCEDURE — 82570 ASSAY OF URINE CREATININE: CPT | Performed by: FAMILY MEDICINE

## 2024-02-12 PROCEDURE — 82306 VITAMIN D 25 HYDROXY: CPT | Performed by: FAMILY MEDICINE

## 2024-02-12 PROCEDURE — G0439 PPPS, SUBSEQ VISIT: HCPCS | Performed by: FAMILY MEDICINE

## 2024-02-12 PROCEDURE — 82043 UR ALBUMIN QUANTITATIVE: CPT | Performed by: FAMILY MEDICINE

## 2024-02-12 PROCEDURE — 83036 HEMOGLOBIN GLYCOSYLATED A1C: CPT | Performed by: FAMILY MEDICINE

## 2024-02-12 PROCEDURE — 81001 URINALYSIS AUTO W/SCOPE: CPT | Performed by: FAMILY MEDICINE

## 2024-02-12 PROCEDURE — 80053 COMPREHEN METABOLIC PANEL: CPT | Performed by: FAMILY MEDICINE

## 2024-02-12 PROCEDURE — 99214 OFFICE O/P EST MOD 30 MIN: CPT | Mod: 25 | Performed by: FAMILY MEDICINE

## 2024-02-12 PROCEDURE — 85027 COMPLETE CBC AUTOMATED: CPT | Performed by: FAMILY MEDICINE

## 2024-02-12 RX ORDER — HYDROCHLOROTHIAZIDE 25 MG/1
25 TABLET ORAL DAILY
Qty: 90 TABLET | Refills: 4 | Status: SHIPPED | OUTPATIENT
Start: 2024-02-12 | End: 2024-02-13

## 2024-02-12 RX ORDER — GLIPIZIDE 10 MG/1
10 TABLET, FILM COATED, EXTENDED RELEASE ORAL 2 TIMES DAILY
Qty: 180 TABLET | Refills: 4 | Status: SHIPPED | OUTPATIENT
Start: 2024-02-12

## 2024-02-12 RX ORDER — LISINOPRIL 20 MG/1
20 TABLET ORAL DAILY
Qty: 90 TABLET | Refills: 4 | Status: SHIPPED | OUTPATIENT
Start: 2024-02-12 | End: 2024-04-08 | Stop reason: DRUGHIGH

## 2024-02-12 RX ORDER — ESCITALOPRAM OXALATE 20 MG/1
20 TABLET ORAL DAILY
Qty: 30 TABLET | Refills: 4 | Status: SHIPPED | OUTPATIENT
Start: 2024-02-12

## 2024-02-12 RX ORDER — HYDROXYZINE HYDROCHLORIDE 25 MG/1
25 TABLET, FILM COATED ORAL EVERY 6 HOURS PRN
Qty: 30 TABLET | Refills: 1 | Status: SHIPPED | OUTPATIENT
Start: 2024-02-12

## 2024-02-12 ASSESSMENT — PATIENT HEALTH QUESTIONNAIRE - PHQ9
10. IF YOU CHECKED OFF ANY PROBLEMS, HOW DIFFICULT HAVE THESE PROBLEMS MADE IT FOR YOU TO DO YOUR WORK, TAKE CARE OF THINGS AT HOME, OR GET ALONG WITH OTHER PEOPLE: NOT DIFFICULT AT ALL
SUM OF ALL RESPONSES TO PHQ QUESTIONS 1-9: 6
SUM OF ALL RESPONSES TO PHQ QUESTIONS 1-9: 6

## 2024-02-12 ASSESSMENT — PAIN SCALES - GENERAL: PAINLEVEL: NO PAIN (0)

## 2024-02-12 NOTE — PROGRESS NOTES
Preventive Care Visit  [facility]  Kristen Navarrete MD, Family Medicine  Feb 12, 2024    Assessment & Plan     Medicare annual wellness visit, subsequent  At today's visit, we discussed lifestyle interventions to promote self-management and wellness, including maintenance of a healthy weight, healthy diet, regular physical activity and exercise, and falls prevention.  Immunizations up-to-date.  Cancer screening up-to-date.    Type 2 diabetes mellitus with complication, without long-term current use of insulin (H)  Stable control.  Continue Ozempic and glipizide at current doses.  Will check follow-up kidney function, it is possible Ozempic is contributing to changes in kidney function so we will keep this in mind.  Will check urine microalbumin, regular urinalysis.  Follow-up 6 months at most.  - Albumin Random Urine Quantitative with Creat Ratio; Future  - Comprehensive metabolic panel; Future  - Hemoglobin A1c; Standing  - Albumin Random Urine Quantitative with Creat Ratio  - Comprehensive metabolic panel  - Hemoglobin A1c  - glipiZIDE (GLUCOTROL XL) 10 MG 24 hr tablet; Take 1 tablet (10 mg) by mouth 2 times daily    Stage 3b chronic kidney disease (H)  This is worsened over the last 6 months and has not been evaluated further.  She stopped her Celebrex.  It is possible Ozempic could be contributing.  Will reassess renal function today.  Will check for hyperparathyroidism, vitamin D deficiency, iron deficiency from her kidney function.  Will check a urinalysis and microalbumin to assess for underlying contributing factors to worsening of her kidney function.  - Albumin Random Urine Quantitative with Creat Ratio; Future  - Comprehensive metabolic panel; Future  - Vitamin D Deficiency; Future  - Parathyroid Hormone Intact; Future  - CBC with platelets; Future  - Ferritin; Future  - UA Macroscopic with reflex to Microscopic and Culture; Future  - Albumin Random Urine Quantitative with Creat  Ratio  - Comprehensive metabolic panel  - Vitamin D Deficiency  - Parathyroid Hormone Intact  - CBC with platelets  - Ferritin  - UA Macroscopic with reflex to Microscopic and Culture  - UA Microscopic with Reflex to Culture    Benign Essential Hypertension  Stable and controlled on lisinopril and hydrochlorothiazide.  Could give consideration to reducing hydrochlorothiazide pending renal function.  - UA Macroscopic with reflex to Microscopic and Culture; Future  - UA Macroscopic with reflex to Microscopic and Culture  - lisinopril (ZESTRIL) 20 MG tablet; Take 1 tablet (20 mg) by mouth daily  - UA Microscopic with Reflex to Culture    Hyperlipidemia, unspecified hyperlipidemia type  She continues to work with pharmacy and cardiology and management of this, not tolerating even low doses of statins.  Continue Zetia.    KAMLESH (obstructive sleep apnea)  Stable and managed with positional management.    Osteopenia, unspecified location  Encouraged weightbearing activities, measures to assist with falls, adequate calcium and vitamin D intake.  Because she is high risk for fracture, we discussed potential intervention.  Will check renal function, give consideration to Fosamax versus endocrinology referral pending kidney function results.  - Vitamin D Deficiency; Future  - Parathyroid Hormone Intact; Future  - Vitamin D Deficiency  - Parathyroid Hormone Intact    Recurrent Major Depression In Full Remission  Anxiety  Stable and controlled on escitalopram at current dose.  Of note, she endorses suicidal ideation and PHQ-9 but clicked the wrong button intentionally, denies suicidal ideation.  - escitalopram (LEXAPRO) 20 MG tablet; Take 1 tablet (20 mg) by mouth daily  - hydrOXYzine HCl (ATARAX) 25 MG tablet; Take 1 tablet (25 mg) by mouth every 6 hours as needed for anxiety    Insomnia, unspecified type  Stable and controlled with the assistance of sleep medicine, trazodone.    Female Stress Incontinence  Stable.    Spinal  "stenosis of lumbar region, unspecified whether neurogenic claudication present  Based on neurosurgery notes from Dr. Long and from [facility] orthopedics, encouraged her to follow-up for consideration of intervention with persistent symptoms.    Poor balance  Referral made to physical therapy for additional assistance.  Unclear how much spinal stenosis could be contributing.  - Physical Therapy  Referral; Future            BMI  Estimated body mass index is 27.37 kg/m  as calculated from the following:    Height as of this encounter: 1.664 m (5' 5.5\").    Weight as of this encounter: 75.8 kg (167 lb).       Depression Screening Follow Up        2/12/2024     9:39 AM   PHQ   PHQ-9 Total Score 6   Q9: Thoughts of better off dead/self-harm past 2 weeks Several days   F/U: Thoughts of suicide or self-harm No   F/U: Safety concerns No                 Follow Up  Patient states that she is not having suicidal ideation, answered question and accurately and was unable to change it.  No further invention.  She remains on her current medications and I offer my ongoing support.    Counseling  Appropriate preventive services were discussed with this patient, including applicable screening as appropriate for fall prevention, nutrition, physical activity, Tobacco-use cessation, weight loss and cognition.  Checklist reviewing preventive services available has been given to the patient.  Reviewed patient's diet, addressing concerns and/or questions.   Discussed possible causes of fatigue. The patient was provided with written information regarding signs of hearing loss.   Information on urinary incontinence and treatment options given to patient.   The patient's PHQ-9 score is consistent with mild depression. She was provided with information regarding depression.             Tristan Alfredo is a 73 year old, presenting for the following:  Wellness Visit (fasting)          Health Care Directive  Patient has a Health " Care Directive on file  Advance care planning document is on file and is current.    Seen in clinic today for routine preventive care visit.  She notes that she entered an incorrect answer on her PHQ-9 endorsing suicidal ideation which is incorrect, feels overall that her depression and anxiety under good control on Lexapro at current dose.  More recently she stopped her Celebrex due to changes in kidney function, due for follow-up.  Taking Tylenol, using Voltaren gel and management of her osteoarthritis pain.  It continues to be a problem.  Takes Tylenol just as needed.  Effexor in her hips, knees, and back.  Visit with neurosurgery due to lumbar stenosis and lumbar spondylolisthesis due to degeneration, feels overall she is doing okay in her legs feel better, much of her pain in her back, saw Dr. Long and saw a provider at [facility] orthopedics, is uncertain about surgery.  For the past month or so has had some struggles with constipation daily, wondering about options.  Feels overall she is doing well with dietary fiber intake, fluid intake, fiber supplement.  Ongoing struggles with balance so she sometimes is lurching.  Saw physical therapy last year which was helpful, agreeable to repeating physical therapy this year.    Diabetes has been stable, tolerating Ozempic and glipizide well.  Occasional feel nausea when she is actually hungry which is a change for her but managing well.  Tolerating lisinopril and hydrochlorothiazide management of hypertension.  She is not able to tolerate rosuvastatin at all, currently taking Zetia and management of lipids under the direction of Dr. Qiu of cardiology.  Known obstructive sleep apnea, managed through avoidance of supine position.  History of osteopenia with high fracture risk, has not had this discussed previously.  Working with sleep medicine regarding insomnia, taking trazodone.            2/5/2024   General Health   How would you rate your overall physical  health? Good   Feel stress (tense, anxious, or unable to sleep) Rather much   (!) STRESS CONCERN      2/5/2024   Nutrition   Diet: Diabetic         2/5/2024   Exercise   Days per week of moderate/strenous exercise 4 days   Average minutes spent exercising at this level 50 min         2/5/2024   Social Factors   Frequency of gathering with friends or relatives Once a week   Worry food won't last until get money to buy more No   Food not last or not have enough money for food? No   Do you have housing?  Yes   Are you worried about losing your housing? No   Lack of transportation? No   Unable to get utilities (heat,electricity)? No         2/12/2024   Fall Risk   Fallen 2 or more times in the past year? No   Trouble with walking or balance? Yes           2/5/2024   Activities of Daily Living- Home Safety   Needs help with the following daily activites None of the above   Safety concerns in the home None of the above         2/5/2024   Dental   Dentist two times every year? Yes         2/5/2024   Hearing Screening   Hearing concerns? (!) I NEED TO ASK PEOPLE TO SPEAK UP OR REPEAT THEMSELVES.         2/5/2024   Driving Risk Screening   Patient/family members have concerns about driving No         2/5/2024   General Alertness/Fatigue Screening   Have you been more tired than usual lately? (!) YES         2/5/2024   Urinary Incontinence Screening   Bothered by leaking urine in past 6 months Yes         2/5/2024   TB Screening   Were you born outside of [place]?  No       Today's PHQ-9 Score:       2/12/2024     9:39 AM   PHQ-9 SCORE   PHQ-9 Total Score MyChart 6 (Mild depression)   PHQ-9 Total Score 6         2/5/2024   Substance Use   Alcohol more than 3/day or more than 7/wk No   Do you have a current opioid prescription? No   How severe/bad is pain from 1 to 10? 5/10   Do you use any other substances recreationally? No     Social History     Tobacco Use    Smoking status: Never    Smokeless tobacco: Never   Vaping Use     Vaping Use: Never used   Substance Use Topics    Alcohol use: Yes     Alcohol/week: 2.0 standard drinks of alcohol     Types: 2 Standard drinks or equivalent per week     Comment: once in a while, but not often    Drug use: No            No data to display                Mammogram Screening - Mammogram every 1-2 years updated in Health Maintenance based on mutual decision making    The 10-year ASCVD risk score (Merari GALINDO, et al., 2019) is: 34%    Values used to calculate the score:      Age: 73 years      Sex: Female      Is Non- : No      Diabetic: Yes      Tobacco smoker: No      Systolic Blood Pressure: 132 mmHg      Is BP treated: Yes      HDL Cholesterol: 42 mg/dL      Total Cholesterol: 136 mg/dL    Fracture Risk Assessment Tool  Link to Frax Calculator  Use the information below to complete the Frax calculator  : 1950  Sex: female  Weight (kg): 75.8 kg (actual weight)  Height (cm): 166.4 cm  Previous Fragility Fracture:  No  History of parent with fractured hip:  No  Current Smoking:  No  Patient has been on glucocorticoids for more than 3 months (5mg/day or more): No  Rheumatoid Arthritis on Problem List:  No  Secondary Osteoporosis on Problem List:  No  Consumes 3 or more units of alcohol per day: No            Reviewed and updated as needed this visit by Provider                    Past Medical History:   Diagnosis Date    Depression     Hypertension     Insomnia      Past Surgical History:   Procedure Laterality Date    HC SLING OPERATION FOR STRESS INCONTINENCE      Description: Mid-Urethral Sling Operation;  Recorded: 2010;    JOINT REPLACEMENT Left     knee    OPEN REDUCTION INTERNAL FIXATION ANKLE Right      and pin removal     [facility] APPENDECTOMY      Description: Appendectomy;  Recorded: 2008;    [facility] TOTAL KNEE ARTHROPLASTY Left 2015    Procedure:   LEFT TOTAL KNEE ARTHROPLASTY;  Surgeon: Sina SHARP MD;  Location: [facility];   Service: Orthopedics     OB History    Para Term  AB Living   3 3 3 0 0 0   SAB IAB Ectopic Multiple Live Births   0 0 0 0 0      # Outcome Date GA Lbr Riley/2nd Weight Sex Delivery Anes PTL Lv   3 Term            2 Term            1 Term              Lab work is in process  Labs reviewed in EPIC  BP Readings from Last 3 Encounters:   24 132/82   23 135/78   10/19/23 135/74    Wt Readings from Last 3 Encounters:   24 75.8 kg (167 lb)   23 76 kg (167 lb 8 oz)   10/16/23 75.8 kg (167 lb)                  Patient Active Problem List   Diagnosis    Female Stress Incontinence    Hyperlipidemia    Recurrent Major Depression In Full Remission    Benign Essential Hypertension    Cervical Spondylosis    Cervical Disc Degeneration    Anxiety    Status post total knee replacement    Insomnia    Type 2 diabetes mellitus with complication, without long-term current use of insulin (H)    Osteopenia    Chronic kidney disease, stage 3 (H)    KAMLESH (obstructive sleep apnea)    Spinal stenosis of lumbar region, unspecified whether neurogenic claudication present     Past Surgical History:   Procedure Laterality Date    HC SLING OPERATION FOR STRESS INCONTINENCE      Description: Mid-Urethral Sling Operation;  Recorded: 2010;    JOINT REPLACEMENT Left     knee    OPEN REDUCTION INTERNAL FIXATION ANKLE Right      and pin removal     [facility] APPENDECTOMY      Description: Appendectomy;  Recorded: 2008;    [facility] TOTAL KNEE ARTHROPLASTY Left 2015    Procedure:   LEFT TOTAL KNEE ARTHROPLASTY;  Surgeon: Sina SHARP MD;  Location: [facility];  Service: Orthopedics       Social History     Tobacco Use    Smoking status: Never    Smokeless tobacco: Never   Substance Use Topics    Alcohol use: Yes     Alcohol/week: 2.0 standard drinks of alcohol     Types: 2 Standard drinks or equivalent per week     Comment: once in a while, but not often     Family History   Problem  Relation Age of Onset    Diabetes Mother     Hypertension Mother     Hypertension Father     Cancer Brother     Clotting Disorder No family hx of          Current Outpatient Medications   Medication Sig Dispense Refill    acetaminophen (TYLENOL) 500 MG tablet Take 500-1,000 mg by mouth every 4 hours as needed for mild pain      aspirin 81 MG EC tablet Take 81 mg by mouth daily      blood glucose (NO BRAND SPECIFIED) test strip Use to test blood sugar 1 times daily or as directed. To accompany: Blood Glucose Monitor Brands: per insurance and patient request 100 strip 11    CALCIUM PO Take 1 tablet by mouth daily      escitalopram (LEXAPRO) 20 MG tablet Take 1 tablet (20 mg) by mouth daily 30 tablet 4    ezetimibe (ZETIA) 10 MG tablet Take 1 tablet (10 mg) by mouth daily 90 tablet 3    glipiZIDE (GLUCOTROL XL) 10 MG 24 hr tablet Take 1 tablet (10 mg) by mouth 2 times daily 180 tablet 4    hydrochlorothiazide (HYDRODIURIL) 25 MG tablet Take 1 tablet (25 mg) by mouth daily 90 tablet 4    hydrOXYzine HCl (ATARAX) 25 MG tablet Take 1 tablet (25 mg) by mouth every 6 hours as needed for anxiety 30 tablet 1    lisinopril (ZESTRIL) 20 MG tablet Take 1 tablet (20 mg) by mouth daily 90 tablet 4    multivitamin therapeutic (THERAGRAN) tablet [MULTIVITAMIN THERAPEUTIC (THERAGRAN) TABLET] Take 1 tablet by mouth daily.      OMEGA-3/DHA/EPA/FISH OIL (FISH OIL-OMEGA-3 FATTY ACIDS) 300-1,000 mg capsule [OMEGA-3/DHA/EPA/FISH OIL (FISH OIL-OMEGA-3 FATTY ACIDS) 300-1,000 MG CAPSULE] Take 2 g by mouth daily.      semaglutide (OZEMPIC) 2 MG/3ML pen Inject 0.5 mg Subcutaneous every 7 days 3 mL 0    thin (NO BRAND SPECIFIED) lancets Use to test blood sugar 1 times daily or as directed. To accompany: Blood Glucose Monitor Brands: per insurance and patient request 100 each 11    vitamin D3 (CHOLECALCIFEROL) 50 mcg (2000 units) tablet Take 1 tablet by mouth daily       Allergies   Allergen Reactions    Atorvastatin Muscle Pain (Myalgia)     Metformin Unknown     Body aches, headache    Morphine (Pf) [Morphine] Nausea and Vomiting    Pravastatin Muscle Pain (Myalgia)     Recent Labs   Lab Test 02/12/24  0930 10/25/23  0925 07/20/23  1002 04/19/23  1658 01/20/23  0953 09/07/22  1337 08/02/22  1102 02/02/22  1040 06/11/21  1252 02/03/21  0927 11/05/20  1131   A1C 6.1* 5.9* 6.5*  --  8.9*  --  8.7*   < > 8.2*   < >  --    LDL  --  71 113*  --  140*  --   --    < >  --   --   --    HDL  --  42* 47*  --  37*  --   --    < >  --   --   --    TRIG  --  115 127  --  287*  --   --    < >  --   --   --    ALT  --   --   --   --   --   --  31  --  34  --  51*   CR  --  1.36* 1.37*   < >  --    < > 1.07*   < > 0.97  --  1.10   GFRESTIMATED  --  41* 41*   < >  --    < > 55*   < > 57*  --  49*   GFRESTBLACK  --   --   --   --   --   --   --   --  >60  --  59*   POTASSIUM  --  4.6 4.9  --   --    < > 4.6   < > 4.7  --  3.8   TSH  --   --   --   --   --   --  1.74  --  1.39  --  1.79    < > = values in this interval not displayed.      Current providers sharing in care for this patient include:  Patient Care Team:  Kristen Navarrete MD as PCP - General (Family Medicine)  Cheyenne Tovar [facility] as Pharmacist (Pharmacist)  Kristen Navarrete MD as Assigned PCP  Jovita Melgoza MD as MD (Cardiovascular Disease)  Goltz, Bennett Ezra, PA-C as Assigned Neuroscience Provider  Jovita Melgoza MD as Assigned Heart and Vascular Provider  Kylie Watkins, Bert as Assigned MTM Pharmacist    The following health maintenance items are reviewed in Epic and correct as of today:  Health Maintenance   Topic Date Due    DEPRESSION ACTION PLAN  Never done    INFLUENZA VACCINE (1) 09/01/2023    MICROALBUMIN  01/20/2024    DIABETIC FOOT EXAM  02/09/2024    MEDICARE ANNUAL WELLNESS VISIT  02/09/2024    ANNUAL REVIEW OF HM ORDERS  04/19/2024    A1C  08/12/2024    PHQ-9  08/12/2024    BMP  10/25/2024    LIPID  10/25/2024    EYE EXAM  10/25/2024    FALL RISK ASSESSMENT  02/12/2025     "HEMOGLOBIN  02/12/2025    MAMMO SCREENING  04/19/2025    COLORECTAL CANCER SCREENING  10/09/2026    ADVANCE CARE PLANNING  12/28/2028    DTAP/TDAP/TD IMMUNIZATION (3 - Td or Tdap) 10/10/2031    DEXA  04/19/2038    HEPATITIS C SCREENING  Completed    Pneumococcal Vaccine: 65+ Years  Completed    URINALYSIS  Completed    ZOSTER IMMUNIZATION  Completed    RSV VACCINE (Pregnancy & 60+)  Completed    COVID-19 Vaccine  Completed    IPV IMMUNIZATION  Aged Out    HPV IMMUNIZATION  Aged Out    MENINGITIS IMMUNIZATION  Aged Out    RSV MONOCLONAL ANTIBODY  Aged Out     Review of Systems    Review of Systems  Constitutional, neuro, ENT, endocrine, pulmonary, cardiac, gastrointestinal, genitourinary, musculoskeletal, integument and psychiatric systems are negative, except as otherwise noted.     Objective    Exam  /82   Pulse 72   Temp 97.2  F (36.2  C) (Temporal)   Resp 16   Ht 1.664 m (5' 5.5\")   Wt 75.8 kg (167 lb)   LMP  (LMP Unknown)   SpO2 98%   BMI 27.37 kg/m     Estimated body mass index is 27.37 kg/m  as calculated from the following:    Height as of this encounter: 1.664 m (5' 5.5\").    Weight as of this encounter: 75.8 kg (167 lb).    Physical Exam  Physical Examination: General appearance - alert, well appearing, and in no distress, oriented to person, place, and time and normal appearing weight  Mental status - alert, oriented to person, place, and time, normal mood, behavior, speech, dress, motor activity, and thought processes  Eyes - pupils equal and reactive, extraocular eye movements intact  Ears - bilateral TM's and external ear canals normal  Nose - normal and patent, no erythema, discharge or polyps  Mouth - mucous membranes moist, pharynx normal without lesions  Neck - supple, no significant adenopathy  Lymphatics - no palpable lymphadenopathy, no hepatosplenomegaly  Chest - clear to auscultation, no wheezes, rales or rhonchi, symmetric air entry  Heart - normal rate, regular rhythm, normal " S1, S2, no murmurs, rubs, clicks or gallops  Abdomen - soft, nontender, nondistended, no masses or organomegaly  Breasts - breasts appear normal, no suspicious masses, no skin or nipple changes or axillary nodes  Neurological - alert, oriented, normal speech, no focal findings or movement disorder noted  Musculoskeletal - no joint tenderness, deformity or swelling  Extremities - peripheral pulses normal, no pedal edema, no clubbing or cyanosis  Skin - normal coloration and turgor, no rashes, no suspicious skin lesions noted          2/12/2024   Mini Cog   Clock Draw Score 2 Normal   3 Item Recall 3 objects recalled   Mini Cog Total Score 5            Signed Electronically by: Kristen Navarrete MD    Answers submitted by the patient for this visit:  Patient Health Questionnaire (Submitted on 2/12/2024)  If you checked off any problems, how difficult have these problems made it for you to do your work, take care of things at home, or get along with other people?: Not difficult at all  PHQ9 TOTAL SCORE: 6

## 2024-02-12 NOTE — PATIENT INSTRUCTIONS
I placed a referral for physical therapy to help with your balance.    To help with constipation, continue to stay hydrated and get regular exercise.  Continue a high-fiber diet and fiber supplements.  You may use MiraLAX powder mixed with fluid daily.  Start with one half capful daily and you can increase or decrease the dose as needed to achieve a soft bowel movement daily.  It is okay to take this medicine daily.    Your bone density is low and your risk of a fracture is considered elevated.  We are checking your kidney function, vitamin D level, and parathyroid hormone level, and we may need to consider medication pending those results.    I recommend following up with the neurosurgeon of your choice as Dr. Long was concerned that your symptoms could worsen or pain could become more permanent if surgery is not completed.  Eating Healthy Foods: Care Instructions  With every meal, you can make healthy food choices. Try to eat a variety of fruits, vegetables, whole grains, lean proteins, and low-fat dairy products. This can help you get the right balance of nutrients, including vitamins and minerals. Small changes add up over time. You can start by adding one healthy food to your meals each day.    Try to make half your plate fruits and vegetables, one-fourth whole grains, and one-fourth lean proteins. Try including dairy with your meals.   Eat more fruits and vegetables. Try to have them with most meals and snacks.   Foods for healthy eating    Fruits    These can be fresh, frozen, canned, or dried.  Try to choose whole fruit rather than fruit juice.  Eat a variety of colors.    Vegetables    These can be fresh, frozen, canned, or dried.  Beans, peas, and lentils count too.    Whole grains    Choose whole-grain breads, cereals, and noodles.  Try brown rice.    Lean proteins    These can include lean meat, poultry, fish, and eggs.  You can also have tofu, beans, peas, lentils, nuts, and seeds.    Dairy    Try  "milk, yogurt, and cheese.  Choose low-fat or fat-free when you can.  If you need to, use lactose-free milk or fortified plant-based milk products, such as soy milk.    Water    Drink water when you're thirsty.  Limit sugar-sweetened drinks, including soda, fruit drinks, and sports drinks.  Where can you learn more?  Go to https://www.[employer].net/patiented  Enter T756 in the search box to learn more about \"Eating Healthy Foods: Care Instructions.\"  Current as of: February 28, 2023               Content Version: 13.8    2965-1020 [email or web address].   Care instructions adapted under license by your healthcare professional. If you have questions about a medical condition or this instruction, always ask your healthcare professional. [employer] disclaims any warranty or liability for your use of this information.      Nutrition for Older Adults: Care Instructions  Overview     Good nutrition is important at any age. But it is especially important for older adults. Eating a healthy diet helps keep your body strong. And it can help lower your risk for disease.  As you get older, your body needs more of certain nutrients. These include vitamin B12, calcium, and vitamin D. But it may be harder for you to get these and other important nutrients. This could be for many reasons. You may not feel as hungry as you used to. Or you could have problems with your teeth or mouth that make it hard to chew. Or you may not enjoy planning and preparing meals, especially if you live alone.  Talk with your doctor if you want help getting the most nutrition from what you eat. The doctor may have you work with a dietitian to help you plan meals.  Follow-up care is a key part of your treatment and safety. Be sure to make and go to all appointments, and call your doctor if you are having problems. It's also a good idea to know your test results and keep a list of the medicines you take.  How can you care for yourself at " home?  To stay healthy  Eat a variety of foods. The more you vary the foods you eat, the more vitamins, minerals, and other nutrients you get.  Take a multivitamin every day. Choose one with about 100% of the daily value (DV) for vitamins and minerals. Do not take more than 100% of the daily value for any vitamin or mineral unless your doctor tells you to. Talk with your doctor if you are not sure which multivitamin is right for you.  Eat lots of fruits and vegetables. Fresh, frozen, or no-salt canned vegetables and fruits in their own juice or light syrup are good choices.  Include foods that are high in vitamin B12 in your diet. Good choices are fortified breakfast cereal, nonfat or low-fat milk and other dairy products, meat, poultry, fish, and eggs.  Get enough calcium and vitamin D. Good choices include nonfat or low-fat milk, cheese, and yogurt. Other good options are tofu, orange juice with added calcium, and some leafy green vegetables, such as jamie greens and kale. If you don't use milk products, talk to your doctor about calcium and vitamin D supplements.  Eat protein foods every day. Good choices include lean meat, fish, poultry, eggs, and cheese. Other good options are cooked beans, peanut butter, and nuts and seeds.  Choose whole grains for half of the grains you eat. Look for 100% whole wheat bread, whole-grain cereals, brown rice, and other whole grains.  If you have constipation  Eat high-fiber foods every day. These include fruits, vegetables, cooked dried beans, and whole grains.  Drink plenty of fluids. If you have kidney, heart, or liver disease and have to limit fluids, talk with your doctor before you increase the amount of fluids you drink.  Ask your doctor if stool softeners may help keep your bowels regular.  If you have mouth problems that make chewing hard  Pick canned or cooked fruits and vegetables. These are often softer.  Chop or shred meat, poultry, and fish. Add sauce or gravy to  "the meat to help keep it moist.  Pick other protein foods that are soft. These include cheese, peanut butter, cooked beans, cottage cheese, and eggs.  If you have trouble shopping for yourself  Ask a local food store to deliver groceries to your home.  Contact a volunteer center and ask for help.  Ask a family member or neighbor to help you.  If you have trouble preparing meals  If you are able, take a cooking class.  Use a microwave oven to cook TV dinners and other frozen or prepared foods.  Take part in group meal programs. You can find these through senior citizen programs.  Have meals brought to your home. Your community may offer programs that deliver meals, such as Meals on Wheels.  If your appetite is poor  Try eating smaller amounts of food more often. For example, eat 4 or 5 small meals a day instead of 1 or 2 large meals.  Eat with family and friends. Or take part in group meal programs offered through volunteer programs. Eating with others may help your appetite. And it helps you be more social.  Ask your doctor if your medicines could cause appetite or taste problems. If so, ask about changing medicines.  Add spices and herbs to increase the flavor of food.  If you think you are depressed, ask your doctor for help. Depression can affect your appetite. And it can make it hard to do everyday activities like grocery shopping and making meals. Treatment can help.  When should you call for help?  Watch closely for changes in your health, and be sure to contact your doctor if you have any problems.  Where can you learn more?  Go to https://www.healthiSoccer.net/patiented  Enter L643 in the search box to learn more about \"Nutrition for Older Adults: Care Instructions.\"  Current as of: February 26, 2023               Content Version: 13.8    5245-1708 [employer].   Care instructions adapted under license by your healthcare professional. If you have questions about a medical condition or this " instruction, always ask your healthcare professional. [employer], [facility] disclaims any warranty or liability for your use of this information.      Preventing Falls: Care Instructions  Injuries and health problems such as trouble walking or poor eyesight can increase your risk of falling. So can some medicines. But there are things you can do to help prevent falls. You can exercise to get stronger. You can also arrange your home to make it safer.    Talk to your doctor about the medicines you take. Ask if any of them increase the risk of falls and whether they can be changed or stopped.   Try to exercise regularly. It can help improve your strength and balance. This can help lower your risk of falling.     Practice fall safety and prevention.    Wear low-heeled shoes that fit well and give your feet good support. Talk to your doctor if you have foot problems that make this hard.  Carry a cellphone or wear a medical alert device that you can use to call for help.  Use stepladders instead of chairs to reach high objects. Don't climb if you're at risk for falls. Ask for help, if needed.  Wear the correct eyeglasses, if you need them.    Make your home safer.    Remove rugs, cords, clutter, and furniture from walkways.  Keep your house well lit. Use night-lights in hallways and bathrooms.  Install and use sturdy handrails on stairways.  Wear nonskid footwear, even inside. Don't walk barefoot or in socks without shoes.    Be safe outside.    Use handrails, curb cuts, and ramps whenever possible.  Keep your hands free by using a shoulder bag or backpack.  Try to walk in well-lit areas. Watch out for uneven ground, changes in pavement, and debris.  Be careful in the winter. Walk on the grass or gravel when sidewalks are slippery. Use de-icer on steps and walkways. Add non-slip devices to shoes.    Put grab bars and nonskid mats in your shower or tub and near the toilet. Try to use a shower chair or bath bench when  "bathing.   Get into a tub or shower by putting in your weaker leg first. Get out with your strong side first. Have a phone or medical alert device in the bathroom with you.   Where can you learn more?  Go to https://www.[employer].net/patiented  Enter G117 in the search box to learn more about \"Preventing Falls: Care Instructions.\"  Current as of: July 18, 2023               Content Version: 13.8    1695-4076 [employer].   Care instructions adapted under license by your healthcare professional. If you have questions about a medical condition or this instruction, always ask your healthcare professional. [employer] disclaims any warranty or liability for your use of this information.      Hearing Loss: Care Instructions  Overview     Hearing loss is a sudden or slow decrease in how well you hear. It can range from slight to profound. Permanent hearing loss can occur with aging. It also can happen when you are exposed long-term to loud noise. Examples include listening to loud music, riding motorcycles, or being around other loud machines.  Hearing loss can affect your work and home life. It can make you feel lonely or depressed. You may feel that you have lost your independence. But hearing aids and other devices can help you hear better and feel connected to others.  Follow-up care is a key part of your treatment and safety. Be sure to make and go to all appointments, and call your doctor if you are having problems. It's also a good idea to know your test results and keep a list of the medicines you take.  How can you care for yourself at home?  Avoid loud noises whenever possible. This helps keep your hearing from getting worse.  Always wear hearing protection around loud noises.  Wear a hearing aid as directed.  A professional can help you pick a hearing aid that will work best for you.  You can also get hearing aids over the counter for mild to moderate hearing loss.  Have hearing tests " "as your doctor suggests. They can show whether your hearing has changed. Your hearing aid may need to be adjusted.  Use other devices as needed. These may include:  Telephone amplifiers and hearing aids that can connect to a television, stereo, radio, or microphone.  Devices that use lights or vibrations. These alert you to the doorbell, a ringing telephone, or a baby monitor.  Television closed-captioning. This shows the words at the bottom of the screen. Most new TVs can do this.  TTY (text telephone). This lets you type messages back and forth on the telephone instead of talking or listening. These devices are also called TDD. When messages are typed on the keyboard, they are sent over the phone line to a receiving TTY. The message is shown on a monitor.  Use text messaging, social media, and email if it is hard for you to communicate by telephone.  Try to learn a listening technique called speechreading. It is not lipreading. You pay attention to people's gestures, expressions, posture, and tone of voice. These clues can help you understand what a person is saying. Face the person you are talking to, and have them face you. Make sure the lighting is good. You need to see the other person's face clearly.  Think about counseling if you need help to adjust to your hearing loss.  When should you call for help?  Watch closely for changes in your health, and be sure to contact your doctor if:    You think your hearing is getting worse.     You have new symptoms, such as dizziness or nausea.   Where can you learn more?  Go to https://www.[employer].net/patiented  Enter R798 in the search box to learn more about \"Hearing Loss: Care Instructions.\"  Current as of: February 28, 2023               Content Version: 13.8    9702-2909 [employer].   Care instructions adapted under license by your healthcare professional. If you have questions about a medical condition or this instruction, always ask your " healthcare professional. [employer], [facility] disclaims any warranty or liability for your use of this information.      Learning About Stress  What is stress?     Stress is your body's response to a hard situation. Your body can have a physical, emotional, or mental response. Stress is a fact of life for most people, and it affects everyone differently. What causes stress for you may not be stressful for someone else.  A lot of things can cause stress. You may feel stress when you go on a job interview, take a test, or run a race. This kind of short-term stress is normal and even useful. It can help you if you need to work hard or react quickly. For example, stress can help you finish an important job on time.  Long-term stress is caused by ongoing stressful situations or events. Examples of long-term stress include long-term health problems, ongoing problems at work, or conflicts in your family. Long-term stress can harm your health.  How does stress affect your health?  When you are stressed, your body responds as though you are in danger. It makes hormones that speed up your heart, make you breathe faster, and give you a burst of energy. This is called the fight-or-flight stress response. If the stress is over quickly, your body goes back to normal and no harm is done.  But if stress happens too often or lasts too long, it can have bad effects. Long-term stress can make you more likely to get sick, and it can make symptoms of some diseases worse. If you tense up when you are stressed, you may develop neck, shoulder, or low back pain. Stress is linked to high blood pressure and heart disease.  Stress also harms your emotional health. It can make you jansen, tense, or depressed. Your relationships may suffer, and you may not do well at work or school.  What can you do to manage stress?  You can try these things to help manage stress:   Do something active. Exercise or activity can help reduce stress. Walking is  a great way to get started. Even everyday activities such as housecleaning or yard work can help.  Try yoga or peg chi. These techniques combine exercise and meditation. You may need some training at first to learn them.  Do something you enjoy. For example, listen to music or go to a movie. Practice your hobby or do volunteer work.  Meditate. This can help you relax, because you are not worrying about what happened before or what may happen in the future.  Do guided imagery. Imagine yourself in any setting that helps you feel calm. You can use online videos, books, or a teacher to guide you.  Do breathing exercises. For example:  From a standing position, bend forward from the waist with your knees slightly bent. Let your arms dangle close to the floor.  Breathe in slowly and deeply as you return to a standing position. Roll up slowly and lift your head last.  Hold your breath for just a few seconds in the standing position.  Breathe out slowly and bend forward from the waist.  Let your feelings out. Talk, laugh, cry, and express anger when you need to. Talking with supportive friends or family, a counselor, or a serena leader about your feelings is a healthy way to relieve stress. Avoid discussing your feelings with people who make you feel worse.  Write. It may help to write about things that are bothering you. This helps you find out how much stress you feel and what is causing it. When you know this, you can find better ways to cope.  What can you do to prevent stress?  You might try some of these things to help prevent stress:  Manage your time. This helps you find time to do the things you want and need to do.  Get enough sleep. Your body recovers from the stresses of the day while you are sleeping.  Get support. Your family, friends, and community can make a difference in how you experience stress.  Limit your news feed. Avoid or limit time on social media or news that may make you feel stressed.  Do  "something active. Exercise or activity can help reduce stress. Walking is a great way to get started.  Where can you learn more?  Go to https://www.[employer]net/patiented  Enter N032 in the search box to learn more about \"Learning About Stress.\"  Current as of: February 26, 2023               Content Version: 13.8    4595-1855 [employer].   Care instructions adapted under license by your healthcare professional. If you have questions about a medical condition or this instruction, always ask your healthcare professional. [employer] disclaims any warranty or liability for your use of this information.      Learning About Sleeping Well  What does sleeping well mean?     Sleeping well means getting enough sleep to feel good and stay healthy. How much sleep is enough varies among people.  The number of hours you sleep and how you feel when you wake up are both important. If you do not feel refreshed, you probably need more sleep. Another sign of not getting enough sleep is feeling tired during the day.  Experts recommend that adults get at least 7 or more hours of sleep per day. Children and older adults need more sleep.  Why is getting enough sleep important?  Getting enough quality sleep is a basic part of good health. When your sleep suffers, your physical health, mood, and your thoughts can suffer too. You may find yourself feeling more grumpy or stressed. Not getting enough sleep also can lead to serious problems, including injury, accidents, anxiety, and depression.  What might cause poor sleeping?  Many things can cause sleep problems, including:  Changes to your sleep schedule.  Stress. Stress can be caused by fear about a single event, such as giving a speech. Or you may have ongoing stress, such as worry about work or school.  Depression, anxiety, and other mental or emotional conditions.  Changes in your sleep habits or surroundings. This includes changes that happen where you " "sleep, such as noise, light, or sleeping in a different bed. It also includes changes in your sleep pattern, such as having jet lag or working a late shift.  Health problems, such as pain, breathing problems, and restless legs syndrome.  Lack of regular exercise.  Using alcohol, nicotine, or caffeine before bed.  How can you help yourself?  Here are some tips that may help you sleep more soundly and wake up feeling more refreshed.  Your sleeping area   Use your bedroom only for sleeping and sex. A bit of light reading may help you fall asleep. But if it doesn't, do your reading elsewhere in the house. Try not to use your TV, computer, smartphone, or tablet while you are in bed.  Be sure your bed is big enough to stretch out comfortably, especially if you have a sleep partner.  Keep your bedroom quiet, dark, and cool. Use curtains, blinds, or a sleep mask to block out light. To block out noise, use earplugs, soothing music, or a \"white noise\" machine.  Your evening and bedtime routine   Create a relaxing bedtime routine. You might want to take a warm shower or bath, or listen to soothing music.  Go to bed at the same time every night. And get up at the same time every morning, even if you feel tired.  What to avoid   Limit caffeine (coffee, tea, caffeinated sodas) during the day, and don't have any for at least 6 hours before bedtime.  Avoid drinking alcohol before bedtime. Alcohol can cause you to wake up more often during the night.  Try not to smoke or use tobacco, especially in the evening. Nicotine can keep you awake.  Limit naps during the day, especially close to bedtime.  Avoid lying in bed awake for too long. If you can't fall asleep or if you wake up in the middle of the night and can't get back to sleep within about 20 minutes, get out of bed and go to another room until you feel sleepy.  Avoid taking medicine right before bed that may keep you awake or make you feel hyper or energized. Your doctor can " "tell you if your medicine may do this and if you can take it earlier in the day.  If you can't sleep   Imagine yourself in a peaceful, pleasant scene. Focus on the details and feelings of being in a place that is relaxing.  Get up and do a quiet or boring activity until you feel sleepy.  Avoid drinking any liquids before going to bed to help prevent waking up often to use the bathroom.  Where can you learn more?  Go to https://www.[employer].net/patiented  Enter J942 in the search box to learn more about \"Learning About Sleeping Well.\"  Current as of: July 11, 2023               Content Version: 13.8    3006-9566 [employer].   Care instructions adapted under license by your healthcare professional. If you have questions about a medical condition or this instruction, always ask your healthcare professional. [employer] disclaims any warranty or liability for your use of this information.      Bladder Training: Care Instructions  Your Care Instructions     Bladder training is used to treat urge incontinence and stress incontinence. Urge incontinence means that the need to urinate comes on so fast that you can't get to a toilet in time. Stress incontinence means that you leak urine because of pressure on your bladder. For example, it may happen when you laugh, cough, or lift something heavy.  Bladder training can increase how long you can wait before you have to urinate. It can also help your bladder hold more urine. And it can give you better control over the urge to urinate.  It is important to remember that bladder training takes a few weeks to a few months to make a difference. You may not see results right away, but don't give up.  Follow-up care is a key part of your treatment and safety. Be sure to make and go to all appointments, and call your doctor if you are having problems. It's also a good idea to know your test results and keep a list of the medicines you take.  How can you care " for yourself at home?  Work with your doctor to come up with a bladder training program that is right for you. You may use one or more of the following methods.  Delayed urination  In the beginning, try to keep from urinating for 5 minutes after you first feel the need to go.  While you wait, take deep, slow breaths to relax. Kegel exercises can also help you delay the need to go to the bathroom.  After some practice, when you can easily wait 5 minutes to urinate, try to wait 10 minutes before you urinate.  Slowly increase the waiting period until you are able to control when you have to urinate.  Scheduled urination  Empty your bladder when you first wake up in the morning.  Schedule times throughout the day when you will urinate.  Start by going to the bathroom every hour, even if you don't need to go.  Slowly increase the time between trips to the bathroom.  When you have found a schedule that works well for you, keep doing it.  If you wake up during the night and have to urinate, do it. Apply your schedule to waking hours only.  Kegel exercises  These tighten and strengthen pelvic muscles, which can help you control the flow of urine. (If doing these exercises causes pain, stop doing them and talk with your doctor.) To do Kegel exercises:  Squeeze your muscles as if you were trying not to pass gas. Or squeeze your muscles as if you were stopping the flow of urine. Your belly, legs, and buttocks shouldn't move.  Hold the squeeze for 3 seconds, then relax for 5 to 10 seconds.  Start with 3 seconds, then add 1 second each week until you are able to squeeze for 10 seconds.  Repeat the exercise 10 times a session. Do 3 to 8 sessions a day.  When should you call for help?  Watch closely for changes in your health, and be sure to contact your doctor if:    Your incontinence is getting worse.     You do not get better as expected.   Where can you learn more?  Go to https://www.healthwise.net/patiented  Enter V684 in the  "search box to learn more about \"Bladder Training: Care Instructions.\"  Current as of: February 28, 2023               Content Version: 13.8    5078-0412 [employer].   Care instructions adapted under license by your healthcare professional. If you have questions about a medical condition or this instruction, always ask your healthcare professional. [employer] disclaims any warranty or liability for your use of this information.      Learning About Depression Screening  What is depression screening?  Depression screening is a way to see if you have depression symptoms. It may be done by a doctor or counselor. It's often part of a routine checkup. That's because your mental health is just as important as your physical health.  Depression is a mental health condition that affects how you feel, think, and act. You may:  Have less energy.  Lose interest in your daily activities.  Feel sad and grouchy for a long time.  Depression is very common. It affects people of all ages.  Many things can lead to depression. Some people become depressed after they have a stroke or find out they have a major illness like cancer or heart disease. The death of a loved one or a breakup may lead to depression. It can run in families. Most experts believe that a combination of inherited genes and stressful life events can cause it.  What happens during screening?  You may be asked to fill out a form about your depression symptoms. You and the doctor will discuss your answers. The doctor may ask you more questions to learn more about how you think, act, and feel.  What happens after screening?  If you have symptoms of depression, your doctor will talk to you about your options.  Doctors usually treat depression with medicines or counseling. Often, combining the two works best. Many people don't get help because they think that they'll get over the depression on their own. But people with depression may not get " "better unless they get treatment.  The cause of depression is not well understood. There may be many factors involved. But if you have depression, it's not your fault.  A serious symptom of depression is thinking about death or suicide. If you or someone you care about talks about this or about feeling hopeless, get help right away.  It's important to know that depression can be treated. Medicine, counseling, and self-care may help.  Where can you learn more?  Go to https://www.[employer].net/patiented  Enter T185 in the search box to learn more about \"Learning About Depression Screening.\"  Current as of: June 25, 2023               Content Version: 13.8    4047-4846 [employer].   Care instructions adapted under license by your healthcare professional. If you have questions about a medical condition or this instruction, always ask your healthcare professional. [employer] disclaims any warranty or liability for your use of this information.      Preventive Care Advice   This is general advice given by our system to help you stay healthy. However, your care team may have specific advice just for you. Please talk to your care team about your preventive care needs.  Nutrition  Eat 5 or more servings of fruits and vegetables each day.  Try wheat bread, brown rice and whole grain pasta (instead of white bread, rice, and pasta).  Get enough calcium and vitamin D. Check the label on foods and aim for 100% of the RDA (recommended daily allowance).  Lifestyle  Exercise at least 150 minutes each week  (30 minutes a day, 5 days a week).  Do muscle strengthening activities 2 days a week. These help control your weight and prevent disease.  No smoking.  Wear sunscreen to prevent skin cancer.  Have a dental exam and cleaning every 6 months.  Yearly exams  See your health care team every year to talk about:  Any changes in your health.  Any medicines your care team has prescribed.  Preventive care, " family planning, and ways to prevent chronic diseases.  Shots (vaccines)   HPV shots (up to age 26), if you've never had them before.  Hepatitis B shots (up to age 59), if you've never had them before.  COVID-19 shot: Get this shot when it's due.  Flu shot: Get a flu shot every year.  Tetanus shot: Get a tetanus shot every 10 years.  Pneumococcal, hepatitis A, and RSV shots: Ask your care team if you need these based on your risk.  Shingles shot (for age 50 and up)  General health tests  Diabetes screening:  Starting at age 35, Get screened for diabetes at least every 3 years.  If you are younger than age 35, ask your care team if you should be screened for diabetes.  Cholesterol test: At age 39, start having a cholesterol test every 5 years, or more often if advised.  Bone density scan (DEXA): At age 50, ask your care team if you should have this scan for osteoporosis (brittle bones).  Hepatitis C: Get tested at least once in your life.  STIs (sexually transmitted infections)  Before age 24: Ask your care team if you should be screened for STIs.  After age 24: Get screened for STIs if you're at risk. You are at risk for STIs (including HIV) if:  You are sexually active with more than one person.  You don't use condoms every time.  You or a partner was diagnosed with a sexually transmitted infection.  If you are at risk for HIV, ask about PrEP medicine to prevent HIV.  Get tested for HIV at least once in your life, whether you are at risk for HIV or not.  Cancer screening tests  Cervical cancer screening: If you have a cervix, begin getting regular cervical cancer screening tests starting at age 21.  Breast cancer scan (mammogram): If you've ever had breasts, begin having regular mammograms starting at age 40. This is a scan to check for breast cancer.  Colon cancer screening: It is important to start screening for colon cancer at age 45.  Have a colonoscopy test every 10 years (or more often if you're at risk) Or,  ask your provider about stool tests like a FIT test every year or Cologuard test every 3 years.  To learn more about your testing options, visit:   https://www.Currently/412998.pdf.  For help making a decision, visit:   https://bit.ly/gb15811.  Prostate cancer screening test: If you have a prostate, ask your care team if a prostate cancer screening test (PSA) at age 55 is right for you.  Lung cancer screening: If you are a current or former smoker ages 50 to 80, ask your care team if ongoing lung cancer screenings are right for you.  For informational purposes only. Not to replace the advice of your health care provider. Copyright   2023 [facility]. All rights reserved. Clinically reviewed by the [facility] Transitions Program. [employer] [id number] - REV 01/24.

## 2024-02-13 ENCOUNTER — TELEPHONE (OUTPATIENT)
Dept: FAMILY MEDICINE | Facility: CLINIC | Age: 74
End: 2024-02-13
Payer: COMMERCIAL

## 2024-02-13 DIAGNOSIS — N18.32 STAGE 3B CHRONIC KIDNEY DISEASE (H): Primary | ICD-10-CM

## 2024-02-13 DIAGNOSIS — I10 ESSENTIAL HYPERTENSION, BENIGN: ICD-10-CM

## 2024-02-13 RX ORDER — AMLODIPINE BESYLATE 5 MG/1
5 TABLET ORAL DAILY
Qty: 90 TABLET | Refills: 1 | Status: SHIPPED | OUTPATIENT
Start: 2024-02-13 | End: 2024-08-12

## 2024-02-13 NOTE — RESULT ENCOUNTER NOTE
Kidney function is stable but is significantly reduced.  Hydrochlorothiazide can sometimes contribute to this.  I'd like you to stop that medication and to begin amlodipine 5 mg daily instead.  We should recheck your blood pressure in 3 to 4 weeks, could do so at a nurse only visit.  I also like you to meet with a kidney specialist to look for underlying causes of your worsening kidney function.  I will place a referral and will have the team call you.    Your vitamin D level looks fine.  Parathyroid hormone is low, which is okay.  We want to make sure this is not too high.  Normal liver function and electrolytes.  Normal iron stores.

## 2024-02-13 NOTE — TELEPHONE ENCOUNTER
"See lab result note, from the PCP, to the patient on 2/13/24:    \"Kristen Navarrete MD  [facility] Nurse Pool  Kidney function is stable but is significantly reduced.  Hydrochlorothiazide can sometimes contribute to this.  I'd like you to stop that medication and to begin amlodipine 5 mg daily instead.  We should recheck your blood pressure in 3 to 4 weeks, could do so at a nurse only visit.  I also like you to meet with a kidney specialist to look for underlying causes of your worsening kidney function.  I will place a referral and will have the team call you.    Your vitamin D level looks fine.  Parathyroid hormone is low, which is okay.  We want to make sure this is not too high.  Normal liver function and electrolytes.  Normal iron stores.\"    Writer called and relayed the above lab result note to the patient, who verbalized understanding and agrees with the plan.    Denies other questions or concerns at this time.    Shala Sim RN, BSN  [facility]    "

## 2024-02-13 NOTE — TELEPHONE ENCOUNTER
Reason for Call:  Other Referral/Order    Detailed comments: Patient shares that she saw PCP yesterday 2/12/24 and needs PT referral sent to [facility] in [place].  She needs referral updated and faxed to  as soon as feasible.     Phone Number Patient can be reached at: Home number on file [phone] (home)    Best Time: any    Can we leave a detailed message on this number? YES    Call taken on 2/13/2024 at 4:03 PM by Tara Carrillo

## 2024-02-20 ENCOUNTER — TRANSFERRED RECORDS (OUTPATIENT)
Dept: HEALTH INFORMATION MANAGEMENT | Facility: CLINIC | Age: 74
End: 2024-02-20
Payer: COMMERCIAL

## 2024-03-05 ENCOUNTER — TRANSFERRED RECORDS (OUTPATIENT)
Dept: HEALTH INFORMATION MANAGEMENT | Facility: CLINIC | Age: 74
End: 2024-03-05
Payer: COMMERCIAL

## 2024-03-07 ENCOUNTER — ALLIED HEALTH/NURSE VISIT (OUTPATIENT)
Dept: FAMILY MEDICINE | Facility: CLINIC | Age: 74
End: 2024-03-07
Payer: COMMERCIAL

## 2024-03-07 VITALS
HEIGHT: 66 IN | DIASTOLIC BLOOD PRESSURE: 70 MMHG | SYSTOLIC BLOOD PRESSURE: 130 MMHG | TEMPERATURE: 97.8 F | WEIGHT: 167 LBS | OXYGEN SATURATION: 98 % | HEART RATE: 70 BPM | BODY MASS INDEX: 26.84 KG/M2 | RESPIRATION RATE: 17 BRPM

## 2024-03-07 DIAGNOSIS — I10 ESSENTIAL HYPERTENSION, BENIGN: Primary | ICD-10-CM

## 2024-03-07 PROCEDURE — 99207 PR NO CHARGE NURSE ONLY: CPT

## 2024-03-07 ASSESSMENT — PAIN SCALES - GENERAL: PAINLEVEL: NO PAIN (0)

## 2024-03-07 NOTE — PROGRESS NOTES
"I met with Ana Maria De La Garza at the request of Dr. Navarrete to recheck her blood pressure.  Blood pressure medications on the med list were reviewed with patient.    Patient has taken all medications as per usual regimen: Yes  Patient reports tolerating them without any issues or concerns: Yes    Vitals:    03/07/24 1111   BP: 130/70   Pulse: 70   Resp: 17   Temp: 97.8  F (36.6  C)   SpO2: 98%   Weight: 75.8 kg (167 lb)   Height: 1.664 m (5' 5.5\")       Blood pressure was taken, previous encounter was reviewed, recorded blood pressure below 140/90.  Patient was discharged and the note will be sent to the provider for final review.     Pt states she is leaving Saturday for [place] and will be gone for 1 week.  "

## 2024-03-27 DIAGNOSIS — D64.9 ANEMIA, UNSPECIFIED: ICD-10-CM

## 2024-03-27 DIAGNOSIS — I10 ESSENTIAL HYPERTENSION, MALIGNANT: ICD-10-CM

## 2024-03-27 DIAGNOSIS — E78.5 HYPERLIPEMIA: ICD-10-CM

## 2024-03-27 DIAGNOSIS — N18.30 CHRONIC KIDNEY DISEASE, STAGE III (MODERATE) (H): Primary | ICD-10-CM

## 2024-04-01 ENCOUNTER — THERAPY VISIT (OUTPATIENT)
Dept: PHYSICAL THERAPY | Facility: REHABILITATION | Age: 74
End: 2024-04-01
Attending: FAMILY MEDICINE
Payer: COMMERCIAL

## 2024-04-01 DIAGNOSIS — R26.89 POOR BALANCE: ICD-10-CM

## 2024-04-01 PROCEDURE — 97161 PT EVAL LOW COMPLEX 20 MIN: CPT | Mod: GP | Performed by: PHYSICAL THERAPIST

## 2024-04-01 PROCEDURE — 97112 NEUROMUSCULAR REEDUCATION: CPT | Mod: GP | Performed by: PHYSICAL THERAPIST

## 2024-04-01 NOTE — PROGRESS NOTES
PHYSICAL THERAPY EVALUATION  Type of Visit: Evaluation    See electronic medical record for Abuse and Falls Screening details.    Subjective       Presenting condition or subjective complaint: dizziness  Pt reports over the past 5-6 years she has struggled with her balance. She reports falling a year ago on ice and hit her head but didn't have a concussion. Pt reports she also fell this winter tripping on a root covered with leaves - no injury.   Pt reports she went to [facility] 2/12/24 because she feels like she is becoming more unsure of her balance and feels like she has been relying on her  to help with stairs.    [place] with getting house ready for summer. Pt reports having a lot of stairs in [place].     Pt reports she also is doing PT at [facility] for back pain and has been working on back stabilization exercises with them.    Pt reports L TKA maybe about 7 years, R knee could have been done also but hasn't done it yet - can get sore.     Pt does try to walk for about 30 minutes every day except for on days when she goes to the [facility] and she will walk in the water for 60 minutes.       Date of onset: 02/12/24    Relevant medical history:     Dates & types of surgery: shoulder 2023 knee 2015    Prior diagnostic imaging/testing results:       Prior therapy history for the same diagnosis, illness or injury:        Prior Level of Function  Transfers: Independent  Ambulation: Independent  ADL: Independent      Living Environment  Social support: With a significant other or spouse   Type of home: House; 2-story   Stairs to enter the home:         Ramp: No   Stairs inside the home: Yes 15 Is there a railing: Yes   Help at home: None  Equipment owned:       Employment: No    Hobbies/Interests: dancing hiking swimming    Patient goals for therapy: not to have my head spinning all the  time    Pain assessment:  Pt in PT for back pain - sporadic back  pain - not all the time     Objective      Cognitive Status Examination  Orientation: Oriented to person, place and time   Level of Consciousness: Alert  Follows Commands and Answers Questions: 100% of the time  Personal Safety and Judgement: Intact  Memory: Intact    POSTURE:  Forward head, rounded shoulders, increased thoracic kyphosis    STRENGTH:  B hip flexors, quad, DF 5/5, able to toe and heel walk with balance challenge    TRANSFERS:  Able to do sit to stand without UE assist - will perform either way    GAIT:   Level of Bertrand: WNL  Assistive Device(s):  Walking stick when hiking  Gait Deviations:  B trendelenberg     BALANCE:  APTA 30 second sit to stand 12 reps (<13 reps at risk for falls)    SLS B ~10-11 seconds, tandem B 30 seconds with mild to mod balance checks    B tandem 30 seconds each with mild to mod balance checks    Assessment & Plan   CLINICAL IMPRESSIONS  Medical Diagnosis: Poor balance (R26.89)    Treatment Diagnosis: Poor balance (R26.89)   Impression/Assessment: Patient is a 73 year old female with balance complaints.  Pt reports her balance has been a concern for 5-6 years and pt did do some PT for dizziness in the past but recently became more concerned as she feels she is needing more assistance with rails and her  and less ability to do her ADLs as easily. The following significant findings have been identified: Decreased strength, Impaired balance, Impaired gait, Impaired muscle performance, Decreased activity tolerance, and Impaired posture. These impairments interfere with their ability to perform self care tasks, recreational activities, household chores, household mobility, and community mobility as compared to previous level of function.     Clinical Decision Making (Complexity):  Clinical Presentation: Stable/Uncomplicated  Clinical Presentation Rationale: based on medical and personal factors listed in PT evaluation  Clinical Decision Making (Complexity): Low  complexity    PLAN OF CARE  Treatment Interventions:  Interventions: Gait Training, Manual Therapy, Neuromuscular Re-education, Therapeutic Activity, Therapeutic Exercise, Self-Care/Home Management    Long Term Goals     PT Goal 1  Goal Description: Pt will be able to improve her balance (by assessment with APTA sit to stand) to be able to improve her ability to perform ADLs and hiking in 90 days.  Target Date: 06/29/24      Frequency of Treatment: 1x/every 1-2 weeks  Duration of Treatment: 90 days    Risks and benefits of evaluation/treatment have been explained.   Patient/Family/caregiver agrees with Plan of Care.     Evaluation Time:     PT Eval, Low Complexity Minutes (97970): 28     Signing Clinician: Bianka Mcnamara PT, DPT, CLT      [facility]                                                                                   OUTPATIENT PHYSICAL THERAPY      PLAN OF TREATMENT FOR OUTPATIENT REHABILITATION   Patient's Last Name, First Name, SOHACharoDANIELCharo  Ana Maria De La Garza YOB: 1950   Provider's Name   [facility]   Medical Record No.  [id number]     Onset Date: 02/12/24  Start of Care Date: 04/01/24     Medical Diagnosis:  Poor balance (R26.89)      PT Treatment Diagnosis:  Poor balance (R26.89) Plan of Treatment  Frequency/Duration: 1x/every 1-2 weeks/ 90 days    Certification date from 04/01/24 to 06/29/24         See note for plan of treatment details and functional goals     Bianka Mcnamara PT, DPT, CLT                        I CERTIFY THE NEED FOR THESE SERVICES FURNISHED UNDER        THIS PLAN OF TREATMENT AND WHILE UNDER MY CARE     (Physician attestation of this document indicates review and certification of the therapy plan).              Referring Provider:  Kristen Navarrete MD    Initial Assessment  See Epic Evaluation- Start of Care Date: 04/01/24

## 2024-04-01 NOTE — PATIENT INSTRUCTIONS
Exercise homework      X10-15 reps in a row 1-2x/day      Trying to balance for up to 30 seconds x5-10 reps on each foot 1-2x/day      Balance 1 foot in front of the other x30 seconds x5 reps on each side 1-2x/day

## 2024-04-02 ENCOUNTER — TRANSFERRED RECORDS (OUTPATIENT)
Dept: HEALTH INFORMATION MANAGEMENT | Facility: CLINIC | Age: 74
End: 2024-04-02
Payer: COMMERCIAL

## 2024-04-08 ENCOUNTER — THERAPY VISIT (OUTPATIENT)
Dept: PHYSICAL THERAPY | Facility: REHABILITATION | Age: 74
End: 2024-04-08
Attending: FAMILY MEDICINE
Payer: COMMERCIAL

## 2024-04-08 ENCOUNTER — OFFICE VISIT (OUTPATIENT)
Dept: PHARMACY | Facility: CLINIC | Age: 74
End: 2024-04-08
Payer: COMMERCIAL

## 2024-04-08 VITALS — HEART RATE: 73 BPM | DIASTOLIC BLOOD PRESSURE: 86 MMHG | SYSTOLIC BLOOD PRESSURE: 136 MMHG

## 2024-04-08 DIAGNOSIS — R26.89 POOR BALANCE: Primary | ICD-10-CM

## 2024-04-08 DIAGNOSIS — F33.42 MAJOR DEPRESSIVE DISORDER, RECURRENT EPISODE, IN FULL REMISSION (H): ICD-10-CM

## 2024-04-08 DIAGNOSIS — E78.5 HYPERLIPIDEMIA, UNSPECIFIED HYPERLIPIDEMIA TYPE: ICD-10-CM

## 2024-04-08 DIAGNOSIS — I10 ESSENTIAL HYPERTENSION, BENIGN: ICD-10-CM

## 2024-04-08 DIAGNOSIS — E11.8 TYPE 2 DIABETES MELLITUS WITH COMPLICATION, WITHOUT LONG-TERM CURRENT USE OF INSULIN (H): Primary | ICD-10-CM

## 2024-04-08 DIAGNOSIS — H04.123 DRY EYES: ICD-10-CM

## 2024-04-08 DIAGNOSIS — N18.30 STAGE 3 CHRONIC KIDNEY DISEASE, UNSPECIFIED WHETHER STAGE 3A OR 3B CKD (H): ICD-10-CM

## 2024-04-08 DIAGNOSIS — Z78.9 TAKES DIETARY SUPPLEMENTS: ICD-10-CM

## 2024-04-08 DIAGNOSIS — M50.30 DEGENERATION OF CERVICAL INTERVERTEBRAL DISC: ICD-10-CM

## 2024-04-08 PROCEDURE — 99605 MTMS BY PHARM NP 15 MIN: CPT

## 2024-04-08 PROCEDURE — 97112 NEUROMUSCULAR REEDUCATION: CPT | Mod: GP | Performed by: PHYSICAL THERAPIST

## 2024-04-08 PROCEDURE — 97110 THERAPEUTIC EXERCISES: CPT | Mod: GP | Performed by: PHYSICAL THERAPIST

## 2024-04-08 PROCEDURE — 99607 MTMS BY PHARM ADDL 15 MIN: CPT

## 2024-04-08 RX ORDER — LISINOPRIL 40 MG/1
40 TABLET ORAL DAILY
COMMUNITY
Start: 2024-03-26

## 2024-04-08 NOTE — LETTER
_  Medication List        Prepared on: 04/10/2024     Bring your Medication List when you go to the doctor, hospital, or   emergency room. And, share it with your family or caregivers.     Note any changes to how you take your medications.  Cross out medications when you no longer use them.    Medication How I take it Why I use it Prescriber   acetaminophen (TYLENOL) 500 MG tablet Take 500-1,000 mg by mouth every 4 hours as needed for mild pain  Pain  Patient Reported   amLODIPine (NORVASC) 5 MG tablet Take 1 tablet (5 mg) by mouth daily Blood pressure  Kristen Navarrete MD   aspirin 81 MG EC tablet Take 81 mg by mouth daily  Clot prevention  Patient Reported   CALCIUM PO Take 1 tablet by mouth daily  Bone Health  Patient Reported   escitalopram (LEXAPRO) 20 MG tablet Take 1 tablet (20 mg) by mouth daily Anxiety Kristen Navarrete MD   ezetimibe (ZETIA) 10 MG tablet Take 1 tablet (10 mg) by mouth daily Cholesterol  Jovita Melgoza MD   glipiZIDE (GLUCOTROL XL) 10 MG 24 hr tablet Take 1 tablet (10 mg) by mouth 2 times daily Type 2 diabetes  Kristen Navarrete MD   hydrOXYzine HCl (ATARAX) 25 MG tablet Take 1 tablet (25 mg) by mouth every 6 hours as needed for anxiety Anxiety  Kristen Navarrete MD   lisinopril (ZESTRIL) 40 MG tablet Take 40 mg by mouth daily  Blood pressure  Patient Reported   multivitamin therapeutic (THERAGRAN) tablet Take 1 tablet by mouth daily.  General Health  Historical Provider   OMEGA-3/DHA/EPA/FISH OIL (FISH OIL-OMEGA-3 FATTY ACIDS) 300-1,000 mg capsule Take 2 g by mouth daily.  General Health  Historical Provider   semaglutide (OZEMPIC) 2 MG/3ML pen Inject 0.5 mg Subcutaneous every 7 days Type 2 diabetes  Kristen Navarrete MD   vitamin D3 (CHOLECALCIFEROL) 50 mcg (2000 units) tablet Take 1 tablet by mouth daily  General Health  Patient Reported         Add new medications, over-the-counter drugs, herbals, vitamins, or  minerals in the blank rows below.    Medication How I take it Why I use it  Prescriber                                      Allergies:      - Atorvastatin - Muscle Pain (Myalgia)  - Metformin - Unknown  - Morphine (pf) [morphine] - Nausea and Vomiting  - Pravastatin - Muscle Pain (Myalgia)        Side effects I have had:      Not on File        Other Information:              My notes and questions:

## 2024-04-08 NOTE — LETTER
April 10, 2024  Ana Maria De La Garza  [address] [place] [address]    Dear Ms. De La Garza, [facility]     Thank you for talking with me on Apr 8, 2024 about your health and medications. As a follow-up to our conversation, I have included two documents:      Your Recommended To-Do List has steps you should take to get the best results from your medications.  Your Medication List will help you keep track of your medications and how to take them.    If you want to talk about these documents, please call Cheyenne Tovar RPH at phone: [phone], Monday-Friday 8-4:30pm.    I look forward to working with you and your doctors to make sure your medications work well for you.    Sincerely,  Cheyenne Tovar RPH  [facility] Pharmacist, [facility]

## 2024-04-08 NOTE — PATIENT INSTRUCTIONS
"Recommendations from today's MTM visit:                                                      Call the [facility] team at [phone] for status update on Ozempic application.     Get fasting cholesterol labs in June as recommended by cardiology to assess efficacy of Zetia     Ask eye doctor for prescription for eye drops     Consider using Diclofenac (Voltaren) 1% gel- use 2g-4g up to 4 times daily as needed, maximum dose per an area on your body is 16g per day and a max of 32g per day for your total body if you are applying it to more than one area on your body.    Continue to monitor your blood pressure, if you notice your blood pressures increase above 140 for the top number and above 90 for the bottom number on a consistent basis then we may need to increase your amlodipine dose.     Follow-up: Return in about 4 months (around 8/8/2024). For A1c re-check.     It was great speaking with you today.  I value your experience and would be very thankful for your time in providing feedback in our clinic survey. In the next few days, you may receive an email or text message from [employer] with a link to a survey related to your  clinical pharmacist.\"     To schedule another MTM appointment, please call the clinic directly or you may call the MTM scheduling line at [phone].    My Clinical Pharmacist's contact information:                                                      Please feel free to contact me with any questions or concerns you have.      Ade Tovar, PharmD  Medication Therapy Management Pharmacist   [facility]    "

## 2024-04-08 NOTE — PROGRESS NOTES
[facility] Balance Scale    #1 SITTING TO STANDING  INSTRUCTIONS: Please stand up. Try not to use your hands for support  4     able to stand without using hands and stabilize  Independently      4  3     able to stand independently using hands  2     able to stand using hands after several tries  1     needs minimal aid to stand or stabilize  0     needs moderate or maximal assist to stand    #2 STANDING UNSUPPORTED  INSTRUCTIONS: Please stand for two minutes without holding on  4     able to stand safely for 2 minutes  4  3     able to stand 2 minutes with supervision  2     able to stand 30 seconds unsupported  1     needs several tries to stand 30 seconds unsupported  0     unable to stand 30 seconds unsupported    *If subject is able to stand 2 minutes unsupported, proceed to item #4    #3 SITTING WITH BACK UNSUPPORTED BUT FEET SUPPORTED ON FLOOR OR ON STOOL  INSTRUCTIONS : Please sit with arms folded for 2 minutes  4     able to sit safely and securely for 2 minutes 4  3     able to sit 2 minutes under supervision  2     able to sit 30 seconds  1     able to sit 10 seconds  0     unable to sit without support 10 seconds    #4 SITTING TO STANDING  INSTRUCTIONS : Please sit down  4     sits safely with minimal use of hands  4  3     controls descent by using hands  2     uses back of legs against chair to control descent  1     sits independently but has uncontrolled descent  0     needs assist to sit    #5 TRANSFERS  INSTRUCTIONS: Arrange chair for pivot transfer.  Ask subject to transfer one way toward a seat with armrests and one way toward a seat without armrests. You may use two chairs ( one with and one without armrests) or a bed and a chair  4     able to transfer safely with minor use of hands 4  3     able to transfer safely definite use of hands  2     able to transfer with verbal cueing and/or supervision  1     needs one person to assist  0     needs assist to sit    #6 STANDING UNSUPPORTED WITH EYES  CLOSED  INSTRUCTIONS: Please close your eyes and stand still for 10 seconds  4     able to stand 10 seconds safely   4  3     able to stand 10 seconds with supervision  2     able to stand 3 seconds  1     unable to keep eyes closed 3 seconds but stays safely  0     needs help to keep from falling    #7 STANDING UNSUPPORTED WITH FEET TOGETHER  INSTRUCTIONS: Place your feet together and stand without holding on.  4    able to place feet together independently and stand 1 minute safely      4  3    able to place feet together independently and stand 1 minute with supervision  2    able to place feet together independently but unable to hold for 30 seconds  1    needs help to attain position but able to stand 15 seconds feet together  0    needs help to attain position and unable to hold for 15 seconds    #8 REACHING FORWARD WITH OUTSTRETCHED ARM WHILE STANDING  INSTRUCTIONS: Lift arm to 90 degrees. Stretch out your fingers and reach forward as far as you can. (Examiner places a ruler at the end of fingertips when arm is at 90 degrees. Fingers should not touch the ruler while reaching forward. The recorded measure is the distance forward that the fingers reach while the subject is in the most forward lean position. When possible, ask subject to use both arms when reaching to avoid rotation of the truck)  4   can reach forward confidently 25 cm (10 inches)  3   can reach forward 12 cm (5 inches)  3  2   can reach forward 5cm (2 inches)  1   reaches forward but needs supervision  0   loses balances while trying/requires external support.    #9  OBJECT FROM THE FLOOR FROM A STANDING POSITION  INSTRUCTIONS:    the shoe/slipper, which is in front of your feet.  4    able to  slipper safely and easily  4  3    able to  slipper but needs supervision  2    unable to  but reaches 2-5 cm (1-2 inches) from slipper and keeps balance    independently  1    unable to  and needs supervision  while trying  0    unable to try/need assist to keep from losing balance or falling    #10 TURNING TO LOOK BEHIND OVER LEFT AND RIGHT SHOULDERS WHILE STANDING  INSTRUCTIONS: Turn to look directly behind you over towards the left shoulder. Repeat to the right. (Examiner may pick an object to look at directly behind the subject to encourage a better twist turn. )  4    looks behind from both sides and weight shift well  4  3    looks behind one side only other side shows less weight shift   2    turns sideways only but maintains balance  1    needs supervision when turning  0    needs assist to keep from losing balance or falling    #11 TURN 360 DEGREES  INSTRUCTIONS:  Turn completely around in a full [place]. Pause. Then turn a full [place] in the other direction.  4   able to turn 360 degrees safely in 4 seconds or less 4  3   able to turn 360 degrees safely one side only 4 seconds or less  2   able to turn 360 degrees safely but slowly  1   needs close supervision or verbal cuing  0   needs assistance while turning    #12 PLACE ALTERNATE FOOT ON STEP OR STOOL WHILE STANDING UNSUPPORTED  INSTRUCTIONS: Place each foot alternately on the step/stool. Continue until each foot has touched the step/stool four times.  4   able to stand independently and safely and completed 8 steps in 20 seconds     4  3   able to stand independently and complete 8 in > 20 seconds   2   able to complete 4 steps without aid with supervision   1   able to complete > 2 steps needs minimal assist  0   needs assistance to keep from falling/unable to try    #13 STANDING UNSUPPORTED ONE FOOT IN FRONT  INSTRUCTIONS: (DEMONSTRATE TO SUBJECT) Place one foot directly in front of the other. If you feel that you cannot place your foot directing in front, try to step far enough ahead that the heel of your forward foot is ahead of the toes of the other foot. (to score 3 points, the length of the step should exceed the length of the other foot and width of  the stance should approximate the subject s normal stride width)   4   able to place foot tandem independently and hold 30 seconds      4  3   able to place foot ahead independently and hold 30 seconds  2   able to take small step independently and hold 30 seconds  1   needs help to step but can hold 15 seconds  0   loses balance while stepping or standing    #14 STANDING ON ONE LEG  INSTRUCTIONS: Stand on one leg as long as you can without holding on.  4   able to lift leg independently and hold > 10 seconds 4  3   able to lift leg independently and hold 5-10 seconds  2   able to lift leg independently and hold > 3 seconds  1   tries to lift leg unable to hold 3 seconds but remains standing independently.  0   unable to try of needs assist to prevent fall    TOTAL SCORE (MAXIMUM = 56) 55/56  41-56 = low fall risk  21-40 = medium fall risk  0-20   = high fall risk

## 2024-04-08 NOTE — PROGRESS NOTES
Medication Therapy Management (MTM) Encounter    ASSESSMENT:                            Medication Adherence/Access: Recommend patient reach out to [facility] team to check on status of Ozempic PAP application.     Diabetes: Stable, most recent A1c remains at goal of <7% thus recommend patient continue on current therapy.     Hypertension: Stable, blood pressure today in clinic is at/near goal of <130/80, plan in place for patient to obtain updated BMP after increasing dose of lisinopril on Friday as recommended by nephrology. Recommend patient continue to monitor blood pressure at home, could consider dose increase of amlodipine if blood pressure is above goal on a regular basis in the future.     Hyperlipidemia: Stable, plan in place for patient to obtain updated cholesterol labs in June as recommended by cardiology to assess efficacy of Ezetimibe.     CKD: Stable, continues to follow with nephrology and plan in place for patient to obtain updated labs as recommended.     Depression: Stable.     Dry eyes: Patient may consider working with eye doctor to find a more affordable eye drop, also could consider requesting order to be sent to the pharmacy to determine if eye drop cost would  be covered under patient's current insurance.     Pain: Stable, recommend patient continue to avoid NSAIDs, okay to trial topical diclofenac therapy to use as needed for pain as this would have less systemic risks compared to oral NSAIDs.     Supplements: Stable, per Dr. Navarrete, appropriate for patient to continue on current vitamin D dose despite slightly elevated levels recently.     PLAN:                            Call the [facility] team at [phone] for status update on Ozempic application.     Get fasting cholesterol labs in June as recommended by cardiology to assess efficacy of Zetia     Ask eye doctor for prescription for eye drops     Consider using Diclofenac (Voltaren) 1% gel- use 2g-4g up  to 4 times daily as needed, maximum dose per an area on your body is 16g per day and a max of 32g per day for your total body if you are applying it to more than one area on your body.    Continue to monitor your blood pressure, if you notice your blood pressures increase above 140 for the top number and above 90 for the bottom number on a consistent basis then we may need to increase your amlodipine dose.     Follow-up: Return in about 4 months (around 8/8/2024). For A1c re-check.     SUBJECTIVE/OBJECTIVE:                          Sayda De La Garza is a 73 year old female coming in for a follow-up visit.       Reason for visit: Diabetes follow up.    Allergies/ADRs: Reviewed in chart  Past Medical History: Reviewed in chart  Tobacco: She reports that she has never smoked. She has never used smokeless tobacco.  Alcohol: 1-3 beverages / week    Medication Adherence/Access: Patient continues to work with [employer] to re-enroll into Ozempic PAP. Notes that they have submitted their portion of the application but has not heard back yet on application status.     Diabetes   Ozempic 0.5mg weekly  Glipizide 10mg twice daily (tired to discontinue in the past but blood sugars started to rise so was re-started).   Blood sugar monitoring: occasionally.   Current diabetes symptoms: none  Diet/Exercise: Reduced appetite with Ozempic use, overall this is tolerable.      Eye exam is up to date  Foot exam: due  Urine Albumin:   Lab Results   Component Value Date    ALCR  02/12/2024      Comment:      Unable to calculate, urine albumin and/or urine creatinine is outside detectable limits.  Microalbuminuria is defined as an albumin:creatinine ratio of 17 to 299 for males and 25 to 299 for females. A ratio of albumin:creatinine of 300 or higher is indicative of overt proteinuria.  Due to biologic variability, positive results should be confirmed by a second, first-morning random or 24-hour timed urine specimen. If there is discrepancy,  a third specimen is recommended. When 2 out of 3 results are in the microalbuminuria range, this is evidence for incipient nephropathy and warrants increased efforts at glucose control, blood pressure control, and institution of therapy with an angiotensin-converting-enzyme (ACE) inhibitor (if the patient can tolerate it).        Lab Results   Component Value Date    A1C 6.1 (H) 02/12/2024     Hypertension   Amlodipine 5mg daily (replaced Hydrochlorothiazide 25mg daily by PCP due to worsening renal function)   Lisinopril 40mg daily (dose increase by nephrology)   Patient reports no current medication side effects  Patient self monitors blood pressure.  Home BP monitoring 130s/80s.        BP Readings from Last 3 Encounters:   03/07/24 130/70   02/12/24 132/82   11/16/23 135/78     Pulse Readings from Last 3 Encounters:   03/07/24 70   02/12/24 72   11/16/23 83     Hyperlipidemia   Ezetimibe 10mg daily   Fish oil 2g daily   Notes that they were recommended to obtain updated cholesterol labs in June by cardiology after starting ezetimibe.   History of intolerance to statin medications, is able to tolerate ezetimibe with no concerns.      Recent Labs   Lab Test 10/25/23  0925 07/20/23  1002   CHOL 136 185   HDL 42* 47*   LDL 71 113*   TRIG 115 127     CKD:  Notes that [facility] and was recommended to increase lisinopril dose to 40mg daily and get updated lab work Friday for re-check.     Depression:   Escitalopram 20mg daily - patient notes that dose increase has been more effective.   Hydroxyzine 25mg daily as needed for anxiety - patient reports no recent use, as it is not needed since current symptoms are controlled per patient reports.     Dry eyes:  Was recommended by eye doctor to start Nordic natural eye drops, patient notes that she was recommended to take twice daily but has only been taking once daily due to cost.     Pain:  Acetaminophen 500-1,000mg every 4 hours as  needed   Patient notes that they may consider trying diclofenac.   Recently got a steroid injection when seeing [facility] orth[facility] for a follow up visit and this was effective for pain.   Is also part taking in physical therapy to help with pain and balance.     Supplements:  Calcium 600mg daily   Multivitamin 1 tablet daily   Vitamin D 2,000 international unit(s) daily   Vitamin D Deficiency Screening Results:  Lab Results   Component Value Date    VITDT 58 (H) 02/12/2024    VITDT 50 08/02/2022      Today's Vitals: /86   Pulse 73   LMP  (LMP Unknown)   ----------------  I spent 30 minutes with this patient today. All changes were made via collaborative practice agreement with Kristen Navarrete MD. A copy of the visit note was provided to the patient's provider(s).    A summary of these recommendations was given to the patient.    Ade Tovar, PharmD  Medication Therapy Management Pharmacist   [facility]      Medication Therapy Recommendations  Degeneration of cervical intervertebral disc    Current Medication: acetaminophen (TYLENOL) 500 MG tablet   Rationale: Synergistic therapy - Needs additional medication therapy - Indication   Recommendation: Start Medication - Diclofenac Sodium 1 % Crea   Status: Accepted per CPA

## 2024-04-08 NOTE — LETTER
"Recommended To-Do List      Prepared on: 04/10/2024       You can get the best results from your medications by completing the items on this \"To-Do List.\"      Bring your To-Do List when you go to your doctor. And, share it with your family or caregivers.    My To-Do List:  What we talked about: What I should do:   A new medication that may be of benefit to you    Start taking Diclofenac Sodium as needed           What we talked about: What I should do:                     "

## 2024-04-08 NOTE — PATIENT INSTRUCTIONS
Could trial wearing a light weight compression stocking to see if that helps change swelling/redness at night time    Switch from squats to modified dead lift exercise    X10-15 reps 1-2 sets 3-5x/week    Balance exercises - trying these versions x5 reps each leg 3-5x/week    Keep working on single leg balance - when 30 seconds is easy - try to do on pillow/cushion/bosu OR with eyes closed - when good at both versions can try with both eyes closed and on foam    Keep working on standing 1 foot in front of the other - when 30 seconds feels easy try to do on pillow/cushion    ADD  Standing feet together on cushion x30 seconds x5 reps - when easy try to do eyes closed

## 2024-04-11 ENCOUNTER — TELEPHONE (OUTPATIENT)
Dept: FAMILY MEDICINE | Facility: CLINIC | Age: 74
End: 2024-04-11
Payer: COMMERCIAL

## 2024-04-11 NOTE — TELEPHONE ENCOUNTER
Ana Maria's Ozempic application has been submitted to the [employer] prescription assistance program.     We will note EPIC when [employer] has made their decision.     Thank you!    Shahnaz Kebede   Prescription Assistance Assistant

## 2024-04-12 ENCOUNTER — LAB (OUTPATIENT)
Dept: LAB | Facility: CLINIC | Age: 74
End: 2024-04-12
Payer: COMMERCIAL

## 2024-04-12 DIAGNOSIS — D64.9 ANEMIA, UNSPECIFIED: ICD-10-CM

## 2024-04-12 DIAGNOSIS — E78.5 HYPERLIPEMIA: ICD-10-CM

## 2024-04-12 DIAGNOSIS — I10 ESSENTIAL HYPERTENSION, MALIGNANT: ICD-10-CM

## 2024-04-12 DIAGNOSIS — N18.30 CHRONIC KIDNEY DISEASE, STAGE III (MODERATE) (H): ICD-10-CM

## 2024-04-12 LAB
ALBUMIN MFR UR ELPH: 8 MG/DL
ALBUMIN SERPL BCG-MCNC: 4.4 G/DL (ref 3.5–5.2)
ALBUMIN UR-MCNC: NEGATIVE MG/DL
ANION GAP SERPL CALCULATED.3IONS-SCNC: 10 MMOL/L (ref 7–15)
APPEARANCE UR: CLEAR
BACTERIA #/AREA URNS HPF: ABNORMAL /HPF
BILIRUB UR QL STRIP: NEGATIVE
BUN SERPL-MCNC: 21.3 MG/DL (ref 8–23)
CALCIUM SERPL-MCNC: 9.7 MG/DL (ref 8.8–10.2)
CHLORIDE SERPL-SCNC: 100 MMOL/L (ref 98–107)
COLOR UR AUTO: YELLOW
CREAT SERPL-MCNC: 1.33 MG/DL (ref 0.51–0.95)
CREAT UR-MCNC: 84.8 MG/DL
DEPRECATED HCO3 PLAS-SCNC: 26 MMOL/L (ref 22–29)
EGFRCR SERPLBLD CKD-EPI 2021: 42 ML/MIN/1.73M2
GLUCOSE SERPL-MCNC: 161 MG/DL (ref 70–99)
GLUCOSE UR STRIP-MCNC: NEGATIVE MG/DL
HGB UR QL STRIP: NEGATIVE
HOLD SPECIMEN: NORMAL
KETONES UR STRIP-MCNC: NEGATIVE MG/DL
LEUKOCYTE ESTERASE UR QL STRIP: NEGATIVE
NITRATE UR QL: NEGATIVE
PH UR STRIP: 6 [PH] (ref 5–8)
PHOSPHATE SERPL-MCNC: 3 MG/DL (ref 2.5–4.5)
POTASSIUM SERPL-SCNC: 4.3 MMOL/L (ref 3.4–5.3)
PROT/CREAT 24H UR: 0.09 MG/MG CR (ref 0–0.2)
RBC #/AREA URNS AUTO: ABNORMAL /HPF
SODIUM SERPL-SCNC: 136 MMOL/L (ref 135–145)
SP GR UR STRIP: 1.02 (ref 1–1.03)
SQUAMOUS #/AREA URNS AUTO: ABNORMAL /LPF
UROBILINOGEN UR STRIP-ACNC: 0.2 E.U./DL
WBC #/AREA URNS AUTO: ABNORMAL /HPF

## 2024-04-12 PROCEDURE — 80069 RENAL FUNCTION PANEL: CPT

## 2024-04-12 PROCEDURE — 36415 COLL VENOUS BLD VENIPUNCTURE: CPT

## 2024-04-12 PROCEDURE — 84156 ASSAY OF PROTEIN URINE: CPT

## 2024-04-12 PROCEDURE — 81001 URINALYSIS AUTO W/SCOPE: CPT

## 2024-04-19 DIAGNOSIS — F33.42 MAJOR DEPRESSIVE DISORDER, RECURRENT EPISODE, IN FULL REMISSION (H): ICD-10-CM

## 2024-04-19 RX ORDER — ESCITALOPRAM OXALATE 10 MG/1
TABLET ORAL
Qty: 90 TABLET | Refills: 3 | OUTPATIENT
Start: 2024-04-19

## 2024-04-26 ENCOUNTER — TELEPHONE (OUTPATIENT)
Dept: FAMILY MEDICINE | Facility: CLINIC | Age: 74
End: 2024-04-26
Payer: COMMERCIAL

## 2024-04-26 NOTE — TELEPHONE ENCOUNTER
Sayda has been approved to the  assistance program for Ozempic through December 31, 2024.      She will receive, at no cost,120 day supplies of Ozempic delivered to the [facility] through the enrollment period.     The patient has been informed of this approval and delivery schedule.     PLEASE CONTACT THE PATIENT UPON ARRIVAL OF THE MEDICATION TO THE [facility] FOR THE THEM TO .    Sayda will call our office for refills and will receive a detailed letter of instructions from our office.    Thank you so much for your assistance,  Corrie Mccord  Prescription   Please send responses to [id number]

## 2024-05-02 ENCOUNTER — THERAPY VISIT (OUTPATIENT)
Dept: PHYSICAL THERAPY | Facility: REHABILITATION | Age: 74
End: 2024-05-02
Payer: COMMERCIAL

## 2024-05-02 ENCOUNTER — HOSPITAL ENCOUNTER (OUTPATIENT)
Dept: MAMMOGRAPHY | Facility: CLINIC | Age: 74
Discharge: HOME | End: 2024-05-02
Attending: FAMILY MEDICINE | Admitting: FAMILY MEDICINE
Payer: MEDICARE

## 2024-05-02 DIAGNOSIS — Z12.31 VISIT FOR SCREENING MAMMOGRAM: ICD-10-CM

## 2024-05-02 DIAGNOSIS — R26.89 POOR BALANCE: Primary | ICD-10-CM

## 2024-05-02 PROCEDURE — 97110 THERAPEUTIC EXERCISES: CPT | Mod: GP | Performed by: PHYSICAL THERAPIST

## 2024-05-02 PROCEDURE — 77067 SCR MAMMO BI INCL CAD: CPT

## 2024-05-02 PROCEDURE — 97112 NEUROMUSCULAR REEDUCATION: CPT | Mod: GP | Performed by: PHYSICAL THERAPIST

## 2024-05-02 NOTE — TELEPHONE ENCOUNTER
Patient came in today 5/2/24 at 2:20pm and picked up her Ozempic.     Kendrick Sun RN  [facility]

## 2024-05-02 NOTE — PATIENT INSTRUCTIONS
Keep working on your balance exercises - even if they are hard to do you are still keeping your balance reflexes as strong as possible    Try walking in your yard [place]    Could benefit from 1-2 walking sticks to use when hiking or in more wooded areas

## 2024-05-07 ENCOUNTER — OFFICE VISIT (OUTPATIENT)
Dept: FAMILY MEDICINE | Facility: CLINIC | Age: 74
End: 2024-05-07
Payer: COMMERCIAL

## 2024-05-07 VITALS
WEIGHT: 172.3 LBS | DIASTOLIC BLOOD PRESSURE: 75 MMHG | OXYGEN SATURATION: 96 % | BODY MASS INDEX: 27.69 KG/M2 | RESPIRATION RATE: 16 BRPM | SYSTOLIC BLOOD PRESSURE: 136 MMHG | HEIGHT: 66 IN | HEART RATE: 72 BPM | TEMPERATURE: 98.6 F

## 2024-05-07 DIAGNOSIS — R20.2 PARESTHESIA OF FOOT, BILATERAL: Primary | ICD-10-CM

## 2024-05-07 PROCEDURE — 99214 OFFICE O/P EST MOD 30 MIN: CPT | Performed by: FAMILY MEDICINE

## 2024-05-07 RX ORDER — GABAPENTIN 100 MG/1
CAPSULE ORAL
Qty: 60 CAPSULE | Refills: 0 | Status: SHIPPED | OUTPATIENT
Start: 2024-05-07

## 2024-05-07 ASSESSMENT — PATIENT HEALTH QUESTIONNAIRE - PHQ9
SUM OF ALL RESPONSES TO PHQ QUESTIONS 1-9: 3
10. IF YOU CHECKED OFF ANY PROBLEMS, HOW DIFFICULT HAVE THESE PROBLEMS MADE IT FOR YOU TO DO YOUR WORK, TAKE CARE OF THINGS AT HOME, OR GET ALONG WITH OTHER PEOPLE: NOT DIFFICULT AT ALL
SUM OF ALL RESPONSES TO PHQ QUESTIONS 1-9: 3

## 2024-05-07 ASSESSMENT — PAIN SCALES - GENERAL: PAINLEVEL: MODERATE PAIN (4)

## 2024-05-07 NOTE — PROGRESS NOTES
"  Assessment & Plan     Paresthesia of foot, bilateral  Begin the following medication  - gabapentin (NEURONTIN) 100 MG capsule; One at bedtime            BMI  Estimated body mass index is 28.23 kg/m  as calculated from the following:    Height as of this encounter: 1.664 m (5' 5.51\").    Weight as of this encounter: 78.2 kg (172 lb 4.8 oz).         Lumbar you    Subjective   Sayda is a 73 year old, presenting for the following health issues:  Musculoskeletal Problem (* per pt burning feet get red and burning hot at night - going on for 1-year )      5/7/2024    12:11 PM   Additional Questions   Roomed by [facility] patient is a type II diabetic currently on glipizide and Ozempic A1c has been dropped down into the low sixes from a starting point of mid eights.  She is keeping her weight down exercising on a regular basis she has high lipids could not tolerate statins is on Vytorin tolerating that.  She is experiencing some burning and mild swelling of both feet previous bunionectomy on left great toe.  Differential diagnosis includes peripheral neuropathy diabetic neuropathy restless leg variant.  Her posterior tibial pulses are really pretty good the right greater than the left dorsalis pedis are palpable she does not have any obvious ulcerations or sensitivity is good.  In the distant past she was treated with gabapentin but discontinued it but cannot remember why.  I would like to retry her on that at a low dose as she seems to be fine during the day except when she goes to bed at night her feet do have the above symptomatology with the burning.  Will start at a low-dose 100 mg she will keep a diary she is coming in in 6 weeks to see Dr. Levine for A1c follow-upgo history of you               Review of Systems  Constitutional, HEENT, cardiovascular, pulmonary, GI, , musculoskeletal, neuro, skin, endocrine and psych systems are negative, except as otherwise noted.      Objective    /75 (BP Location: Right " "arm, Patient Position: Sitting, Cuff Size: Adult Regular)   Pulse 72   Temp 98.6  F (37  C) (Oral)   Resp 16   Ht 1.664 m (5' 5.51\")   Wt 78.2 kg (172 lb 4.8 oz)   LMP  (LMP Unknown)   SpO2 96%   BMI 28.23 kg/m    Body mass index is 28.23 kg/m .  Physical Exam   General Appearance:  Alert, cooperative, no distress  Head:  Normocephalic, no obvious abnormality  Ears: TM anatomy normal  Eyes:  PERRL, EOM's intact, conjunctiva and corneas clear  Nose:  Nares symmetrical, septum midline, mucosa pink, no sinus tenderness  Throat:  Lips, tongue, and mucosa are moist, pink, and intact  Neck:  Supple, symmetrical, trachea midline, no adenopathy; thyroid: no enlargement, symmetric,no tenderness/mass/nodules; no carotid bruit, no JVD  Back:  Symmetrical, no curvature, ROM normal, no CVA tenderness  Chest/Breast:  No mass or tenderness  Lungs:  Clear to auscultation bilaterally, respirations unlabored   Heart:  Normal PMI, regular rate & rhythm, S1 and S2 normal, no murmurs, rubs, or gallops  Abdomen:  Soft, non-tender, bowel sounds active all four quadrants, no mass, or organomegaly  Musculoskeletal:  Tone and strength strong and symmetrical, all extremities  Lymphatic:  No adenopathy  Skin/Hair/Nails:  Skin warm, dry, and intact, no rashes  Neurologic:  Alert and oriented x3, no cranial nerve deficits, normal strength and tone, gait steady  Extremities:  No edema.  Arnav's sign negative.    Genitourinary: deferred  Pulses:  Equal bilaterally DP and posterior tibial were felt bilaterally              Signed Electronically by: Marko Hernandez MD    "

## 2024-06-10 ENCOUNTER — LAB (OUTPATIENT)
Dept: LAB | Facility: CLINIC | Age: 74
End: 2024-06-10
Payer: COMMERCIAL

## 2024-06-10 ENCOUNTER — THERAPY VISIT (OUTPATIENT)
Dept: PHYSICAL THERAPY | Facility: REHABILITATION | Age: 74
End: 2024-06-10
Payer: COMMERCIAL

## 2024-06-10 ENCOUNTER — TRANSFERRED RECORDS (OUTPATIENT)
Dept: HEALTH INFORMATION MANAGEMENT | Facility: CLINIC | Age: 74
End: 2024-06-10

## 2024-06-10 DIAGNOSIS — R26.89 POOR BALANCE: Primary | ICD-10-CM

## 2024-06-10 DIAGNOSIS — E11.8 TYPE 2 DIABETES MELLITUS WITH COMPLICATION, WITHOUT LONG-TERM CURRENT USE OF INSULIN (H): ICD-10-CM

## 2024-06-10 DIAGNOSIS — E78.2 MIXED HYPERLIPIDEMIA: ICD-10-CM

## 2024-06-10 LAB
CHOLEST SERPL-MCNC: 178 MG/DL
FASTING STATUS PATIENT QL REPORTED: YES
HBA1C MFR BLD: 5.7 % (ref 0–5.6)
HDLC SERPL-MCNC: 50 MG/DL
LDLC SERPL CALC-MCNC: 102 MG/DL
NONHDLC SERPL-MCNC: 128 MG/DL
TRIGL SERPL-MCNC: 128 MG/DL

## 2024-06-10 PROCEDURE — 36415 COLL VENOUS BLD VENIPUNCTURE: CPT

## 2024-06-10 PROCEDURE — 83036 HEMOGLOBIN GLYCOSYLATED A1C: CPT

## 2024-06-10 PROCEDURE — 97112 NEUROMUSCULAR REEDUCATION: CPT | Mod: GP | Performed by: PHYSICAL THERAPIST

## 2024-06-10 PROCEDURE — 80061 LIPID PANEL: CPT

## 2024-06-10 PROCEDURE — 97110 THERAPEUTIC EXERCISES: CPT | Mod: GP | Performed by: PHYSICAL THERAPIST

## 2024-06-10 NOTE — PATIENT INSTRUCTIONS
Increased pain in the leg can be from extra amounts of sitting with driving and watching soccer games - try to walk 1-2 laps around the soccer fields in between games and then try to stand during 1/2 time to get pressure off of sciatic nerve    Exercise list    Dead lift - forward bending down to the floor to strengthen your back and back of legs and knees x10-15 reps 1-2 sets    Single leg balance on the floor for 30 seconds each x10 reps    Balancing 1 foot in front of the other x30 seconds x5-10 reps    Balancing on the floor with both feet with eyes closed for 10-30 seconds x5-10 reps    TRY - balancing with both feet on bosu ball with eyes open x10-20 seconds x5-10 reps    DO ALL BALANCE EXERCISES by counter top or chair for safety!

## 2024-06-18 DIAGNOSIS — E78.2 MIXED HYPERLIPIDEMIA: Primary | ICD-10-CM

## 2024-06-18 DIAGNOSIS — I10 ESSENTIAL HYPERTENSION, MALIGNANT: ICD-10-CM

## 2024-07-22 ENCOUNTER — THERAPY VISIT (OUTPATIENT)
Dept: PHYSICAL THERAPY | Facility: REHABILITATION | Age: 74
End: 2024-07-22
Payer: COMMERCIAL

## 2024-07-22 DIAGNOSIS — R26.89 POOR BALANCE: Primary | ICD-10-CM

## 2024-07-22 PROCEDURE — 97112 NEUROMUSCULAR REEDUCATION: CPT | Mod: GP | Performed by: PHYSICAL THERAPIST

## 2024-07-22 PROCEDURE — 97110 THERAPEUTIC EXERCISES: CPT | Mod: GP | Performed by: PHYSICAL THERAPIST

## 2024-07-24 NOTE — PROGRESS NOTES
[facility]                                                                                   OUTPATIENT PHYSICAL THERAPY    PLAN OF TREATMENT FOR OUTPATIENT REHABILITATION   Patient's Last Name, First Name, Ana Maria La YOB: 1950   Provider's Name   [facility]   Medical Record No.  [id number]     Onset Date: 02/12/24  Start of Care Date: 04/01/24     Medical Diagnosis:  Poor balance (R26.89)      PT Treatment Diagnosis:  Poor balance (R26.89) Plan of Treatment  Frequency/Duration: 1x/every 1-2 weeks/ 90 days    Certification date from 06/29/24 to 09/26/24         See note for plan of treatment details and functional goals     Bianka Mcnamara PT                         I CERTIFY THE NEED FOR THESE SERVICES FURNISHED UNDER        THIS PLAN OF TREATMENT AND WHILE UNDER MY CARE     (Physician attestation of this document indicates review and certification of the therapy plan).              Initial Assessment  See Epic Evaluation- Start of Care Date: 04/01/24    Beginning/End Dates of Progress Note Reporting Period:  04/01/24 to 07/22/2024    Referring Provider:  Kristen Navarrete MD       07/22/24 0500   Appointment Info   Signing clinician's name / credentials Bianka Mcnamara PT, DPT, CLT   Total/Authorized Visits 8   Visits Used 5   Medical Diagnosis Poor balance (R26.89)   PT Tx Diagnosis Poor balance (R26.89)   Quick Adds Certification   Progress Note/Certification   Start of Care Date 04/01/24   Onset of illness/injury or Date of Surgery 02/12/24   Therapy Frequency 1x/every 1-2 weeks   Predicted Duration 90 days   Certification date from 06/29/24   Certification date to 09/26/24   Progress Note Due Date 09/26/24   Progress Note Completed Date 04/01/24   GOALS   PT Goals 2;3   PT Goal 1   Goal Description Pt will be able to improve her balance (by assessment with APTA sit to stand) to be able to improve her ability to perform  ADLs and hiking in 90 days.   Goal Progress IMPROVING - working on walking in the water - trying to do this everyday for 30-45 minutes   Target Date 06/29/24   Subjective Report   Subjective Report Pt reports doing okay. Pt L knee is a little sore since she fell 2 weeks ago - pain rating 6/10 and pt has bump on R shin from hitting the paddleboard. Pt reports over the past 6 weeks she has only had 1 fall and that was on a paddleboard on the water. Pt reports she does continue to have shoulder and hip pain as well - fine with sitting but can get sore from sleeping on her side - will roll over and pain will go away. Pt has to walk down steps [place] get [place] - goes okay except for knee pain.   Objective Measures   Objective Measures Objective Measure 1;Objective Measure 2;Objective Measure 3;Objective Measure 4   Objective Measure 1   Objective Measure APTA assessment   Details APTA sit to stand 14 reps (was 12 reps initially)   Objective Measure 2   Objective Measure Balance   Details R SLS 30 seconds (was 10-22 seconds), L SLS 30 seconds (was 11-24), from last visits - tandem 30 seconds B with min balance sway, Rhomberg/NBOS on foam x30 seconds with min balance sway, NBOS on foam EC x30 seconds but able to catch herself with hands on chair (was 4-5 seconds)   Objective Measure 3   Objective Measure ROM   Details Trunk ROM WNL without back/hip or knee pain all directions   Objective Measure 4   Objective Measure Gait - from last visit   Details Pt with L > R trendelenberg and decreased L arm swing - doesn't go into extension   Treatment Interventions (PT)   Interventions Therapeutic Procedure/Exercise;Neuromuscular Re-education;Self Care/Home Management   Therapeutic Procedure/Exercise   Therapeutic Procedures: strength, endurance, ROM, flexibility minutes (54326) 24   Ther Proc 1 - Details Performed trunk ROM, sit to stand x14 rep, reviewed HEP   Skilled Intervention Reassessed response to HEP and  activity level over past 6 weeks.  Performed and reviewed HEP and encouraged pt to continue performing daily as well as [place] for 30 minutes versus 45 minutes if getting increased knee pain.   Patient Response/Progress Pt robert's understanding for increased activity during soccer tournaments   Neuromuscular Re-education   Neuromuscular re-ed of mvmt, balance, coord, kinesthetic sense, posture, proprioception minutes (25587) 14   Neuro Re-ed 1 - Details Performed tandem B x1 rep each and SLS EO x20 trials each.   Skilled Intervention Reassessed response to HEP and activity level over past 6 weeks with discussion about pt's fall on her paddleboard in the water as different from a fall on the ground without good reason for the fall to happen. Reassessed SLS  and tandem stance and advised to continue balance exercises with home program daily as able.   Patient Response/Progress Improving balance   Plan   Home program modified dead lift, tandem, SLS, rhomberg EO/EC and on bosu ball, walking in yard and in lake   Plan for next session Reassess reaction to HEP and to trip to [place] and balance with the beach.   Comments   Comments Pt robert's continued improvement to sit to stand and SLS EO as well as trunk ROM. Pt remains good candidate for skilled PT services to address remaining impairments and reach goals.   Total Session Time   Timed Code Treatment Minutes 38   Total Treatment Time (sum of timed and untimed services) 38

## 2024-07-31 ENCOUNTER — TELEPHONE (OUTPATIENT)
Dept: FAMILY MEDICINE | Facility: CLINIC | Age: 74
End: 2024-07-31
Payer: COMMERCIAL

## 2024-07-31 NOTE — TELEPHONE ENCOUNTER
We received notice from [employer] that a 120 day supply of Ozempic should arrive at the [facility] within 10 business days.    Please contact Sayda to .    Thanks so much for your help!    Jane Mahajan  Prescription Assistance Supervisor  Pharmacy Assistance

## 2024-08-02 NOTE — TELEPHONE ENCOUNTER
Kendrick Sun RN called Sayda on 8/2 and left a message to return call to clinic. If patient calls back, please route to [facility].     TC if patient returns call please let her know that her ozempic has arrived and is ready for  here at the [facility].     Kendrick Sun RN  [facility]     yes

## 2024-08-05 NOTE — TELEPHONE ENCOUNTER
2nd message left for patient to call clinic, when she returns call please let her know her Ozempic is at the [facility] for

## 2024-08-05 NOTE — TELEPHONE ENCOUNTER
Spoke to patient and relayed message. Patient verbalized understanding and is in agreement with plan.     Lola Sawant RN  [facility]

## 2024-08-06 NOTE — TELEPHONE ENCOUNTER
Patient arrived to clinic, name and birth date verified and patient signed blue slip that she picked up medication. Ozempic given to patient.    VINNIE GriffinN, RN  [facility]

## 2024-08-12 DIAGNOSIS — I10 ESSENTIAL HYPERTENSION, BENIGN: ICD-10-CM

## 2024-08-12 RX ORDER — AMLODIPINE BESYLATE 5 MG/1
5 TABLET ORAL DAILY
Qty: 90 TABLET | Refills: 1 | Status: SHIPPED | OUTPATIENT
Start: 2024-08-12

## 2024-09-09 ENCOUNTER — THERAPY VISIT (OUTPATIENT)
Dept: PHYSICAL THERAPY | Facility: REHABILITATION | Age: 74
End: 2024-09-09
Payer: COMMERCIAL

## 2024-09-09 DIAGNOSIS — R26.89 POOR BALANCE: Primary | ICD-10-CM

## 2024-09-09 PROCEDURE — 97110 THERAPEUTIC EXERCISES: CPT | Mod: GP | Performed by: PHYSICAL THERAPIST

## 2024-09-09 PROCEDURE — 97112 NEUROMUSCULAR REEDUCATION: CPT | Mod: GP | Performed by: PHYSICAL THERAPIST

## 2024-09-09 PROCEDURE — 97116 GAIT TRAINING THERAPY: CPT | Mod: GP | Performed by: PHYSICAL THERAPIST

## 2024-09-09 NOTE — PROGRESS NOTES
Discharge Note    Beginning/End Dates of Progress Note Reporting Period:  04/01/24 to 09/09/2024    Referring Provider:  Kristen Navarrete MD    DISCHARGE  Reason for Discharge: Patient has met all goals.    Discharge Plan: Patient to continue home program.       09/09/24 0500   Appointment Info   Signing clinician's name / credentials Bianka Mcnamara PT, DPT, CLT   Total/Authorized Visits 8   Visits Used 6   Medical Diagnosis Poor balance (R26.89)   PT Tx Diagnosis Poor balance (R26.89)   Progress Note/Certification   Start of Care Date 04/01/24   Onset of illness/injury or Date of Surgery 02/12/24   Therapy Frequency 1x/every 1-2 weeks   Predicted Duration 90 days   Certification date from 06/29/24   Certification date to 09/26/24   Progress Note Due Date 09/26/24   Progress Note Completed Date 04/01/24   GOALS   PT Goals 2;3   PT Goal 1   Goal Description Pt will be able to improve her balance (by assessment with APTA sit to stand) to be able to improve her ability to perform ADLs and hiking in 90 days.   Goal Progress MET - doing well with her balance   Target Date 09/26/24   Subjective Report   Subjective Report Pt reports over the past 6 weeks she celebrated her birthday and anniversary. Pt reports she went on a trip to [place] and did well - she did have 1 fall in the sand but did not hurt anything. Pt reports she feels like her balance has been really good. She was able to try and play pickleball and only had her balance go off once when she tried to reach a ball that was below her reach. Pt reports she has started having some R medial elbow pain rated 4/10 after traveling - maybe from carrying/lifting suitcases - for about 3 weeks. Pt reports she has 1 more camping trip to [facility] and then will be busy getting equipment out of the lake - will try to get docks out soon. Pt reports she has hydrobikes that they ride around the lake right now - takes about 25 minutes and sometimes will also sit and paddleboard on the  water as well. Once it gets too cold then pt will return to [facility] in [facility] to walk in the pool.   Objective Measures   Objective Measures Objective Measure 1;Objective Measure 2;Objective Measure 3;Objective Measure 4;Objective Measure 5   Objective Measure 1   Objective Measure APTA assessment   Details APTA sit to stand 17 reps (was 12-14 reps)   Objective Measure 2   Objective Measure Balance   Details R SLS 30 seconds (was 10-22 seconds), L SLS 30 seconds (was 11-24),  tandem 30 seconds B   Objective Measure 3   Objective Measure ROM   Details Trunk ROM WNL without back/hip or knee pain all directions   Objective Measure 4   Objective Measure Gait   Details Pt with improved B arm swing and minimal trendelenberg remaining L   Objective Measure 5   Objective Measure Strength   Details B LE grossly 5/5   Treatment Interventions (PT)   Interventions Therapeutic Procedure/Exercise;Neuromuscular Re-education;Self Care/Home Management;Gait Training   Therapeutic Procedure/Exercise   Therapeutic Procedures: strength, endurance, ROM, flexibility minutes (66803) 20   Ther Proc 1 - Details Performed trunk ROM, sit to stand x17 rep, sit to stand to curl to overhead press x10 reps, dead lift with 10 lbs x10 reps - reviewed performing strength training at least 3x/week, aerobic up to 5 times per week with her hydrobike, pool walking or recumbent bike   Skilled Intervention Reassessed response to HEP and activity level over past 6 weeks.  Reassessed B LE strength and trunk ROM discussed progress. Performed, reviewed and further whole body strength/weight bearing HEP and encouraged pt to continue performing long term for continued improvements.   Patient Response/Progress Pt demo's understanding of importance of continuing movement aerobic and strength training for long term benefits   Neuromuscular Re-education   Neuromuscular re-ed of mvmt, balance, coord, kinesthetic sense, posture, proprioception minutes (00705) 52    Neuro Re-ed 1 - Details Performed tandem B x1 rep each and SLS EO x5-10 trials for 30 seconds B   Skilled Intervention Reassessed response to HEP and activity level over past 6 weeks. Reassessed SLS  and tandem stance and advised to continue balance exercises with home program daily as able.   Patient Response/Progress Improving balance   Gait Training   Gait Training Minutes, includes stair climbing (77702) 8   Gait 1 - Details Performed forward and retro walking x40 ft, side stepping x40' B, side carioca x40' B - pt to do in pool or lake   Skilled Intervention Reassessed gait quality and discussed results. Performed gait training and advised on walking program with gait variations to improve weight bearing, balance and gait quality - per pt instructions and printed AVS for home.   Patient Response/Progress Pt able to do gait variations with minimal difficulty.   Plan   Home program modified dead lift, sit to stand to curl to press, tandem, SLS, walking in yard and in lake forward/retro/side step and carioca   Plan for next session Discharge to I with HEP with goals mostly met and pt I with HEP. Thank you for this referral!   Comments   Comments Pt robert's continued improvement to sit to stand and SLS EO as well as trunk ROM and balance to WFL with pt pleased with how well she is doing with her strength and balance getting to perform in hobbies with minimal to no difficulty.   Total Session Time   Timed Code Treatment Minutes 38   Total Treatment Time (sum of timed and untimed services) 38

## 2024-09-09 NOTE — PATIENT INSTRUCTIONS
IMPORTANT TO CONTINUE exercises - especially through the winter to keep the improvements you have made in PT      Aerobic exercise  Biking on the water or in basement, paddleboarding, walking land or water - trying to do up to 30 minutes up to 5x/week    When walking in lake/pool - try walking backwards, sideways and carioca to challenge muscles differently      Strength training exercises - at least 3x/week 10 reps 2-3 sets  Deadlift with 5 lbs    Squat in chair to stand and then overhead press with soup cans        Balance - try to do single leg balance and tandem balance for 30 seconds every day each leg

## 2024-09-27 ENCOUNTER — NURSE TRIAGE (OUTPATIENT)
Dept: FAMILY MEDICINE | Facility: CLINIC | Age: 74
End: 2024-09-27

## 2024-09-27 NOTE — TELEPHONE ENCOUNTER
Triage:  Patient called with UTI symptoms.  See protocol below.  Advised to do E-visit for UTI.  VLADIMIR Vance RN  [facility]      Reason for Disposition   All other urine symptoms    Additional Information   Negative: Shock suspected (e.g., cold/pale/clammy skin, too weak to stand, low BP, rapid pulse)   Negative: Sounds like a life-threatening emergency to the triager   Negative: Followed a female genital area injury (e.g., labia, vagina, vulva)   Negative: Followed a male genital area injury (penis, scrotum)   Negative: Vaginal discharge   Negative: Pus (white, yellow) or bloody discharge from end of penis   Negative: Pain or burning with passing urine (urination) and pregnant   Negative: Pain or burning with passing urine (urination) and female   Negative: Pain or burning with passing urine (urination) and male   Negative: Pain or itching in the vulvar area   Negative: Pain in scrotum is main symptom   Negative: Blood in the urine is main symptom   Negative: Symptoms arising from use of a urinary catheter (e.g., Coude, Arvizu)   Negative: Unable to urinate (or only a few drops) > 4 hours and bladder feels very full (e.g., palpable bladder or strong urge to urinate)   Negative: Decreased urination and drinking very little and dehydration suspected (e.g., dark urine, no urine > 12 hours, very dry mouth, very lightheaded)   Negative: Patient sounds very sick or weak to the triager   Negative: Fever > 100.4 F  (38.0 C)   Negative: Side (flank) or lower back pain present   Negative: Bad or foul-smelling urine   Negative: Urinating more frequently than usual (i.e., frequency)   Negative: Can't control passage of urine (i.e., urinary incontinence) and new-onset (< 2 weeks) or worsening   Negative: Patient wants to be seen   Negative: Can't control passage of urine (i.e., urinary incontinence, wetting self) and present > 2 weeks   Negative: Urination is difficult to start (i.e., hesitancy)  "or straining   Negative: Dribbling (losing urine) just after finishing urination (i.e., post-void dribbling)   Negative: Has to get out of bed to urinate > 2 times a night (i.e., nocturia)    Answer Assessment - Initial Assessment Questions  1. SYMPTOM: \"What's the main symptom you're concerned about?\" (e.g., frequency, incontinence)      Burning and frequency  2. ONSET: \"When did the  symptoms  start?\"      today  3. PAIN: \"Is there any pain?\" If Yes, ask: \"How bad is it?\" (Scale: 1-10; mild, moderate, severe)      no  4. CAUSE: \"What do you think is causing the symptoms?\"      UTI  5. OTHER SYMPTOMS: \"Do you have any other symptoms?\" (e.g., blood in urine, fever, flank pain, pain with urination)      no  6. PREGNANCY: \"Is there any chance you are pregnant?\" \"When was your last menstrual period?\"      no    Protocols used: Urinary Symptoms-A-[place]    "

## 2024-10-28 ENCOUNTER — TRANSFERRED RECORDS (OUTPATIENT)
Dept: HEALTH INFORMATION MANAGEMENT | Facility: CLINIC | Age: 74
End: 2024-10-28
Payer: COMMERCIAL

## 2024-10-28 LAB — RETINOPATHY: NEGATIVE

## 2024-10-31 ENCOUNTER — TELEPHONE (OUTPATIENT)
Dept: FAMILY MEDICINE | Facility: CLINIC | Age: 74
End: 2024-10-31
Payer: COMMERCIAL

## 2024-10-31 DIAGNOSIS — F41.1 ANXIETY STATE: ICD-10-CM

## 2024-10-31 RX ORDER — ESCITALOPRAM OXALATE 20 MG/1
20 TABLET ORAL DAILY
Qty: 90 TABLET | Refills: 1 | Status: SHIPPED | OUTPATIENT
Start: 2024-10-31

## 2024-10-31 NOTE — TELEPHONE ENCOUNTER
Called and informed patient that Ozempic has arrived at clinic. Patient states she will come to the clinic to  medication.    Dania Cummings RN  [facility]

## 2024-11-05 ENCOUNTER — TRANSFERRED RECORDS (OUTPATIENT)
Dept: HEALTH INFORMATION MANAGEMENT | Facility: CLINIC | Age: 74
End: 2024-11-05
Payer: COMMERCIAL

## 2024-11-19 ENCOUNTER — TRANSFERRED RECORDS (OUTPATIENT)
Dept: HEALTH INFORMATION MANAGEMENT | Facility: CLINIC | Age: 74
End: 2024-11-19
Payer: COMMERCIAL

## 2024-12-06 ENCOUNTER — MYC MEDICAL ADVICE (OUTPATIENT)
Dept: FAMILY MEDICINE | Facility: CLINIC | Age: 74
End: 2024-12-06
Payer: COMMERCIAL

## 2024-12-09 NOTE — TELEPHONE ENCOUNTER
Called and relayed message from Dr. Navarrete, patient verbalized understanding and is scheduled to see Dr. Navarrete on Monday 12/16/24.     Kendrick Sun RN  [facility]

## 2024-12-16 ENCOUNTER — OFFICE VISIT (OUTPATIENT)
Dept: FAMILY MEDICINE | Facility: CLINIC | Age: 74
End: 2024-12-16
Payer: COMMERCIAL

## 2024-12-16 VITALS
DIASTOLIC BLOOD PRESSURE: 74 MMHG | RESPIRATION RATE: 20 BRPM | TEMPERATURE: 98 F | WEIGHT: 175 LBS | SYSTOLIC BLOOD PRESSURE: 122 MMHG | OXYGEN SATURATION: 97 % | BODY MASS INDEX: 28.12 KG/M2 | HEART RATE: 79 BPM | HEIGHT: 66 IN

## 2024-12-16 DIAGNOSIS — E11.8 TYPE 2 DIABETES MELLITUS WITH COMPLICATION, WITHOUT LONG-TERM CURRENT USE OF INSULIN (H): ICD-10-CM

## 2024-12-16 DIAGNOSIS — N18.32 STAGE 3B CHRONIC KIDNEY DISEASE (H): ICD-10-CM

## 2024-12-16 DIAGNOSIS — F33.1 MAJOR DEPRESSIVE DISORDER, RECURRENT EPISODE, MODERATE (H): ICD-10-CM

## 2024-12-16 DIAGNOSIS — F41.1 GENERALIZED ANXIETY DISORDER: Primary | ICD-10-CM

## 2024-12-16 LAB
ALBUMIN SERPL BCG-MCNC: 4.2 G/DL (ref 3.5–5.2)
ALP SERPL-CCNC: 79 U/L (ref 40–150)
ALT SERPL W P-5'-P-CCNC: 43 U/L (ref 0–50)
ANION GAP SERPL CALCULATED.3IONS-SCNC: 12 MMOL/L (ref 7–15)
AST SERPL W P-5'-P-CCNC: 26 U/L (ref 0–45)
BILIRUB SERPL-MCNC: 0.3 MG/DL
BUN SERPL-MCNC: 23.5 MG/DL (ref 8–23)
CALCIUM SERPL-MCNC: 10 MG/DL (ref 8.8–10.4)
CHLORIDE SERPL-SCNC: 101 MMOL/L (ref 98–107)
CREAT SERPL-MCNC: 1.26 MG/DL (ref 0.51–0.95)
EGFRCR SERPLBLD CKD-EPI 2021: 44 ML/MIN/1.73M2
ERYTHROCYTE [DISTWIDTH] IN BLOOD BY AUTOMATED COUNT: 12.9 % (ref 10–15)
EST. AVERAGE GLUCOSE BLD GHB EST-MCNC: 146 MG/DL
GLUCOSE SERPL-MCNC: 118 MG/DL (ref 70–99)
HBA1C MFR BLD: 6.7 % (ref 0–5.6)
HCO3 SERPL-SCNC: 26 MMOL/L (ref 22–29)
HCT VFR BLD AUTO: 42.5 % (ref 35–47)
HGB BLD-MCNC: 14.3 G/DL (ref 11.7–15.7)
HOLD SPECIMEN: NORMAL
HOLD SPECIMEN: NORMAL
MCH RBC QN AUTO: 31.1 PG (ref 26.5–33)
MCHC RBC AUTO-ENTMCNC: 33.6 G/DL (ref 31.5–36.5)
MCV RBC AUTO: 92 FL (ref 78–100)
PLATELET # BLD AUTO: 224 10E3/UL (ref 150–450)
POTASSIUM SERPL-SCNC: 4.5 MMOL/L (ref 3.4–5.3)
PROT SERPL-MCNC: 6.6 G/DL (ref 6.4–8.3)
RBC # BLD AUTO: 4.6 10E6/UL (ref 3.8–5.2)
SODIUM SERPL-SCNC: 139 MMOL/L (ref 135–145)
TSH SERPL DL<=0.005 MIU/L-ACNC: 2.09 UIU/ML (ref 0.3–4.2)
WBC # BLD AUTO: 6 10E3/UL (ref 4–11)

## 2024-12-16 PROCEDURE — 36415 COLL VENOUS BLD VENIPUNCTURE: CPT | Performed by: FAMILY MEDICINE

## 2024-12-16 PROCEDURE — 83036 HEMOGLOBIN GLYCOSYLATED A1C: CPT | Performed by: FAMILY MEDICINE

## 2024-12-16 PROCEDURE — 85027 COMPLETE CBC AUTOMATED: CPT | Performed by: FAMILY MEDICINE

## 2024-12-16 PROCEDURE — 99214 OFFICE O/P EST MOD 30 MIN: CPT | Performed by: FAMILY MEDICINE

## 2024-12-16 PROCEDURE — G2211 COMPLEX E/M VISIT ADD ON: HCPCS | Performed by: FAMILY MEDICINE

## 2024-12-16 PROCEDURE — 84443 ASSAY THYROID STIM HORMONE: CPT | Performed by: FAMILY MEDICINE

## 2024-12-16 PROCEDURE — 80053 COMPREHEN METABOLIC PANEL: CPT | Performed by: FAMILY MEDICINE

## 2024-12-16 RX ORDER — BUSPIRONE HYDROCHLORIDE 5 MG/1
5 TABLET ORAL 2 TIMES DAILY
Qty: 60 TABLET | Refills: 1 | Status: SHIPPED | OUTPATIENT
Start: 2024-12-16

## 2024-12-16 ASSESSMENT — ANXIETY QUESTIONNAIRES
IF YOU CHECKED OFF ANY PROBLEMS ON THIS QUESTIONNAIRE, HOW DIFFICULT HAVE THESE PROBLEMS MADE IT FOR YOU TO DO YOUR WORK, TAKE CARE OF THINGS AT HOME, OR GET ALONG WITH OTHER PEOPLE: VERY DIFFICULT
GAD7 TOTAL SCORE: 19
4. TROUBLE RELAXING: NEARLY EVERY DAY
7. FEELING AFRAID AS IF SOMETHING AWFUL MIGHT HAPPEN: MORE THAN HALF THE DAYS
GAD7 TOTAL SCORE: 19
5. BEING SO RESTLESS THAT IT IS HARD TO SIT STILL: MORE THAN HALF THE DAYS
8. IF YOU CHECKED OFF ANY PROBLEMS, HOW DIFFICULT HAVE THESE MADE IT FOR YOU TO DO YOUR WORK, TAKE CARE OF THINGS AT HOME, OR GET ALONG WITH OTHER PEOPLE?: VERY DIFFICULT
1. FEELING NERVOUS, ANXIOUS, OR ON EDGE: NEARLY EVERY DAY
3. WORRYING TOO MUCH ABOUT DIFFERENT THINGS: NEARLY EVERY DAY
GAD7 TOTAL SCORE: 19
6. BECOMING EASILY ANNOYED OR IRRITABLE: NEARLY EVERY DAY
7. FEELING AFRAID AS IF SOMETHING AWFUL MIGHT HAPPEN: MORE THAN HALF THE DAYS
2. NOT BEING ABLE TO STOP OR CONTROL WORRYING: NEARLY EVERY DAY

## 2024-12-16 ASSESSMENT — PATIENT HEALTH QUESTIONNAIRE - PHQ9
10. IF YOU CHECKED OFF ANY PROBLEMS, HOW DIFFICULT HAVE THESE PROBLEMS MADE IT FOR YOU TO DO YOUR WORK, TAKE CARE OF THINGS AT HOME, OR GET ALONG WITH OTHER PEOPLE: VERY DIFFICULT
SUM OF ALL RESPONSES TO PHQ QUESTIONS 1-9: 15
SUM OF ALL RESPONSES TO PHQ QUESTIONS 1-9: 15

## 2024-12-16 ASSESSMENT — PAIN SCALES - GENERAL: PAINLEVEL_OUTOF10: NO PAIN (0)

## 2024-12-16 NOTE — PATIENT INSTRUCTIONS
"Begin buspirone 5 mg twice daily.  Notify me with intolerance.  Follow-up with me in 1 month.    Therapist groups in the area:    [facility] ([place] and other sites) [phone]    [facility] [place] [phone]    [facility] ([place]) [phone]  **[facility] ([facility], other locations) [phone]    [employer] ([place]) [phone]    [email or web address] or the [employer] [facility] (virtual, monthly subscription)     Check out the tanja \"Calm\"      "

## 2024-12-16 NOTE — PROGRESS NOTES
"  Assessment & Plan     Generalized anxiety disorder  Inadequate control.  Discussed options.  Continue escitalopram at current dose.  Add buspirone 5 mg twice daily.  Notify of side effects.  Will assess for anemia, electrolyte changes, or thyroid dysfunction that could be contributing to her symptom experience.  Follow-up with me in 4 weeks to reassess.  Information provided regarding cognitive Darci therapist in the area, encourage consideration of the \"calm\" rahel.  - busPIRone (BUSPAR) 5 MG tablet; Take 1 tablet (5 mg) by mouth 2 times daily.  - CBC with platelets; Future  - TSH with free T4 reflex; Future  - CBC with platelets  - TSH with free T4 reflex    Moderate recurrent Major Depression   Overall stable per her perception though her PHQ-9 remains elevated.  Continue escitalopram.    Type 2 diabetes mellitus with complication, without long-term current use of insulin (H)  Courage continued efforts at healthy lifestyle habits.  Worsening likely related to recent steroid injection, difficulties with back that have impacted activity, and recent stressors.  Will follow.  Will check kidney and liver function today.  - Hemoglobin A1c  - Comprehensive metabolic panel; Future  - Comprehensive metabolic panel    Stage 3b chronic kidney disease (H)  We will reassess kidney function today.  Continue to avoid nephrotoxic agents.    The longitudinal plan of care for the diagnosis(es)/condition(s) as documented were addressed during this visit. Due to the added complexity in care, I will continue to support Sayda in the subsequent management and with ongoing continuity of care.      BMI  Estimated body mass index is 28.68 kg/m  as calculated from the following:    Height as of this encounter: 1.664 m (5' 5.5\").    Weight as of this encounter: 79.4 kg (175 lb).             Subjective   Sayda is a 74 year old, presenting for the following health issues:   Stress and Anxiety    Seen in clinic today to discuss increasing " "stress and anxiety.  [place] very excited about this but the transition will be prolonged, [place] until the spring.  Notes this is a financial risk.  Nervous about downsizing and moving.  Feeling much more anxious over the past month.  Over the past few days will feel a bit more shaky or dizzy at times.  Disrupted sleep, both falling asleep and awakening early as \"mind is going\".  Fears regarding Medicare and Social Security and current political climate.  Using deep breathing and mindfulness exercises to help, finding an adequate effect.  Currently taking escitalopram which has been helpful for her depression, feels her depression is under good control \"I do not feel depressed at all\".  In the past to taking citalopram, duloxetine, hydroxyzine, lorazepam, venlafaxine, trazodone.  Was very excited to have weaned off trazodone last winter as it lost its effectiveness and is not interested in resuming.  Also notes she has been having more urinary tract infections lately although she also notes she had stopped her vaginal estrogen and plans to restart.  Due for follow-up of diabetes, admits that she has been less active as she has been struggling more with back pain, anticipating L5 decompression surgery next year.  Corticosteroid injection within the past month.    History of Present Illness       Mental Health Follow-up:  Patient presents to follow-up on Anxiety.    Patient's anxiety since last visit has been:  Worse  The patient is not having other symptoms associated with anxiety.  Any significant life events: other  Patient is feeling anxious or having panic attacks.  Patient has no concerns about alcohol or drug use.    Headaches:   Since the patient's last clinic visit, headaches are: no change  The patient is getting headaches:  At night  She is not able to do normal daily activities when she has a " "migraine.  The patient is taking the following rescue/relief medications:  Other   Patient states \"I get total relief\" from the rescue/relief medications.   The patient is taking the following medications to prevent migraines:  No medications to prevent migraines  In the past 4 weeks, the patient has gone to an Urgent Care or Emergency Room 0 times times due to headaches.    She eats 2-3 servings of fruits and vegetables daily.She consumes 0 sweetened beverage(s) daily.She exercises with enough effort to increase her heart rate 20 to 29 minutes per day.  She exercises with enough effort to increase her heart rate 3 or less days per week.   She is taking medications regularly.                     Objective    /74   Pulse 79   Temp 98  F (36.7  C)   Resp 20   Ht 1.664 m (5' 5.5\")   Wt 79.4 kg (175 lb)   LMP  (LMP Unknown)   SpO2 97%   BMI 28.68 kg/m    Body mass index is 28.68 kg/m .  Physical Exam   Alert female.  Affect appropriate.  Normal psychomotor activities.            Signed Electronically by: Kristen Navarrete MD    "

## 2024-12-17 ENCOUNTER — TRANSFERRED RECORDS (OUTPATIENT)
Dept: HEALTH INFORMATION MANAGEMENT | Facility: CLINIC | Age: 74
End: 2024-12-17

## 2024-12-17 ENCOUNTER — TRANSFERRED RECORDS (OUTPATIENT)
Dept: HEALTH INFORMATION MANAGEMENT | Facility: CLINIC | Age: 74
End: 2024-12-17
Payer: COMMERCIAL

## 2025-01-03 PROBLEM — N18.32 STAGE 3B CHRONIC KIDNEY DISEASE (H): Status: ACTIVE | Noted: 2020-11-04

## 2025-01-03 PROBLEM — N18.30 CHRONIC KIDNEY DISEASE, STAGE 3 (H): Status: RESOLVED | Noted: 2020-11-04 | Resolved: 2025-01-03

## 2025-01-06 ENCOUNTER — OFFICE VISIT (OUTPATIENT)
Dept: FAMILY MEDICINE | Facility: CLINIC | Age: 75
End: 2025-01-06
Payer: COMMERCIAL

## 2025-01-06 VITALS
TEMPERATURE: 97.8 F | OXYGEN SATURATION: 97 % | RESPIRATION RATE: 18 BRPM | DIASTOLIC BLOOD PRESSURE: 70 MMHG | SYSTOLIC BLOOD PRESSURE: 130 MMHG | WEIGHT: 175 LBS | HEART RATE: 84 BPM | BODY MASS INDEX: 28.12 KG/M2 | HEIGHT: 66 IN

## 2025-01-06 DIAGNOSIS — M54.16 CHRONIC LEFT-SIDED LUMBAR RADICULOPATHY: ICD-10-CM

## 2025-01-06 DIAGNOSIS — M48.061 SPINAL STENOSIS OF LUMBAR REGION WITHOUT NEUROGENIC CLAUDICATION: ICD-10-CM

## 2025-01-06 DIAGNOSIS — M43.16 SPONDYLOLISTHESIS OF LUMBAR REGION: ICD-10-CM

## 2025-01-06 DIAGNOSIS — N18.32 STAGE 3B CHRONIC KIDNEY DISEASE (H): ICD-10-CM

## 2025-01-06 DIAGNOSIS — Z01.818 PREOP GENERAL PHYSICAL EXAM: Primary | ICD-10-CM

## 2025-01-06 DIAGNOSIS — E78.5 HYPERLIPIDEMIA, UNSPECIFIED HYPERLIPIDEMIA TYPE: ICD-10-CM

## 2025-01-06 DIAGNOSIS — E11.8 TYPE 2 DIABETES MELLITUS WITH COMPLICATION, WITHOUT LONG-TERM CURRENT USE OF INSULIN (H): ICD-10-CM

## 2025-01-06 DIAGNOSIS — F33.1 MAJOR DEPRESSIVE DISORDER, RECURRENT EPISODE, MODERATE (H): ICD-10-CM

## 2025-01-06 DIAGNOSIS — I10 ESSENTIAL HYPERTENSION, BENIGN: ICD-10-CM

## 2025-01-06 DIAGNOSIS — F41.1 GENERALIZED ANXIETY DISORDER: ICD-10-CM

## 2025-01-06 DIAGNOSIS — G47.33 OSA (OBSTRUCTIVE SLEEP APNEA): ICD-10-CM

## 2025-01-06 LAB
ANION GAP SERPL CALCULATED.3IONS-SCNC: 9 MMOL/L (ref 7–15)
BUN SERPL-MCNC: 22.7 MG/DL (ref 8–23)
CALCIUM SERPL-MCNC: 9.8 MG/DL (ref 8.8–10.4)
CHLORIDE SERPL-SCNC: 104 MMOL/L (ref 98–107)
CREAT SERPL-MCNC: 1.25 MG/DL (ref 0.51–0.95)
CREAT UR-MCNC: 149 MG/DL
EGFRCR SERPLBLD CKD-EPI 2021: 45 ML/MIN/1.73M2
ERYTHROCYTE [DISTWIDTH] IN BLOOD BY AUTOMATED COUNT: 12.9 % (ref 10–15)
GLUCOSE SERPL-MCNC: 145 MG/DL (ref 70–99)
HCO3 SERPL-SCNC: 28 MMOL/L (ref 22–29)
HCT VFR BLD AUTO: 40 % (ref 35–47)
HGB BLD-MCNC: 13.8 G/DL (ref 11.7–15.7)
MCH RBC QN AUTO: 31.9 PG (ref 26.5–33)
MCHC RBC AUTO-ENTMCNC: 34.5 G/DL (ref 31.5–36.5)
MCV RBC AUTO: 93 FL (ref 78–100)
MICROALBUMIN UR-MCNC: <12 MG/L
MICROALBUMIN/CREAT UR: NORMAL MG/G{CREAT}
PLATELET # BLD AUTO: 219 10E3/UL (ref 150–450)
POTASSIUM SERPL-SCNC: 4.6 MMOL/L (ref 3.4–5.3)
RBC # BLD AUTO: 4.32 10E6/UL (ref 3.8–5.2)
SODIUM SERPL-SCNC: 141 MMOL/L (ref 135–145)
WBC # BLD AUTO: 6.8 10E3/UL (ref 4–11)

## 2025-01-06 PROCEDURE — 99214 OFFICE O/P EST MOD 30 MIN: CPT | Performed by: FAMILY MEDICINE

## 2025-01-06 PROCEDURE — 36415 COLL VENOUS BLD VENIPUNCTURE: CPT | Performed by: FAMILY MEDICINE

## 2025-01-06 PROCEDURE — G2211 COMPLEX E/M VISIT ADD ON: HCPCS | Performed by: FAMILY MEDICINE

## 2025-01-06 PROCEDURE — 85027 COMPLETE CBC AUTOMATED: CPT | Performed by: FAMILY MEDICINE

## 2025-01-06 PROCEDURE — 82043 UR ALBUMIN QUANTITATIVE: CPT | Performed by: FAMILY MEDICINE

## 2025-01-06 PROCEDURE — 96127 BRIEF EMOTIONAL/BEHAV ASSMT: CPT | Performed by: FAMILY MEDICINE

## 2025-01-06 PROCEDURE — 80048 BASIC METABOLIC PNL TOTAL CA: CPT | Performed by: FAMILY MEDICINE

## 2025-01-06 PROCEDURE — 82570 ASSAY OF URINE CREATININE: CPT | Performed by: FAMILY MEDICINE

## 2025-01-06 RX ORDER — BUSPIRONE HYDROCHLORIDE 5 MG/1
TABLET ORAL
Qty: 120 TABLET | Refills: 1 | Status: SHIPPED | OUTPATIENT
Start: 2025-01-06

## 2025-01-06 ASSESSMENT — PAIN SCALES - GENERAL: PAINLEVEL_OUTOF10: NO PAIN (0)

## 2025-01-06 NOTE — PATIENT INSTRUCTIONS
1 week before surgery, stop aspirin, Ozempic, and any vitamins and supplements.    Do not take lisinopril or glipizide the evening before or morning of your surgery.

## 2025-01-06 NOTE — PROGRESS NOTES
Preoperative Evaluation  [facility]  [address]  [facility] [address]  Phone: [phone]  Fax: [phone]  Primary Provider: Kristen Navarrete MD  Pre-op Performing Provider: Kristen Navarrete MD  Jan 6, 2025 1/6/2025   Surgical Information   What procedure is being done? decompression surgery   Facility or Hospital where procedure/surgery will be performed[facility]   Who is doing the procedure / surgery? dr bhardwaj   Date of surgery / procedure: jan 22   Time of surgery / procedure: ?   Where do you plan to recover after surgery? at home with family     Fax number for surgical facility: Note does not need to be faxed, will be available electronically in Epic.    Assessment & Plan     The proposed surgical procedure is considered INTERMEDIATE risk.    Preop general physical exam  Surgical risks include controlled diabetes, controlled hypertension, controlled dyslipidemia, stable chronic kidney disease, and mild untreated obstructive sleep apnea and age with position changes.  At this time her health conditions are adequately managed.  She may proceed with surgery as scheduled without further clinical care location or evaluation and may proceed with choice of anesthesia.  We will check BMP and CBC today.    Spinal stenosis of lumbar region without neurogenic claudication  Spondylolisthesis of lumbar region  Chronic left-sided lumbar radiculopathy  Inadequate control with conservative measures.  To proceed with surgery as scheduled.    Type 2 diabetes mellitus with complication, without long-term current use of insulin (H)  Adequately controlled with most recent A1c at 6.7%.  She will hold Ozempic at least 1 week prior to surgery, hold glipizide the evening before and morning of surgery.  Resume both following surgery.    Benign Essential Hypertension  Stable and controlled on lisinopril and amlodipine.  Continue, holding lisinopril night before and morning of  surgery.    Hyperlipidemia, unspecified hyperlipidemia type  Managed with Zetia.  Continue.    KAMLESH (obstructive sleep apnea)  Mild, treated with avoidance of sleeping on her back.  In the short-term if requiring sleeping on her back postoperatively this would be fine though she may be more apt to experiencing apneic episodes.    Stage 3b chronic kidney disease (H)  This has been stable.  We will check basic metabolic panel and urine microalbumin today.  - Albumin Random Urine Quantitative with Creat Ratio; Future    Generalized anxiety disorder  Major depressive disorder, recurrent episode, moderate (H)  Inadequate control of anxiety currently.  Continue escitalopram.  Increase buspirone to 10 mg in the morning, 5 mg in the afternoon for 1 week, then 10 mg twice daily.  Follow-up with me in 1 month to reassess.  - busPIRone (BUSPAR) 5 MG tablet; Take 2 tabs by mouth in the morning and 1 tab in the evening daily for 1 week, then 2 tabs twice daily.       - No identified additional risk factors other than previously addressed    Antiplatelet or Anticoagulation Medication Instructions   - aspirin: Discontinue aspirin 7 days prior to procedure to reduce bleeding risk. It should be resumed postoperatively.     Additional Medication Instructions  Take all scheduled medications on the day of surgery EXCEPT for modifications listed below:   - ACE/ARB/ARNI (lisinopril, enalapril, losartan, valsartan, olmesartan, sacubritril/valsartan) : DO NOT TAKE on day of surgery (minimum 11 hours for general anesthesia).   - GLP-1 agonist/Long-acting insulin combinations: DO NOT TAKE the day before or day of surgery.   - sulfonylurea (e.g. glyburide, glipizide): DO NOT TAKE day of surgery    Recommendation  Approval given to proceed with proposed procedure, without further diagnostic evaluation.    The longitudinal plan of care for the diagnosis(es)/condition(s) as documented were addressed during this visit. Due to the added complexity  in care, I will continue to support Sayda in the subsequent management and with ongoing continuity of care.      Subjective   Sayda is a 74 year old, presenting for the following:  Pre-Op Exam (1-22-25 back surgery )        HPI related to upcoming procedure: Chronic low back pain with left leg radiculopathy, inadequately controlled with conservative measures including oral medications, physical therapy, and injections, requiring surgical intervention.  Diabetes has been stable and controlled with glipizide and Ozempic, most recent A1c 6.7% to 12/16/2024.  Hypertension has been stable and controlled with amlodipine and lisinopril.  Taking Zetia and management of hyperlipidemia as she is intolerant of statins.  History of chronic kidney disease stage IIIb which has been stable with GFR in the 40-45 range.  Sleep apnea has been mild, manages with positional measures of sleeping on her side or stomach, avoiding sleeping on her back.  Taking escitalopram and recently started buspirone and management of depression and anxiety, tolerating well but no improvement in anxiety with adjustment in buspirone, interested next steps.        1/6/2025   Pre-Op Questionnaire   Have you ever had a heart attack or stroke? No   Have you ever had surgery on your heart or blood vessels, such as a stent placement, a coronary artery bypass, or surgery on an artery in your head, neck, heart, or legs? No   Do you have chest pain with activity? No   Do you have a history of heart failure? No   Do you currently have a cold, bronchitis or symptoms of other infection? No   Do you have a cough, shortness of breath, or wheezing? No   Do you or anyone in your family have previous history of blood clots? No   Do you or does anyone in your family have a serious bleeding problem such as prolonged bleeding following surgeries or cuts? No   Have you ever had problems with anemia or been told to take iron pills? No   Have you had any abnormal blood loss  such as black, tarry or bloody stools, or abnormal vaginal bleeding? No   Have you ever had a blood transfusion? No   Are you willing to have a blood transfusion if it is medically needed before, during, or after your surgery? Yes   Have you or any of your relatives ever had problems with anesthesia? No   Do you have sleep apnea, excessive snoring or daytime drowsiness? No   Do you have any artifical heart valves or other implanted medical devices like a pacemaker, defibrillator, or continuous glucose monitor? No   Do you have artificial joints? (!) YES --left knee and left shoulder   Are you allergic to latex? No     Health Care Directive  Patient has a Health Care Directive on file      Preoperative Review of    reviewed - no record of controlled substances prescribed.          Patient Active Problem List    Diagnosis Date Noted    Poor balance 04/01/2024     Priority: Medium    Spinal stenosis of lumbar region, unspecified whether neurogenic claudication present 02/11/2024     Priority: Medium    KAMLESH (obstructive sleep apnea) 10/16/2023     Priority: Medium    Stage 3b chronic kidney disease (H) 11/04/2020     Priority: Medium    Osteopenia 10/09/2018     Priority: Medium     High risk of fracture per DEXA 8/2018        Hyperlipidemia      Priority: Medium     Created by Conversion        Type 2 diabetes mellitus with complication, without long-term current use of insulin (H) 01/26/2017     Priority: Medium    Anxiety      Priority: Medium     Created by Conversion  Replacement Utility updated for latest IMO load        Insomnia 06/22/2016     Priority: Medium    Status post total knee replacement 02/18/2015     Priority: Medium     Left (2/18/2015)        Benign Essential Hypertension      Priority: Medium     Created by Conversion        Cervical Disc Degeneration      Priority: Medium     Created by Conversion        Female Stress Incontinence      Priority: Medium     Created by Conversion  [facility]  Annotation: May 18 2010  9:18AM - Kristen Navarrete: s/p   bladder   sling Dr. Eugene        Recurrent Major Depression In Full Remission      Priority: Medium     Created by Conversion        Cervical Spondylosis      Priority: Medium     Created by Conversion          Past Medical History:   Diagnosis Date    Depression     Hypertension     Insomnia      Past Surgical History:   Procedure Laterality Date    [facility] SLING OPERATION FOR STRESS INCONTINENCE      Description: Mid-Urethral Sling Operation;  Recorded: 05/18/2010;    JOINT REPLACEMENT Left 2008    knee    OPEN REDUCTION INTERNAL FIXATION ANKLE Right     2008 and pin removal 2009    [facility] APPENDECTOMY      Description: Appendectomy;  Recorded: 02/26/2008;    [facility] TOTAL KNEE ARTHROPLASTY Left 2/18/2015    Procedure:   LEFT TOTAL KNEE ARTHROPLASTY;  Surgeon: Sina SHARP MD;  Location: [facility];  Service: Orthopedics     Current Outpatient Medications   Medication Sig Dispense Refill    amLODIPine (NORVASC) 5 MG tablet Take 1 tablet (5 mg) by mouth daily 90 tablet 1    aspirin 81 MG EC tablet Take 81 mg by mouth daily      blood glucose (NO BRAND SPECIFIED) test strip Use to test blood sugar 1 times daily or as directed. To accompany: Blood Glucose Monitor Brands: per insurance and patient request 100 strip 11    busPIRone (BUSPAR) 5 MG tablet Take 1 tablet (5 mg) by mouth 2 times daily. 60 tablet 1    CALCIUM PO Take 1 tablet by mouth daily      escitalopram (LEXAPRO) 20 MG tablet Take 1 tablet (20 mg) by mouth daily 90 tablet 1    ezetimibe (ZETIA) 10 MG tablet Take 1 tablet (10 mg) by mouth daily 90 tablet 3    glipiZIDE (GLUCOTROL XL) 10 MG 24 hr tablet Take 1 tablet (10 mg) by mouth 2 times daily 180 tablet 4    lisinopril (ZESTRIL) 40 MG tablet Take 40 mg by mouth daily      multivitamin therapeutic (THERAGRAN) tablet [MULTIVITAMIN THERAPEUTIC (THERAGRAN) TABLET] Take 1 tablet by mouth daily.      OMEGA-3/DHA/EPA/FISH OIL (FISH OIL-OMEGA-3 FATTY  "ACIDS) 300-1,000 mg capsule [OMEGA-3/DHA/EPA/FISH OIL (FISH OIL-OMEGA-3 FATTY ACIDS) 300-1,000 MG CAPSULE] Take 2 g by mouth daily.      semaglutide (OZEMPIC) 2 MG/3ML pen Inject 0.5 mg Subcutaneous every 7 days 3 mL 0    thin (NO BRAND SPECIFIED) lancets Use to test blood sugar 1 times daily or as directed. To accompany: Blood Glucose Monitor Brands: per insurance and patient request 100 each 11    vitamin D3 (CHOLECALCIFEROL) 50 mcg (2000 units) tablet Take 1 tablet by mouth daily         Allergies   Allergen Reactions    Atorvastatin Muscle Pain (Myalgia)    Metformin Unknown     Body aches, headache    Morphine (Pf) [Morphine] Nausea and Vomiting    Pravastatin Muscle Pain (Myalgia)        Social History     Tobacco Use    Smoking status: Never    Smokeless tobacco: Never   Substance Use Topics    Alcohol use: Yes     Alcohol/week: 2.0 standard drinks of alcohol     Types: 2 Standard drinks or equivalent per week     Comment: once in a while, but not often     Family History   Problem Relation Age of Onset    Diabetes Mother     Hypertension Mother     Hypertension Father     Cancer Brother     Clotting Disorder No family hx of     Anesthesia Reaction No family hx of      History   Drug Use No             Review of Systems  Constitutional, neuro, ENT, endocrine, pulmonary, cardiac, gastrointestinal, genitourinary, musculoskeletal, integument and psychiatric systems are negative, except as otherwise noted.    Objective    /70   Pulse 84   Temp 97.8  F (36.6  C) (Oral)   Resp 18   Ht 1.664 m (5' 5.5\")   Wt 79.4 kg (175 lb)   LMP  (LMP Unknown)   SpO2 97%   BMI 28.68 kg/m     Estimated body mass index is 28.68 kg/m  as calculated from the following:    Height as of this encounter: 1.664 m (5' 5.5\").    Weight as of this encounter: 79.4 kg (175 lb).  Physical Exam  GENERAL: alert and no distress  EYES: Eyes grossly normal to inspection, PERRL and conjunctivae and sclerae normal  HENT: ear canals and " TM's normal, nose and mouth without ulcers or lesions  NECK: no adenopathy, no asymmetry, masses, or scars  RESP: lungs clear to auscultation - no rales, rhonchi or wheezes  CV: regular rate and rhythm, normal S1 S2, no S3 or S4, no murmur, click or rub, no peripheral edema  ABDOMEN: soft, nontender, no hepatosplenomegaly, no masses and bowel sounds normal  MS: no gross musculoskeletal defects noted, no edema  SKIN: no suspicious lesions or rashes  NEURO: Normal strength and tone, mentation intact and speech normal  PSYCH: mentation appears normal, affect normal/bright    Recent Labs   Lab Test 12/16/24  1029 06/10/24  0855 04/12/24  0955 02/12/24  0930   HGB 14.3  --   --  14.4     --   --  202     --  136 138   POTASSIUM 4.5  --  4.3 4.5   CR 1.26*  --  1.33* 1.29*   A1C 6.7* 5.7*  --  6.1*        Diagnostics  Labs pending at this time.  Results will be reviewed when available.   No EKG required, no history of coronary heart disease, significant arrhythmia, peripheral arterial disease or other structural heart disease.    Revised Cardiac Risk Index (RCRI)  The patient has the following serious cardiovascular risks for perioperative complications:   - No serious cardiac risks = 0 points     RCRI Interpretation: 0 points: Class I (very low risk - 0.4% complication rate)         Signed Electronically by: Kristen Navarrete MD  A copy of this evaluation report is provided to the requesting physician.

## 2025-01-06 NOTE — H&P (VIEW-ONLY)
Preoperative Evaluation  [facility]  [address]  [facility] [address]  Phone: [phone]  Fax: [phone]  Primary Provider: Kristen Navarrete MD  Pre-op Performing Provider: Kristen Navarrete MD  Jan 6, 2025 1/6/2025   Surgical Information   What procedure is being done? decompression surgery   Facility or Hospital where procedure/surgery [facility] be performed[facility]   Who is doing the procedure / surgery? dr bhardwaj   Date of surgery / procedure: jan 22   Time of surgery / procedure: ?   Where do you plan to recover after surgery? at home with family     Fax number for surgical facility: Note does not need to be faxed, will be available electronically in Epic.    Assessment & Plan     The proposed surgical procedure is considered INTERMEDIATE risk.    Preop general physical exam  Surgical risks include controlled diabetes, controlled hypertension, controlled dyslipidemia, stable chronic kidney disease, and mild untreated obstructive sleep apnea and age with position changes.  At this time her health conditions are adequately managed.  She may proceed with surgery as scheduled without further clinical care location or evaluation and may proceed with choice of anesthesia.  We will check BMP and CBC today.    Spinal stenosis of lumbar region without neurogenic claudication  Spondylolisthesis of lumbar region  Chronic left-sided lumbar radiculopathy  Inadequate control with conservative measures.  To proceed with surgery as scheduled.    Type 2 diabetes mellitus with complication, without long-term current use of insulin (H)  Adequately controlled with most recent A1c at 6.7%.  She will hold Ozempic at least 1 week prior to surgery, hold glipizide the evening before and morning of surgery.  Resume both following surgery.    Benign Essential Hypertension  Stable and controlled on lisinopril and amlodipine.  Continue, holding lisinopril night before and morning of  surgery.    Hyperlipidemia, unspecified hyperlipidemia type  Managed with Zetia.  Continue.    KAMLESH (obstructive sleep apnea)  Mild, treated with avoidance of sleeping on her back.  In the short-term if requiring sleeping on her back postoperatively this would be fine though she may be more apt to experiencing apneic episodes.    Stage 3b chronic kidney disease (H)  This has been stable.  We will check basic metabolic panel and urine microalbumin today.  - Albumin Random Urine Quantitative with Creat Ratio; Future    Generalized anxiety disorder  Major depressive disorder, recurrent episode, moderate (H)  Inadequate control of anxiety currently.  Continue escitalopram.  Increase buspirone to 10 mg in the morning, 5 mg in the afternoon for 1 week, then 10 mg twice daily.  Follow-up with me in 1 month to reassess.  - busPIRone (BUSPAR) 5 MG tablet; Take 2 tabs by mouth in the morning and 1 tab in the evening daily for 1 week, then 2 tabs twice daily.       - No identified additional risk factors other than previously addressed    Antiplatelet or Anticoagulation Medication Instructions   - aspirin: Discontinue aspirin 7 days prior to procedure to reduce bleeding risk. It should be resumed postoperatively.     Additional Medication Instructions  Take all scheduled medications on the day of surgery EXCEPT for modifications listed below:   - ACE/ARB/ARNI (lisinopril, enalapril, losartan, valsartan, olmesartan, sacubritril/valsartan) : DO NOT TAKE on day of surgery (minimum 11 hours for general anesthesia).   - GLP-1 agonist/Long-acting insulin combinations: DO NOT TAKE the day before or day of surgery.   - sulfonylurea (e.g. glyburide, glipizide): DO NOT TAKE day of surgery    Recommendation  Approval given to proceed with proposed procedure, without further diagnostic evaluation.    The longitudinal plan of care for the diagnosis(es)/condition(s) as documented were addressed during this visit. Due to the added complexity  in care, I will continue to support Sayda in the subsequent management and with ongoing continuity of care.      Subjective   Sayda is a 74 year old, presenting for the following:  Pre-Op Exam (1-22-25 back surgery )        HPI related to upcoming procedure: Chronic low back pain with left leg radiculopathy, inadequately controlled with conservative measures including oral medications, physical therapy, and injections, requiring surgical intervention.  Diabetes has been stable and controlled with glipizide and Ozempic, most recent A1c 6.7% to 12/16/2024.  Hypertension has been stable and controlled with amlodipine and lisinopril.  Taking Zetia and management of hyperlipidemia as she is intolerant of statins.  History of chronic kidney disease stage IIIb which has been stable with GFR in the 40-45 range.  Sleep apnea has been mild, manages with positional measures of sleeping on her side or stomach, avoiding sleeping on her back.  Taking escitalopram and recently started buspirone and management of depression and anxiety, tolerating well but no improvement in anxiety with adjustment in buspirone, interested next steps.        1/6/2025   Pre-Op Questionnaire   Have you ever had a heart attack or stroke? No   Have you ever had surgery on your heart or blood vessels, such as a stent placement, a coronary artery bypass, or surgery on an artery in your head, neck, heart, or legs? No   Do you have chest pain with activity? No   Do you have a history of heart failure? No   Do you currently have a cold, bronchitis or symptoms of other infection? No   Do you have a cough, shortness of breath, or wheezing? No   Do you or anyone in your family have previous history of blood clots? No   Do you or does anyone in your family have a serious bleeding problem such as prolonged bleeding following surgeries or cuts? No   Have you ever had problems with anemia or been told to take iron pills? No   Have you had any abnormal blood loss  such as black, tarry or bloody stools, or abnormal vaginal bleeding? No   Have you ever had a blood transfusion? No   Are you willing to have a blood transfusion if it is medically needed before, during, or after your surgery? Yes   Have you or any of your relatives ever had problems with anesthesia? No   Do you have sleep apnea, excessive snoring or daytime drowsiness? No   Do you have any artifical heart valves or other implanted medical devices like a pacemaker, defibrillator, or continuous glucose monitor? No   Do you have artificial joints? (!) YES --left knee and left shoulder   Are you allergic to latex? No     Health Care Directive  Patient has a Health Care Directive on file      Preoperative Review of    reviewed - no record of controlled substances prescribed.          Patient Active Problem List    Diagnosis Date Noted    Poor balance 04/01/2024     Priority: Medium    Spinal stenosis of lumbar region, unspecified whether neurogenic claudication present 02/11/2024     Priority: Medium    KAMLESH (obstructive sleep apnea) 10/16/2023     Priority: Medium    Stage 3b chronic kidney disease (H) 11/04/2020     Priority: Medium    Osteopenia 10/09/2018     Priority: Medium     High risk of fracture per DEXA 8/2018        Hyperlipidemia      Priority: Medium     Created by Conversion        Type 2 diabetes mellitus with complication, without long-term current use of insulin (H) 01/26/2017     Priority: Medium    Anxiety      Priority: Medium     Created by Conversion  Replacement Utility updated for latest IMO load        Insomnia 06/22/2016     Priority: Medium    Status post total knee replacement 02/18/2015     Priority: Medium     Left (2/18/2015)        Benign Essential Hypertension      Priority: Medium     Created by Conversion        Cervical Disc Degeneration      Priority: Medium     Created by Conversion        Female Stress Incontinence      Priority: Medium     Created by Conversion  [facility]  Annotation: May 18 2010  9:18AM - Kristen Navarrete: s/p   bladder   sling Dr. Eugene        Recurrent Major Depression In Full Remission      Priority: Medium     Created by Conversion        Cervical Spondylosis      Priority: Medium     Created by Conversion          Past Medical History:   Diagnosis Date    Depression     Hypertension     Insomnia      Past Surgical History:   Procedure Laterality Date    HC SLING OPERATION FOR STRESS INCONTINENCE      Description: Mid-Urethral Sling Operation;  Recorded: 05/18/2010;    JOINT REPLACEMENT Left 2008    knee    OPEN REDUCTION INTERNAL FIXATION ANKLE Right     2008 and pin removal 2009    [facility] APPENDECTOMY      Description: Appendectomy;  Recorded: 02/26/2008;    [facility] TOTAL KNEE ARTHROPLASTY Left 2/18/2015    Procedure:   LEFT TOTAL KNEE ARTHROPLASTY;  Surgeon: Sina SHARP MD;  Location: [facility];  Service: Orthopedics     Current Outpatient Medications   Medication Sig Dispense Refill    amLODIPine (NORVASC) 5 MG tablet Take 1 tablet (5 mg) by mouth daily 90 tablet 1    aspirin 81 MG EC tablet Take 81 mg by mouth daily      blood glucose (NO BRAND SPECIFIED) test strip Use to test blood sugar 1 times daily or as directed. To accompany: Blood Glucose Monitor Brands: per insurance and patient request 100 strip 11    busPIRone (BUSPAR) 5 MG tablet Take 1 tablet (5 mg) by mouth 2 times daily. 60 tablet 1    CALCIUM PO Take 1 tablet by mouth daily      escitalopram (LEXAPRO) 20 MG tablet Take 1 tablet (20 mg) by mouth daily 90 tablet 1    ezetimibe (ZETIA) 10 MG tablet Take 1 tablet (10 mg) by mouth daily 90 tablet 3    glipiZIDE (GLUCOTROL XL) 10 MG 24 hr tablet Take 1 tablet (10 mg) by mouth 2 times daily 180 tablet 4    lisinopril (ZESTRIL) 40 MG tablet Take 40 mg by mouth daily      multivitamin therapeutic (THERAGRAN) tablet [MULTIVITAMIN THERAPEUTIC (THERAGRAN) TABLET] Take 1 tablet by mouth daily.      OMEGA-3/DHA/EPA/FISH OIL (FISH OIL-OMEGA-3 FATTY  "ACIDS) 300-1,000 mg capsule [OMEGA-3/DHA/EPA/FISH OIL (FISH OIL-OMEGA-3 FATTY ACIDS) 300-1,000 MG CAPSULE] Take 2 g by mouth daily.      semaglutide (OZEMPIC) 2 MG/3ML pen Inject 0.5 mg Subcutaneous every 7 days 3 mL 0    thin (NO BRAND SPECIFIED) lancets Use to test blood sugar 1 times daily or as directed. To accompany: Blood Glucose Monitor Brands: per insurance and patient request 100 each 11    vitamin D3 (CHOLECALCIFEROL) 50 mcg (2000 units) tablet Take 1 tablet by mouth daily         Allergies   Allergen Reactions    Atorvastatin Muscle Pain (Myalgia)    Metformin Unknown     Body aches, headache    Morphine (Pf) [Morphine] Nausea and Vomiting    Pravastatin Muscle Pain (Myalgia)        Social History     Tobacco Use    Smoking status: Never    Smokeless tobacco: Never   Substance Use Topics    Alcohol use: Yes     Alcohol/week: 2.0 standard drinks of alcohol     Types: 2 Standard drinks or equivalent per week     Comment: once in a while, but not often     Family History   Problem Relation Age of Onset    Diabetes Mother     Hypertension Mother     Hypertension Father     Cancer Brother     Clotting Disorder No family hx of     Anesthesia Reaction No family hx of      History   Drug Use No             Review of Systems  Constitutional, neuro, ENT, endocrine, pulmonary, cardiac, gastrointestinal, genitourinary, musculoskeletal, integument and psychiatric systems are negative, except as otherwise noted.    Objective    /70   Pulse 84   Temp 97.8  F (36.6  C) (Oral)   Resp 18   Ht 1.664 m (5' 5.5\")   Wt 79.4 kg (175 lb)   LMP  (LMP Unknown)   SpO2 97%   BMI 28.68 kg/m     Estimated body mass index is 28.68 kg/m  as calculated from the following:    Height as of this encounter: 1.664 m (5' 5.5\").    Weight as of this encounter: 79.4 kg (175 lb).  Physical Exam  GENERAL: alert and no distress  EYES: Eyes grossly normal to inspection, PERRL and conjunctivae and sclerae normal  HENT: ear canals and " TM's normal, nose and mouth without ulcers or lesions  NECK: no adenopathy, no asymmetry, masses, or scars  RESP: lungs clear to auscultation - no rales, rhonchi or wheezes  CV: regular rate and rhythm, normal S1 S2, no S3 or S4, no murmur, click or rub, no peripheral edema  ABDOMEN: soft, nontender, no hepatosplenomegaly, no masses and bowel sounds normal  MS: no gross musculoskeletal defects noted, no edema  SKIN: no suspicious lesions or rashes  NEURO: Normal strength and tone, mentation intact and speech normal  PSYCH: mentation appears normal, affect normal/bright    Recent Labs   Lab Test 12/16/24  1029 06/10/24  0855 04/12/24  0955 02/12/24  0930   HGB 14.3  --   --  14.4     --   --  202     --  136 138   POTASSIUM 4.5  --  4.3 4.5   CR 1.26*  --  1.33* 1.29*   A1C 6.7* 5.7*  --  6.1*        Diagnostics  Labs pending at this time.  Results will be reviewed when available.   No EKG required, no history of coronary heart disease, significant arrhythmia, peripheral arterial disease or other structural heart disease.    Revised Cardiac Risk Index (RCRI)  The patient has the following serious cardiovascular risks for perioperative complications:   - No serious cardiac risks = 0 points     RCRI Interpretation: 0 points: Class I (very low risk - 0.4% complication rate)         Signed Electronically by: Kristen Navarrete MD  A copy of this evaluation report is provided to the requesting physician.

## 2025-01-09 ENCOUNTER — TELEPHONE (OUTPATIENT)
Dept: FAMILY MEDICINE | Facility: CLINIC | Age: 75
End: 2025-01-09
Payer: COMMERCIAL

## 2025-01-09 NOTE — TELEPHONE ENCOUNTER
Sayda's Ozempic assistance application has been submitted to the [employer] assistance program.    We will note Epic when we receive a decision.    Thanks so much for your help!    Jane Mahajan  Prescription Assistance Supervisor  Pharmacy Assistance   Pool: [facility]

## 2025-01-22 ENCOUNTER — APPOINTMENT (OUTPATIENT)
Dept: RADIOLOGY | Facility: CLINIC | Age: 75
DRG: 451 | End: 2025-01-22
Attending: ORTHOPAEDIC SURGERY
Payer: MEDICARE

## 2025-01-22 ENCOUNTER — ANESTHESIA (OUTPATIENT)
Dept: SURGERY | Facility: CLINIC | Age: 75
End: 2025-01-22
Payer: MEDICARE

## 2025-01-22 ENCOUNTER — HOSPITAL ENCOUNTER (INPATIENT)
Facility: CLINIC | Age: 75
DRG: 451 | End: 2025-01-22
Attending: ORTHOPAEDIC SURGERY | Admitting: ORTHOPAEDIC SURGERY
Payer: MEDICARE

## 2025-01-22 ENCOUNTER — ANESTHESIA EVENT (OUTPATIENT)
Dept: SURGERY | Facility: CLINIC | Age: 75
End: 2025-01-22
Payer: MEDICARE

## 2025-01-22 DIAGNOSIS — M54.16 LUMBAR RADICULOPATHY: Primary | ICD-10-CM

## 2025-01-22 PROBLEM — G47.33 OSA (OBSTRUCTIVE SLEEP APNEA): Status: ACTIVE | Noted: 2023-10-16

## 2025-01-22 PROBLEM — E11.8 TYPE 2 DIABETES MELLITUS WITH COMPLICATION, WITHOUT LONG-TERM CURRENT USE OF INSULIN (H): Status: ACTIVE | Noted: 2017-01-26

## 2025-01-22 LAB
GLUCOSE BLDC GLUCOMTR-MCNC: 110 MG/DL (ref 70–99)
GLUCOSE BLDC GLUCOMTR-MCNC: 128 MG/DL (ref 70–99)
GLUCOSE BLDC GLUCOMTR-MCNC: 192 MG/DL (ref 70–99)
GLUCOSE BLDC GLUCOMTR-MCNC: 196 MG/DL (ref 70–99)

## 2025-01-22 PROCEDURE — 99205 OFFICE O/P NEW HI 60 MIN: CPT | Mod: FS

## 2025-01-22 PROCEDURE — 250N000011 HC RX IP 250 OP 636: Performed by: ANESTHESIOLOGY

## 2025-01-22 PROCEDURE — 99417 PROLNG OP E/M EACH 15 MIN: CPT | Mod: FS

## 2025-01-22 PROCEDURE — 250N000025 HC SEVOFLURANE, PER MIN: Performed by: ORTHOPAEDIC SURGERY

## 2025-01-22 PROCEDURE — 999N000182 XR SURGERY OARM

## 2025-01-22 PROCEDURE — 258N000003 HC RX IP 258 OP 636: Performed by: NURSE ANESTHETIST, CERTIFIED REGISTERED

## 2025-01-22 PROCEDURE — 4A1034Z MONITORING OF CENTRAL NERVOUS ELECTRICAL ACTIVITY, PERCUTANEOUS APPROACH: ICD-10-PCS | Performed by: ORTHOPAEDIC SURGERY

## 2025-01-22 PROCEDURE — 99207 PR APP CREDIT; MD BILLING SHARED VISIT: CPT | Performed by: STUDENT IN AN ORGANIZED HEALTH CARE EDUCATION/TRAINING PROGRAM

## 2025-01-22 PROCEDURE — 250N000011 HC RX IP 250 OP 636: Performed by: ORTHOPAEDIC SURGERY

## 2025-01-22 PROCEDURE — 370N000017 HC ANESTHESIA TECHNICAL FEE, PER MIN: Performed by: ORTHOPAEDIC SURGERY

## 2025-01-22 PROCEDURE — 999N000141 HC STATISTIC PRE-PROCEDURE NURSING ASSESSMENT: Performed by: ORTHOPAEDIC SURGERY

## 2025-01-22 PROCEDURE — C1713 ANCHOR/SCREW BN/BN,TIS/BN: HCPCS | Performed by: ORTHOPAEDIC SURGERY

## 2025-01-22 PROCEDURE — C1762 CONN TISS, HUMAN(INC FASCIA): HCPCS | Performed by: ORTHOPAEDIC SURGERY

## 2025-01-22 PROCEDURE — 250N000005 HC OR RX SURGIFLO HEMOSTATIC MATRIX 10ML 199102S OPNP: Performed by: ORTHOPAEDIC SURGERY

## 2025-01-22 PROCEDURE — 250N000013 HC RX MED GY IP 250 OP 250 PS 637

## 2025-01-22 PROCEDURE — 250N000011 HC RX IP 250 OP 636: Performed by: PHYSICIAN ASSISTANT

## 2025-01-22 PROCEDURE — 258N000003 HC RX IP 258 OP 636: Performed by: ANESTHESIOLOGY

## 2025-01-22 PROCEDURE — 250N000012 HC RX MED GY IP 250 OP 636 PS 637

## 2025-01-22 PROCEDURE — 250N000009 HC RX 250: Performed by: ORTHOPAEDIC SURGERY

## 2025-01-22 PROCEDURE — 258N000003 HC RX IP 258 OP 636: Performed by: ORTHOPAEDIC SURGERY

## 2025-01-22 PROCEDURE — 360N000086 HC SURGERY LEVEL 6 W/ FLUORO, PER MIN: Performed by: ORTHOPAEDIC SURGERY

## 2025-01-22 PROCEDURE — 250N000009 HC RX 250: Performed by: ANESTHESIOLOGY

## 2025-01-22 PROCEDURE — 0SG0071 FUSION OF LUMBAR VERTEBRAL JOINT WITH AUTOLOGOUS TISSUE SUBSTITUTE, POSTERIOR APPROACH, POSTERIOR COLUMN, OPEN APPROACH: ICD-10-PCS | Performed by: ORTHOPAEDIC SURGERY

## 2025-01-22 PROCEDURE — 250N000013 HC RX MED GY IP 250 OP 250 PS 637: Performed by: ORTHOPAEDIC SURGERY

## 2025-01-22 PROCEDURE — 250N000009 HC RX 250: Performed by: NURSE ANESTHETIST, CERTIFIED REGISTERED

## 2025-01-22 PROCEDURE — 710N000010 HC RECOVERY PHASE 1, LEVEL 2, PER MIN: Performed by: ORTHOPAEDIC SURGERY

## 2025-01-22 PROCEDURE — 82962 GLUCOSE BLOOD TEST: CPT

## 2025-01-22 PROCEDURE — 999N000182 XR SURGERY CARM FLUORO GREATER THAN 5 MIN

## 2025-01-22 PROCEDURE — 8E0WXBZ COMPUTER ASSISTED PROCEDURE OF TRUNK REGION: ICD-10-PCS | Performed by: ORTHOPAEDIC SURGERY

## 2025-01-22 PROCEDURE — 272N000001 HC OR GENERAL SUPPLY STERILE: Performed by: ORTHOPAEDIC SURGERY

## 2025-01-22 PROCEDURE — 250N000013 HC RX MED GY IP 250 OP 250 PS 637: Performed by: PHYSICIAN ASSISTANT

## 2025-01-22 PROCEDURE — 01NB0ZZ RELEASE LUMBAR NERVE, OPEN APPROACH: ICD-10-PCS | Performed by: ORTHOPAEDIC SURGERY

## 2025-01-22 PROCEDURE — 250N000011 HC RX IP 250 OP 636: Performed by: NURSE ANESTHETIST, CERTIFIED REGISTERED

## 2025-01-22 DEVICE — IMPLANTABLE DEVICE: Type: IMPLANTABLE DEVICE | Site: BACK | Status: FUNCTIONAL

## 2025-01-22 DEVICE — DBM 7509145 MAGNIFUSE 1.75 X 5 CM
Type: IMPLANTABLE DEVICE | Site: BACK | Status: FUNCTIONAL
Brand: MAGNIFUSE® BONE GRAFT

## 2025-01-22 DEVICE — SCREW BN PEEK SS MA STRL SPNE 5.5/6 MM ROD 559200029: Type: IMPLANTABLE DEVICE | Site: BACK | Status: FUNCTIONAL

## 2025-01-22 DEVICE — IMP ROD MEDT SOLERA CVD 5.5X40MM CHR 1555501040: Type: IMPLANTABLE DEVICE | Site: BACK | Status: FUNCTIONAL

## 2025-01-22 DEVICE — IMP ROD MEDT SOLERA CVD 5.5X35MM CHR 1555501035: Type: IMPLANTABLE DEVICE | Site: BACK | Status: FUNCTIONAL

## 2025-01-22 RX ORDER — VANCOMYCIN HYDROCHLORIDE 1 G/20ML
INJECTION, POWDER, LYOPHILIZED, FOR SOLUTION INTRAVENOUS
Status: DISCONTINUED
Start: 2025-01-22 | End: 2025-01-22 | Stop reason: HOSPADM

## 2025-01-22 RX ORDER — POLYETHYLENE GLYCOL 3350 17 G/17G
17 POWDER, FOR SOLUTION ORAL DAILY
Status: DISCONTINUED | OUTPATIENT
Start: 2025-01-23 | End: 2025-01-24 | Stop reason: HOSPADM

## 2025-01-22 RX ORDER — OXYCODONE HYDROCHLORIDE 5 MG/1
5 TABLET ORAL
Status: DISCONTINUED | OUTPATIENT
Start: 2025-01-22 | End: 2025-01-22 | Stop reason: HOSPADM

## 2025-01-22 RX ORDER — VANCOMYCIN HYDROCHLORIDE 1 G/20ML
INJECTION, POWDER, LYOPHILIZED, FOR SOLUTION INTRAVENOUS PRN
Status: DISCONTINUED | OUTPATIENT
Start: 2025-01-22 | End: 2025-01-22 | Stop reason: HOSPADM

## 2025-01-22 RX ORDER — FENTANYL CITRATE 50 UG/ML
100 INJECTION, SOLUTION INTRAMUSCULAR; INTRAVENOUS ONCE
Status: COMPLETED | OUTPATIENT
Start: 2025-01-22 | End: 2025-01-22

## 2025-01-22 RX ORDER — ONDANSETRON 4 MG/1
4 TABLET, ORALLY DISINTEGRATING ORAL EVERY 6 HOURS PRN
Status: CANCELLED | OUTPATIENT
Start: 2025-01-22

## 2025-01-22 RX ORDER — NALOXONE HYDROCHLORIDE 0.4 MG/ML
0.4 INJECTION, SOLUTION INTRAMUSCULAR; INTRAVENOUS; SUBCUTANEOUS
Status: DISCONTINUED | OUTPATIENT
Start: 2025-01-22 | End: 2025-01-24 | Stop reason: HOSPADM

## 2025-01-22 RX ORDER — ONDANSETRON 2 MG/ML
INJECTION INTRAMUSCULAR; INTRAVENOUS PRN
Status: DISCONTINUED | OUTPATIENT
Start: 2025-01-22 | End: 2025-01-22

## 2025-01-22 RX ORDER — BISACODYL 10 MG
10 SUPPOSITORY, RECTAL RECTAL DAILY PRN
Status: DISCONTINUED | OUTPATIENT
Start: 2025-01-25 | End: 2025-01-24 | Stop reason: HOSPADM

## 2025-01-22 RX ORDER — DEXAMETHASONE SODIUM PHOSPHATE 10 MG/ML
4 INJECTION, SOLUTION INTRAMUSCULAR; INTRAVENOUS
Status: CANCELLED | OUTPATIENT
Start: 2025-01-22

## 2025-01-22 RX ORDER — LISINOPRIL 40 MG/1
40 TABLET ORAL DAILY
Status: DISCONTINUED | OUTPATIENT
Start: 2025-01-23 | End: 2025-01-24 | Stop reason: HOSPADM

## 2025-01-22 RX ORDER — ONDANSETRON 4 MG/1
4 TABLET, ORALLY DISINTEGRATING ORAL EVERY 30 MIN PRN
Status: CANCELLED | OUTPATIENT
Start: 2025-01-22

## 2025-01-22 RX ORDER — NALOXONE HYDROCHLORIDE 0.4 MG/ML
0.1 INJECTION, SOLUTION INTRAMUSCULAR; INTRAVENOUS; SUBCUTANEOUS
Status: CANCELLED | OUTPATIENT
Start: 2025-01-22

## 2025-01-22 RX ORDER — OXYCODONE HYDROCHLORIDE 5 MG/1
10 TABLET ORAL
Status: DISCONTINUED | OUTPATIENT
Start: 2025-01-22 | End: 2025-01-22 | Stop reason: HOSPADM

## 2025-01-22 RX ORDER — PROCHLORPERAZINE MALEATE 5 MG/1
5 TABLET ORAL EVERY 6 HOURS PRN
Status: CANCELLED | OUTPATIENT
Start: 2025-01-22

## 2025-01-22 RX ORDER — NALOXONE HYDROCHLORIDE 0.4 MG/ML
0.1 INJECTION, SOLUTION INTRAMUSCULAR; INTRAVENOUS; SUBCUTANEOUS
Status: DISCONTINUED | OUTPATIENT
Start: 2025-01-22 | End: 2025-01-22 | Stop reason: HOSPADM

## 2025-01-22 RX ORDER — HYDROMORPHONE HCL IN WATER/PF 6 MG/30 ML
0.2 PATIENT CONTROLLED ANALGESIA SYRINGE INTRAVENOUS
Status: CANCELLED | OUTPATIENT
Start: 2025-01-22

## 2025-01-22 RX ORDER — LORATADINE 10 MG/1
10 TABLET ORAL DAILY PRN
Status: CANCELLED | OUTPATIENT
Start: 2025-01-22

## 2025-01-22 RX ORDER — EZETIMIBE 10 MG/1
10 TABLET ORAL DAILY
Status: DISCONTINUED | OUTPATIENT
Start: 2025-01-22 | End: 2025-01-24 | Stop reason: HOSPADM

## 2025-01-22 RX ORDER — TRAMADOL HYDROCHLORIDE 50 MG/1
50 TABLET ORAL EVERY 6 HOURS PRN
Status: DISCONTINUED | OUTPATIENT
Start: 2025-01-22 | End: 2025-01-24 | Stop reason: HOSPADM

## 2025-01-22 RX ORDER — ONDANSETRON 2 MG/ML
4 INJECTION INTRAMUSCULAR; INTRAVENOUS EVERY 30 MIN PRN
Status: DISCONTINUED | OUTPATIENT
Start: 2025-01-22 | End: 2025-01-22 | Stop reason: HOSPADM

## 2025-01-22 RX ORDER — DEXAMETHASONE SODIUM PHOSPHATE 4 MG/ML
INJECTION, SOLUTION INTRA-ARTICULAR; INTRALESIONAL; INTRAMUSCULAR; INTRAVENOUS; SOFT TISSUE PRN
Status: DISCONTINUED | OUTPATIENT
Start: 2025-01-22 | End: 2025-01-22

## 2025-01-22 RX ORDER — ONDANSETRON 4 MG/1
4 TABLET, ORALLY DISINTEGRATING ORAL EVERY 30 MIN PRN
Status: DISCONTINUED | OUTPATIENT
Start: 2025-01-22 | End: 2025-01-22 | Stop reason: HOSPADM

## 2025-01-22 RX ORDER — FENTANYL CITRATE 50 UG/ML
25 INJECTION, SOLUTION INTRAMUSCULAR; INTRAVENOUS EVERY 5 MIN PRN
Status: DISCONTINUED | OUTPATIENT
Start: 2025-01-22 | End: 2025-01-22 | Stop reason: HOSPADM

## 2025-01-22 RX ORDER — CEFAZOLIN SODIUM/WATER 2 G/20 ML
2 SYRINGE (ML) INTRAVENOUS SEE ADMIN INSTRUCTIONS
Status: DISCONTINUED | OUTPATIENT
Start: 2025-01-22 | End: 2025-01-22 | Stop reason: HOSPADM

## 2025-01-22 RX ORDER — OXYCODONE HYDROCHLORIDE 5 MG/1
5 TABLET ORAL EVERY 4 HOURS PRN
Status: DISCONTINUED | OUTPATIENT
Start: 2025-01-22 | End: 2025-01-23

## 2025-01-22 RX ORDER — MAGNESIUM SULFATE HEPTAHYDRATE 40 MG/ML
2 INJECTION, SOLUTION INTRAVENOUS ONCE
Status: COMPLETED | OUTPATIENT
Start: 2025-01-22 | End: 2025-01-22

## 2025-01-22 RX ORDER — PROCHLORPERAZINE MALEATE 5 MG/1
5 TABLET ORAL EVERY 6 HOURS PRN
Status: DISCONTINUED | OUTPATIENT
Start: 2025-01-22 | End: 2025-01-24 | Stop reason: HOSPADM

## 2025-01-22 RX ORDER — DEXAMETHASONE SODIUM PHOSPHATE 4 MG/ML
4 INJECTION, SOLUTION INTRA-ARTICULAR; INTRALESIONAL; INTRAMUSCULAR; INTRAVENOUS; SOFT TISSUE
Status: DISCONTINUED | OUTPATIENT
Start: 2025-01-22 | End: 2025-01-22 | Stop reason: HOSPADM

## 2025-01-22 RX ORDER — POLYETHYLENE GLYCOL 3350 17 G/17G
17 POWDER, FOR SOLUTION ORAL DAILY
Status: CANCELLED | OUTPATIENT
Start: 2025-01-23

## 2025-01-22 RX ORDER — CEFAZOLIN SODIUM 1 G/3ML
1 INJECTION, POWDER, FOR SOLUTION INTRAMUSCULAR; INTRAVENOUS EVERY 8 HOURS
Status: CANCELLED | OUTPATIENT
Start: 2025-01-22 | End: 2025-01-23

## 2025-01-22 RX ORDER — SODIUM CHLORIDE 9 MG/ML
INJECTION, SOLUTION INTRAVENOUS CONTINUOUS
Status: DISCONTINUED | OUTPATIENT
Start: 2025-01-22 | End: 2025-01-24 | Stop reason: HOSPADM

## 2025-01-22 RX ORDER — BUPIVACAINE HYDROCHLORIDE AND EPINEPHRINE 2.5; 5 MG/ML; UG/ML
INJECTION, SOLUTION EPIDURAL; INFILTRATION; INTRACAUDAL; PERINEURAL
Status: DISCONTINUED
Start: 2025-01-22 | End: 2025-01-22 | Stop reason: HOSPADM

## 2025-01-22 RX ORDER — HYDROMORPHONE HCL IN WATER/PF 6 MG/30 ML
0.2 PATIENT CONTROLLED ANALGESIA SYRINGE INTRAVENOUS EVERY 4 HOURS PRN
Status: DISCONTINUED | OUTPATIENT
Start: 2025-01-22 | End: 2025-01-24

## 2025-01-22 RX ORDER — BUSPIRONE HYDROCHLORIDE 5 MG/1
10 TABLET ORAL 2 TIMES DAILY
COMMUNITY

## 2025-01-22 RX ORDER — AMOXICILLIN 250 MG
1 CAPSULE ORAL 2 TIMES DAILY
Status: CANCELLED | OUTPATIENT
Start: 2025-01-22

## 2025-01-22 RX ORDER — EPHEDRINE SULFATE 50 MG/ML
INJECTION, SOLUTION INTRAMUSCULAR; INTRAVENOUS; SUBCUTANEOUS PRN
Status: DISCONTINUED | OUTPATIENT
Start: 2025-01-22 | End: 2025-01-22

## 2025-01-22 RX ORDER — LORATADINE 10 MG/1
10 TABLET ORAL DAILY PRN
Status: DISCONTINUED | OUTPATIENT
Start: 2025-01-22 | End: 2025-01-24 | Stop reason: HOSPADM

## 2025-01-22 RX ORDER — VANCOMYCIN HYDROCHLORIDE 1 G/20ML
1 INJECTION, POWDER, LYOPHILIZED, FOR SOLUTION INTRAVENOUS
Status: DISCONTINUED | OUTPATIENT
Start: 2025-01-22 | End: 2025-01-22 | Stop reason: HOSPADM

## 2025-01-22 RX ORDER — BISACODYL 10 MG
10 SUPPOSITORY, RECTAL RECTAL DAILY PRN
Status: CANCELLED | OUTPATIENT
Start: 2025-01-25

## 2025-01-22 RX ORDER — BUPIVACAINE HYDROCHLORIDE AND EPINEPHRINE 2.5; 5 MG/ML; UG/ML
2 INJECTION, SOLUTION EPIDURAL; INFILTRATION; INTRACAUDAL; PERINEURAL ONCE
Status: DISCONTINUED | OUTPATIENT
Start: 2025-01-22 | End: 2025-01-22 | Stop reason: HOSPADM

## 2025-01-22 RX ORDER — AMOXICILLIN 250 MG
1 CAPSULE ORAL 2 TIMES DAILY
Status: DISCONTINUED | OUTPATIENT
Start: 2025-01-22 | End: 2025-01-24 | Stop reason: HOSPADM

## 2025-01-22 RX ORDER — FENTANYL CITRATE 50 UG/ML
50 INJECTION, SOLUTION INTRAMUSCULAR; INTRAVENOUS EVERY 5 MIN PRN
Status: DISCONTINUED | OUTPATIENT
Start: 2025-01-22 | End: 2025-01-22 | Stop reason: HOSPADM

## 2025-01-22 RX ORDER — FLUMAZENIL 0.1 MG/ML
0.2 INJECTION, SOLUTION INTRAVENOUS
Status: DISCONTINUED | OUTPATIENT
Start: 2025-01-22 | End: 2025-01-22 | Stop reason: HOSPADM

## 2025-01-22 RX ORDER — HYDROMORPHONE HCL IN WATER/PF 6 MG/30 ML
0.1 PATIENT CONTROLLED ANALGESIA SYRINGE INTRAVENOUS EVERY 4 HOURS PRN
Status: DISCONTINUED | OUTPATIENT
Start: 2025-01-22 | End: 2025-01-24

## 2025-01-22 RX ORDER — HYDROMORPHONE HCL IN WATER/PF 6 MG/30 ML
0.2 PATIENT CONTROLLED ANALGESIA SYRINGE INTRAVENOUS EVERY 5 MIN PRN
Status: DISCONTINUED | OUTPATIENT
Start: 2025-01-22 | End: 2025-01-22 | Stop reason: HOSPADM

## 2025-01-22 RX ORDER — LIDOCAINE 40 MG/G
CREAM TOPICAL
Status: DISCONTINUED | OUTPATIENT
Start: 2025-01-22 | End: 2025-01-22 | Stop reason: HOSPADM

## 2025-01-22 RX ORDER — BUSPIRONE HYDROCHLORIDE 10 MG/1
10 TABLET ORAL 2 TIMES DAILY
Status: DISCONTINUED | OUTPATIENT
Start: 2025-01-22 | End: 2025-01-24 | Stop reason: HOSPADM

## 2025-01-22 RX ORDER — ASPIRIN 81 MG/1
81 TABLET ORAL DAILY
Status: DISCONTINUED | OUTPATIENT
Start: 2025-01-22 | End: 2025-01-24 | Stop reason: HOSPADM

## 2025-01-22 RX ORDER — FENTANYL CITRATE 50 UG/ML
100 INJECTION, SOLUTION INTRAMUSCULAR; INTRAVENOUS
Status: DISCONTINUED | OUTPATIENT
Start: 2025-01-22 | End: 2025-01-22 | Stop reason: HOSPADM

## 2025-01-22 RX ORDER — ESCITALOPRAM OXALATE 20 MG/1
20 TABLET ORAL DAILY
Status: DISCONTINUED | OUTPATIENT
Start: 2025-01-23 | End: 2025-01-24 | Stop reason: HOSPADM

## 2025-01-22 RX ORDER — MAGNESIUM HYDROXIDE 1200 MG/15ML
LIQUID ORAL PRN
Status: DISCONTINUED | OUTPATIENT
Start: 2025-01-22 | End: 2025-01-22 | Stop reason: HOSPADM

## 2025-01-22 RX ORDER — CEFAZOLIN SODIUM/WATER 2 G/20 ML
2 SYRINGE (ML) INTRAVENOUS
Status: COMPLETED | OUTPATIENT
Start: 2025-01-22 | End: 2025-01-22

## 2025-01-22 RX ORDER — SODIUM CHLORIDE, SODIUM LACTATE, POTASSIUM CHLORIDE, CALCIUM CHLORIDE 600; 310; 30; 20 MG/100ML; MG/100ML; MG/100ML; MG/100ML
INJECTION, SOLUTION INTRAVENOUS CONTINUOUS
Status: DISCONTINUED | OUTPATIENT
Start: 2025-01-22 | End: 2025-01-22 | Stop reason: HOSPADM

## 2025-01-22 RX ORDER — ACETAMINOPHEN 325 MG/1
975 TABLET ORAL EVERY 8 HOURS
Status: CANCELLED | OUTPATIENT
Start: 2025-01-22

## 2025-01-22 RX ORDER — SODIUM CHLORIDE 9 MG/ML
INJECTION, SOLUTION INTRAVENOUS CONTINUOUS
Status: CANCELLED | OUTPATIENT
Start: 2025-01-22

## 2025-01-22 RX ORDER — ONDANSETRON 4 MG/1
4 TABLET, ORALLY DISINTEGRATING ORAL EVERY 6 HOURS PRN
Status: DISCONTINUED | OUTPATIENT
Start: 2025-01-22 | End: 2025-01-24 | Stop reason: HOSPADM

## 2025-01-22 RX ORDER — AMLODIPINE BESYLATE 5 MG/1
5 TABLET ORAL DAILY
Status: DISCONTINUED | OUTPATIENT
Start: 2025-01-23 | End: 2025-01-24 | Stop reason: HOSPADM

## 2025-01-22 RX ORDER — ONDANSETRON 2 MG/ML
4 INJECTION INTRAMUSCULAR; INTRAVENOUS EVERY 6 HOURS PRN
Status: DISCONTINUED | OUTPATIENT
Start: 2025-01-22 | End: 2025-01-24 | Stop reason: HOSPADM

## 2025-01-22 RX ORDER — OXYCODONE HYDROCHLORIDE 5 MG/1
10 TABLET ORAL EVERY 4 HOURS PRN
Status: CANCELLED | OUTPATIENT
Start: 2025-01-22

## 2025-01-22 RX ORDER — PROPOFOL 10 MG/ML
INJECTION, EMULSION INTRAVENOUS PRN
Status: DISCONTINUED | OUTPATIENT
Start: 2025-01-22 | End: 2025-01-22

## 2025-01-22 RX ORDER — BUPIVACAINE HYDROCHLORIDE AND EPINEPHRINE 2.5; 5 MG/ML; UG/ML
INJECTION, SOLUTION EPIDURAL; INFILTRATION; INTRACAUDAL; PERINEURAL PRN
Status: DISCONTINUED | OUTPATIENT
Start: 2025-01-22 | End: 2025-01-22 | Stop reason: HOSPADM

## 2025-01-22 RX ORDER — NALOXONE HYDROCHLORIDE 0.4 MG/ML
0.2 INJECTION, SOLUTION INTRAMUSCULAR; INTRAVENOUS; SUBCUTANEOUS
Status: DISCONTINUED | OUTPATIENT
Start: 2025-01-22 | End: 2025-01-24 | Stop reason: HOSPADM

## 2025-01-22 RX ORDER — FENTANYL CITRATE 50 UG/ML
25 INJECTION, SOLUTION INTRAMUSCULAR; INTRAVENOUS EVERY 5 MIN PRN
Status: CANCELLED | OUTPATIENT
Start: 2025-01-22

## 2025-01-22 RX ORDER — ONDANSETRON 2 MG/ML
4 INJECTION INTRAMUSCULAR; INTRAVENOUS EVERY 6 HOURS PRN
Status: CANCELLED | OUTPATIENT
Start: 2025-01-22

## 2025-01-22 RX ORDER — NICOTINE POLACRILEX 4 MG
15-30 LOZENGE BUCCAL
Status: DISCONTINUED | OUTPATIENT
Start: 2025-01-22 | End: 2025-01-24 | Stop reason: HOSPADM

## 2025-01-22 RX ORDER — HYDROMORPHONE HYDROCHLORIDE 1 MG/ML
0.5 INJECTION, SOLUTION INTRAMUSCULAR; INTRAVENOUS; SUBCUTANEOUS
Status: CANCELLED | OUTPATIENT
Start: 2025-01-22

## 2025-01-22 RX ORDER — CEFAZOLIN SODIUM 1 G/3ML
1 INJECTION, POWDER, FOR SOLUTION INTRAMUSCULAR; INTRAVENOUS EVERY 8 HOURS
Status: COMPLETED | OUTPATIENT
Start: 2025-01-22 | End: 2025-01-23

## 2025-01-22 RX ORDER — DEXTROSE MONOHYDRATE 25 G/50ML
25-50 INJECTION, SOLUTION INTRAVENOUS
Status: DISCONTINUED | OUTPATIENT
Start: 2025-01-22 | End: 2025-01-24 | Stop reason: HOSPADM

## 2025-01-22 RX ORDER — HYDROMORPHONE HCL IN WATER/PF 6 MG/30 ML
0.2 PATIENT CONTROLLED ANALGESIA SYRINGE INTRAVENOUS EVERY 5 MIN PRN
Status: CANCELLED | OUTPATIENT
Start: 2025-01-22

## 2025-01-22 RX ORDER — PROPOFOL 10 MG/ML
INJECTION, EMULSION INTRAVENOUS CONTINUOUS PRN
Status: DISCONTINUED | OUTPATIENT
Start: 2025-01-22 | End: 2025-01-22

## 2025-01-22 RX ORDER — MULTIVITAMIN,THERAPEUTIC
1 TABLET ORAL DAILY
Status: DISCONTINUED | OUTPATIENT
Start: 2025-01-23 | End: 2025-01-24 | Stop reason: HOSPADM

## 2025-01-22 RX ORDER — OXYCODONE HYDROCHLORIDE 5 MG/1
5 TABLET ORAL EVERY 4 HOURS PRN
Status: CANCELLED | OUTPATIENT
Start: 2025-01-22

## 2025-01-22 RX ORDER — HYDROMORPHONE HCL IN WATER/PF 6 MG/30 ML
0.4 PATIENT CONTROLLED ANALGESIA SYRINGE INTRAVENOUS EVERY 5 MIN PRN
Status: CANCELLED | OUTPATIENT
Start: 2025-01-22

## 2025-01-22 RX ORDER — SODIUM CHLORIDE, SODIUM LACTATE, POTASSIUM CHLORIDE, CALCIUM CHLORIDE 600; 310; 30; 20 MG/100ML; MG/100ML; MG/100ML; MG/100ML
INJECTION, SOLUTION INTRAVENOUS CONTINUOUS
Status: CANCELLED | OUTPATIENT
Start: 2025-01-22

## 2025-01-22 RX ORDER — HYDROMORPHONE HCL IN WATER/PF 6 MG/30 ML
0.4 PATIENT CONTROLLED ANALGESIA SYRINGE INTRAVENOUS EVERY 5 MIN PRN
Status: DISCONTINUED | OUTPATIENT
Start: 2025-01-22 | End: 2025-01-22 | Stop reason: HOSPADM

## 2025-01-22 RX ORDER — FENTANYL CITRATE 50 UG/ML
50 INJECTION, SOLUTION INTRAMUSCULAR; INTRAVENOUS EVERY 5 MIN PRN
Status: CANCELLED | OUTPATIENT
Start: 2025-01-22

## 2025-01-22 RX ORDER — GLYCOPYRROLATE 0.2 MG/ML
INJECTION, SOLUTION INTRAMUSCULAR; INTRAVENOUS PRN
Status: DISCONTINUED | OUTPATIENT
Start: 2025-01-22 | End: 2025-01-22

## 2025-01-22 RX ORDER — ACETAMINOPHEN 325 MG/1
975 TABLET ORAL EVERY 8 HOURS
Status: DISCONTINUED | OUTPATIENT
Start: 2025-01-22 | End: 2025-01-24 | Stop reason: HOSPADM

## 2025-01-22 RX ORDER — GABAPENTIN 100 MG/1
100 CAPSULE ORAL
Status: COMPLETED | OUTPATIENT
Start: 2025-01-22 | End: 2025-01-22

## 2025-01-22 RX ORDER — ONDANSETRON 2 MG/ML
4 INJECTION INTRAMUSCULAR; INTRAVENOUS EVERY 30 MIN PRN
Status: CANCELLED | OUTPATIENT
Start: 2025-01-22

## 2025-01-22 RX ORDER — MULTIVITAMIN,THERAPEUTIC
1 TABLET ORAL DAILY
Status: CANCELLED | OUTPATIENT
Start: 2025-01-23

## 2025-01-22 RX ORDER — GLIPIZIDE 10 MG/1
10 TABLET, FILM COATED, EXTENDED RELEASE ORAL 2 TIMES DAILY
Status: DISCONTINUED | OUTPATIENT
Start: 2025-01-22 | End: 2025-01-24 | Stop reason: HOSPADM

## 2025-01-22 RX ADMIN — CEFAZOLIN 1 G: 1 INJECTION, POWDER, FOR SOLUTION INTRAMUSCULAR; INTRAVENOUS at 21:18

## 2025-01-22 RX ADMIN — INSULIN ASPART 1 UNITS: 100 INJECTION, SOLUTION INTRAVENOUS; SUBCUTANEOUS at 17:23

## 2025-01-22 RX ADMIN — ROCURONIUM BROMIDE 50 MG: 10 INJECTION, SOLUTION INTRAVENOUS at 12:15

## 2025-01-22 RX ADMIN — ONDANSETRON 4 MG: 2 INJECTION INTRAMUSCULAR; INTRAVENOUS at 14:08

## 2025-01-22 RX ADMIN — HYDROMORPHONE HYDROCHLORIDE 0.5 MG: 1 INJECTION, SOLUTION INTRAMUSCULAR; INTRAVENOUS; SUBCUTANEOUS at 14:10

## 2025-01-22 RX ADMIN — ONDANSETRON 4 MG: 2 INJECTION, SOLUTION INTRAMUSCULAR; INTRAVENOUS at 20:55

## 2025-01-22 RX ADMIN — PHENYLEPHRINE HYDROCHLORIDE 100 MCG: 10 INJECTION INTRAVENOUS at 12:43

## 2025-01-22 RX ADMIN — SODIUM CHLORIDE, POTASSIUM CHLORIDE, SODIUM LACTATE AND CALCIUM CHLORIDE: 600; 310; 30; 20 INJECTION, SOLUTION INTRAVENOUS at 11:13

## 2025-01-22 RX ADMIN — PROPOFOL 30 MCG/KG/MIN: 10 INJECTION, EMULSION INTRAVENOUS at 12:30

## 2025-01-22 RX ADMIN — ROCURONIUM BROMIDE 10 MG: 10 INJECTION, SOLUTION INTRAVENOUS at 12:37

## 2025-01-22 RX ADMIN — FENTANYL CITRATE 25 MCG: 50 INJECTION INTRAMUSCULAR; INTRAVENOUS at 15:19

## 2025-01-22 RX ADMIN — Medication 5 MG: at 14:08

## 2025-01-22 RX ADMIN — GABAPENTIN 100 MG: 100 CAPSULE ORAL at 10:51

## 2025-01-22 RX ADMIN — PHENYLEPHRINE HYDROCHLORIDE 100 MCG: 10 INJECTION INTRAVENOUS at 13:40

## 2025-01-22 RX ADMIN — TRAMADOL HYDROCHLORIDE 50 MG: 50 TABLET, COATED ORAL at 21:21

## 2025-01-22 RX ADMIN — SODIUM CHLORIDE: 9 INJECTION, SOLUTION INTRAVENOUS at 21:15

## 2025-01-22 RX ADMIN — SENNOSIDES AND DOCUSATE SODIUM 1 TABLET: 50; 8.6 TABLET ORAL at 21:21

## 2025-01-22 RX ADMIN — FENTANYL CITRATE 100 MCG: 50 INJECTION INTRAMUSCULAR; INTRAVENOUS at 12:15

## 2025-01-22 RX ADMIN — MAGNESIUM SULFATE HEPTAHYDRATE 2 G: 40 INJECTION, SOLUTION INTRAVENOUS at 11:13

## 2025-01-22 RX ADMIN — PROPOFOL 150 MG: 10 INJECTION, EMULSION INTRAVENOUS at 12:15

## 2025-01-22 RX ADMIN — PHENYLEPHRINE HYDROCHLORIDE 0.3 MCG/KG/MIN: 10 INJECTION INTRAVENOUS at 12:38

## 2025-01-22 RX ADMIN — Medication 5 MG: at 12:57

## 2025-01-22 RX ADMIN — Medication 2 G: at 12:08

## 2025-01-22 RX ADMIN — GLYCOPYRROLATE 0.2 MG: 0.2 INJECTION INTRAMUSCULAR; INTRAVENOUS at 12:54

## 2025-01-22 RX ADMIN — LIDOCAINE HYDROCHLORIDE 5 ML: 10 INJECTION, SOLUTION EPIDURAL; INFILTRATION; INTRACAUDAL; PERINEURAL at 12:15

## 2025-01-22 RX ADMIN — EZETIMIBE 10 MG: 10 TABLET ORAL at 17:18

## 2025-01-22 RX ADMIN — Medication 10 MG: at 12:52

## 2025-01-22 RX ADMIN — BUSPIRONE HYDROCHLORIDE 10 MG: 10 TABLET ORAL at 21:21

## 2025-01-22 RX ADMIN — DEXAMETHASONE SODIUM PHOSPHATE 4 MG: 4 INJECTION, SOLUTION INTRA-ARTICULAR; INTRALESIONAL; INTRAMUSCULAR; INTRAVENOUS; SOFT TISSUE at 12:32

## 2025-01-22 RX ADMIN — PHENYLEPHRINE HYDROCHLORIDE 100 MCG: 10 INJECTION INTRAVENOUS at 14:18

## 2025-01-22 RX ADMIN — HYDROMORPHONE HYDROCHLORIDE 0.5 MG: 1 INJECTION, SOLUTION INTRAMUSCULAR; INTRAVENOUS; SUBCUTANEOUS at 13:40

## 2025-01-22 RX ADMIN — ROCURONIUM BROMIDE 20 MG: 10 INJECTION, SOLUTION INTRAVENOUS at 13:16

## 2025-01-22 RX ADMIN — FENTANYL CITRATE 25 MCG: 50 INJECTION INTRAMUSCULAR; INTRAVENOUS at 15:03

## 2025-01-22 RX ADMIN — PROPOFOL 30 MG: 10 INJECTION, EMULSION INTRAVENOUS at 14:10

## 2025-01-22 RX ADMIN — Medication 200 MG: at 13:27

## 2025-01-22 RX ADMIN — Medication 5 MG: at 13:52

## 2025-01-22 RX ADMIN — SODIUM CHLORIDE, POTASSIUM CHLORIDE, SODIUM LACTATE AND CALCIUM CHLORIDE: 600; 310; 30; 20 INJECTION, SOLUTION INTRAVENOUS at 13:20

## 2025-01-22 RX ADMIN — ACETAMINOPHEN 975 MG: 325 TABLET ORAL at 17:18

## 2025-01-22 ASSESSMENT — ENCOUNTER SYMPTOMS
ABDOMINAL PAIN: 0
WEAKNESS: 0
FLANK PAIN: 0
WEIGHT LOSS: 0
BACK PAIN: 1
NAUSEA: 0
COUGH: 0
PALPITATIONS: 0
CHILLS: 0
FREQUENCY: 0
WHEEZING: 0
SEIZURES: 0
ORTHOPNEA: 0
DIAPHORESIS: 0
TINGLING: 0
SENSORY CHANGE: 0
HEARTBURN: 0
VOMITING: 0
DIZZINESS: 0
FEVER: 0
BLURRED VISION: 0
SHORTNESS OF BREATH: 0

## 2025-01-22 ASSESSMENT — ACTIVITIES OF DAILY LIVING (ADL)
ADLS_ACUITY_SCORE: 17
ADLS_ACUITY_SCORE: 17
ADLS_ACUITY_SCORE: 20
ADLS_ACUITY_SCORE: 17
ADLS_ACUITY_SCORE: 18
ADLS_ACUITY_SCORE: 18
ADLS_ACUITY_SCORE: 17
ADLS_ACUITY_SCORE: 18
ADLS_ACUITY_SCORE: 18

## 2025-01-22 NOTE — OP NOTE
Orthopedic  Operative Note    Pre-operative diagnosis: 1.  Lumbar radiculopathy in setting of mobile degenerative spondylolisthesis with lateral recess stenosis at L4-5    Post-operative diagnosis: 1.  Lumbar radiculopathy in setting of mobile degenerative spondylolisthesis with lateral recess stenosis at L4-5    Procedure: 1.  L4-5 posterior lateral fusion, [employer]   2.  L4-5 bilateral decompression   3.  Use of intraoperative microscopy   4.  Local autograft, allograft bone grafting   5.  O-arm with Stealth navigation   6.  EMG neuromonitoring    Surgeon: Guero Palmer MD    Assistant(s): Kerry Draper PA-C is an experienced first surgical assistant whose assistance was necessary for patient positioning, hemostasis, soft tissue and neural retraction, closure, and safe progression of surgery.      Anesthesia: General endotracheal anesthesia    Estimated blood loss: 50 mL     Drains: Hemovac    Specimens: None    Indications:                               The patient is a 74-year-old female developed lower extremity radicular symptoms in the setting of a mobile degenerative spondylolisthesis at L4-5.  She failed conservative measures and elected for surgical intervention in form of an L4-5 fusion.    I again reviewed the operative indications, the general and specific risks, benefits, and rehabilitation issues. Details of the procedure and postoperative recovery is highlighted. Patient and attending family appear to understand the issues and express their consent proceed.     Findings: None    Complications: None     Procedure Detail: The patient was evaluated in the preoperative holding area.  They were given the opportunity ask questions regarding the procedure in detail, and provided informed consent to proceed.  Their back was marked with the surgeon's initials at the anticipated level of the incision.  They were brought back to the operative suite on a gurney.  There, they were inducted under general  anesthesia by our anesthesia colleagues and placed prone onto a four-point Toro frame.  The back was prepped and draped in the typical sterile fashion.  A preprocedural pause was performed to identify the correct patient, laterality, procedure to be performed, as well as administration of preoperative antibiotics.     I then initiated the procedure by making a small vertical stab over the right PSIS using a 10 blade.  I dissected down using a curved tonsil, and palpated the PSIS using the tonsil.  I then selected a percutaneous pin 100 mm in length, inserted it along the same trajectory, and made contact with the PSIS bone.  I then gently malleted it into place.  The PSIS pin was found to be firmly in bone.  I then attached the O-arm array, sterilely draped the field, and obtained an O-arm spin.     Once this was completed, I was able to plan the course of my incision percutaneously using navigated tools.  I incised the skin using a 10 blade, dissected down through subcuticular layers using electrocautery.  I divided either side of the spinous process using electrocautery.  A Ramos was then used to clear the tissues off either side of the spinous process.  I cleared the soft tissues off the L4-5 facet joint, as well as the pars interarticularis of L4 bilaterally up to the L3-4 facet joint.  I took care to preserve the 3 4 facet joint capsule.  I then confirmed appropriate surgical site using navigated tools.  I exposed out to the transverse processes at each level, decorticated the transverse processes, and placed Magnifuse sponges in the bilateral lateral gutters. I then placed a Mead retractor for better visualization.    I set about placing screws from L4-L5 in the following fashion.  First, I obtained a cortical trajectory using the navigated bur, and made a  hole using a navigated bur.  I then used an awl tip tap to undertapped the trajectory of the screw.  I then placed the screw. All screws were  then stimulated, and all screws stimulated above a 20.     Once the screws and screw trajectories were in place, I turned my attention to the decompression.  I first removed the interspinous ligament at L4-5, and removed the inferior portion of the L4 spinous process using a rongeur.  The remnants of this were retained for autograft.  I then used a high-speed bur to thin the lamina up to the insertion of the ligamentum flavum as well as a portion of the medial joint bilaterally overlying the lateral recess.  I used a series of Kerrisons to remove the ligamentum flavum in piecemeal fashion.  This gave the excellent visualization of the thecal sac and lateral recess.  I probed above below the area of decompression and found no residual stenosis.  I then copiously irrigated the wound.    I used a high-speed bur to decorticate the bilateral L4-5 facet joint, and packed this with remaining bone graft. I then placed rods from L4-L5 bilaterally.  I placed set plugs, and final tightened the set plugs.  I obtained final AP and lateral x-rays confirming appropriate positioning of the implant and confirming that there was jaquan above and below the screws.  I then obtained additional hemostasis and found very little residual bleeding.  I placed 1 g of vancomycin powder into the wound.  A 10 Maltese Hemovac drain was also placed.  The fascia was closed using 1 Vicryl suture, the subcuticular layer was closed using 2-0 Vicryl suture, 0.25% Marcaine was introduced, and the skin was closed using a 3-0 strata fix.  The skin was covered with skin glue and a Primapore dressing was placed.  A subcuticular 2-0 Vicryl suture and skin glue was used to close the percutaneous pin site after this was removed using a slap hammer.     All sponge and needle counts were correct at the end.  The patient tolerated the procedure without any complication.  EMG was quiet throughout the procedure.             Condition: Stable     Weight bearing status:  Weight bearing as tolerated     Activity:      Anticoagulation plan:    Plan:      Guero Palmer MD  [facility]  Date:  1/22/2025  2:05 PM   Activity as tolerated  Patient may move about with assist as indicated or with supervision    Ambulation and mechanical prophylaxis.    The patient will be transferred to the floor once they clear PACU criteria.  Ancef will be given postoperatively for antibiotic prophylaxis, and the patient will mobilize for DVT prophylaxis.  The patient will be on strict no heavy lifting, twisting, or bending requirements for the next 3 months.  The patient will follow-up with me in 2 weeks for a follow-up visit.

## 2025-01-22 NOTE — ANESTHESIA CARE TRANSFER NOTE
Patient: Ana Maria De La Garza    Procedure: Procedure(s):  Lumbar 4 - Lumbar 5 Posterior Lumbar Decompression and Fusion with stealth navigation       Diagnosis: History of back pain [Z87.39]  Stenosis, spinal, lumbar [M48.061]  Spondylolisthesis of lumbar region [M43.16]  Diagnosis Additional Information: No value filed.    Anesthesia Type:   General     Note:    Oropharynx: oropharynx clear of all foreign objects and spontaneously breathing  Level of Consciousness: drowsy  Oxygen Supplementation: face mask  Level of Supplemental Oxygen (L/min / FiO2): 8    Dentition: dentition unchanged  Vital Signs Stable: post-procedure vital signs reviewed and stable  Report to RN Given: handoff report given  Patient transferred to: PACU    Handoff Report: Identifed the Patient, Identified the Reponsible Provider, Reviewed the pertinent medical history, Discussed the surgical course, Reviewed Intra-OP anesthesia mangement and issues during anesthesia, Set expectations for post-procedure period and Allowed opportunity for questions and acknowledgement of understanding      Vitals:  Vitals Value Taken Time   /70 01/22/25 1434   Temp 36  C (96.8  F) 01/22/25 1433   Pulse 104 01/22/25 1434   Resp 18 01/22/25 1434   SpO2 98 % 01/22/25 1434   Vitals shown include unfiled device data.    Electronically Signed By: TONNY WALDROP CRNA  January 22, 2025  2:36 PM

## 2025-01-22 NOTE — CONSULTS
[facility]  Consult Note - Hospitalist Service  Date of Admission:  1/22/2025  Consult Requested by: Orthopedics Dr. Palmer  Reason for Consult: medical management    Assessment & Plan   Ana Maria De La Garza is a 74 year old female admitted on 1/22/2025. She has a PMH of DM type II, HTN, HLD, KAMLESH, CKD stage IIIb, KATT, depression who is here for lumbar surgery.    The preop visit is reviewed.  Preop labs from 1/6 with a BMP showing creatinine of 1.25, GFR was 45.  CBC was unremarkable with hemoglobin of 13.8..  Home medications reviewed.  Thank you for the consultation, we will continue to follow    Lumbar radiculopathy in the setting of mobile degenerative spondylolisthesis with lateral recess stenosis at L4-5 s/p L4-5 posterior lateral fusion, etc.  -complications: none   -EBL: 50 cc  -general anesthesia   -DVT prophylaxis and pain management per primary service being orthopedist  -Close monitoring for respiratory concerns, urine retention, ileus, skin reactions, medication side effects etc.  -Incentive spirometry  -Discharge per primary service  - fall precautions, PT/OT   -Hemovac drain present    HTN  -PTA amlodipine and lisinopril resumed with holding parameters    DM type II  -Hemoglobin A1c from 12/2024 was 6.7%  -Hold PTA glipizide and once weekly Ozempic postoperatively for now  -Accu-Cheks and low-dose sliding scale insulin she added    CKD stage IIIb  -Review of preop labs from 1/6 showing creatinine of 1.25, GFR 45 appears to be at her baseline  -Avoid NSAIDs and nephrotoxic agents  -AM BMP     HLD  -PTA Zetia resumed    KATT  Depression  -PTA Lexapro and BuSpar resumed    KAMLESH   - does not use CPAP at home, may require supplemental oxygen nightly    Osteopenia   -On calcium and vitamin D3 supplementation outpatient     The patient's care was discussed with the Attending Physician, Dr. Reid and the pt .    Clinically Significant Risk Factors Present on Admission                "  # Drug Induced Platelet Defect: home medication list includes an antiplatelet medication   # Hypertension: Noted on problem list          # DMII: A1C = 6.7 % (Ref range: 0.0 - 5.6 %) within past 6 months    # Overweight: Estimated body mass index is 29.12 kg/m  as calculated from the following:    Height as of this encounter: 1.651 m (5' 5\").    Weight as of this encounter: 79.4 kg (175 lb).              Jennifer Bruner PA-C  Hospitalist Service  Securely message with [employer] (more info)  Text page via [facility] Paging/Directory   ______________________________________________________________________    Chief Complaint   Low back pain     History is obtained from the patient    History of Present Illness   Ana Maria De La Garza is a 74 year old female who  has a PMH of DM type II, HTN, HLD, KAMLESH, CKD stage IIIb, KATT, depression who is here for lumbar surgery.    The preop visit is reviewed.  Preop labs from 1/6 with a BMP showing creatinine of 1.25, GFR was 45.  CBC was unremarkable with hemoglobin of 13.8. Home medications reviewed.      Patient was seen postoperatively in the PACU.  Vital signs stable, patient is on 2 L via NC, still drowsy from general anesthesia and also received additional fentanyl in the PACU for postop back pain but following commands and answer questions appropriately. She was denying any acute concerns or complaints, was not experiencing any chest pain, shortness of breath, abdominal pain, or decrease sensation to her upper or lower extremities.  She denies any history of heart attack, stroke or blood clots.  She does not drink, smoke or utilize any recreational drugs.  She was rating her back pain 5/10 postoperatively.  We discussed home medications and initiating sliding scale insulin while hospitalized postoperatively.  Patient states she does not use CPAP at home.  Also discussed pain management per primary team as well as DVT prophylaxis.       Past Medical History    Past Medical " History:   Diagnosis Date    Depression     Diabetes (H)     Hypertension     Insomnia        Past Surgical History   Past Surgical History:   Procedure Laterality Date    HC SLING OPERATION FOR STRESS INCONTINENCE      Description: Mid-Urethral Sling Operation;  Recorded: 05/18/2010;    left shoulder replacement Left 09/16/2022    Dr. Sullivan    OPEN REDUCTION INTERNAL FIXATION ANKLE Right     2008 and pin removal 2009    Z APPENDECTOMY      Description: Appendectomy;  Recorded: 02/26/2008;    ZZC TOTAL KNEE ARTHROPLASTY Left 02/18/2015    Procedure:   LEFT TOTAL KNEE ARTHROPLASTY;  Surgeon: Sina SHARP MD;  Location: [facility];  Service: Orthopedics       Medications   I have reviewed this patient's current medications       Review of Systems    Review of Systems   Constitutional:  Negative for chills, diaphoresis, fever, malaise/fatigue and weight loss.   Eyes:  Negative for blurred vision.   Respiratory:  Negative for cough, shortness of breath and wheezing.    Cardiovascular:  Negative for chest pain, palpitations and orthopnea.   Gastrointestinal:  Negative for abdominal pain, heartburn, nausea and vomiting.   Genitourinary:  Negative for flank pain, frequency and urgency.   Musculoskeletal:  Positive for back pain.   Skin:  Negative for itching and rash.   Neurological:  Negative for dizziness, tingling, sensory change, seizures and weakness.         Physical Exam   Vital Signs: Temp: 96.8  F (36  C) Temp src: Temporal BP: (!) 144/70 Pulse: 101   Resp: 13 SpO2: 95 % O2 Device: Nasal cannula Oxygen Delivery: 2 LPM  Weight: 175 lbs 0 oz    Physical Exam  Constitutional:       General: She is not in acute distress.     Appearance: She is not ill-appearing, toxic-appearing or diaphoretic.      Comments: Drowsy post general anesthesia   HENT:      Head: Normocephalic and atraumatic.      Mouth/Throat:      Mouth: Mucous membranes are dry.   Cardiovascular:      Rate and Rhythm: Regular rhythm.  "Tachycardia present.      Heart sounds: No murmur heard.     No friction rub.   Pulmonary:      Effort: No respiratory distress.      Breath sounds: No wheezing.      Comments: atelectasis  Abdominal:      General: Bowel sounds are normal.      Palpations: Abdomen is soft.      Tenderness: There is no abdominal tenderness.   Musculoskeletal:      Comments: Hemovac drain present.  2+ DP and PT pulses bilaterally, bilateral compartment soft and warm.  Sensation intact bilateral lower extremities   Neurological:      General: No focal deficit present.      Comments: Drowsy post general anesthesia, answering questions appropriately.  Equal  strength 5/5 bilaterally, sensation intact bilateral upper and lower extremities.  Moving bilateral feet.           Medical Decision Making       60 MINUTES SPENT BY ME on the date of service doing chart review, history, exam, documentation & further activities per the note.      Data         Imaging results reviewed over the past 24 hrs:   Recent Results (from the past 24 hours)   POC US Guidance Needle Placement    Narrative    Ultrasound was performed as guidance to an anesthesia procedure.  Click   \"PACS images\" hyperlink below to view any stored images.  For specific   procedure details, view procedure note authored by anesthesia.   XR Surgery OARM    Narrative    This exam was marked as non-reportable because it will not be read by a   radiologist or a [place] non-radiologist provider.         XR Surgery JOVANI  Fluoro G/T 5 Min    Narrative    This exam was marked as non-reportable because it will not be read by a   radiologist or a [place] non-radiologist provider.           "

## 2025-01-22 NOTE — ANESTHESIA PROCEDURE NOTES
Airway       Patient location during procedure: OR       Procedure Start/Stop Times: 1/22/2025 12:18 PM  Staff -        Anesthesiologist:  Woo Florence MD       CRNA: Mirian Blackman APRN CRNA       Other Anesthesia Staff: Shahnaz Moon       Performed By: SRNAIndications and Patient Condition       Indications for airway management: shanti-procedural       Induction type:intravenous       Mask difficulty assessment: 1 - vent by mask    Final Airway Details       Final airway type: endotracheal airway       Successful airway: ETT - single  Endotracheal Airway Details        ETT size (mm): 7.0       Cuffed: yes       Successful intubation technique: direct laryngoscopy       DL Blade Type: Moon 2       Grade View of Cords: 1       Adjucts: stylet       Position: Right       Measured from: lips       Secured at (cm): 22       Bite block used: None    Post intubation assessment        Placement verified by: capnometry, equal breath sounds and chest rise        Number of attempts at approach: 1       Number of other approaches attempted: 0       Secured with: tape       Ease of procedure: easy       Dentition: Intact and Unchanged       Dental guard used and removed. Dental Guard Type: Standard White.    Medication(s) Administered   Medication Administration Time: 1/22/2025 12:18 PM

## 2025-01-22 NOTE — INTERVAL H&P NOTE
"I have reviewed the surgical (or preoperative) H&P that is linked to this encounter, and examined the patient. There are no significant changes    Clinical Conditions Present on Arrival:  Clinically Significant Risk Factors Present on Admission                  # Drug Induced Platelet Defect: home medication list includes an antiplatelet medication      # DMII: A1C = 6.7 % (Ref range: 0.0 - 5.6 %) within past 6 months  # Overweight: Estimated body mass index is 29.12 kg/m  as calculated from the following:    Height as of this encounter: 1.651 m (5' 5\").    Weight as of this encounter: 79.4 kg (175 lb).       "

## 2025-01-22 NOTE — ANESTHESIA POSTPROCEDURE EVALUATION
Patient: Ana Maria De La Garza    Procedure: Procedure(s):  Lumbar 4 - Lumbar 5 Posterior Lumbar Decompression and Fusion with stealth navigation       Anesthesia Type:  General    Note:  Disposition: Inpatient   Postop Pain Control:             Sign Out: Well controlled pain   PONV: No   Neuro/Psych:             Sign Out: Acceptable/Baseline neuro status   Airway/Respiratory:             Sign Out: Acceptable/Baseline resp. status   CV/Hemodynamics:             Sign Out: Acceptable CV status   Other NRE: NONE   DID A NON-ROUTINE EVENT OCCUR?            Last vitals:  Vitals Value Taken Time   /65 01/22/25 1540   Temp 35.9  C (96.6  F) 01/22/25 1540   Pulse 100 01/22/25 1547   Resp 26 01/22/25 1546   SpO2 92 % 01/22/25 1547   Vitals shown include unfiled device data.    Electronically Signed By: Woo Florence MD  January 22, 2025  3:50 PM

## 2025-01-22 NOTE — ANESTHESIA PREPROCEDURE EVALUATION
Anesthesia Pre-Procedure Evaluation    Patient: Ana Maria De La Garza   MRN: [id number] : 1950        Procedure : Procedure(s):  Lumbar 4 - Lumbar 5 Posterior Lumbar Decompression and Fusion with stealth navigation          Past Medical History:   Diagnosis Date    Depression     Diabetes (H)     Hypertension     Insomnia       Past Surgical History:   Procedure Laterality Date    HC SLING OPERATION FOR STRESS INCONTINENCE      Description: Mid-Urethral Sling Operation;  Recorded: 2010;    left shoulder replacement Left 2022    Dr. Sullivan    OPEN REDUCTION INTERNAL FIXATION ANKLE Right      and pin removal 2009    ZZC APPENDECTOMY      Description: Appendectomy;  Recorded: 2008;    ZZC TOTAL KNEE ARTHROPLASTY Left 2015    Procedure:   LEFT TOTAL KNEE ARTHROPLASTY;  Surgeon: Sina SHARP MD;  Location: [facility];  Service: Orthopedics      Allergies   Allergen Reactions    Atorvastatin Muscle Pain (Myalgia)    Metformin Unknown     Body aches, headache    Morphine (Pf) [Morphine] Nausea and Vomiting    Pravastatin Muscle Pain (Myalgia)      Social History     Tobacco Use    Smoking status: Never    Smokeless tobacco: Never   Substance Use Topics    Alcohol use: Yes     Alcohol/week: 2.0 standard drinks of alcohol     Types: 2 Standard drinks or equivalent per week     Comment: once in a while, but not often      Wt Readings from Last 1 Encounters:   25 79.4 kg (175 lb)        Anesthesia Evaluation            ROS/MED HX  ENT/Pulmonary:       Neurologic:  - neg neurologic ROS     Cardiovascular:       METS/Exercise Tolerance:     Hematologic:  - neg hematologic  ROS     Musculoskeletal:  - neg musculoskeletal ROS     GI/Hepatic:  - neg GI/hepatic ROS     Renal/Genitourinary:       Endo:       Psychiatric/Substance Use:  - neg psychiatric ROS     Infectious Disease:  - neg infectious disease ROS     Malignancy:  - neg malignancy ROS     Other:  - neg other ROS     "      Physical Exam    Airway  airway exam normal      Mallampati: II       Respiratory Devices and Support         Dental  no notable dental history         Cardiovascular   cardiovascular exam normal          Pulmonary   pulmonary exam normal                OUTSIDE LABS:  CBC:   Lab Results   Component Value Date    WBC 6.8 01/06/2025    WBC 6.0 12/16/2024    HGB 13.8 01/06/2025    HGB 14.3 12/16/2024    HCT 40.0 01/06/2025    HCT 42.5 12/16/2024     01/06/2025     12/16/2024     BMP:   Lab Results   Component Value Date     01/06/2025     12/16/2024    POTASSIUM 4.6 01/06/2025    POTASSIUM 4.5 12/16/2024    CHLORIDE 104 01/06/2025    CHLORIDE 101 12/16/2024    CO2 28 01/06/2025    CO2 26 12/16/2024    BUN 22.7 01/06/2025    BUN 23.5 (H) 12/16/2024    CR 1.25 (H) 01/06/2025    CR 1.26 (H) 12/16/2024     (H) 01/06/2025     (H) 12/16/2024     COAGS: No results found for: \"PTT\", \"INR\", \"FIBR\"  POC: No results found for: \"BGM\", \"HCG\", \"HCGS\"  HEPATIC:   Lab Results   Component Value Date    ALBUMIN 4.2 12/16/2024    PROTTOTAL 6.6 12/16/2024    ALT 43 12/16/2024    AST 26 12/16/2024    ALKPHOS 79 12/16/2024    BILITOTAL 0.3 12/16/2024     OTHER:   Lab Results   Component Value Date    A1C 6.7 (H) 12/16/2024    CUONG 9.8 01/06/2025    PHOS 3.0 04/12/2024    MAG 2.4 12/06/2018    TSH 2.09 12/16/2024    CRP 0.3 06/11/2021    SED 6 06/11/2021       Anesthesia Plan    ASA Status:  3       Anesthesia Type: General.     - Airway: ETT              Consents    Anesthesia Plan(s) and associated risks, benefits, and realistic alternatives discussed. Questions answered and patient/representative(s) expressed understanding.     - Discussed:     - Discussed with:  Patient            Postoperative Care       PONV prophylaxis: Ondansetron (or other 5HT-3), Dexamethasone or Solumedrol     Comments:    Other Comments: Mean BP>80           Woo Florence MD    I have reviewed the pertinent notes " "and labs in the chart from the past 30 days and (re)examined the patient.  Any updates or changes from those notes are reflected in this note.    Clinically Significant Risk Factors Present on Admission                 # Drug Induced Platelet Defect: home medication list includes an antiplatelet medication   # Hypertension: Noted on problem list          # DMII: A1C = 6.7 % (Ref range: 0.0 - 5.6 %) within past 6 months    # Overweight: Estimated body mass index is 28.68 kg/m  as calculated from the following:    Height as of 1/6/25: 1.664 m (5' 5.5\").    Weight as of 1/6/25: 79.4 kg (175 lb).                "

## 2025-01-22 NOTE — PHARMACY-ADMISSION MEDICATION HISTORY
Pharmacist Admission Medication History    Admission medication history is complete. The information provided in this note is only as accurate as the sources available at the time of the update.    Information Source(s): Patient and CareEverywhere/SureScripts via in-person    Pertinent Information: None    Allergies reviewed with patient and updates made in EHR: yes    Medication History Completed By: Goran Summers [facility] 1/22/2025 11:17 AM    PTA Med List   Medication Sig Last Dose/Taking    amLODIPine (NORVASC) 5 MG tablet Take 1 tablet (5 mg) by mouth daily 1/22/2025 Morning    aspirin 81 MG EC tablet Take 81 mg by mouth daily 1/16/2025    busPIRone (BUSPAR) 5 MG tablet Take 10 mg by mouth 2 times daily. 1/22/2025 Morning    CALCIUM PO Take 1 tablet by mouth daily Past Week    escitalopram (LEXAPRO) 20 MG tablet Take 1 tablet (20 mg) by mouth daily 1/22/2025 Morning    ezetimibe (ZETIA) 10 MG tablet Take 1 tablet (10 mg) by mouth daily 1/21/2025 Morning    glipiZIDE (GLUCOTROL XL) 10 MG 24 hr tablet Take 1 tablet (10 mg) by mouth 2 times daily 1/21/2025 Evening    lisinopril (ZESTRIL) 40 MG tablet Take 40 mg by mouth daily 1/21/2025 Morning    multivitamin therapeutic (THERAGRAN) tablet [MULTIVITAMIN THERAPEUTIC (THERAGRAN) TABLET] Take 1 tablet by mouth daily. Past Week    OMEGA-3/DHA/EPA/FISH OIL (FISH OIL-OMEGA-3 FATTY ACIDS) 300-1,000 mg capsule [OMEGA-3/DHA/EPA/FISH OIL (FISH OIL-OMEGA-3 FATTY ACIDS) 300-1,000 MG CAPSULE] Take 2 g by mouth daily. Past Week    semaglutide (OZEMPIC) 2 MG/3ML pen Inject 0.5 mg Subcutaneous every 7 days 1/9/2025    vitamin D3 (CHOLECALCIFEROL) 50 mcg (2000 units) tablet Take 1 tablet by mouth daily Past Week

## 2025-01-23 ENCOUNTER — APPOINTMENT (OUTPATIENT)
Dept: PHYSICAL THERAPY | Facility: CLINIC | Age: 75
DRG: 451 | End: 2025-01-23
Attending: ORTHOPAEDIC SURGERY
Payer: MEDICARE

## 2025-01-23 ENCOUNTER — APPOINTMENT (OUTPATIENT)
Dept: OCCUPATIONAL THERAPY | Facility: CLINIC | Age: 75
DRG: 451 | End: 2025-01-23
Attending: ORTHOPAEDIC SURGERY
Payer: MEDICARE

## 2025-01-23 VITALS
WEIGHT: 175 LBS | RESPIRATION RATE: 16 BRPM | SYSTOLIC BLOOD PRESSURE: 146 MMHG | HEART RATE: 79 BPM | HEIGHT: 65 IN | TEMPERATURE: 98.1 F | OXYGEN SATURATION: 94 % | DIASTOLIC BLOOD PRESSURE: 81 MMHG | BODY MASS INDEX: 29.16 KG/M2

## 2025-01-23 LAB
ANION GAP SERPL CALCULATED.3IONS-SCNC: 9 MMOL/L (ref 7–15)
BUN SERPL-MCNC: 18.7 MG/DL (ref 8–23)
CALCIUM SERPL-MCNC: 8.9 MG/DL (ref 8.8–10.4)
CHLORIDE SERPL-SCNC: 102 MMOL/L (ref 98–107)
CREAT SERPL-MCNC: 1.1 MG/DL (ref 0.51–0.95)
EGFRCR SERPLBLD CKD-EPI 2021: 52 ML/MIN/1.73M2
ERYTHROCYTE [DISTWIDTH] IN BLOOD BY AUTOMATED COUNT: 12.6 % (ref 10–15)
GLUCOSE BLDC GLUCOMTR-MCNC: 149 MG/DL (ref 70–99)
GLUCOSE BLDC GLUCOMTR-MCNC: 156 MG/DL (ref 70–99)
GLUCOSE SERPL-MCNC: 155 MG/DL (ref 70–99)
HCO3 SERPL-SCNC: 23 MMOL/L (ref 22–29)
HCT VFR BLD AUTO: 33.7 % (ref 35–47)
HGB BLD-MCNC: 11.8 G/DL (ref 11.7–15.7)
MCH RBC QN AUTO: 31.9 PG (ref 26.5–33)
MCHC RBC AUTO-ENTMCNC: 35 G/DL (ref 31.5–36.5)
MCV RBC AUTO: 91 FL (ref 78–100)
PLATELET # BLD AUTO: 195 10E3/UL (ref 150–450)
POTASSIUM SERPL-SCNC: 4.9 MMOL/L (ref 3.4–5.3)
RBC # BLD AUTO: 3.7 10E6/UL (ref 3.8–5.2)
SODIUM SERPL-SCNC: 134 MMOL/L (ref 135–145)
WBC # BLD AUTO: 11.4 10E3/UL (ref 4–11)

## 2025-01-23 PROCEDURE — 97110 THERAPEUTIC EXERCISES: CPT | Mod: GP

## 2025-01-23 PROCEDURE — 250N000011 HC RX IP 250 OP 636: Performed by: ORTHOPAEDIC SURGERY

## 2025-01-23 PROCEDURE — 97116 GAIT TRAINING THERAPY: CPT | Mod: GP

## 2025-01-23 PROCEDURE — 36415 COLL VENOUS BLD VENIPUNCTURE: CPT | Performed by: ORTHOPAEDIC SURGERY

## 2025-01-23 PROCEDURE — 82565 ASSAY OF CREATININE: CPT | Performed by: ORTHOPAEDIC SURGERY

## 2025-01-23 PROCEDURE — 85027 COMPLETE CBC AUTOMATED: CPT | Performed by: ORTHOPAEDIC SURGERY

## 2025-01-23 PROCEDURE — 97535 SELF CARE MNGMENT TRAINING: CPT | Mod: GO

## 2025-01-23 PROCEDURE — 250N000013 HC RX MED GY IP 250 OP 250 PS 637: Performed by: NURSE PRACTITIONER

## 2025-01-23 PROCEDURE — 82310 ASSAY OF CALCIUM: CPT | Performed by: ORTHOPAEDIC SURGERY

## 2025-01-23 PROCEDURE — 82962 GLUCOSE BLOOD TEST: CPT

## 2025-01-23 PROCEDURE — 250N000013 HC RX MED GY IP 250 OP 250 PS 637

## 2025-01-23 PROCEDURE — 99232 SBSQ HOSP IP/OBS MODERATE 35: CPT | Performed by: STUDENT IN AN ORGANIZED HEALTH CARE EDUCATION/TRAINING PROGRAM

## 2025-01-23 PROCEDURE — 120N000001 HC R&B MED SURG/OB

## 2025-01-23 PROCEDURE — 80048 BASIC METABOLIC PNL TOTAL CA: CPT | Performed by: ORTHOPAEDIC SURGERY

## 2025-01-23 PROCEDURE — 97161 PT EVAL LOW COMPLEX 20 MIN: CPT | Mod: GP

## 2025-01-23 PROCEDURE — 250N000013 HC RX MED GY IP 250 OP 250 PS 637: Performed by: ORTHOPAEDIC SURGERY

## 2025-01-23 PROCEDURE — 97165 OT EVAL LOW COMPLEX 30 MIN: CPT | Mod: GO

## 2025-01-23 RX ORDER — METHOCARBAMOL 500 MG/1
500 TABLET, FILM COATED ORAL 3 TIMES DAILY PRN
Status: DISCONTINUED | OUTPATIENT
Start: 2025-01-23 | End: 2025-01-24 | Stop reason: HOSPADM

## 2025-01-23 RX ADMIN — INSULIN ASPART 1 UNITS: 100 INJECTION, SOLUTION INTRAVENOUS; SUBCUTANEOUS at 07:07

## 2025-01-23 RX ADMIN — BENZOCAINE AND MENTHOL 1 LOZENGE: 15; 3.6 LOZENGE ORAL at 01:06

## 2025-01-23 RX ADMIN — ACETAMINOPHEN 975 MG: 325 TABLET ORAL at 08:42

## 2025-01-23 RX ADMIN — TRAMADOL HYDROCHLORIDE 50 MG: 50 TABLET, COATED ORAL at 22:46

## 2025-01-23 RX ADMIN — BUSPIRONE HYDROCHLORIDE 10 MG: 10 TABLET ORAL at 08:42

## 2025-01-23 RX ADMIN — SENNOSIDES AND DOCUSATE SODIUM 1 TABLET: 50; 8.6 TABLET ORAL at 20:14

## 2025-01-23 RX ADMIN — PROCHLORPERAZINE EDISYLATE 5 MG: 5 INJECTION INTRAMUSCULAR; INTRAVENOUS at 01:04

## 2025-01-23 RX ADMIN — TRAMADOL HYDROCHLORIDE 50 MG: 50 TABLET, COATED ORAL at 10:32

## 2025-01-23 RX ADMIN — AMLODIPINE BESYLATE 5 MG: 5 TABLET ORAL at 08:42

## 2025-01-23 RX ADMIN — EZETIMIBE 10 MG: 10 TABLET ORAL at 08:43

## 2025-01-23 RX ADMIN — TRAMADOL HYDROCHLORIDE 50 MG: 50 TABLET, COATED ORAL at 16:33

## 2025-01-23 RX ADMIN — ACETAMINOPHEN 975 MG: 325 TABLET ORAL at 16:33

## 2025-01-23 RX ADMIN — POLYETHYLENE GLYCOL 3350 17 G: 17 POWDER, FOR SOLUTION ORAL at 08:42

## 2025-01-23 RX ADMIN — METHOCARBAMOL 500 MG: 500 TABLET ORAL at 20:27

## 2025-01-23 RX ADMIN — ACETAMINOPHEN 975 MG: 325 TABLET ORAL at 01:05

## 2025-01-23 RX ADMIN — CEFAZOLIN 1 G: 1 INJECTION, POWDER, FOR SOLUTION INTRAMUSCULAR; INTRAVENOUS at 04:16

## 2025-01-23 RX ADMIN — THERA TABS 1 TABLET: TAB at 08:42

## 2025-01-23 RX ADMIN — LISINOPRIL 40 MG: 40 TABLET ORAL at 08:42

## 2025-01-23 RX ADMIN — ACETAMINOPHEN 975 MG: 325 TABLET ORAL at 23:06

## 2025-01-23 RX ADMIN — ESCITALOPRAM OXALATE 20 MG: 20 TABLET ORAL at 08:42

## 2025-01-23 RX ADMIN — BUSPIRONE HYDROCHLORIDE 10 MG: 10 TABLET ORAL at 20:14

## 2025-01-23 RX ADMIN — TRAMADOL HYDROCHLORIDE 50 MG: 50 TABLET, COATED ORAL at 04:20

## 2025-01-23 ASSESSMENT — ACTIVITIES OF DAILY LIVING (ADL)
ADLS_ACUITY_SCORE: 31
IADL_COMMENTS: SHARED WITH SPOUSE
ADLS_ACUITY_SCORE: 31
ADLS_ACUITY_SCORE: 31
ADLS_ACUITY_SCORE: 20
ADLS_ACUITY_SCORE: 20
ADLS_ACUITY_SCORE: 31
ADLS_ACUITY_SCORE: 20
ADLS_ACUITY_SCORE: 31
ADLS_ACUITY_SCORE: 20
ADLS_ACUITY_SCORE: 31
ADLS_ACUITY_SCORE: 20
ADLS_ACUITY_SCORE: 31
ADLS_ACUITY_SCORE: 20

## 2025-01-23 ASSESSMENT — COLUMBIA-SUICIDE SEVERITY RATING SCALE - C-SSRS
1. IN THE PAST MONTH, HAVE YOU WISHED YOU WERE DEAD OR WISHED YOU COULD GO TO SLEEP AND NOT WAKE UP?: NO
2. HAVE YOU ACTUALLY HAD ANY THOUGHTS OF KILLING YOURSELF IN THE PAST MONTH?: NO
6. HAVE YOU EVER DONE ANYTHING, STARTED TO DO ANYTHING, OR PREPARED TO DO ANYTHING TO END YOUR LIFE?: NO

## 2025-01-23 NOTE — PROGRESS NOTES
"[facility]opa[facility]ics Progress Note      Post-operative Day: 1 Day Post-Op    Procedure(s):  Lumbar 4 - Lumbar 5 Posterior Lumbar Decompression and Fusion with stealth navigation      Subjective: Patient seen up resting in bedside chair eating lunch.  Family present in room.  Endorses pain to lumbar spine, managing with tramadol as sensitive to other narcotics.  Is voiding although noted to have elevated PVR.  Tolerating regular diet with no nausea or vomiting, no new radicular symptoms.     Pain: moderate  Chest pain, SOB:  No  Drain: 90 ml out overnight       Objective:  Blood pressure 137/63, pulse 78, temperature 97.9  F (36.6  C), temperature source Oral, resp. rate 16, height 1.651 m (5' 5\"), weight 79.4 kg (175 lb), SpO2 92%, not currently breastfeeding.    Patient Vitals for the past 24 hrs:   BP Temp Temp src Pulse Resp SpO2   01/23/25 0817 137/63 -- -- 78 -- --   01/23/25 0816 (!) 180/69 97.9  F (36.6  C) Oral 78 16 92 %   01/23/25 0408 136/73 98.4  F (36.9  C) Oral 79 14 93 %   01/23/25 0105 (!) 145/71 97.2  F (36.2  C) Oral 88 15 93 %   01/22/25 2111 -- -- -- -- 10 93 %   01/22/25 1905 132/68 97.5  F (36.4  C) Oral 93 10 93 %   01/22/25 1800 130/67 97.2  F (36.2  C) Oral 91 10 95 %   01/22/25 1700 115/57 97.4  F (36.3  C) Axillary 91 10 92 %   01/22/25 1630 114/59 97.3  F (36.3  C) Oral 92 10 93 %   01/22/25 1600 127/68 97.1  F (36.2  C) Oral 95 10 93 %   01/22/25 1540 129/65 (!) 96.6  F (35.9  C) Temporal 95 10 94 %   01/22/25 1530 (!) 143/71 -- -- 101 15 95 %   01/22/25 1525 -- -- -- 96 13 92 %   01/22/25 1520 -- -- -- 99 (!) 9 95 %   01/22/25 1519 134/66 -- -- 99 (!) 6 95 %   01/22/25 1510 134/64 -- -- 97 13 94 %   01/22/25 1505 -- -- -- 101 13 95 %   01/22/25 1503 (!) 144/70 -- -- 101 15 (!) 90 %   01/22/25 1500 (!) 144/70 -- -- 101 16 94 %   01/22/25 1450 140/66 -- -- (!) 106 13 94 %   01/22/25 1440 (!) 151/70 -- -- 93 15 100 %   01/22/25 1433 (!) 151/70 96.8  F (36  C) Temporal 105 14 100 % "       Wt Readings from Last 4 Encounters:   01/22/25 79.4 kg (175 lb)   01/06/25 79.4 kg (175 lb)   12/16/24 79.4 kg (175 lb)   05/07/24 78.2 kg (172 lb 4.8 oz)       General: Alert and orientated, NAD  Respiratory: Non-labored breathing on RA  BLE motor function, sensation, and circulation intact   Yes, Dorsiflexion/plantarflexion intact and equal bilaterally. Moves all other extremities with ease and purpose   Wound status: incisions are clean dry and intact. Yes, hemovac drain present   Calf tenderness: Bilateral  No    Pertinent Labs   Lab Results: personally reviewed.     Recent Labs   Lab Test 01/23/25  0545 01/06/25  1457 12/16/24  1029 02/02/22  1040 06/11/21  1252   WBC 11.4* 6.8 6.0   < > 7.6   HGB 11.8 13.8 14.3   < > 14.7   HCT 33.7* 40.0 42.5   < > 41.3   MCV 91 93 92   < > 88    219 224   < > 202   * 141 139   < > 140   CRP  --   --   --   --  0.3    < > = values in this interval not displayed.       Plan:   Continue to monitor drain output DC when less than 30 mL per shift  Continue monitoring for urinary retention straight cath per protocol, will avoid anticholinergics  Anticoagulation protocol: Mechanical and/or ambulation     Pain medications:  scopainmedication: tramadol and tylenol, will add Robaxin   Weight bearing status:  strict no heavy lifting, twisting, or bending requirements for the next 3 months.   Disposition:  Home pending adequate pain control on p.o. analgesics, drain output, cleared by therapy and hospitalist with ability to empty bladder adequately.  Anticipate another 1-2 nights  Continue cares and rehabilitation     Report completed by:  TONNY Núñez CNP  Date: 1/23/2025  Time: 2:21 PM

## 2025-01-23 NOTE — PROGRESS NOTES
Occupational Therapy Discharge Summary    Reason for therapy discharge:    All goals and outcomes met, no further needs identified.    Progress towards therapy goal(s). See goals on Care Plan in [facility] electronic health record for goal details.  Goals met    Therapy recommendation(s):    No further therapy is recommended.       01/23/25 0730   Appointment Info   Signing Clinician's Name / Credentials (OT) Dania Moon OTR/L   Quick Adds   Quick Adds Certification   Living Environment   People in Home spouse   Current Living Arrangements condominium;house   Living Environment Comments have home in [place] that they'll return to when pt feels ready, currently staying in a condo they own in [place]. condo has walk-in shower and regular height toilet with bidet and vanity next to it. flat bed.   Self-Care   Equipment Currently Used at Home none   Fall history within last six months no   Activity/Exercise/Self-Care Comment IND for BADLs   Instrumental Activities of Daily Living (IADL)   IADL Comments shared with spouse   General Information   Onset of Illness/Injury or Date of Surgery 01/22/25   Referring Physician Guero Palmer MD   Patient/Family Therapy Goal Statement (OT) less pain   Additional Occupational Profile Info/Pertinent History of Current Problem s/p L4-5 posterior lateral fusion   Existing Precautions/Restrictions spinal   Cognitive Status Examination   Orientation Status orientation to person, place and time   Affect/Mental Status (Cognitive) WFL   Follows Commands WFL   Pain Assessment   Patient Currently in Pain Yes, see Vital Sign flowsheet   Range of Motion Comprehensive   General Range of Motion bilateral upper extremity ROM WFL   Strength Comprehensive (MMT)   General Manual Muscle Testing (MMT) Assessment no strength deficits identified   Bed Mobility   Bed Mobility supine-sit;sit-supine   Supine-Sit [place] (Bed Mobility) contact guard;verbal cues   Sit-Supine  [place] (Bed Mobility) contact guard;verbal cues   Transfers   Transfers sit-stand transfer;toilet transfer;shower transfer   Sit-Stand Transfer   Sit-Stand [place] (Transfers) contact guard;verbal cues   Shower Transfer   Type (Shower Transfer) lateral   [place] Level (Shower Transfer) contact guard;verbal cues   Toilet Transfer   Type (Toilet Transfer) sit-stand;stand-sit   [place] Level (Toilet Transfer) contact guard;verbal cues   Balance   Balance Comments CGA with FWW for static standing   Activities of Daily Living   BADL Assessment/Intervention lower body dressing;toileting   Lower Body Dressing Assessment/Training   [place] Level (Lower Body Dressing) minimum assist (75% patient effort)   Toileting   [place] Level (Toileting) contact guard assist;verbal cues   Clinical Impression   Criteria for Skilled Therapeutic Interventions Met (OT) Yes, treatment indicated   OT Diagnosis dec BADL IND   OT Problem List-Impairments impacting ADL problems related to;activity tolerance impaired;balance;mobility;pain;post-surgical precautions   Assessment of Occupational Performance 5 or more Performance Deficits   Identified Performance Deficits dressing, toileting, bathing, household mobility, functional transfers, household management, meal prpe, community mobility   Planned Therapy Interventions (OT) ADL retraining;IADL retraining;bed mobility training;transfer training;progressive activity/exercise   Clinical Decision Making Complexity (OT) problem focused assessment/low complexity   Risk & Benefits of therapy have been explained evaluation/treatment results reviewed;participants included;patient   OT Total Evaluation Time   OT Eval, Low Complexity Minutes (15541) 10   Therapy Certification   Medical Diagnosis Lumbar radiculopathy   Start of Care Date 01/23/25   Certification date from 01/23/25   Certification date to 01/23/25   OT Goals   Therapy Frequency (OT) One time eval and treatment    OT Predicted Duration/Target Date for Goal Attainment 01/23/25   OT Goals Lower Body Dressing;Bed Mobility;Transfers;Toilet Transfer/Toileting   OT: Lower Body Dressing Supervision/stand-by assist;within precautions   OT: Bed Mobility Supervision/stand-by assist;supine to/from sitting;within precautions   OT: Transfer Supervision/stand-by assist;within precautions  (walk-in shower)   OT: Toilet Transfer/Toileting Supervision/stand-by assist;toilet transfer;cleaning and garment management;within precautions   Self-Care/Home Management   Self-Care/Home Mgmt/ADL, Compensatory, Meal Prep Minutes (37812) 24   Symptoms Noted During/After Treatment (Meal Preparation/Planning Training) increased pain   Treatment Detail/Skilled Intervention Educ on spine precaution and implications for BADLs. Educ on positioning to complete ADLs to maintain precautions. Following educ and with initial cues, setup to don shorts using figure 4 with pt showing understanding to carryover to socks/shoes, SBA for toilet transfer simulating home setup with SBA for standing clothing management and pericares, SBA with FWW at counter for hand hygiene, SBA for lateral walk-in shower transfer without AD, SBA for sit <> supine using log roll, and SBA with FWW for amb in room with FWW with demo for FWW positioning during transfers.   OT Discharge Planning   OT Plan d/c   OT Discharge Recommendation (DC Rec) home with assist   OT Rationale for DC Rec lives with supportive spouse and met OT goals   OT Brief overview of current status SBA   OT Total Distance Amb During Session (feet) 40   Total Session Time   Timed Code Treatment Minutes 24   Total Session Time (sum of timed and untimed services) 34   M [facility]                                                                                   OUTPATIENT OCCUPATIONAL THERAPY    PLAN OF TREATMENT FOR OUTPATIENT REHABILITATION   Patient's Last Name, First Name,  Ana Maria La YOB: 1950   Provider's Name   [facility]   Medical Record No.  [id number]     Onset Date: 01/22/25 Start of Care Date: 01/23/25     Medical Diagnosis:  Lumbar radiculopathy               OT Diagnosis:  dec BADL IND Certification Dates:  From: 01/23/25  To: 01/23/25     See note for plan of treatment, functional goals, and certification details.    I CERTIFY THE NEED FOR THESE SERVICES FURNISHED UNDER        THIS PLAN OF TREATMENT AND WHILE UNDER MY CARE (Physician co-signature of this document indicates review and certification of the therapy plan).

## 2025-01-23 NOTE — PLAN OF CARE
"Patient vital signs are at baseline: Yes  Patient able to ambulate as they were prior to admission or with assist devices provided by therapies during their stay:  Yes  Patient MUST void prior to discharge:  Yes  Patient able to tolerate oral intake:  Yes  Pain has adequate pain control using Oral analgesics:  Yes  Does patient have an identified :  Yes  Has goal D/C date and time been discussed with patient:  Yes       Pt's dressing dry and intact. HV patent and draining greater then 50ml in a shift. Still retaining some urine but is able to void per pt \" I want to keep trying to void and dont want to be st cath again\", encouraging pt to ambulate to help void more. Requests only PRN tramadol for pain per pt \" other narcotics make me want to throw up\" see MAR. Worked with PT/OT. Discharge pending drain output and rentention.   Leslie Merino RN on 1/23/2025 at 5:41 PM                  "

## 2025-01-23 NOTE — PROGRESS NOTES
01/23/25 0935   Appointment Info   Signing Clinician's Name / Credentials (PT) Carmen Lemae PT   Quick Adds   Quick Adds Certification   Living Environment   People in Home spouse   Current Living Arrangements house;condominium   Home Accessibility no concerns   Transportation Anticipated family or friend will provide   Living Environment Comments staying at condo locally then return to home in [place] with bedroom upstairs   Self-Care   Equipment Currently Used at Home none   Activity/Exercise/Self-Care Comment independent ADL/IADL's   General Information   Onset of Illness/Injury or Date of Surgery 01/22/25   Referring Physician Dr. Palmer   Patient/Family Therapy Goals Statement (PT) hike, bike   Pertinent History of Current Problem (include personal factors and/or comorbidities that impact the POC) s/p L4-5 post. fusion 1/22/25   Existing Precautions/Restrictions spinal   Weight-Bearing Status - LLE weight-bearing as tolerated   Weight-Bearing Status - RLE weight-bearing as tolerated   Cognition   Affect/Mental Status (Cognition) WFL   Orientation Status (Cognition) oriented x 4   Follows Commands (Cognition) WFL   Range of Motion (ROM)   Range of Motion ROM is WFL   Strength (Manual Muscle Testing)   Strength (Manual Muscle Testing) Deficits observed during functional mobility   Transfers   Transfers sit-stand transfer   Sit-Stand Transfer   Sit-Stand [place] (Transfers) supervision;nonverbal cues (demo/gesture);verbal cues   Assistive Device (Sit-Stand Transfers) walker, front-wheeled;cane, straight   Gait/Stairs (Locomotion)   [place] Level (Gait) supervision;verbal cues;nonverbal cues (demo/gesture)   Assistive Device (Gait) other (see comments)  (none)   Distance in Feet (Gait) 60   Pattern (Gait) step-through   Deviations/Abnormal Patterns (Gait) gait speed decreased;weight shifting decreased;stride length decreased;other (see comments)  (mild path deviation without AD)   Negotiation  (Stairs) stairs independence;handrail location;number of steps;ascending technique;descending technique   [place] Level (Stairs) supervision;verbal cues;nonverbal cues (demo/gesture)   Handrail Location (Stairs) left side (ascending);right side (descending)   Number of Steps (Stairs) 8   Ascending Technique (Stairs) step-over-step   Descending Technique (Stairs) step-over-step   Comment, (Gait/Stairs) cuing and demonstration for safe pattern   Balance   Balance Comments mild unsteadiness with ambulation without AD   Sensory Examination   Sensory Perception Comments pt denies numbness/tingling   Clinical Impression   Criteria for Skilled Therapeutic Intervention Yes, treatment indicated   PT Diagnosis (PT) impaired functional mobility   Influenced by the following impairments decreased strength, balance   Functional limitations due to impairments transfers, gait, stairs   Clinical Presentation (PT Evaluation Complexity) stable   Clinical Presentation Rationale pt presents as medically diagnosed   Clinical Decision Making (Complexity) low complexity   Planned Therapy Interventions (PT) balance training;gait training;home exercise program;neuromuscular re-education;patient/family education;stair training;strengthening;transfer training   Risk & Benefits of therapy have been explained evaluation/treatment results reviewed;patient   PT Total Evaluation Time   PT Eval, Low Complexity Minutes (66278) 10   Therapy Certification   Start of care date 01/23/25   Certification date from 01/23/25   Certification date to 01/23/25   Medical Diagnosis lumbar radiculopathy s/p L4-5 fusion   Physical Therapy Goals   PT Frequency One time eval and treatment only   PT Predicted Duration/Target Date for Goal Attainment 01/23/25   PT Goals Transfers;Gait;PT Goal 1   PT: Transfers Independent;Modified independent;Sit to/from stand   PT: Gait Independent;Modified independent;150 feet;Assistive device   PT: Goal 1 Pt will demonstrate  spinal HEP with handout independently   Interventions   Interventions Quick Adds Gait Training;Therapeutic Procedure   Therapeutic Procedure/Exercise   Ther. Procedure: strength, endurance, ROM, flexibillity Minutes (80658) 10   Symptoms Noted During/After Treatment increased pain   Treatment Detail/Skilled Intervention issued spinal HEP handout and instructed pt in spinal prec.; pt completed reclined BL AP, QS, GS x5 reps and standing BLE ex x5-10 reps with UE support independently post cuing and demonstration for technique   Gait Training   Gait Training Minutes (69441) 13   Symptoms Noted During/After Treatment (Gait Training) increased pain   Treatment Detail/Skilled Intervention gait training with SEC and with FWW, demonstration and cuing for gait pattern   Distance in Feet 100, 150   [place] Level (Gait Training) stand-by assist  (sba with SEC then mod independent with FWW)   Physical Assistance Level (Gait Training) supervision;verbal cues;nonverbal cues (demo/gestures)   Weight Bearing (Gait Training) weight-bearing as tolerated   Assistive Device (Gait Training) straight cane;rolling walker   Pattern Analysis (Gait Training) swing-through gait   Gait Analysis Deviations decreased step length;decreased gino   Impairments (Gait Analysis/Training) pain;balance impaired;strength decreased   PT Discharge Planning   PT Plan d/c PT   PT Discharge Recommendation (DC Rec) home with assist   PT Rationale for DC Rec spouse able to assist   PT Brief overview of current status amb. mod independent with FWW, supervision without AD   PT Total Distance Amb During Session (feet) 310   PT Equipment Needed at Discharge walker, rolling   Physical Therapy Time and Intention   Timed Code Treatment Minutes 23   Total Session Time (sum of timed and untimed services) 33   [facility]                                                                                   OUTPATIENT PHYSICAL  THERAPY    PLAN OF TREATMENT FOR OUTPATIENT REHABILITATION   Patient's Last Name, First Name, CARISSA SultanaAna Maria arnett YOB: 1950   Provider's Name   [facility]   Medical Record No.  [id number]     Onset Date: 01/22/25 Start of Care Date: 01/23/25     Medical Diagnosis:  lumbar radiculopathy s/p L4-5 fusion               PT Diagnosis:  impaired functional mobility Certification Dates:  From: 01/23/25  To: 01/23/25       See note for plan of treatment, functional goals, and certification details.    I CERTIFY THE NEED FOR THESE SERVICES FURNISHED UNDER        THIS PLAN OF TREATMENT AND WHILE UNDER MY CARE (Physician co-signature of this document indicates review and certification of the therapy plan).

## 2025-01-23 NOTE — PLAN OF CARE
Physical Therapy Discharge Summary    Reason for therapy discharge:    All goals and outcomes met, no further needs identified.    Progress towards therapy goal(s). See goals on Care Plan in [facility] electronic health record for goal details.  Goals met    Therapy recommendation(s):    Continue home exercise program.

## 2025-01-23 NOTE — PROGRESS NOTES
[facility]    Medicine Progress Note - Hospitalist Service    Date of Admission:  1/22/2025    Assessment & Plan   Ms. Ana Maria De La Garza is a 74 year old women admitted on 1/22/2025. She has a PMH of DM type II, HTN, HLD, KAMLESH, CKD stage IIIb, KATT, depression who presented to the hospital for elective L4-L5 lateral fusion.  Hospitalist medicine consulted for management of chronic medical conditions.  Hospital course complicated with urinary retention, postoperative pain.  Possible discharge on 1/24     Lumbar radiculopathy in the setting of mobile degenerative spondylolisthesis with lateral recess stenosis at L4-5 s/p L4-5 posterior lateral fusion, etc.  Postoperative pain  complications: none   EBL: 50 cc  general anesthesia   1/23, intermittent uncontrolled pain particularly certain movements such as leaning forward.  Patient is hesitant about utilizing opioids.    Plan  Pain management as per primary team.  Advised the patient to consider low-dose opioid if pain remains uncontrolled.  DVT prophylaxis as per primary team.  Hemovac drain present--> managed by primary team.    Urinary retention  Status post straight cath overnight.  Per RN, patient continues to have incomplete voiding.  PVR around 350.    Plan  Continue to monitor bladder scan--> straight cath or Arvizu catheter if retaining above 500 cc.    Hypertension    Plan  Continue home amlodipine lisinopril with holding parameters     DM type II  Hemoglobin A1c from 12/2024 was 6.7%    Plan  Continue to hold home glipizide and once weekly Ozempic  Sliding scale insulin     CKD stage IIIb  Creatinine around baseline.    Plan  No further intervention     HLD    Plan  Continue home statin.    KATT  Depression    Plan  Continue home Lexapro and BuSpar     KAMLESH        Osteopenia   On calcium and vitamin D3 supplementation outpatient             Diet: Advance Diet as Tolerated: Regular Diet Adult    DVT Prophylaxis: Defer to primary  "service  Arvizu Catheter: Not present  Lines: None     Cardiac Monitoring: None  Code Status: Full Code      Clinically Significant Risk Factors Present on Admission         # Hyponatremia: Lowest Na = 134 mmol/L in last 2 days, will monitor as appropriate         # Drug Induced Platelet Defect: home medication list includes an antiplatelet medication   # Hypertension: Noted on problem list          # DMII: A1C = 6.7 % (Ref range: 0.0 - 5.6 %) within past 6 months    # Overweight: Estimated body mass index is 29.12 kg/m  as calculated from the following:    Height as of this encounter: 1.651 m (5' 5\").    Weight as of this encounter: 79.4 kg (175 lb).              Social Drivers of Health    Depression: Not at risk (1/6/2025)    PHQ-2     PHQ-2 Score: 0   Recent Concern: Depression - At risk (12/16/2024)    PHQ-2     PHQ-2 Score: 5   Stress: Stress Concern Present (2/5/2024)    [place] [employer] - Occupational Stress Questionnaire     Feeling of Stress : Rather much   Social Connections: Unknown (2/5/2024)    Social Connection and Isolation Panel [NHANES]     Frequency of Social Gatherings with Friends and Family: Once a week          Disposition Plan     Medically Ready for Discharge: Anticipated Tomorrow             GRACIE DIEHL MD  Hospitalist Service  [facility]  Securely message with [employer] (more info)  Text page via [employer] Paging/Directory   ______________________________________________________________________    Interval History   Continues to experience intermittent uncontrolled lower back pain.  She denies any history of urinary retention, denies any difficulty with urination.  Denies any burning with urination.  Denies any chest pain or shortness of breath.  Denies any lightheadedness.  Denies any nausea or vomiting.    Physical Exam   Vital Signs: Temp: 97.9  F (36.6  C) Temp src: Oral BP: 137/63 Pulse: 78   Resp: 16 SpO2: 92 % O2 Device: None (Room air) " Oxygen Delivery: 2 LPM  Weight: 175 lbs 0 oz    Physical Exam  Constitutional:       General: She is not in acute distress.     Appearance: She is not toxic-appearing.   Cardiovascular:      Rate and Rhythm: Normal rate.   Skin:     General: Skin is warm and dry.   Neurological:      Mental Status: She is alert.   Psychiatric:         Mood and Affect: Mood normal.         Thought Content: Thought content normal.          Medical Decision Making       45 MINUTES SPENT BY ME on the date of service doing chart review, history, exam, documentation & further activities per the note.      Data     I have personally reviewed the following data over the past 24 hrs:    11.4 (H)  \   11.8   / 195     134 (L) 102 18.7 /  149 (H)   4.9 23 1.10 (H) \       Imaging results reviewed over the past 24 hrs:   Recent Results (from the past 24 hours)   XR Surgery OARM    Narrative    This exam was marked as non-reportable because it will not be read by a   radiologist or a [place] non-radiologist provider.         XR Surgery JOVANI  Fluoro G/T 5 Min    Narrative    This exam was marked as non-reportable because it will not be read by a   radiologist or a [place] non-radiologist provider.

## 2025-01-23 NOTE — PLAN OF CARE
Problem: Spinal Surgery  Goal: Optimal Functional Ability  Outcome: Not Progressing     Problem: Spinal Surgery  Goal: Optimal Pain Control and Function  Outcome: Not Progressing   Goal Outcome Evaluation:    Patient vital signs are at baseline: Yes  Patient able to ambulate as they were prior to admission or with assist devices provided by therapies during their stay:  No,  Reason:  not yet OOB  Patient MUST void prior to discharge:  No, due to void  Patient able to tolerate oral intake:  Yes, tolerating clears  Pain has adequate pain control using Oral analgesics:  Yes  Does patient have an identified :  Yes  Has goal D/C date and time been discussed with patient:  Yes    VSS on 2 L O2 NC. Patient drowsy after coming to the floor from PACU, however is easily arousable. Due to walk and void. Bowel sounds hypoactive, getting clear liquid diet. Hemovac in place.  at bedside. Discussed plan with  and patient. Alarms on for safety. CMS intact.        Problem: Discharge Planning  Goal: Discharge to home or other facility with appropriate resources  11/1/2023 1055 by Claire Timmons RN  Outcome: Progressing  11/1/2023 0521 by Shawnee Singh RN  Outcome: Progressing  11/1/2023 0258 by Shawnee Singh RN  Outcome: Progressing     Problem: Pain  Goal: Verbalizes/displays adequate comfort level or baseline comfort level  11/1/2023 1055 by Claire Timmons RN  Outcome: Progressing  11/1/2023 0521 by Shawnee Singh RN  Outcome: Progressing  11/1/2023 0258 by Shawnee Singh RN  Outcome: Progressing     Problem: Skin/Tissue Integrity  Goal: Absence of new skin breakdown  Description: 1. Monitor for areas of redness and/or skin breakdown  2. Assess vascular access sites hourly  3. Every 4-6 hours minimum:  Change oxygen saturation probe site  4. Every 4-6 hours:  If on nasal continuous positive airway pressure, respiratory therapy assess nares and determine need for appliance change or resting period.   11/1/2023 1055 by Claire Timmons RN  Outcome: Progressing  11/1/2023 0521 by Shawnee Singh RN  Outcome: Progressing  11/1/2023 0258 by Shawnee Singh RN  Outcome: Progressing     Problem: ABCDS Injury Assessment  Goal: Absence of physical injury  11/1/2023 1055 by Claire Timmons RN  Outcome: Progressing  11/1/2023 0521 by Shawnee Singh RN  Outcome: Progressing  11/1/2023 0258 by Shawnee Singh RN  Outcome: Progressing     Problem: Safety - Adult  Goal: Free from fall injury  11/1/2023 1055 by Claire Timmons RN  Outcome: Progressing  11/1/2023 0521 by Shawnee Singh RN  Outcome: Progressing  11/1/2023 0258 by Shawnee Singh RN  Outcome: Progressing     Problem: Chronic Conditions and Co-morbidities  Goal: Patient's chronic conditions and co-morbidity symptoms are monitored and maintained or improved  11/1/2023 1055 by Claire Timmons RN  Outcome: Progressing  11/1/2023 0521 by Shawnee Singh RN  Outcome:

## 2025-01-23 NOTE — PLAN OF CARE
Problem: Adult Inpatient Plan of Care  Goal: Optimal Comfort and Wellbeing  Outcome: Progressing     Problem: Spinal Surgery  Goal: Optimal Pain Control and Function  Outcome: Progressing     Problem: Spinal Surgery  Goal: Nausea and Vomiting Relief  Outcome: Progressing  Intervention: Prevent or Manage Nausea and Vomiting   Goal Outcome Evaluation:       Patient vital signs are at baseline: Yes  Patient able to ambulate as they were prior to admission or with assist devices provided by therapies during their stay:  Yes  Patient MUST void prior to discharge:  No,  Reason:  having urinary retention. Required straight cath X 1.   Patient able to tolerate oral intake:  Yes  Pain has adequate pain control using Oral analgesics:  Yes  Does patient have an identified :  Yes  Has goal D/C date and time been discussed with patient:  Yes    Pt is alert and oriented X 4. VSS. CMS intact. Dressing is CDI. Diet advanced to regular diet this morning. Up with a X 1, GB, and walker. Hemovac in place. Call light within reach.

## 2025-01-24 VITALS
TEMPERATURE: 98.4 F | HEART RATE: 77 BPM | WEIGHT: 175 LBS | DIASTOLIC BLOOD PRESSURE: 63 MMHG | HEIGHT: 65 IN | SYSTOLIC BLOOD PRESSURE: 124 MMHG | BODY MASS INDEX: 29.16 KG/M2 | RESPIRATION RATE: 16 BRPM | OXYGEN SATURATION: 91 %

## 2025-01-24 LAB
ERYTHROCYTE [DISTWIDTH] IN BLOOD BY AUTOMATED COUNT: 13 % (ref 10–15)
GLUCOSE BLDC GLUCOMTR-MCNC: 141 MG/DL (ref 70–99)
GLUCOSE BLDC GLUCOMTR-MCNC: 143 MG/DL (ref 70–99)
HCT VFR BLD AUTO: 33.8 % (ref 35–47)
HGB BLD-MCNC: 11.8 G/DL (ref 11.7–15.7)
MCH RBC QN AUTO: 32.3 PG (ref 26.5–33)
MCHC RBC AUTO-ENTMCNC: 34.9 G/DL (ref 31.5–36.5)
MCV RBC AUTO: 93 FL (ref 78–100)
PLATELET # BLD AUTO: 189 10E3/UL (ref 150–450)
RBC # BLD AUTO: 3.65 10E6/UL (ref 3.8–5.2)
WBC # BLD AUTO: 8.8 10E3/UL (ref 4–11)

## 2025-01-24 PROCEDURE — 250N000013 HC RX MED GY IP 250 OP 250 PS 637

## 2025-01-24 PROCEDURE — 250N000011 HC RX IP 250 OP 636: Performed by: ORTHOPAEDIC SURGERY

## 2025-01-24 PROCEDURE — 250N000013 HC RX MED GY IP 250 OP 250 PS 637: Performed by: ORTHOPAEDIC SURGERY

## 2025-01-24 PROCEDURE — 250N000013 HC RX MED GY IP 250 OP 250 PS 637: Performed by: STUDENT IN AN ORGANIZED HEALTH CARE EDUCATION/TRAINING PROGRAM

## 2025-01-24 PROCEDURE — 250N000013 HC RX MED GY IP 250 OP 250 PS 637: Performed by: NURSE PRACTITIONER

## 2025-01-24 PROCEDURE — 85027 COMPLETE CBC AUTOMATED: CPT | Performed by: ORTHOPAEDIC SURGERY

## 2025-01-24 PROCEDURE — 36415 COLL VENOUS BLD VENIPUNCTURE: CPT | Performed by: ORTHOPAEDIC SURGERY

## 2025-01-24 PROCEDURE — 99232 SBSQ HOSP IP/OBS MODERATE 35: CPT | Performed by: STUDENT IN AN ORGANIZED HEALTH CARE EDUCATION/TRAINING PROGRAM

## 2025-01-24 RX ORDER — METHOCARBAMOL 500 MG/1
500 TABLET, FILM COATED ORAL 3 TIMES DAILY PRN
Qty: 25 TABLET | Refills: 0 | Status: SHIPPED | OUTPATIENT
Start: 2025-01-24

## 2025-01-24 RX ORDER — ACETAMINOPHEN 325 MG/1
975 TABLET ORAL EVERY 8 HOURS
Status: SHIPPED
Start: 2025-01-24

## 2025-01-24 RX ORDER — ONDANSETRON 4 MG/1
4 TABLET, ORALLY DISINTEGRATING ORAL EVERY 6 HOURS PRN
Qty: 15 TABLET | Refills: 0 | Status: SHIPPED | OUTPATIENT
Start: 2025-01-24

## 2025-01-24 RX ORDER — HYDROMORPHONE HYDROCHLORIDE 2 MG/1
1-2 TABLET ORAL
Qty: 25 TABLET | Refills: 0 | Status: SHIPPED | OUTPATIENT
Start: 2025-01-24

## 2025-01-24 RX ORDER — AMOXICILLIN 250 MG
1 CAPSULE ORAL 2 TIMES DAILY
Qty: 42 TABLET | Refills: 0 | Status: SHIPPED | OUTPATIENT
Start: 2025-01-24

## 2025-01-24 RX ORDER — ASPIRIN 81 MG/1
81 TABLET ORAL DAILY
Status: SHIPPED
Start: 2025-01-24

## 2025-01-24 RX ADMIN — HYDROMORPHONE HYDROCHLORIDE 2 MG: 2 TABLET ORAL at 15:15

## 2025-01-24 RX ADMIN — ESCITALOPRAM OXALATE 20 MG: 20 TABLET ORAL at 09:23

## 2025-01-24 RX ADMIN — INSULIN ASPART 1 UNITS: 100 INJECTION, SOLUTION INTRAVENOUS; SUBCUTANEOUS at 06:30

## 2025-01-24 RX ADMIN — SENNOSIDES AND DOCUSATE SODIUM 1 TABLET: 50; 8.6 TABLET ORAL at 09:24

## 2025-01-24 RX ADMIN — AMLODIPINE BESYLATE 5 MG: 5 TABLET ORAL at 09:23

## 2025-01-24 RX ADMIN — HYDROMORPHONE HYDROCHLORIDE 1 MG: 2 TABLET ORAL at 09:32

## 2025-01-24 RX ADMIN — TRAMADOL HYDROCHLORIDE 50 MG: 50 TABLET, COATED ORAL at 11:55

## 2025-01-24 RX ADMIN — METHOCARBAMOL 500 MG: 500 TABLET ORAL at 12:10

## 2025-01-24 RX ADMIN — INSULIN ASPART 1 UNITS: 100 INJECTION, SOLUTION INTRAVENOUS; SUBCUTANEOUS at 11:55

## 2025-01-24 RX ADMIN — ACETAMINOPHEN 975 MG: 325 TABLET ORAL at 15:14

## 2025-01-24 RX ADMIN — POLYETHYLENE GLYCOL 3350 17 G: 17 POWDER, FOR SOLUTION ORAL at 09:23

## 2025-01-24 RX ADMIN — ONDANSETRON 4 MG: 4 TABLET, ORALLY DISINTEGRATING ORAL at 15:15

## 2025-01-24 RX ADMIN — BUSPIRONE HYDROCHLORIDE 10 MG: 10 TABLET ORAL at 09:33

## 2025-01-24 RX ADMIN — TRAMADOL HYDROCHLORIDE 50 MG: 50 TABLET, COATED ORAL at 05:13

## 2025-01-24 RX ADMIN — EZETIMIBE 10 MG: 10 TABLET ORAL at 09:33

## 2025-01-24 RX ADMIN — LISINOPRIL 40 MG: 40 TABLET ORAL at 09:24

## 2025-01-24 RX ADMIN — THERA TABS 1 TABLET: TAB at 09:24

## 2025-01-24 RX ADMIN — ACETAMINOPHEN 975 MG: 325 TABLET ORAL at 09:24

## 2025-01-24 RX ADMIN — ONDANSETRON 4 MG: 4 TABLET, ORALLY DISINTEGRATING ORAL at 09:33

## 2025-01-24 ASSESSMENT — ACTIVITIES OF DAILY LIVING (ADL)
ADLS_ACUITY_SCORE: 31
ADLS_ACUITY_SCORE: 30
ADLS_ACUITY_SCORE: 31
ADLS_ACUITY_SCORE: 30
ADLS_ACUITY_SCORE: 31
ADLS_ACUITY_SCORE: 31
ADLS_ACUITY_SCORE: 30
ADLS_ACUITY_SCORE: 30
ADLS_ACUITY_SCORE: 31

## 2025-01-24 NOTE — PROGRESS NOTES
"[facility] Orthopaedics Progress Note      Post-operative Day: 2 Day Post-Op    Procedure(s):  Lumbar 4 - Lumbar 5 Posterior Lumbar Decompression and Fusion with stealth navigation      Subjective: Patient seen up resting in bedside chair, was up ambulating in hallway, endorses pain to lumbar spine and incision. Trial PO Dilaudid this AM with no nausea or vomiting. Did not feel was helpful with pain.  Is voiding without difficulty and passing gas. No new radicular symptoms. Drain out this AM     Pain: moderate  Chest pain, SOB:  No      Objective:  Blood pressure 116/69, pulse 75, temperature 98.2  F (36.8  C), temperature source Oral, resp. rate 18, height 1.651 m (5' 5\"), weight 79.4 kg (175 lb), SpO2 92%, not currently breastfeeding.    Patient Vitals for the past 24 hrs:   BP Temp Temp src Pulse Resp SpO2   01/24/25 0748 116/69 98.2  F (36.8  C) Oral 75 18 92 %   01/24/25 0620 134/65 98.6  F (37  C) Oral 67 18 92 %   01/24/25 0102 120/67 98.3  F (36.8  C) Oral 76 18 92 %   01/23/25 1606 (!) 146/81 98.1  F (36.7  C) Oral 79 16 94 %       Wt Readings from Last 4 Encounters:   01/22/25 79.4 kg (175 lb)   01/06/25 79.4 kg (175 lb)   12/16/24 79.4 kg (175 lb)   05/07/24 78.2 kg (172 lb 4.8 oz)       General: Alert and orientated, NAD  Respiratory: Non-labored breathing on RA  BLE motor function, sensation, and circulation intact   Yes, Dorsiflexion/plantarflexion intact and equal bilaterally. Moves all other extremities with ease and purpose   Wound status: incisions are clean dry and intact. Yes  Calf tenderness: Bilateral  No    Pertinent Labs   Lab Results: personally reviewed.     Recent Labs   Lab Test 01/23/25  0545 01/06/25  1457 12/16/24  1029 02/02/22  1040 06/11/21  1252   WBC 11.4* 6.8 6.0   < > 7.6   HGB 11.8 13.8 14.3   < > 14.7   HCT 33.7* 40.0 42.5   < > 41.3   MCV 91 93 92   < > 88    219 224   < > 202   * 141 139   < > 140   CRP  --   --   --   --  0.3    < > = values in this interval " not displayed.       Plan:   Lumbar x-ray following drain removal   Anticoagulation protocol: Mechanical and/or ambulation     Pain medications:  Multimodal approach with ice, tylenol, Robaxin. Will avoid Atarax given difficulty with AUR yesterday. Trial Dilaudid 1-2 mg q 3 hrs  Weight bearing status:  strict no heavy lifting, twisting, or bending requirements for the next 3 months.   Disposition:  Home pending adequate pain control on p.o. analgesics, , cleared by therapy and hospitalist this afternoon vs tomorrow   Continue cares and rehabilitation     Report completed by:  TONNY Núñez CNP  Date: 1/24/2025  Time: 2:21 PM

## 2025-01-24 NOTE — PLAN OF CARE
Patient vital signs are at baseline: Yes  Patient able to ambulate as they were prior to admission or with assist devices provided by therapies during their stay:  Yes   Patient MUST void prior to discharge:  Yes passed all three PVRs  Patient able to tolerate oral intake:  Yes  Pain has adequate pain control using Oral analgesics:  Yes  Does patient have an identified :  Yes  Has goal D/C date and time been discussed with patient:  Yes    Hemovac out 0500, dressing on back changed. Patients pain is managed well with PRN tramadol though would like to try oral dilaudid due to concerns of pain at home. Stand by assist.        Problem: Adult Inpatient Plan of Care  Goal: Absence of Hospital-Acquired Illness or Injury  Outcome: Progressing  Intervention: Identify and Manage Fall Risk  Recent Flowsheet Documentation  Taken 1/24/2025 0010 by Mark Givens RN  Safety Promotion/Fall Prevention:   activity supervised   increase visualization of patient   increased rounding and observation   safety round/check completed   supervised activity  Taken 1/23/2025 2015 by Mark Givens RN  Safety Promotion/Fall Prevention:   activity supervised   increase visualization of patient   increased rounding and observation   safety round/check completed   supervised activity     Problem: Adult Inpatient Plan of Care  Goal: Optimal Comfort and Wellbeing  Outcome: Progressing  Intervention: Monitor Pain and Promote Comfort  Recent Flowsheet Documentation  Taken 1/23/2025 2338 by Mark Givens RN  Pain Management Interventions:   cold applied   music therapy   rest   pain management plan reviewed with patient/caregiver

## 2025-01-24 NOTE — PROGRESS NOTES
[facility]    Medicine Progress Note - Hospitalist Service    Date of Admission:  1/22/2025    Assessment & Plan   Ms. Ana Maria De La Garza is a 74 year old women admitted on 1/22/2025. She has a PMH of DM type II, HTN, HLD, KAMLESH, CKD stage IIIb, KATT, depression who presented to the hospital for elective L4-L5 lateral fusion.  Hospitalist medicine consulted for management of chronic medical conditions.  Hospital course complicated with urinary retention, postoperative pain.  Urinary tension resolved.  From internal medicine standpoint, no concerns about possible discharge on 1/24 if pain is well-controlled.    Lumbar radiculopathy in the setting of mobile degenerative spondylolisthesis with lateral recess stenosis at L4-5 s/p L4-5 posterior lateral fusion, etc.  Postoperative pain  complications: none   EBL: 50 cc  general anesthesia   Patient was trying to avoid opioids.  She continues to experience intermittent back pain with certain movements.  She is agreeable with trialing low-dose Dilaudid prior to discharge.    Plan  Dilaudid 1 to 2 mg p.o. as needed--> discussed with RN--> patient will trial a low-dose to see if she can tolerate it and if it is helping with her pain prior to discharge.  DVT prophylaxis per primary team.    Urinary retention (resolved)    Hypertension  Blood pressures currently around 120/70    Plan  Continue home amlodipine lisinopril with holding parameters     DM type II  Hemoglobin A1c from 12/2024 was 6.7%    Plan  Continue home glipizide and Ozempic on discharge.  Continue sliding scale insulin while inpatient.    CKD stage IIIb  Creatinine around baseline.    Plan  No further intervention     HLD    Plan  Continue home statin.    KATT  Depression    Plan  Continue home Lexapro and BuSpar     KAMLESH        Osteopenia   On calcium and vitamin D3 supplementation outpatient             Diet: Advance Diet as Tolerated: Regular Diet Adult    DVT Prophylaxis: Defer to  "primary service  Arvizu Catheter: Not present  Lines: None     Cardiac Monitoring: None  Code Status: Full Code      Clinically Significant Risk Factors Present on Admission         # Hyponatremia: Lowest Na = 134 mmol/L in last 2 days, will monitor as appropriate         # Drug Induced Platelet Defect: home medication list includes an antiplatelet medication   # Hypertension: Noted on problem list          # DMII: A1C = 6.7 % (Ref range: 0.0 - 5.6 %) within past 6 months    # Overweight: Estimated body mass index is 29.12 kg/m  as calculated from the following:    Height as of this encounter: 1.651 m (5' 5\").    Weight as of this encounter: 79.4 kg (175 lb).              Social Drivers of Health    Depression: Not at risk (1/6/2025)    PHQ-2     PHQ-2 Score: 0   Recent Concern: Depression - At risk (12/16/2024)    PHQ-2     PHQ-2 Score: 5   Stress: Stress Concern Present (2/5/2024)    [employer] [place] [employer] - Occupational Stress Questionnaire     Feeling of Stress : Rather much   Social Connections: Unknown (2/5/2024)    Social Connection and Isolation Panel [NHANES]     Frequency of Social Gatherings with Friends and Family: Once a week          Disposition Plan     Medically Ready for Discharge: Anticipated Tomorrow             GRACIE DIEHL MD  Hospitalist Service  [facility]  Securely message with zumatek (more info)  Text page via [employer] Paging/Directory   ______________________________________________________________________    Interval History   No significant events.  Denies any chest pain or shortness of breath.  Denies any abdominal pain.  Denies any difficulty with urination.  Pain is well-controlled when she is sitting still, however, continues to experience lower back pain around the surgical site with certain movements.  She is agreeable with trialing low-dose Dilaudid prior to discharge.        Physical Exam   Vital Signs: Temp: 98.2  F (36.8  C) Temp src: " Oral BP: 116/69 Pulse: 75   Resp: 18 SpO2: 92 % O2 Device: None (Room air)    Weight: 175 lbs 0 oz    Physical Exam  Constitutional:       General: She is not in acute distress.     Appearance: She is not ill-appearing or toxic-appearing.   Pulmonary:      Effort: Pulmonary effort is normal. No respiratory distress.      Breath sounds: No wheezing.   Skin:     General: Skin is warm and dry.   Neurological:      Mental Status: She is alert.   Psychiatric:         Mood and Affect: Mood normal.         Thought Content: Thought content normal.          Medical Decision Making       41 MINUTES SPENT BY ME on the date of service doing chart review, history, exam, documentation & further activities per the note.      Data     I have personally reviewed the following data over the past 24 hrs:    8.8  \   11.8   / 189     N/A N/A N/A /  141 (H)   N/A N/A N/A \       Imaging results reviewed over the past 24 hrs:   No results found for this or any previous visit (from the past 24 hours).

## 2025-01-24 NOTE — DISCHARGE SUMMARY
[facility] Discharge Summary                                  [facility]     CHEN HUFF [id number]   Age: 74 year old  PCP: Kristen Navarrete, [phone] 1950     Date of Admission:  1/22/2025  Date of Discharge::  1/24/2025  Discharge Provider:  TONNY Núñez CNP    Code status:  Full Code    Admission Information:  Admission Diagnosis:  History of back pain [Z87.39]  Stenosis, spinal, lumbar [M48.061]  Spondylolisthesis of lumbar region [M43.16]  Lumbar radiculopathy [M54.16]    Post-Operative Day: 2 Days Post-Op     Reason for admission:  The patient was admitted for the following:Procedure(s) (LRB):  Lumbar 4 - Lumbar 5 Posterior Lumbar Decompression and Fusion with stealth navigation (N/A)    Principal Problem:    Lumbar radiculopathy  Active Problems:    Hyperlipidemia    Anxiety    Type 2 diabetes mellitus with complication, without long-term current use of insulin (H)    Stage 3b chronic kidney disease (H)    KAMLESH (obstructive sleep apnea)      Allergies:  Atorvastatin, Metformin, Morphine (pf) [morphine], and Pravastatin    Following the procedure noted above the patient was transferred to the post-op floor and started on:    Therapy:  physical therapy and occupational therapy  Anticoagulation Plan: Mechanical and/or ambulation     Pain Management: scopainmedication: dilaudid, tylenol, and Robaxin  Weight bearing status: WBAT, strict no heavy lifting, twisting, or bending requirements for the next 3 months        The patient was followed by Orthopedics during the inpatient treatment course:  Complications:  None  Additional consultations:  hospitalist      Pertinent Labs   Lab Results: personally reviewed.     Recent Labs   Lab Test 01/24/25  0713 01/23/25  0545 01/06/25  1457 12/16/24  1029 02/02/22  1040 06/11/21  1252   WBC 8.8 11.4* 6.8 6.0   < > 7.6   HGB 11.8 11.8 13.8 14.3   < > 14.7   HCT 33.8* 33.7* 40.0 42.5   < > 41.3   MCV 93 91 93 92   < > 88     195 219 224   < > 202   NA  --  134* 141 139   < > 140   CRP  --   --   --   --   --  0.3    < > = values in this interval not displayed.          Discharge Information:  Condition at discharge: Stable  Discharge destination:  Discharged to home     Medications at discharge:  Current Discharge Medication List        START taking these medications    Details   acetaminophen (TYLENOL) 325 MG tablet Take 3 tablets (975 mg) by mouth every 8 hours.    Associated Diagnoses: Lumbar radiculopathy      HYDROmorphone (DILAUDID) 2 MG tablet Take 0.5-1 tablets (1-2 mg) by mouth every 3 hours as needed for severe pain or moderate pain.  Qty: 25 tablet, Refills: 0    Associated Diagnoses: Lumbar radiculopathy      methocarbamol (ROBAXIN) 500 MG tablet Take 1 tablet (500 mg) by mouth 3 times daily as needed for muscle spasms.  Qty: 25 tablet, Refills: 0    Associated Diagnoses: Lumbar radiculopathy      ondansetron (ZOFRAN ODT) 4 MG ODT tab Take 1 tablet (4 mg) by mouth every 6 hours as needed for nausea or vomiting.  Qty: 15 tablet, Refills: 0    Associated Diagnoses: Lumbar radiculopathy      senna-docusate (SENOKOT-S/PERICOLACE) 8.6-50 MG tablet Take 1 tablet by mouth 2 times daily.  Qty: 42 tablet, Refills: 0    Associated Diagnoses: Lumbar radiculopathy           CONTINUE these medications which have CHANGED    Details   aspirin 81 MG EC tablet Take 1 tablet (81 mg) by mouth daily. May resume 1/28/25    Associated Diagnoses: Lumbar radiculopathy           CONTINUE these medications which have NOT CHANGED    Details   amLODIPine (NORVASC) 5 MG tablet Take 1 tablet (5 mg) by mouth daily  Qty: 90 tablet, Refills: 1    Associated Diagnoses: Essential hypertension, benign      busPIRone (BUSPAR) 5 MG tablet Take 10 mg by mouth 2 times daily.      CALCIUM PO Take 1 tablet by mouth daily      escitalopram (LEXAPRO) 20 MG tablet Take 1 tablet (20 mg) by mouth daily  Qty: 90 tablet, Refills: 1    Associated Diagnoses:  Anxiety state      ezetimibe (ZETIA) 10 MG tablet Take 1 tablet (10 mg) by mouth daily  Qty: 90 tablet, Refills: 3    Associated Diagnoses: Mixed hyperlipidemia      glipiZIDE (GLUCOTROL XL) 10 MG 24 hr tablet Take 1 tablet (10 mg) by mouth 2 times daily  Qty: 180 tablet, Refills: 4    Associated Diagnoses: Type 2 diabetes mellitus with complication, without long-term current use of insulin (H)      lisinopril (ZESTRIL) 40 MG tablet Take 40 mg by mouth daily      multivitamin therapeutic (THERAGRAN) tablet [MULTIVITAMIN THERAPEUTIC (THERAGRAN) TABLET] Take 1 tablet by mouth daily.      OMEGA-3/DHA/EPA/FISH OIL (FISH OIL-OMEGA-3 FATTY ACIDS) 300-1,000 mg capsule [OMEGA-3/DHA/EPA/FISH OIL (FISH OIL-OMEGA-3 FATTY ACIDS) 300-1,000 MG CAPSULE] Take 2 g by mouth daily.      semaglutide (OZEMPIC) 2 MG/3ML pen Inject 0.5 mg Subcutaneous every 7 days  Qty: 3 mL, Refills: 0    Associated Diagnoses: Type 2 diabetes mellitus with complication, without long-term current use of insulin (H)      vitamin D3 (CHOLECALCIFEROL) 50 mcg (2000 units) tablet Take 1 tablet by mouth daily      blood glucose (NO BRAND SPECIFIED) test strip Use to test blood sugar 1 times daily or as directed. To accompany: Blood Glucose Monitor Brands: per insurance and patient request  Qty: 100 strip, Refills: 11    Associated Diagnoses: Type 2 diabetes mellitus with complication, without long-term current use of insulin (H)      thin (NO BRAND SPECIFIED) lancets Use to test blood sugar 1 times daily or as directed. To accompany: Blood Glucose Monitor Brands: per insurance and patient request  Qty: 100 each, Refills: 11    Associated Diagnoses: Type 2 diabetes mellitus with complication, without long-term current use of insulin (H)                        Follow-Up Care:  Patient should be seen in the office in 14 days by the Orthopedic Surgeon/Physician Assistant.  Call [phone] for appointment or questions.    It was my pleasure to take care of the  above patient.  TONNY Núñez CNP

## 2025-01-24 NOTE — PLAN OF CARE
Problem: Spinal Surgery  Goal: Optimal Pain Control and Function  Intervention: Prevent or Manage Pain  Recent Flowsheet Documentation  Taken 1/24/2025 1515 by Carmen Glass, RN  Pain Management Interventions:   cold applied   medication (see MAR)  Taken 1/24/2025 1212 by Carmen Glass, RN  Pain Management Interventions:   cold applied   repositioned  Taken 1/24/2025 1155 by Carmen Glass, RN  Pain Management Interventions: medication (see MAR)  Taken 1/24/2025 0932 by Carmen Glass RN  Pain Management Interventions:   medication (see MAR)   cold applied   repositioned   Goal Outcome Evaluation:       Patient vital signs are at baseline: Yes  Patient able to ambulate as they were prior to admission or with assist devices provided by therapies during their stay:  Yes  Patient MUST void prior to discharge:  Yes  Patient able to tolerate oral intake:  Yes  Pain has adequate pain control using Oral analgesics:  Yes  Does patient have an identified :  Yes  Has goal D/C date and time been discussed with patient:  Yes

## 2025-01-25 NOTE — PLAN OF CARE
Goal Outcome Evaluation:       Note from 0700 to discharge. Discharge paperwork addressed thoroughly with pt. Questions answered and addressed at length by writer. AVS sent with pt. IV access discontinued. No issues noted. VSS, no shortness of breath.

## 2025-01-27 ENCOUNTER — PATIENT OUTREACH (OUTPATIENT)
Dept: FAMILY MEDICINE | Facility: CLINIC | Age: 75
End: 2025-01-27
Payer: COMMERCIAL

## 2025-01-27 NOTE — TELEPHONE ENCOUNTER
Writer called patient and left message to return call to clinic.     If patient returns call please route to nurse queue to complete TCM hospital follow up questionnaire, medication reconciliation and assist with scheduling a hospital follow up visit.    Patient hospitalized for Lumbar 4 - Lumbar 5 Posterior Lumbar Decompression and Fusion. Per AVS, follow-up with Dr. Palmer/ Kerry Draper, PAC in 14 days. Call [phone] to make or change your appointment, or to connect with an on-call provider after hours.    Dania Cummings RN  [facility]

## 2025-01-28 RX ORDER — METHYLPREDNISOLONE 4 MG/1
TABLET ORAL
COMMUNITY
Start: 2025-01-27

## 2025-01-28 NOTE — TELEPHONE ENCOUNTER
Miralax mainly taken to prevent straining, which can increase her pain during recovery.  Use as needed to keep stools soft and easy to pass.  If taking a narcotic pain medication, I recommend continuing Miralax.  If not, may stop.    Yes, methylprednisolone can cause vivid dreams and affect sleep for some.  VJ

## 2025-01-28 NOTE — TELEPHONE ENCOUNTER
Transitions of Care Outreach  Chief Complaint   Patient presents with    Hospital F/U       Most Recent Admission Date: 1/22/2025   Most Recent Admission Diagnosis: History of back pain - Z87.39  Stenosis, spinal, lumbar - M48.061  Spondylolisthesis of lumbar region - M43.16  Lumbar radiculopathy - M54.16     Most Recent Discharge Date: 1/24/2025   Most Recent Discharge Diagnosis: Lumbar radiculopathy - M54.16     Transitions of Care Assessment    Discharge Assessment  How are you doing now that you are home?: doing ok until sunday when pain increased (10/10), patient notified surgeon and was started on steroid, which has been going well. Pain has greatly improved today, able to get up and walk intermittently.. endorses some nausea, but utilzing zofran.  How are your symptoms? (Red Flag symptoms escalate to triage hotline per guidelines): Improved  Do you know how to contact your clinic care team if you have future questions or changes to your health status? : Yes  Does the patient have their discharge instructions? : Yes  Does the patient have questions regarding their discharge instructions? : No  Were you started on any new medications or were there changes to any of your previous medications? : Yes  Does the patient have all of their medications?: Yes  Do you have questions regarding any of your medications? : Yes (see comment) (advise on miralax and senna docusate, message sent to provider)  Do you have all of your needed medical supplies or equipment (DME)?  (i.e. oxygen tank, CPAP, cane, etc.): Yes    Follow up Plan     Discharge Follow-Up  Discharge follow up appointment scheduled in alignment with recommended follow up timeframe or Transitions of Risk Category? (Low = within 30 days; Moderate= within 14 days; High= within 7 days): Yes  Discharge Follow Up Appointment Date: 02/04/25 (also has AWV with PCP on 2/13. advised of split billing, declines additional appointment)  Discharge Follow Up Appointment  Scheduled with?: Specialty Care Provider (with neurosurgery)    Future Appointments   Date Time Provider Department Center   2/13/2025 10:40 AM Kristen Navarrete MD [facility]       Outpatient Plan as outlined on AVS reviewed with patient.    For any urgent concerns, please contact our 24 hour nurse triage line: [phone] ([phone])       Dania SORIANO RN

## 2025-01-28 NOTE — TELEPHONE ENCOUNTER
Called and spoke with patient for hospital follow up call. Patient hospitalized for Lumbar 4 - Lumbar 5 Posterior Lumbar Decompression and Fusion 1/22-1/24.    Patient asking if she should continue taking both Miralax and senna docusate. Patient states she did not take Miralax yesterday, and was able to have a BM this morning easily).    Patient states she is having small soft bowel movements  Not straining to have a bowel movement  Describes stool as long, thin, soft, formed  Does endorse history of chronic constipation    Advised to hold off on miralax at this time due to soft, easy to pass stools, and not currently on med list. Advised to continue senna-docusate at this time due to possible constipation resulting from narcotic use. Will ask provider to advise regarding stool softener/laxative use.    Patient would also like to ask the provider if steroid (methylprednisolone) can cause strange, vivid dreams. Patient states she struggled to get to sleep and stay asleep last night, and did not want to sleep due to the vivid dreams. Advised that a steroids can affect sleep. Will route to provider to further advise regarding vivid dreams.    Ok to leave a detailed message at mobile number on file.  Routing to provider to review and advise next steps.    Dania Cummings RN  [facility]     Impaired gait

## 2025-01-28 NOTE — TELEPHONE ENCOUNTER
See response from the patient, on 1/28/25, regarding Miralax recommendations.    Writer called the patient and relayed the above message to the patient, who verbalized understanding and agrees with the plan.    Denies other questions or concerns at this time.    Shala Sim RN, BSN  [facility]

## 2025-02-04 ENCOUNTER — TRANSFERRED RECORDS (OUTPATIENT)
Dept: HEALTH INFORMATION MANAGEMENT | Facility: CLINIC | Age: 75
End: 2025-02-04
Payer: COMMERCIAL

## (undated) DEVICE — DRAPE STERI TOWEL LG 1010

## (undated) DEVICE — SUTURE VICRYL+ 2-0 27IN CT-1 UND VCP259H

## (undated) DEVICE — DRAPE IOBAN INCISE 23X17" 6650EZ

## (undated) DEVICE — DRAPE C-ARM 60X42" 1013

## (undated) DEVICE — DRAPE SHEET REV FOLD 3/4 9349

## (undated) DEVICE — WRAP B-COOL TERI LUMBAR 08143380

## (undated) DEVICE — CATH IV 14GA 2IN REM FLASHPLUG DEHP-FR PVC FR 4251717-02

## (undated) DEVICE — GLOVE BIOGEL PI ULTRATOUCH G SZ 6.5 42165

## (undated) DEVICE — TRAP SPECIMAN 5CC-40CC DYND44140

## (undated) DEVICE — ESU BIPOLAR SEALER AQUAMANTYS 6MM 23-112-1

## (undated) DEVICE — Device

## (undated) DEVICE — PIN PERCUTANEOUS NAVIGATION FOR SPINE O-ARM 100MM 9733235

## (undated) DEVICE — GLOVE BIOGEL PI SZ 7.5 40875

## (undated) DEVICE — ESU PENCIL SMOKE EVAC W/ROCKER SWITCH 0703-047-000

## (undated) DEVICE — SU SILK 2-0 FS-1 18" 685G

## (undated) DEVICE — RX SURGIFLO HEMOSTATIC MATRIX 8ML 2991

## (undated) DEVICE — GOWN LG DISP 9515

## (undated) DEVICE — CUSHION INSERT LG PRONE VIEW JACKSON TABLE

## (undated) DEVICE — SOL NACL 0.9% IRRIG 1000ML BOTTLE 2F7124

## (undated) DEVICE — SOL WATER IRRIG 1000ML BOTTLE 2F7114

## (undated) DEVICE — SUTURE VICRYL+ 1 18 CT/CR  VLT VCP753D

## (undated) DEVICE — CUSTOM PACK LUMBAR FUSION SNE5BLFHEA

## (undated) DEVICE — TOOL DISSECT MIDAS MR8 14CM MATCH HEAD 3MM MR8-14MH30

## (undated) DEVICE — DRSG TEGADERM 2 3/8X2 3/4" 1624W

## (undated) DEVICE — GLOVE UNDER INDICATOR PI SZ 7.0 LF 41670

## (undated) DEVICE — SU STRATAFIX MONOCRYL 3-0 SPIRAL PS-2 30CM SXMP1B106

## (undated) DEVICE — DRAPE BACK TABLE PADDED 60X90

## (undated) DEVICE — PACK 9X6IN THRP HOT COLD CMPR  MED GEL 80104

## (undated) DEVICE — IOM STANDARD SUPPLY FEE

## (undated) DEVICE — PROBE PEDICLE SCREW BALL TIP NEUROSIGN V4 4008-00

## (undated) DEVICE — IOM CASE FLAT FEE

## (undated) DEVICE — DRSG GAUZE 2X2" TRAY 1806

## (undated) DEVICE — SUCTION TIP YANKAUER W/O VENT K86

## (undated) DEVICE — SOL NACL 0.9% INJ 500ML BAG 2B1323Q

## (undated) DEVICE — DRSG TEGADERM 4X4 3/4" 1626W

## (undated) DEVICE — GOWN XLG DISP 9545

## (undated) DEVICE — DRAPE O ARM TUBE 9732722

## (undated) DEVICE — SU DERMABOND ADVANCED .7ML DNX12

## (undated) DEVICE — DRAPE U SPLIT 74X120" 29440

## (undated) DEVICE — CLEANER CAUTERY TIP E2401

## (undated) DEVICE — ELECTRODE PATIENT RETURN ADULT L10 FT 2 PLATE CORD 0855C

## (undated) DEVICE — DRSG PRIMAPORE 03 1/8X6" 66000318

## (undated) DEVICE — POSITIONER ARM CRADLE LAMINECTOMY DISP

## (undated) DEVICE — DRSG PRIMAPORE 02X3" 7133

## (undated) DEVICE — SUCTION MANIFOLD NEPTUNE 2 SYS 1 PORT 702-025-000

## (undated) DEVICE — KIT DRAIN CLOSED WOUND SUCTION MED 400ML RESVR

## (undated) DEVICE — SYR BULB IRRIG DOVER 60 ML LATEX FREE 67000

## (undated) DEVICE — MARKER SPHERES PASSIVE MEDT PACK 5 8801075

## (undated) RX ORDER — GLYCOPYRROLATE 0.2 MG/ML
INJECTION, SOLUTION INTRAMUSCULAR; INTRAVENOUS
Status: DISPENSED
Start: 2025-01-22

## (undated) RX ORDER — FENTANYL CITRATE 50 UG/ML
INJECTION, SOLUTION INTRAMUSCULAR; INTRAVENOUS
Status: DISPENSED
Start: 2025-01-22

## (undated) RX ORDER — EPHEDRINE SULFATE 50 MG/ML
INJECTION, SOLUTION INTRAMUSCULAR; INTRAVENOUS; SUBCUTANEOUS
Status: DISPENSED
Start: 2025-01-22

## (undated) RX ORDER — FENTANYL CITRATE-0.9 % NACL/PF 10 MCG/ML
PLASTIC BAG, INJECTION (ML) INTRAVENOUS
Status: DISPENSED
Start: 2025-01-22